# Patient Record
Sex: FEMALE | Race: BLACK OR AFRICAN AMERICAN | NOT HISPANIC OR LATINO | Employment: OTHER | URBAN - METROPOLITAN AREA
[De-identification: names, ages, dates, MRNs, and addresses within clinical notes are randomized per-mention and may not be internally consistent; named-entity substitution may affect disease eponyms.]

---

## 2010-11-03 LAB
CHOLESTEROL, TOTAL: 231 MG/DL (ref 0–200)
HDLC SERPL-MCNC: 56 MG/DL (ref 40–59)
LDLC SERPL CALC-MCNC: 160 MG/DL (ref 0–99)
TRIGL SERPL-MCNC: 77 MG/DL (ref 0–149)
VLDLC SERPL-MCNC: 15 MG/DL (ref 5–40)

## 2020-02-03 ENCOUNTER — TELEPHONE (OUTPATIENT)
Dept: INTERNAL MEDICINE | Facility: CLINIC | Age: 65
End: 2020-02-03

## 2020-02-03 ENCOUNTER — HOSPITAL ENCOUNTER (OUTPATIENT)
Dept: RADIOLOGY | Facility: HOSPITAL | Age: 65
Discharge: HOME | DRG: 744 | End: 2020-02-03
Attending: FAMILY MEDICINE
Payer: COMMERCIAL

## 2020-02-03 ENCOUNTER — OFFICE VISIT (OUTPATIENT)
Dept: INTERNAL MEDICINE | Facility: CLINIC | Age: 65
End: 2020-02-03
Payer: COMMERCIAL

## 2020-02-03 VITALS
SYSTOLIC BLOOD PRESSURE: 148 MMHG | HEIGHT: 63 IN | OXYGEN SATURATION: 99 % | TEMPERATURE: 99 F | HEART RATE: 93 BPM | BODY MASS INDEX: 31.92 KG/M2 | DIASTOLIC BLOOD PRESSURE: 80 MMHG | WEIGHT: 180.13 LBS

## 2020-02-03 DIAGNOSIS — R07.89 CHEST PRESSURE: ICD-10-CM

## 2020-02-03 DIAGNOSIS — R53.83 FATIGUE, UNSPECIFIED TYPE: ICD-10-CM

## 2020-02-03 DIAGNOSIS — I10 HYPERTENSION, UNSPECIFIED TYPE: ICD-10-CM

## 2020-02-03 DIAGNOSIS — R06.02 SOB (SHORTNESS OF BREATH): ICD-10-CM

## 2020-02-03 DIAGNOSIS — E04.1 THYROID NODULE: ICD-10-CM

## 2020-02-03 DIAGNOSIS — N93.9 VAGINAL BLEEDING: Primary | ICD-10-CM

## 2020-02-03 PROCEDURE — 99204 OFFICE O/P NEW MOD 45 MIN: CPT | Mod: S$GLB,,, | Performed by: FAMILY MEDICINE

## 2020-02-03 PROCEDURE — 93010 ELECTROCARDIOGRAM REPORT: CPT | Mod: S$GLB,,, | Performed by: INTERNAL MEDICINE

## 2020-02-03 PROCEDURE — 93005 ELECTROCARDIOGRAM TRACING: CPT | Mod: S$GLB,,, | Performed by: FAMILY MEDICINE

## 2020-02-03 PROCEDURE — 3077F PR MOST RECENT SYSTOLIC BLOOD PRESSURE >= 140 MM HG: ICD-10-PCS | Mod: CPTII,S$GLB,, | Performed by: FAMILY MEDICINE

## 2020-02-03 PROCEDURE — 99204 PR OFFICE/OUTPT VISIT, NEW, LEVL IV, 45-59 MIN: ICD-10-PCS | Mod: S$GLB,,, | Performed by: FAMILY MEDICINE

## 2020-02-03 PROCEDURE — 3008F PR BODY MASS INDEX (BMI) DOCUMENTED: ICD-10-PCS | Mod: CPTII,S$GLB,, | Performed by: FAMILY MEDICINE

## 2020-02-03 PROCEDURE — 1101F PT FALLS ASSESS-DOCD LE1/YR: CPT | Mod: CPTII,S$GLB,, | Performed by: FAMILY MEDICINE

## 2020-02-03 PROCEDURE — 71046 X-RAY EXAM CHEST 2 VIEWS: CPT | Mod: 26,,, | Performed by: RADIOLOGY

## 2020-02-03 PROCEDURE — 3008F BODY MASS INDEX DOCD: CPT | Mod: CPTII,S$GLB,, | Performed by: FAMILY MEDICINE

## 2020-02-03 PROCEDURE — 99999 PR PBB SHADOW E&M-EST. PATIENT-LVL IV: CPT | Mod: PBBFAC,,, | Performed by: FAMILY MEDICINE

## 2020-02-03 PROCEDURE — 99999 PR PBB SHADOW E&M-EST. PATIENT-LVL IV: ICD-10-PCS | Mod: PBBFAC,,, | Performed by: FAMILY MEDICINE

## 2020-02-03 PROCEDURE — 93010 EKG 12-LEAD: ICD-10-PCS | Mod: S$GLB,,, | Performed by: INTERNAL MEDICINE

## 2020-02-03 PROCEDURE — 3077F SYST BP >= 140 MM HG: CPT | Mod: CPTII,S$GLB,, | Performed by: FAMILY MEDICINE

## 2020-02-03 PROCEDURE — 3079F PR MOST RECENT DIASTOLIC BLOOD PRESSURE 80-89 MM HG: ICD-10-PCS | Mod: CPTII,S$GLB,, | Performed by: FAMILY MEDICINE

## 2020-02-03 PROCEDURE — 71046 XR CHEST PA AND LATERAL: ICD-10-PCS | Mod: 26,,, | Performed by: RADIOLOGY

## 2020-02-03 PROCEDURE — 93005 EKG 12-LEAD: ICD-10-PCS | Mod: S$GLB,,, | Performed by: FAMILY MEDICINE

## 2020-02-03 PROCEDURE — 3079F DIAST BP 80-89 MM HG: CPT | Mod: CPTII,S$GLB,, | Performed by: FAMILY MEDICINE

## 2020-02-03 PROCEDURE — 71046 X-RAY EXAM CHEST 2 VIEWS: CPT | Mod: TC,FY,PO

## 2020-02-03 PROCEDURE — 1101F PR PT FALLS ASSESS DOC 0-1 FALLS W/OUT INJ PAST YR: ICD-10-PCS | Mod: CPTII,S$GLB,, | Performed by: FAMILY MEDICINE

## 2020-02-03 RX ORDER — FUROSEMIDE 40 MG/1
1 TABLET ORAL 2 TIMES DAILY
Status: ON HOLD | COMMUNITY
Start: 2020-01-13 | End: 2020-02-06 | Stop reason: SDUPTHER

## 2020-02-03 RX ORDER — LOSARTAN POTASSIUM 50 MG/1
50 TABLET ORAL DAILY
Qty: 30 TABLET | Refills: 3 | Status: SHIPPED | OUTPATIENT
Start: 2020-02-03 | End: 2020-09-04 | Stop reason: SDUPTHER

## 2020-02-04 ENCOUNTER — TELEPHONE (OUTPATIENT)
Dept: OBSTETRICS AND GYNECOLOGY | Facility: CLINIC | Age: 65
End: 2020-02-04

## 2020-02-04 ENCOUNTER — HOSPITAL ENCOUNTER (OUTPATIENT)
Facility: HOSPITAL | Age: 65
Discharge: HOME | DRG: 744 | End: 2020-02-06
Attending: EMERGENCY MEDICINE | Admitting: EMERGENCY MEDICINE
Payer: COMMERCIAL

## 2020-02-04 ENCOUNTER — PATIENT MESSAGE (OUTPATIENT)
Dept: INTERNAL MEDICINE | Facility: CLINIC | Age: 65
End: 2020-02-04

## 2020-02-04 DIAGNOSIS — N95.0 POSTMENOPAUSAL BLEEDING: ICD-10-CM

## 2020-02-04 DIAGNOSIS — N93.8 DUB (DYSFUNCTIONAL UTERINE BLEEDING): ICD-10-CM

## 2020-02-04 DIAGNOSIS — D64.9 SYMPTOMATIC ANEMIA: Primary | ICD-10-CM

## 2020-02-04 DIAGNOSIS — D64.9 ANEMIA: ICD-10-CM

## 2020-02-04 PROBLEM — E61.1 LOW IRON: Status: ACTIVE | Noted: 2020-02-04

## 2020-02-04 PROBLEM — I51.7 CARDIOMEGALY: Status: ACTIVE | Noted: 2020-02-04

## 2020-02-04 PROBLEM — D75.839 THROMBOCYTOSIS: Status: ACTIVE | Noted: 2020-02-04

## 2020-02-04 PROBLEM — R74.01 ELEVATED AST (SGOT): Status: ACTIVE | Noted: 2020-02-04

## 2020-02-04 PROBLEM — I10 ESSENTIAL HYPERTENSION: Status: ACTIVE | Noted: 2020-02-04

## 2020-02-04 PROBLEM — D62 ACUTE BLOOD LOSS ANEMIA: Status: ACTIVE | Noted: 2020-02-04

## 2020-02-04 LAB
ABO + RH BLD: NORMAL
ALBUMIN SERPL BCP-MCNC: 3.9 G/DL (ref 3.5–5.2)
ALP SERPL-CCNC: 66 U/L (ref 55–135)
ALT SERPL W/O P-5'-P-CCNC: 29 U/L (ref 10–44)
ANION GAP SERPL CALC-SCNC: 10 MMOL/L (ref 8–16)
ANISOCYTOSIS BLD QL SMEAR: ABNORMAL
APTT BLDCRRT: 21.3 SEC (ref 21–32)
AST SERPL-CCNC: 46 U/L (ref 10–40)
BASOPHILS # BLD AUTO: 0.02 K/UL (ref 0–0.2)
BASOPHILS NFR BLD: 0.5 % (ref 0–1.9)
BILIRUB SERPL-MCNC: 0.4 MG/DL (ref 0.1–1)
BILIRUB UR QL STRIP: ABNORMAL
BLD GP AB SCN CELLS X3 SERPL QL: NORMAL
BLD PROD TYP BPU: NORMAL
BLD PROD TYP BPU: NORMAL
BLOOD UNIT EXPIRATION DATE: NORMAL
BLOOD UNIT EXPIRATION DATE: NORMAL
BLOOD UNIT TYPE CODE: 6200
BLOOD UNIT TYPE CODE: 6200
BLOOD UNIT TYPE: NORMAL
BLOOD UNIT TYPE: NORMAL
BUN SERPL-MCNC: 12 MG/DL (ref 8–23)
BURR CELLS BLD QL SMEAR: ABNORMAL
CALCIUM SERPL-MCNC: 8.8 MG/DL (ref 8.7–10.5)
CHLORIDE SERPL-SCNC: 106 MMOL/L (ref 95–110)
CLARITY UR: ABNORMAL
CO2 SERPL-SCNC: 22 MMOL/L (ref 23–29)
CODING SYSTEM: NORMAL
CODING SYSTEM: NORMAL
COLOR UR: ABNORMAL
CREAT SERPL-MCNC: 0.8 MG/DL (ref 0.5–1.4)
DIFFERENTIAL METHOD: ABNORMAL
DISPENSE STATUS: NORMAL
DISPENSE STATUS: NORMAL
EOSINOPHIL # BLD AUTO: 0.1 K/UL (ref 0–0.5)
EOSINOPHIL NFR BLD: 1.2 % (ref 0–8)
ERYTHROCYTE [DISTWIDTH] IN BLOOD BY AUTOMATED COUNT: 22.5 % (ref 11.5–14.5)
EST. GFR  (AFRICAN AMERICAN): >60 ML/MIN/1.73 M^2
EST. GFR  (NON AFRICAN AMERICAN): >60 ML/MIN/1.73 M^2
GLUCOSE SERPL-MCNC: 105 MG/DL (ref 70–110)
GLUCOSE UR QL STRIP: ABNORMAL
HCT VFR BLD AUTO: 16.5 % (ref 37–48.5)
HCV AB SERPL QL IA: NEGATIVE
HGB BLD-MCNC: 4.2 G/DL (ref 12–16)
HGB UR QL STRIP: ABNORMAL
HYPOCHROMIA BLD QL SMEAR: ABNORMAL
IMM GRANULOCYTES # BLD AUTO: 0.01 K/UL (ref 0–0.04)
IMM GRANULOCYTES NFR BLD AUTO: 0.2 % (ref 0–0.5)
INR PPP: 1 (ref 0.8–1.2)
KETONES UR QL STRIP: ABNORMAL
LEUKOCYTE ESTERASE UR QL STRIP: ABNORMAL
LYMPHOCYTES # BLD AUTO: 1.7 K/UL (ref 1–4.8)
LYMPHOCYTES NFR BLD: 41 % (ref 18–48)
MCH RBC QN AUTO: 14.6 PG (ref 27–31)
MCHC RBC AUTO-ENTMCNC: 25.5 G/DL (ref 32–36)
MCV RBC AUTO: 58 FL (ref 82–98)
MICROSCOPIC COMMENT: ABNORMAL
MONOCYTES # BLD AUTO: 0.4 K/UL (ref 0.3–1)
MONOCYTES NFR BLD: 9.8 % (ref 4–15)
NEUTROPHILS # BLD AUTO: 1.9 K/UL (ref 1.8–7.7)
NEUTROPHILS NFR BLD: 47.3 % (ref 38–73)
NITRITE UR QL STRIP: ABNORMAL
NRBC BLD-RTO: 1 /100 WBC
NUM UNITS TRANS PACKED RBC: NORMAL
NUM UNITS TRANS PACKED RBC: NORMAL
OVALOCYTES BLD QL SMEAR: ABNORMAL
PH UR STRIP: ABNORMAL [PH] (ref 5–8)
PLATELET # BLD AUTO: 427 K/UL (ref 150–350)
PLATELET BLD QL SMEAR: ABNORMAL
PMV BLD AUTO: 10.2 FL (ref 9.2–12.9)
POIKILOCYTOSIS BLD QL SMEAR: ABNORMAL
POTASSIUM SERPL-SCNC: 4 MMOL/L (ref 3.5–5.1)
PROT SERPL-MCNC: 7.3 G/DL (ref 6–8.4)
PROT UR QL STRIP: ABNORMAL
PROTHROMBIN TIME: 10.5 SEC (ref 9–12.5)
RBC # BLD AUTO: 2.87 M/UL (ref 4–5.4)
RBC #/AREA URNS HPF: >100 /HPF (ref 0–4)
SODIUM SERPL-SCNC: 138 MMOL/L (ref 136–145)
SP GR UR STRIP: ABNORMAL (ref 1–1.03)
TARGETS BLD QL SMEAR: ABNORMAL
URN SPEC COLLECT METH UR: ABNORMAL
UROBILINOGEN UR STRIP-ACNC: ABNORMAL EU/DL
WBC # BLD AUTO: 4.07 K/UL (ref 3.9–12.7)

## 2020-02-04 PROCEDURE — 25000003 PHARM REV CODE 250: Performed by: EMERGENCY MEDICINE

## 2020-02-04 PROCEDURE — 58100 BIOPSY OF UTERUS LINING: CPT

## 2020-02-04 PROCEDURE — 63600175 PHARM REV CODE 636 W HCPCS: Performed by: NURSE PRACTITIONER

## 2020-02-04 PROCEDURE — 86803 HEPATITIS C AB TEST: CPT

## 2020-02-04 PROCEDURE — 88305 TISSUE EXAM BY PATHOLOGIST: CPT | Performed by: PATHOLOGY

## 2020-02-04 PROCEDURE — G0378 HOSPITAL OBSERVATION PER HR: HCPCS

## 2020-02-04 PROCEDURE — 85730 THROMBOPLASTIN TIME PARTIAL: CPT

## 2020-02-04 PROCEDURE — 36430 TRANSFUSION BLD/BLD COMPNT: CPT | Mod: 59

## 2020-02-04 PROCEDURE — 25000003 PHARM REV CODE 250: Performed by: NURSE PRACTITIONER

## 2020-02-04 PROCEDURE — 86920 COMPATIBILITY TEST SPIN: CPT

## 2020-02-04 PROCEDURE — 85025 COMPLETE CBC W/AUTO DIFF WBC: CPT

## 2020-02-04 PROCEDURE — 21400001 HC TELEMETRY ROOM

## 2020-02-04 PROCEDURE — 96374 THER/PROPH/DIAG INJ IV PUSH: CPT | Mod: 59

## 2020-02-04 PROCEDURE — 25000003 PHARM REV CODE 250: Performed by: OBSTETRICS & GYNECOLOGY

## 2020-02-04 PROCEDURE — 88305 TISSUE EXAM BY PATHOLOGIST: CPT | Mod: 26,,, | Performed by: PATHOLOGY

## 2020-02-04 PROCEDURE — 93010 ELECTROCARDIOGRAM REPORT: CPT | Mod: ,,, | Performed by: INTERNAL MEDICINE

## 2020-02-04 PROCEDURE — 88305 TISSUE EXAM BY PATHOLOGIST: ICD-10-PCS | Mod: 26,,, | Performed by: PATHOLOGY

## 2020-02-04 PROCEDURE — 96376 TX/PRO/DX INJ SAME DRUG ADON: CPT | Mod: 59

## 2020-02-04 PROCEDURE — 99291 CRITICAL CARE FIRST HOUR: CPT | Mod: 25

## 2020-02-04 PROCEDURE — 93005 ELECTROCARDIOGRAM TRACING: CPT

## 2020-02-04 PROCEDURE — 86901 BLOOD TYPING SEROLOGIC RH(D): CPT

## 2020-02-04 PROCEDURE — 85610 PROTHROMBIN TIME: CPT

## 2020-02-04 PROCEDURE — 81000 URINALYSIS NONAUTO W/SCOPE: CPT

## 2020-02-04 PROCEDURE — 80053 COMPREHEN METABOLIC PANEL: CPT

## 2020-02-04 PROCEDURE — 93010 EKG 12-LEAD: ICD-10-PCS | Mod: ,,, | Performed by: INTERNAL MEDICINE

## 2020-02-04 PROCEDURE — P9016 RBC LEUKOCYTES REDUCED: HCPCS

## 2020-02-04 PROCEDURE — 36415 COLL VENOUS BLD VENIPUNCTURE: CPT

## 2020-02-04 RX ORDER — MEDROXYPROGESTERONE ACETATE 5 MG/1
10 TABLET ORAL DAILY
Status: DISCONTINUED | OUTPATIENT
Start: 2020-02-04 | End: 2020-02-06 | Stop reason: HOSPADM

## 2020-02-04 RX ORDER — ONDANSETRON 2 MG/ML
4 INJECTION INTRAMUSCULAR; INTRAVENOUS EVERY 4 HOURS PRN
Status: DISCONTINUED | OUTPATIENT
Start: 2020-02-04 | End: 2020-02-06 | Stop reason: HOSPADM

## 2020-02-04 RX ORDER — TALC
6 POWDER (GRAM) TOPICAL NIGHTLY PRN
Status: DISCONTINUED | OUTPATIENT
Start: 2020-02-04 | End: 2020-02-06 | Stop reason: HOSPADM

## 2020-02-04 RX ORDER — FERROUS SULFATE 325(65) MG
325 TABLET, DELAYED RELEASE (ENTERIC COATED) ORAL 2 TIMES DAILY WITH MEALS
Status: DISCONTINUED | OUTPATIENT
Start: 2020-02-06 | End: 2020-02-06 | Stop reason: HOSPADM

## 2020-02-04 RX ORDER — ACETAMINOPHEN 325 MG/1
650 TABLET ORAL EVERY 4 HOURS PRN
Status: DISCONTINUED | OUTPATIENT
Start: 2020-02-04 | End: 2020-02-06 | Stop reason: HOSPADM

## 2020-02-04 RX ORDER — ASCORBIC ACID 500 MG
500 TABLET ORAL NIGHTLY
Status: DISCONTINUED | OUTPATIENT
Start: 2020-02-04 | End: 2020-02-06 | Stop reason: HOSPADM

## 2020-02-04 RX ORDER — HYDROCODONE BITARTRATE AND ACETAMINOPHEN 500; 5 MG/1; MG/1
TABLET ORAL
Status: DISCONTINUED | OUTPATIENT
Start: 2020-02-04 | End: 2020-02-06 | Stop reason: HOSPADM

## 2020-02-04 RX ORDER — MEDROXYPROGESTERONE ACETATE 5 MG/1
5 TABLET ORAL DAILY
Status: DISCONTINUED | OUTPATIENT
Start: 2020-02-04 | End: 2020-02-04

## 2020-02-04 RX ORDER — LOSARTAN POTASSIUM 50 MG/1
50 TABLET ORAL DAILY
Status: DISCONTINUED | OUTPATIENT
Start: 2020-02-05 | End: 2020-02-04

## 2020-02-04 RX ORDER — POLYETHYLENE GLYCOL 3350 17 G/17G
17 POWDER, FOR SOLUTION ORAL 2 TIMES DAILY PRN
Status: DISCONTINUED | OUTPATIENT
Start: 2020-02-04 | End: 2020-02-06 | Stop reason: HOSPADM

## 2020-02-04 RX ORDER — FUROSEMIDE 10 MG/ML
20 INJECTION INTRAMUSCULAR; INTRAVENOUS 2 TIMES DAILY PRN
Status: COMPLETED | OUTPATIENT
Start: 2020-02-04 | End: 2020-02-04

## 2020-02-04 RX ORDER — SODIUM CHLORIDE 0.9 % (FLUSH) 0.9 %
10 SYRINGE (ML) INJECTION
Status: DISCONTINUED | OUTPATIENT
Start: 2020-02-04 | End: 2020-02-06 | Stop reason: HOSPADM

## 2020-02-04 RX ORDER — FUROSEMIDE 10 MG/ML
20 INJECTION INTRAMUSCULAR; INTRAVENOUS ONCE
Status: DISCONTINUED | OUTPATIENT
Start: 2020-02-04 | End: 2020-02-04

## 2020-02-04 RX ORDER — FUROSEMIDE 40 MG/1
40 TABLET ORAL 2 TIMES DAILY
Status: DISCONTINUED | OUTPATIENT
Start: 2020-02-04 | End: 2020-02-04

## 2020-02-04 RX ORDER — LOSARTAN POTASSIUM 25 MG/1
25 TABLET ORAL DAILY
Status: DISCONTINUED | OUTPATIENT
Start: 2020-02-05 | End: 2020-02-06 | Stop reason: HOSPADM

## 2020-02-04 RX ORDER — FUROSEMIDE 40 MG/1
40 TABLET ORAL 2 TIMES DAILY
Status: DISCONTINUED | OUTPATIENT
Start: 2020-02-05 | End: 2020-02-06 | Stop reason: HOSPADM

## 2020-02-04 RX ADMIN — MEDROXYPROGESTERONE ACETATE 5 MG: 5 TABLET ORAL at 11:02

## 2020-02-04 RX ADMIN — MEDROXYPROGESTERONE ACETATE 10 MG: 5 TABLET ORAL at 03:02

## 2020-02-04 RX ADMIN — OXYCODONE HYDROCHLORIDE AND ACETAMINOPHEN 500 MG: 500 TABLET ORAL at 08:02

## 2020-02-04 RX ADMIN — FUROSEMIDE 20 MG: 10 INJECTION, SOLUTION INTRAMUSCULAR; INTRAVENOUS at 02:02

## 2020-02-04 RX ADMIN — FUROSEMIDE 20 MG: 10 INJECTION, SOLUTION INTRAMUSCULAR; INTRAVENOUS at 05:02

## 2020-02-04 NOTE — H&P
"[facility] Hospital Medicine  History & Physical    Patient Name: Liliane Ford  MRN: [id number]  Admission Date: 2/4/2020  Attending Physician: Ihsan Sullivan MD   Primary Care Provider: Primary Doctor No    Patient seen in the emergency room    Patient information was obtained from patient and ER records.     Subjective:     Principal Problem:Symptomatic anemia    Chief Complaint:   Chief Complaint   Patient presents with    Anemia     sent from Dr. Martin for low blood count Hgb-4 & Hct-17; pt has been having heavy vaginal bleeding off and on since august        HPI: This is a 64-year-old female who came into the emergency room with reports of extreme fatigue and ongoing vaginal bleeding with blood clots.  Patient reports she has been having intermittent heavy vaginal bleeding, at times with large blood clots, since August of 2019. Patient states she has not followed up with a primary care physician or a gyn or any physician at all for this problem.   Patient reports that her menses.  Initially when she was 56 years old and she had not had any problems until the intermittent bleeding began in August 2019 all of a sudden.  She denies any history of hysterectomy.  She reports her last Pap smear and mammogram was in 2004 and work "okay".  She denies any  previously diagnosed vaginal or gyn issues.  She denies any abdominal cramps or vaginal discharge.  Currently no nausea, vomiting, or abdominal pain.  She denies any diarrhea or constipation and states her last colonoscopy was greater than 10 years ago and was "okay".  Patient states recently she was started on some "blood pressure medication and a fluid pill" which she takes intermittently.   Patient was evaluated emergency room where she was noted to have  A hemoglobin of 4.2 and hematocrit of 17.9.  Patient has been placed in the hospital for further evaluation, recommendation, and blood transfusion.         Past Medical History:   Diagnosis " Date    Pulmonary edema        Past Surgical History:   Procedure Laterality Date     SECTION      x 2       Review of patient's allergies indicates:   Allergen Reactions    Penicillins Rash       No current facility-administered medications on file prior to encounter.      Current Outpatient Medications on File Prior to Encounter   Medication Sig    furosemide (LASIX) 40 MG tablet Take 1 tablet by mouth 2 (two) times daily.    losartan (COZAAR) 50 MG tablet Take 1 tablet (50 mg total) by mouth once daily.     Family History     Problem Relation (Age of Onset)    Hypertension Mother        Tobacco Use    Smoking status: Former Smoker    Smokeless tobacco: Never Used   Substance and Sexual Activity    Alcohol use: Never     Frequency: Never    Drug use: Never    Sexual activity: Not Currently     Partners: Male     Review of Systems   Constitutional: Positive for activity change, appetite change and fatigue. Negative for chills, diaphoresis and fever.   HENT: Negative for ear discharge, ear pain and facial swelling.    Eyes: Negative for pain and redness.   Respiratory: Positive for shortness of breath. Negative for cough.         Patient reports shortness of breath upon exertion   Cardiovascular: Positive for palpitations and leg swelling.        Patient reports palpitations upon exertion    Patient reports occasional bilateral lower extremity edema   Gastrointestinal: Negative for abdominal distention, abdominal pain, constipation, diarrhea, nausea and vomiting.   Endocrine: Negative for polydipsia and polyphagia.   Genitourinary: Positive for vaginal bleeding. Negative for difficulty urinating, dysuria, flank pain and hematuria.        Patient reports enter bleeding since 2019, occasionally with large blood clots.    Patient states she has not discussed this  problem with a physician prior to today.   Musculoskeletal: Negative for gait problem, neck pain and neck stiffness.   Skin:  Positive for pallor.   Allergic/Immunologic: Negative for food allergies.   Neurological: Positive for weakness. Negative for seizures, facial asymmetry and speech difficulty.   Hematological: Does not bruise/bleed easily.   Psychiatric/Behavioral: Negative for agitation, behavioral problems, confusion, hallucinations and suicidal ideas. The patient is not nervous/anxious.      Objective:     Vital Signs (Most Recent):  Temp: 97.8 °F (36.6 °C) (02/04/20 1431)  Pulse: 83 (02/04/20 1500)  Resp: 16 (02/04/20 1431)  BP: 133/67 (02/04/20 1431)  SpO2: 100 % (02/04/20 1431) Vital Signs (24h Range):  Temp:  [97.4 °F (36.3 °C)-98.7 °F (37.1 °C)] 97.8 °F (36.6 °C)  Pulse:  [82-99] 83  Resp:  [16-20] 16  SpO2:  [100 %] 100 %  BP: (133-149)/(67-85) 133/67     Weight: 81 kg (178 lb 7.4 oz)  Body mass index is 31.61 kg/m².    Physical Exam   Constitutional: She is oriented to person, place, and time. She appears well-developed and well-nourished.   Weak, fatigued   HENT:   Head: Normocephalic and atraumatic.   Eyes: Pupils are equal, round, and reactive to light. Conjunctivae and EOM are normal. Right eye exhibits no discharge. Left eye exhibits no discharge.   Neck: Normal range of motion. Neck supple. No JVD present.   Cardiovascular: Normal rate, regular rhythm and intact distal pulses.   Pulmonary/Chest: Breath sounds normal. No respiratory distress.   Short of breath upon exertion   Abdominal: Soft. Bowel sounds are normal. She exhibits no distension. There is no tenderness. There is no guarding.   Genitourinary:   Genitourinary Comments: Not examined   Musculoskeletal: Normal range of motion. She exhibits no edema.   Neurological: She is alert and oriented to person, place, and time. No cranial nerve deficit.   Skin: Skin is warm and dry. Capillary refill takes less than 2 seconds. She is not diaphoretic. There is pallor.   Psychiatric: She has a normal mood and affect. Her behavior is normal. Judgment and thought content  normal.         CRANIAL NERVES     CN III, IV, VI   Pupils are equal, round, and reactive to light.  Extraocular motions are normal.        Significant Labs: Reviewed       Folate and vitamin B12 level ordered      Significant Imaging: Reviewed    Assessment/Plan:     * Symptomatic anemia   Secondary to acute blood loss secondary to postmenopausal vaginal bleeding-   Patient to receive 2 units of packed red blood cells today   Repeat CBC in a.m.   Gyn consulted for further and evaluation recommendations      Low iron   Patient with low iron, low ferritin, elevated TIBC, and saturated ferritin unable to be measured   Patient to receive 2 units of packed red blood cells today -will give 1 dose of 200 mg of IV Venofer  in a.m.   Add multivitamin, ferrous sulfate, and p.m. Vitamin-C  for home use   Will go ahead and check folate and vitamin B12 level also       Cardiomegaly   As noted on chest x-ray, patient reports a history of bilateral lower extremity edema   will give a dose of IV Lasix after 1st and 2nd unit of packed red blood cells and monitor closely for any signs of volume overload      Elevated AST (SGOT)   Monitor   No reported history of alcohol   Repeat CMP in the morning       Thrombocytosis   Monitor   Patient currently vaginal bleeding   Repeat CBC in a.m.       Essential hypertension   Monitor   Resume home Lasix tomorrow, resume home losartan at half dose tomorrow   Titrate antihypertensives as needed       Postmenopausal bleeding   Gyn consulted   Provera started   Monitor anemia         VTE Risk Mitigation (From admission, onward)         Ordered     Place BEVERLY hose  Until discontinued      02/04/20 1610     IP VTE HIGH RISK PATIENT  Once      02/04/20 1610                Time spent seeing patient( greater than 1/2 spent in direct contact) : 74 minutes       ESTHELA Johnson  Department of Hospital Medicine   [facility]

## 2020-02-04 NOTE — PROGRESS NOTES
"Subjective:      Patient ID: Liliane Ford is a 64 y.o. female.    Chief Complaint: Fatigue (said it is her heart ) and Foot Swelling (vaginal bleeding )    HPI  63 yo female here to establish care.  She reports that she started having vaginal bleeding back in Aug 2019.  It would come and go. Reports heavy/clotting at times.  She says she bled at one point for 30 days.  She has been in menopause she reports since age 56.  She was not evaluated.  Lately, she is feeling more tired and can't exercise/do cross fit like she was.  She feels almost pressure in her chest.  No chest pain.  She has leg swelling, was seen in UC and given lasix.  Was also given losartan at some point for BP but she doesn't like meds so she didn't take it.  No weight loss, more gain due to not exercising like she had been.  She is more pale and is eating a lot of ice.    History reviewed. No pertinent past medical history.  Family History   Problem Relation Age of Onset    Hypertension Mother      Past Surgical History:   Procedure Laterality Date     SECTION      x 2     Social History     Tobacco Use    Smoking status: Former Smoker    Smokeless tobacco: Never Used   Substance Use Topics    Alcohol use: Never     Frequency: Never    Drug use: Never       BP (!) 148/80   Pulse 93   Temp 98.9 °F (37.2 °C) (Oral)   Ht 5' 3" (1.6 m)   Wt 81.7 kg (180 lb 1.9 oz)   SpO2 99%   BMI 31.91 kg/m²     Review of Systems   Constitutional: Positive for activity change and fatigue. Negative for appetite change, chills, diaphoresis, fever and unexpected weight change.   HENT: Negative for ear pain, hearing loss, postnasal drip, rhinorrhea and tinnitus.    Eyes: Negative for visual disturbance.   Respiratory: Positive for shortness of breath. Negative for cough and wheezing.    Cardiovascular: Positive for leg swelling. Negative for chest pain and palpitations.   Gastrointestinal: Negative for abdominal distention.   Genitourinary: " Positive for vaginal bleeding. Negative for dysuria, frequency, hematuria and urgency.   Musculoskeletal: Negative for back pain and joint swelling.   Neurological: Positive for weakness. Negative for headaches.   Hematological: Negative for adenopathy.   Psychiatric/Behavioral: Negative for confusion and decreased concentration.       Objective:     Physical Exam   Constitutional: She is oriented to person, place, and time. She appears well-developed and well-nourished. No distress.   HENT:   Right Ear: External ear normal.   Left Ear: External ear normal.   Nose: Nose normal.   Mouth/Throat: Oropharynx is clear and moist.   Eyes: Pupils are equal, round, and reactive to light. Conjunctivae are normal.   Neck: Normal range of motion. Neck supple. Carotid bruit is not present.   Cardiovascular: Normal rate and regular rhythm.   Murmur heard.  Pulmonary/Chest: Effort normal and breath sounds normal. No respiratory distress. She has no wheezes. She has no rales.   Abdominal: Soft. Bowel sounds are normal. She exhibits no distension. There is no tenderness. There is no guarding.   Musculoskeletal: She exhibits edema.   BL trace edema   Neurological: She is alert and oriented to person, place, and time. No cranial nerve deficit.   Skin: Skin is warm and dry. There is pallor.   Psychiatric: She has a normal mood and affect. Her behavior is normal. Judgment and thought content normal.   Nursing note and vitals reviewed.      Lab Results   Component Value Date    WBC 5.14 02/03/2020    HGB 4.2 (LL) 02/03/2020    HCT 17.9 (LL) 02/03/2020     (H) 02/03/2020    CHOL 185 11/08/2007    TRIG 50 11/08/2007    HDL 51 11/08/2007    ALT 31 02/03/2020    AST 32 02/03/2020     02/03/2020    K 3.5 02/03/2020     02/03/2020    CREATININE 0.8 02/03/2020    BUN 18 02/03/2020    CO2 25 02/03/2020    TSH 3.234 02/03/2020       Assessment:     1. Vaginal bleeding    2. Fatigue, unspecified type    3. Thyroid nodule    4.  Chest pressure    5. SOB (shortness of breath)    6. Hypertension, unspecified type         Plan:     Vaginal bleeding  -     Ambulatory referral to Gynecology  -     CBC auto differential; Future; Expected date: 02/03/2020  -     Ferritin; Future; Expected date: 02/03/2020  -     Iron and TIBC; Future; Expected date: 02/03/2020  -     US Pelvis Complete Non OB; Future; Expected date: 02/03/2020    Fatigue, unspecified type  -     CBC auto differential; Future; Expected date: 02/03/2020  -     Comprehensive metabolic panel; Future; Expected date: 02/03/2020  -     TSH; Future; Expected date: 02/03/2020  -     Ferritin; Future; Expected date: 02/03/2020  -     Iron and TIBC; Future; Expected date: 02/03/2020  -     2D Echo w/ Color Flow Doppler; Future    Thyroid nodule  -     TSH; Future; Expected date: 02/03/2020    Chest pressure  -     EKG 12-lead  -     2D Echo w/ Color Flow Doppler; Future  -     X-Ray Chest PA And Lateral; Future; Expected date: 02/03/2020    SOB (shortness of breath)  -     2D Echo w/ Color Flow Doppler; Future  -     X-Ray Chest PA And Lateral; Future; Expected date: 02/03/2020    Hypertension, unspecified type    Other orders  -     losartan (COZAAR) 50 MG tablet; Take 1 tablet (50 mg total) by mouth once daily.  Dispense: 30 tablet; Refill: 3    Labs ordered//critical H&H//pt called and advised to go to ER overnight  Needs to see Gyn, get vag US  Suspect she has anemia//likely cause for her symptoms  EKG done/CXR done  Advised to use lasix daily  Losartan sent in for BP  Will get Echo  Short term f/u

## 2020-02-04 NOTE — SUBJECTIVE & OBJECTIVE
Past Medical History:   Diagnosis Date    Pulmonary edema        Past Surgical History:   Procedure Laterality Date     SECTION      x 2       Review of patient's allergies indicates:   Allergen Reactions    Penicillins Rash       No current facility-administered medications on file prior to encounter.      Current Outpatient Medications on File Prior to Encounter   Medication Sig    furosemide (LASIX) 40 MG tablet Take 1 tablet by mouth 2 (two) times daily.    losartan (COZAAR) 50 MG tablet Take 1 tablet (50 mg total) by mouth once daily.     Family History     Problem Relation (Age of Onset)    Hypertension Mother        Tobacco Use    Smoking status: Former Smoker    Smokeless tobacco: Never Used   Substance and Sexual Activity    Alcohol use: Never     Frequency: Never    Drug use: Never    Sexual activity: Not Currently     Partners: Male     Review of Systems   Constitutional: Positive for activity change, appetite change and fatigue. Negative for chills, diaphoresis and fever.   HENT: Negative for ear discharge, ear pain and facial swelling.    Eyes: Negative for pain and redness.   Respiratory: Positive for shortness of breath. Negative for cough.         Patient reports shortness of breath upon exertion   Cardiovascular: Positive for palpitations and leg swelling.        Patient reports palpitations upon exertion    Patient reports occasional bilateral lower extremity edema   Gastrointestinal: Negative for abdominal distention, abdominal pain, constipation, diarrhea, nausea and vomiting.   Endocrine: Negative for polydipsia and polyphagia.   Genitourinary: Positive for vaginal bleeding. Negative for difficulty urinating, dysuria, flank pain and hematuria.        Patient reports enter bleeding since 2019, occasionally with large blood clots.    Patient states she has not discussed this  problem with a physician prior to today.   Musculoskeletal: Negative for gait problem, neck  pain and neck stiffness.   Skin: Positive for pallor.   Allergic/Immunologic: Negative for food allergies.   Neurological: Positive for weakness. Negative for seizures, facial asymmetry and speech difficulty.   Hematological: Does not bruise/bleed easily.   Psychiatric/Behavioral: Negative for agitation, behavioral problems, confusion, hallucinations and suicidal ideas. The patient is not nervous/anxious.      Objective:     Vital Signs (Most Recent):  Temp: 97.8 °F (36.6 °C) (02/04/20 1431)  Pulse: 83 (02/04/20 1500)  Resp: 16 (02/04/20 1431)  BP: 133/67 (02/04/20 1431)  SpO2: 100 % (02/04/20 1431) Vital Signs (24h Range):  Temp:  [97.4 °F (36.3 °C)-98.7 °F (37.1 °C)] 97.8 °F (36.6 °C)  Pulse:  [82-99] 83  Resp:  [16-20] 16  SpO2:  [100 %] 100 %  BP: (133-149)/(67-85) 133/67     Weight: 81 kg (178 lb 7.4 oz)  Body mass index is 31.61 kg/m².    Physical Exam   Constitutional: She is oriented to person, place, and time. She appears well-developed and well-nourished.   Weak, fatigued   HENT:   Head: Normocephalic and atraumatic.   Eyes: Pupils are equal, round, and reactive to light. Conjunctivae and EOM are normal. Right eye exhibits no discharge. Left eye exhibits no discharge.   Neck: Normal range of motion. Neck supple. No JVD present.   Cardiovascular: Normal rate, regular rhythm and intact distal pulses.   Pulmonary/Chest: Breath sounds normal. No respiratory distress.   Short of breath upon exertion   Abdominal: Soft. Bowel sounds are normal. She exhibits no distension. There is no tenderness. There is no guarding.   Genitourinary:   Genitourinary Comments: Not examined   Musculoskeletal: Normal range of motion. She exhibits no edema.   Neurological: She is alert and oriented to person, place, and time. No cranial nerve deficit.   Skin: Skin is warm and dry. Capillary refill takes less than 2 seconds. She is not diaphoretic. There is pallor.   Psychiatric: She has a normal mood and affect. Her behavior is  normal. Judgment and thought content normal.         CRANIAL NERVES     CN III, IV, VI   Pupils are equal, round, and reactive to light.  Extraocular motions are normal.        Significant Labs: Reviewed       Folate and vitamin B12 level ordered      Significant Imaging: Reviewed

## 2020-02-04 NOTE — ASSESSMENT & PLAN NOTE
Patient with low iron, low ferritin, elevated TIBC, and saturated ferritin unable to be measured   Patient to receive 2 units of packed red blood cells today -will give 1 dose of 200 mg of IV Venofer  in a.m.   Add multivitamin, ferrous sulfate, and p.m. Vitamin-C  for home use   Will go ahead and check folate and vitamin B12 level also

## 2020-02-04 NOTE — PLAN OF CARE
Patient AAOx4. VSS.  Patient remained afebrile throughout the shift.  Heart rate closely monitored   Patient NSR on monitor.   2 Units of PRBC's infusing as per orders  Monitor H/H  Patient remained free of falls this shift.  Plan of care reviewed.  Patient verbalized understanding.  Patient moving/turning independently  Frequent weight shifting encouraged.  Bed low, siderails up x2, wheels locked, call light in reach.  Patient instructed to call for assistance.  Hourly rounding completed.  Will continue to monitor.

## 2020-02-04 NOTE — TELEPHONE ENCOUNTER
Called pt and she said she did not go to ER.  She is on her way to her US and echo appointments this morning at [facility].  Please advise?

## 2020-02-04 NOTE — HPI
"This is a 64-year-old female who came into the emergency room with reports of extreme fatigue and ongoing vaginal bleeding with blood clots.  Patient reports she has been having intermittent heavy vaginal bleeding, at times with large blood clots, since August of 2019. Patient states she has not followed up with a primary care physician or a gyn or any physician at all for this problem.   Patient reports that her menses.  Initially when she was 56 years old and she had not had any problems until the intermittent bleeding began in August 2019 all of a sudden.  She denies any history of hysterectomy.  She reports her last Pap smear and mammogram was in 2004 and work "okay".  She denies any  previously diagnosed vaginal or gyn issues.  She denies any abdominal cramps or vaginal discharge.  Currently no nausea, vomiting, or abdominal pain.  She denies any diarrhea or constipation and states her last colonoscopy was greater than 10 years ago and was "okay".  Patient states recently she was started on some "blood pressure medication and a fluid pill" which she takes intermittently.   Patient was evaluated emergency room where she was noted to have  A hemoglobin of 4.2 and hematocrit of 17.9.  Patient has been placed in the hospital for further evaluation, recommendation, and blood transfusion.       "

## 2020-02-04 NOTE — ED PROVIDER NOTES
SCRIBE #1 NOTE: I, Alexis Santos, am scribing for, and in the presence of, Daniel Menezes MD. I have scribed the entire note.      History      Chief Complaint   Patient presents with    Anemia     sent from Dr. Martin for low blood count Hgb-4 & Hct-17; pt has been having heavy vaginal bleeding off and on since august     Review of patient's allergies indicates:   Allergen Reactions    Penicillins Rash        HPI   HPI    2020, 9:17 AM   History obtained from the patient      History of Present Illness: Liliane Ford is a 64 y.o. female patient who presents to the Emergency Department for anemia. Pt was referred to the ED by Dr. Martin ([facility]) for further evaluation of low H&H of 4.2/17.9. Symptoms are constant and moderate in severity. No mitigating or exacerbating factors reported. Associated sxs include vaginal bleeding, fatigue, and dyspnea on exertion. Patient denies any fever, chills, n/v/d, CP, weakness, numbness, dizziness, headache, and all other sxs at this time. No further complaints or concerns at this time.     Arrival mode: Personal vehicle    PCP: Primary Doctor No       Past Medical History:  Past Medical History:   Diagnosis Date    Pulmonary edema        Past Surgical History:  Past Surgical History:   Procedure Laterality Date     SECTION      x 2         Family History:  Family History   Problem Relation Age of Onset    Hypertension Mother        Social History:  Social History     Tobacco Use    Smoking status: Former Smoker    Smokeless tobacco: Never Used   Substance and Sexual Activity    Alcohol use: Never     Frequency: Never    Drug use: Never    Sexual activity: Not Currently     Partners: Male       ROS   Review of Systems   Constitutional: Positive for fatigue. Negative for chills, diaphoresis and fever.   HENT: Negative for sore throat.    Respiratory: Positive for shortness of breath (on exertion).    Cardiovascular: Negative for chest pain.    Gastrointestinal: Negative for diarrhea, nausea and vomiting.   Genitourinary: Positive for vaginal bleeding. Negative for dysuria.   Musculoskeletal: Negative for back pain.   Skin: Negative for rash.   Neurological: Negative for dizziness, weakness, light-headedness and headaches.   Hematological: Does not bruise/bleed easily.   All other systems reviewed and are negative.    Physical Exam      Initial Vitals [02/04/20 0906]   BP Pulse Resp Temp SpO2   (!) 143/85 96 18 98.7 °F (37.1 °C) 100 %      MAP       --          Physical Exam  Nursing Notes and Vital Signs Reviewed.  Constitutional: Patient is in no acute distress. Well-developed and well-nourished.  Head: Atraumatic. Normocephalic.  Eyes: PERRL. EOM intact. Conjunctivae are pale. No scleral icterus.  ENT: Mucous membranes are moist. Oropharynx is clear and symmetric.    Neck: Supple. Full ROM. No lymphadenopathy.  Cardiovascular: Regular rate. Regular rhythm. No murmurs, rubs, or gallops. Distal pulses are 2+ and symmetric.  Pulmonary/Chest: No respiratory distress. Clear to auscultation bilaterally. No wheezing or rales.  Abdominal: Soft and non-distended.  There is no tenderness.  No rebound, guarding, or rigidity.   Pelvic: A female chaperone was present for this examination. Nl external inspection. No lesions or abnormalities were visible on the labia majora or minora. Cervical os is closed. There is no CMT. There is blood in the vaginal vault. No discharge. No adnexal tenderness. No adnexal masses.  Musculoskeletal: Moves all extremities. No obvious deformities. No edema.  Skin: Warm and dry. Pale.  Neurological:  Alert, awake, and appropriate.  Normal speech.  No acute focal neurological deficits are appreciated.  Psychiatric: Normal affect. Good eye contact. Appropriate in content.    ED Course    Critical Care  Date/Time: 2/4/2020 11:38 AM  Performed by: Daniel Menezes MD  Authorized by: Daniel Menezes MD   Direct patient  "critical care time: 20 minutes  Additional history critical care time: 5 minutes  Ordering / reviewing critical care time: 5 minutes  Documentation critical care time: 5 minutes  Consulting other physicians critical care time: 5 minutes  Total critical care time (exclusive of procedural time) : 40 minutes  Critical care time was exclusive of separately billable procedures and treating other patients and teaching time.  Critical care was necessary to treat or prevent imminent or life-threatening deterioration of the following conditions: Symptomatic anemia.  Critical care was time spent personally by me on the following activities: blood draw for specimens, development of treatment plan with patient or surrogate, discussions with consultants, interpretation of cardiac output measurements, evaluation of patient's response to treatment, examination of patient, obtaining history from patient or surrogate, ordering and performing treatments and interventions, ordering and review of laboratory studies, ordering and review of radiographic studies, pulse oximetry and re-evaluation of patient's condition.        ED Vital Signs:  Vitals:    02/04/20 0906 02/04/20 1045 02/04/20 1107 02/04/20 1122   BP: (!) 143/85 (!) 140/83 136/75 136/76   Pulse: 96 87 86 84   Resp: 18 20 18 20   Temp: 98.7 °F (37.1 °C) 98.4 °F (36.9 °C) 98.4 °F (36.9 °C) 98.3 °F (36.8 °C)   TempSrc: Oral Oral Oral Oral   SpO2: 100% 100% 100% 100%   Weight: 81 kg (178 lb 7.4 oz)      Height: 5' 3" (1.6 m)          Abnormal Lab Results:  Labs Reviewed   CBC W/ AUTO DIFFERENTIAL - Abnormal; Notable for the following components:       Result Value    RBC 2.87 (*)     Hemoglobin 4.2 (*)     Hematocrit 16.5 (*)     Mean Corpuscular Volume 58 (*)     Mean Corpuscular Hemoglobin 14.6 (*)     Mean Corpuscular Hemoglobin Conc 25.5 (*)     RDW 22.5 (*)     Platelets 427 (*)     nRBC 1 (*)     Platelet Estimate Increased (*)     All other components within normal limits "    Narrative:      HGB, HCT  critical result(s) called and verbal readback obtained   from JEISON MCCORMACK RN by NICO 02/04/2020 10:01   COMPREHENSIVE METABOLIC PANEL - Abnormal; Notable for the following components:    CO2 22 (*)     AST 46 (*)     All other components within normal limits   HEPATITIS C ANTIBODY   APTT   PROTIME-INR   TYPE & SCREEN   PREPARE RBC SOFT        All Lab Results:  Results for orders placed or performed during the hospital encounter of 02/04/20   Hepatitis C antibody   Result Value Ref Range    Hepatitis C Ab Negative Negative   APTT   Result Value Ref Range    aPTT 21.3 21.0 - 32.0 sec   Protime-INR   Result Value Ref Range    Prothrombin Time 10.5 9.0 - 12.5 sec    INR 1.0 0.8 - 1.2   CBC auto differential   Result Value Ref Range    WBC 4.07 3.90 - 12.70 K/uL    RBC 2.87 (L) 4.00 - 5.40 M/uL    Hemoglobin 4.2 (LL) 12.0 - 16.0 g/dL    Hematocrit 16.5 (LL) 37.0 - 48.5 %    Mean Corpuscular Volume 58 (L) 82 - 98 fL    Mean Corpuscular Hemoglobin 14.6 (L) 27.0 - 31.0 pg    Mean Corpuscular Hemoglobin Conc 25.5 (L) 32.0 - 36.0 g/dL    RDW 22.5 (H) 11.5 - 14.5 %    Platelets 427 (H) 150 - 350 K/uL    MPV 10.2 9.2 - 12.9 fL    Immature Granulocytes 0.2 0.0 - 0.5 %    Gran # (ANC) 1.9 1.8 - 7.7 K/uL    Immature Grans (Abs) 0.01 0.00 - 0.04 K/uL    Lymph # 1.7 1.0 - 4.8 K/uL    Mono # 0.4 0.3 - 1.0 K/uL    Eos # 0.1 0.0 - 0.5 K/uL    Baso # 0.02 0.00 - 0.20 K/uL    nRBC 1 (A) 0 /100 WBC    Gran% 47.3 38.0 - 73.0 %    Lymph% 41.0 18.0 - 48.0 %    Mono% 9.8 4.0 - 15.0 %    Eosinophil% 1.2 0.0 - 8.0 %    Basophil% 0.5 0.0 - 1.9 %    Platelet Estimate Increased (A)     Aniso Moderate     Poik Moderate     Hypo Moderate     Ovalocytes Moderate     Target Cells Occasional     German Cells Occasional     Differential Method Automated    Comprehensive metabolic panel   Result Value Ref Range    Sodium 138 136 - 145 mmol/L    Potassium 4.0 3.5 - 5.1 mmol/L    Chloride 106 95 - 110 mmol/L    CO2 22 (L) 23 -  29 mmol/L    Glucose 105 70 - 110 mg/dL    BUN, Bld 12 8 - 23 mg/dL    Creatinine 0.8 0.5 - 1.4 mg/dL    Calcium 8.8 8.7 - 10.5 mg/dL    Total Protein 7.3 6.0 - 8.4 g/dL    Albumin 3.9 3.5 - 5.2 g/dL    Total Bilirubin 0.4 0.1 - 1.0 mg/dL    Alkaline Phosphatase 66 55 - 135 U/L    AST 46 (H) 10 - 40 U/L    ALT 29 10 - 44 U/L    Anion Gap 10 8 - 16 mmol/L    eGFR if African American >60 >60 mL/min/1.73 m^2    eGFR if non African American >60 >60 mL/min/1.73 m^2   Type & Screen   Result Value Ref Range    Group & Rh A POS     Indirect Soraya NEG    Prepare RBC 2 Units; symptomatic anemia   Result Value Ref Range    UNIT NUMBER [id number]     Product Code M0903P31     DISPENSE STATUS CROSSMATCHED     CODING SYSTEM EDSV628     Unit Blood Type Code 6200     Unit Blood Type A POS     Unit Expiration [id number]     UNIT NUMBER [id number]     Product Code I4351T76     DISPENSE STATUS ISSUED     CODING SYSTEM QPYO580     Unit Blood Type Code 6200     Unit Blood Type A POS     Unit Expiration [id number]      Imaging Results:  Imaging Results          X-Ray Chest 1 View (Final result)  Result time 02/04/20 10:12:50    Final result by Nikolai Suarez MD (02/04/20 10:12:50)                 Impression:      No acute findings.      Electronically signed by: Nikolai Suarez MD  Date:    02/04/2020  Time:    10:12             Narrative:    EXAMINATION:  XR CHEST 1 VIEW    CLINICAL HISTORY:  Anemia, unspecified    TECHNIQUE:  AP view of the chest was performed.    COMPARISON:  02/03/2020    FINDINGS:  Stable cardiomegaly and pulmonary vascular prominence.  Stable left mid lung bandlike scarring.  No acute infiltrates.  No pneumothorax.  No acute osseous findings demonstrated.                               The EKG was ordered, reviewed, and independently interpreted by the ED provider.  Interpretation time: 09:46  Rate: 90 BPM  Rhythm: Unusual P axis, possible ectopic atrial rhythm.  Interpretation: Left axis deviation. Possible  anterolateral infarct, age undetermined. No STEMI.           The Emergency Provider reviewed the vital signs and test results, which are outlined above.    ED Discussion     10:32 AM: Discussed pt's case with Dr. Aquino (OB/GYN) who recommends starting the pt on Provera, and having the pt follow up with outpatient OB/GYN for postmenopausal bleeding.    11:32 AM: Discussed case with DIEGO Canela ([facility]). Dr. Sullivan agrees with current care and management of pt and accepts admission.   Admitting Service: [facility]  Admitting Physician: Dr. Sullivan  Admit to: Inpatient Med Tele    11:33 AM: Re-evaluated pt. I have discussed test results, shared treatment plan, and the need for admission with patient and family at bedside. Pt and family express understanding at this time and agree with all information. All questions answered. Pt and family have no further questions or concerns at this time. Pt is ready for admit.         ED Medication(s):  Medications   0.9%  NaCl infusion (for blood administration) (has no administration in time range)   medroxyPROGESTERone tablet 5 mg (has no administration in time range)          New Prescriptions    No medications on file         Medical Decision Making    Medical Decision Making:   Clinical Tests:   Lab Tests: Ordered and Reviewed  Radiological Study: Ordered and Reviewed  Medical Tests: Ordered and Reviewed           Scribe Attestation:   Scribe #1: I performed the above scribed service and the documentation accurately describes the services I performed. I attest to the accuracy of the note.    Attending:   Physician Attestation Statement for Scribe #1: I, Daniel Menezes MD, personally performed the services described in this documentation, as scribed by Alexis Santos, in my presence, and it is both accurate and complete.          Clinical Impression       ICD-10-CM ICD-9-CM   1. Symptomatic anemia D64.9 285.9   2. Anemia D64.9 285.9   3. DUB  (dysfunctional uterine bleeding) N93.8 626.8       Disposition:   Disposition: Admitted  Condition: Jeni Menezes MD  02/04/20 1150

## 2020-02-04 NOTE — TELEPHONE ENCOUNTER
Called by lab regarding critical H/H.    Left message on both pt's and 's cell phone that needs to go to ER, likely for transfusion due to very low blood count.    SM

## 2020-02-04 NOTE — ED NOTES
Pt reports being sent from her MD's office for blood work. Pt reports SOB on exertion and heavy vaginal bleeding.    Patient moved to ED room 16, patient assisted onto stretcher and changed into a gown. Patient placed on cardiac monitor, continuous pulse oximetry and automatic blood pressure cuff. Bed placed in low locked position, side rails up x 2, call light is within reach of patient or family, orientation to room and explanation of wait provided to family and patient, alarms set and turned on for monitor and pulse ox, awaiting MD evaluation and orders, will continue to monitor.    Patient identifies self as Liliane Ford      LOC: The patient is awake, alert and aware of environment with an appropriate affect, the patient is oriented x 3 and speaking appropriately.  APPEARANCE: Patient resting comfortably and in no acute distress, patient is clean and well groomed, patient's clothing is properly fastened.  SKIN: The skin is warm and dry, color consistent with ethnicity, patient has normal skin turgor and moist mucus membranes, skin intact, no breakdown or bruising noted.  MUSCULOSKELETAL: Patient moving all extremities well, no obvious swelling or deformities noted.  RESPIRATORY: Airway is open and patent, respirations are spontaneous, patient has a normal effort and rate, no accessory muscle use noted.  CARDIAC: Patient has a normal rate, no peripheral edema noted, capillary refill < 3 seconds.  ABDOMEN: Soft and non tender to palpation, no distention noted.  NEUROLOGIC: PERRL, eyes open spontaneously, behavior appropriate to situation, follows commands, facial expression symmetrical, bilateral hand grasp equal and even, purposeful motor response noted, normal sensation in all extremities when touched with a finger.

## 2020-02-04 NOTE — ASSESSMENT & PLAN NOTE
Secondary to acute blood loss secondary to postmenopausal vaginal bleeding-   Patient to receive 2 units of packed red blood cells today   Repeat CBC in a.m.   Gyn consulted for further and evaluation recommendations

## 2020-02-04 NOTE — TELEPHONE ENCOUNTER
Left messages for the pt. to call back. slee,lpn    Pt scheduled for f/u appt Tuesday, [place], 2pm, with Dr. Aquino.       ----- Message from Ema Aquino MD sent at 2/4/2020  1:09 PM CST -----  Seen in er 2/4/2020  (post menopausal bleeding)    Needs f/u [place] with me next tuesday

## 2020-02-04 NOTE — ASSESSMENT & PLAN NOTE
Monitor   Resume home Lasix tomorrow, resume home losartan at half dose tomorrow   Titrate antihypertensives as needed

## 2020-02-04 NOTE — ASSESSMENT & PLAN NOTE
Provera 10 mg daily  emb today (see procedure note)  Pelvic sono results pending  Agrees to follow up in gyn office

## 2020-02-04 NOTE — TELEPHONE ENCOUNTER
Please call and find out if pt went to ER.  Critical low blood counts//if she has not gone she needs to go.  [facility] would be a good choice so we can track her results.

## 2020-02-04 NOTE — HPI
65 y/o  reports history of postmenopausal bleeding for past 5 months; at times has had daily bleeding; using super pad, changing q 3-4 hrs; denies dysmenorrhea; Denies gyn exam in last 10 years

## 2020-02-04 NOTE — PROCEDURES
"Liliane Ford is a 64 y.o. female patient.    Temp: 98.3 °F (36.8 °C) (02/04/20 1122)  Pulse: 82 (02/04/20 1207)  Resp: 20 (02/04/20 1207)  BP: 137/70 (02/04/20 1207)  SpO2: 100 % (02/04/20 1207)  Weight: 81 kg (178 lb 7.4 oz) (02/04/20 0906)  Height: 5' 3" (160 cm) (02/04/20 0906)       Procedures    CC: ENDOMETRIAL BIOPSPY    Liliane Ford is a 64 y.o. female No obstetric history on file. presents for an endometrial biopsy secondary to postmenopausal bleeding.      PRE-ENDOMETRIAL BIOPSY COUNSELING:    The patient was informed of the risk of bleeding, infection, uterine perforation and pain and that the test will rule-out endometrial cancer with accuracy greater than 95%.  She was counseled on the alternatives to endometrial biopsy and agrees to proceed.      TIME OUT PERFORMED.    The cervix was visualized with a speculum.  A single tooth tenaculum was placed on the anterior lip prior to the biopsy.      A sterile endometrial pipelle was passed without difficulty to a depth of 12 cm.    Endometrial tissue was obtained.      The specimen was placed in formalyn and sent to Pathology of histology evaluation.    The patient tolerated the procedure well.            POST ENDOMETRIAL BIOPSY COUNSELING:  Manage post biopsy cramping with NSAIDs or Tylenol.  Expect spotting or light bleeding for a few days.  Report bleeding heavier than a period, fever > 101.0 F, worsening pain or a foul smelling vaginal discharge.      Counseling lasted approximately 15 minutes and all her questions were answered.      FOLLOW-UP:  Pending biopsy  Ema Aquino  2/4/2020    "

## 2020-02-04 NOTE — TELEPHONE ENCOUNTER
Called pt and let her know that Dr. Martin said GO TO ER//[facility] for severe anemia. She is dangerously low and this can stress her heart!!  She said she will go to [facility].

## 2020-02-04 NOTE — ASSESSMENT & PLAN NOTE
As noted on chest x-ray, patient reports a history of bilateral lower extremity edema   will give a dose of IV Lasix after 1st and 2nd unit of packed red blood cells and monitor closely for any signs of volume overload

## 2020-02-04 NOTE — CONSULTS
[facility]  Obstetrics & Gynecology  Consult Note    Patient Name: Liliane Ford  MRN: [id number]  Admission Date: 2020  Hospital Length of Stay: 0 days  Code Status: No Order  Primary Care Provider: Primary Doctor No  Principal Problem: Postmenopausal bleeding    Consults  Subjective:     Chief Complaint: intermittent vaginal bleeding since Aug 2019    History of Present Illness:  63 y/o  reports history of postmenopausal bleeding for past 5 months; at times has had daily bleeding; using super pad, changing q 3-4 hrs; denies dysmenorrhea; Denies gyn exam in last 10 years  Presented to ER for blood transfusion --hgb 4  No new subjective & objective note has been filed under this hospital service since the last note was generated.    Pelvic exam    Vagina--large amount of watery blood in vagina, small clots removed  Cervix normal, closed  Ut sounds to 12 using pipelle    Assessment/Plan:     * Postmenopausal bleeding  Provera 10 mg daily  emb today (see procedure note)  Pelvic sono results pending  Agrees to follow up in gyn office    Anemia  Agree with plan for blood transfusion        Thank you for your consult. I will follow-up with patient. Please contact us if you have any additional questions.    Ema Aquino MD  Obstetrics & Gynecology  [facility]

## 2020-02-05 LAB
ALBUMIN SERPL BCP-MCNC: 3.7 G/DL (ref 3.5–5.2)
ALP SERPL-CCNC: 68 U/L (ref 55–135)
ALT SERPL W/O P-5'-P-CCNC: 21 U/L (ref 10–44)
ANION GAP SERPL CALC-SCNC: 7 MMOL/L (ref 8–16)
AST SERPL-CCNC: 21 U/L (ref 10–40)
BASOPHILS # BLD AUTO: 0.04 K/UL (ref 0–0.2)
BASOPHILS NFR BLD: 0.7 % (ref 0–1.9)
BILIRUB SERPL-MCNC: 0.6 MG/DL (ref 0.1–1)
BLD PROD TYP BPU: NORMAL
BLD PROD TYP BPU: NORMAL
BLOOD UNIT EXPIRATION DATE: NORMAL
BLOOD UNIT EXPIRATION DATE: NORMAL
BLOOD UNIT TYPE CODE: 6200
BLOOD UNIT TYPE CODE: 6200
BLOOD UNIT TYPE: NORMAL
BLOOD UNIT TYPE: NORMAL
BUN SERPL-MCNC: 13 MG/DL (ref 8–23)
CALCIUM SERPL-MCNC: 8.9 MG/DL (ref 8.7–10.5)
CHLORIDE SERPL-SCNC: 109 MMOL/L (ref 95–110)
CO2 SERPL-SCNC: 24 MMOL/L (ref 23–29)
CODING SYSTEM: NORMAL
CODING SYSTEM: NORMAL
CREAT SERPL-MCNC: 0.8 MG/DL (ref 0.5–1.4)
DIFFERENTIAL METHOD: ABNORMAL
DISPENSE STATUS: NORMAL
DISPENSE STATUS: NORMAL
EOSINOPHIL # BLD AUTO: 0.1 K/UL (ref 0–0.5)
EOSINOPHIL NFR BLD: 2 % (ref 0–8)
ERYTHROCYTE [DISTWIDTH] IN BLOOD BY AUTOMATED COUNT: 28.2 % (ref 11.5–14.5)
EST. GFR  (AFRICAN AMERICAN): >60 ML/MIN/1.73 M^2
EST. GFR  (NON AFRICAN AMERICAN): >60 ML/MIN/1.73 M^2
GLUCOSE SERPL-MCNC: 97 MG/DL (ref 70–110)
HCT VFR BLD AUTO: 22.6 % (ref 37–48.5)
HGB BLD-MCNC: 6.2 G/DL (ref 12–16)
IMM GRANULOCYTES # BLD AUTO: 0.02 K/UL (ref 0–0.04)
IMM GRANULOCYTES NFR BLD AUTO: 0.4 % (ref 0–0.5)
LYMPHOCYTES # BLD AUTO: 2.2 K/UL (ref 1–4.8)
LYMPHOCYTES NFR BLD: 39 % (ref 18–48)
MAGNESIUM SERPL-MCNC: 1.9 MG/DL (ref 1.6–2.6)
MCH RBC QN AUTO: 17.3 PG (ref 27–31)
MCHC RBC AUTO-ENTMCNC: 27.4 G/DL (ref 32–36)
MCV RBC AUTO: 63 FL (ref 82–98)
MONOCYTES # BLD AUTO: 0.4 K/UL (ref 0.3–1)
MONOCYTES NFR BLD: 7.7 % (ref 4–15)
NEUTROPHILS # BLD AUTO: 2.8 K/UL (ref 1.8–7.7)
NEUTROPHILS NFR BLD: 50.2 % (ref 38–73)
NRBC BLD-RTO: 1 /100 WBC
NUM UNITS TRANS PACKED RBC: NORMAL
NUM UNITS TRANS PACKED RBC: NORMAL
PLATELET # BLD AUTO: 383 K/UL (ref 150–350)
PMV BLD AUTO: 9.9 FL (ref 9.2–12.9)
POTASSIUM SERPL-SCNC: 3.5 MMOL/L (ref 3.5–5.1)
PROT SERPL-MCNC: 6.9 G/DL (ref 6–8.4)
RBC # BLD AUTO: 3.58 M/UL (ref 4–5.4)
SODIUM SERPL-SCNC: 140 MMOL/L (ref 136–145)
WBC # BLD AUTO: 5.61 K/UL (ref 3.9–12.7)

## 2020-02-05 PROCEDURE — 36430 TRANSFUSION BLD/BLD COMPNT: CPT

## 2020-02-05 PROCEDURE — 36415 COLL VENOUS BLD VENIPUNCTURE: CPT

## 2020-02-05 PROCEDURE — 96375 TX/PRO/DX INJ NEW DRUG ADDON: CPT

## 2020-02-05 PROCEDURE — 99232 PR SUBSEQUENT HOSPITAL CARE,LEVL II: ICD-10-PCS | Mod: ,,, | Performed by: OBSTETRICS & GYNECOLOGY

## 2020-02-05 PROCEDURE — 85025 COMPLETE CBC W/AUTO DIFF WBC: CPT

## 2020-02-05 PROCEDURE — 80053 COMPREHEN METABOLIC PANEL: CPT

## 2020-02-05 PROCEDURE — 25000003 PHARM REV CODE 250: Performed by: OBSTETRICS & GYNECOLOGY

## 2020-02-05 PROCEDURE — 83735 ASSAY OF MAGNESIUM: CPT

## 2020-02-05 PROCEDURE — 25000003 PHARM REV CODE 250: Performed by: NURSE PRACTITIONER

## 2020-02-05 PROCEDURE — 63600175 PHARM REV CODE 636 W HCPCS: Performed by: NURSE PRACTITIONER

## 2020-02-05 PROCEDURE — 21400001 HC TELEMETRY ROOM

## 2020-02-05 PROCEDURE — 99232 SBSQ HOSP IP/OBS MODERATE 35: CPT | Mod: ,,, | Performed by: OBSTETRICS & GYNECOLOGY

## 2020-02-05 PROCEDURE — G0378 HOSPITAL OBSERVATION PER HR: HCPCS

## 2020-02-05 PROCEDURE — P9016 RBC LEUKOCYTES REDUCED: HCPCS

## 2020-02-05 RX ORDER — HYDROCODONE BITARTRATE AND ACETAMINOPHEN 500; 5 MG/1; MG/1
TABLET ORAL
Status: DISCONTINUED | OUTPATIENT
Start: 2020-02-05 | End: 2020-02-06 | Stop reason: HOSPADM

## 2020-02-05 RX ADMIN — LOSARTAN POTASSIUM 25 MG: 25 TABLET ORAL at 08:02

## 2020-02-05 RX ADMIN — MEDROXYPROGESTERONE ACETATE 10 MG: 5 TABLET ORAL at 08:02

## 2020-02-05 RX ADMIN — OXYCODONE HYDROCHLORIDE AND ACETAMINOPHEN 500 MG: 500 TABLET ORAL at 09:02

## 2020-02-05 RX ADMIN — FUROSEMIDE 40 MG: 40 TABLET ORAL at 08:02

## 2020-02-05 RX ADMIN — FUROSEMIDE 40 MG: 40 TABLET ORAL at 09:02

## 2020-02-05 RX ADMIN — THERA TABS 1 TABLET: TAB at 08:02

## 2020-02-05 RX ADMIN — IRON SUCROSE 200 MG: 20 INJECTION, SOLUTION INTRAVENOUS at 10:02

## 2020-02-05 NOTE — ASSESSMENT & PLAN NOTE
Grossly enlarged uterus with fibroids on ultrasound.  Pt to follow up with Dr. Aquino in clinic next week and continue daily Provera until then.  Bleeding has significantly improved and pt is stable from Gyn standpoint.  Signing off.

## 2020-02-05 NOTE — SUBJECTIVE & OBJECTIVE
Interval History:   Pt reports feeling better. Bleeding has significantly improved and has nearly resolved completely.  Pt reports no issues this morning.    Scheduled Meds:   ascorbic acid (vitamin C)  500 mg Oral QHS    [START ON 2/6/2020] ferrous sulfate  325 mg Oral BID WM    furosemide  40 mg Oral BID    iron sucrose  200 mg Intravenous Once    losartan  25 mg Oral Daily    medroxyPROGESTERone  10 mg Oral Daily    multivitamin  1 tablet Oral Daily     Continuous Infusions:  PRN Meds:sodium chloride, acetaminophen, melatonin, ondansetron, polyethylene glycol, sodium chloride 0.9%    Review of patient's allergies indicates:   Allergen Reactions    Penicillins Rash       Objective:     Vital Signs (Most Recent):  Temp: 98.6 °F (37 °C) (02/05/20 0747)  Pulse: 84 (02/05/20 0747)  Resp: 20 (02/05/20 0747)  BP: (!) 154/75 (02/05/20 0747)  SpO2: 99 % (02/05/20 0747) Vital Signs (24h Range):  Temp:  [97.4 °F (36.3 °C)-99.2 °F (37.3 °C)] 98.6 °F (37 °C)  Pulse:  [] 84  Resp:  [16-20] 20  SpO2:  [98 %-100 %] 99 %  BP: (128-164)/(61-85) 154/75     Weight: 81 kg (178 lb 9.2 oz)  Body mass index is 31.63 kg/m².  No LMP recorded. Patient is postmenopausal.    I&O (Last 24H):    Intake/Output Summary (Last 24 hours) at 2/5/2020 0836  Last data filed at 2/4/2020 1719  Gross per 24 hour   Intake 1092.75 ml   Output --   Net 1092.75 ml       Physical Exam:   Constitutional: She is oriented to person, place, and time. She appears well-developed and well-nourished. No distress.       Cardiovascular: Normal rate, regular rhythm and normal heart sounds.     Pulmonary/Chest: Effort normal and breath sounds normal.        Abdominal: Soft. Bowel sounds are normal. She exhibits no distension. There is no tenderness.             Musculoskeletal: Normal range of motion and moves all extremeties. She exhibits no edema or tenderness.       Neurological: She is alert and oriented to person, place, and time.    Skin: Skin is warm  and dry.    Psychiatric: She has a normal mood and affect. Her behavior is normal. Thought content normal.       Laboratory:  CBC:   Recent Labs   Lab 02/05/20  0506   WBC 5.61   RBC 3.58*   HGB 6.2*   HCT 22.6*   *   MCV 63*   MCH 17.3*   MCHC 27.4*     I have personallly reviewed all pertinent lab results from the last 24 hours.    Diagnostic Results:  Labs: Reviewed

## 2020-02-05 NOTE — PROGRESS NOTES
[facility] -   Obstetrics & Gynecology  Progress Note    Patient Name: Liliane Ford  MRN: [id number]  Admission Date: 2020  Primary Care Provider: Primary Doctor No  Principal Problem: Symptomatic anemia    Subjective:     HPI:  63 y/o  reports history of postmenopausal bleeding for past 5 months; at times has had daily bleeding; using super pad, changing q 3-4 hrs; denies dysmenorrhea; Denies gyn exam in last 10 years    Interval History:   Pt reports feeling better. Bleeding has significantly improved and has nearly resolved completely.  Pt reports no issues this morning.    Scheduled Meds:   ascorbic acid (vitamin C)  500 mg Oral QHS    [START ON 2020] ferrous sulfate  325 mg Oral BID WM    furosemide  40 mg Oral BID    iron sucrose  200 mg Intravenous Once    losartan  25 mg Oral Daily    medroxyPROGESTERone  10 mg Oral Daily    multivitamin  1 tablet Oral Daily     Continuous Infusions:  PRN Meds:sodium chloride, acetaminophen, melatonin, ondansetron, polyethylene glycol, sodium chloride 0.9%    Review of patient's allergies indicates:   Allergen Reactions    Penicillins Rash       Objective:     Vital Signs (Most Recent):  Temp: 98.6 °F (37 °C) (20)  Pulse: 84 (20)  Resp: 20 (20)  BP: (!) 154/75 (20)  SpO2: 99 % (20) Vital Signs (24h Range):  Temp:  [97.4 °F (36.3 °C)-99.2 °F (37.3 °C)] 98.6 °F (37 °C)  Pulse:  [] 84  Resp:  [16-20] 20  SpO2:  [98 %-100 %] 99 %  BP: (128-164)/(61-85) 154/75     Weight: 81 kg (178 lb 9.2 oz)  Body mass index is 31.63 kg/m².  No LMP recorded. Patient is postmenopausal.    I&O (Last 24H):    Intake/Output Summary (Last 24 hours) at 2020 0836  Last data filed at 2020 1719  Gross per 24 hour   Intake 1092.75 ml   Output --   Net 1092.75 ml       Physical Exam:   Constitutional: She is oriented to person, place, and time. She appears well-developed and well-nourished. No distress.        Cardiovascular: Normal rate, regular rhythm and normal heart sounds.     Pulmonary/Chest: Effort normal and breath sounds normal.        Abdominal: Soft. Bowel sounds are normal. She exhibits no distension. There is no tenderness.             Musculoskeletal: Normal range of motion and moves all extremeties. She exhibits no edema or tenderness.       Neurological: She is alert and oriented to person, place, and time.    Skin: Skin is warm and dry.    Psychiatric: She has a normal mood and affect. Her behavior is normal. Thought content normal.       Laboratory:  CBC:   Recent Labs   Lab 02/05/20  0506   WBC 5.61   RBC 3.58*   HGB 6.2*   HCT 22.6*   *   MCV 63*   MCH 17.3*   MCHC 27.4*     I have personallly reviewed all pertinent lab results from the last 24 hours.    Diagnostic Results:  Labs: Reviewed    Assessment/Plan:     * Symptomatic anemia  Agree with plan for blood transfusion    Postmenopausal bleeding  Grossly enlarged uterus with fibroids on ultrasound.  Pt to follow up with Dr. Aquino in clinic next week and continue daily Provera until then.  Bleeding has significantly improved and pt is stable from Gyn standpoint.  Signing off.        Armando Haq MD  Obstetrics & Gynecology  [facility] -

## 2020-02-05 NOTE — PLAN OF CARE
Pt is awake and oriented. Vital Signs stable. No acute events noted. Care plan reviewed and pt verbalized understanding. Call light is in reach. Will continue to monitor. PIV CDI.  Pt with no complaints this shift. She remained free from injury. Plan of care reviewed. W

## 2020-02-05 NOTE — NURSING
Notified Dr. Sullivan that this pt's hemoglobin was 6.2 this morning. No new orders at this time. Will continue to monitor.

## 2020-02-05 NOTE — PLAN OF CARE
02/05/20 1425   Discharge Assessment   Assessment Type Discharge Planning Assessment   Confirmed/corrected address and phone number on facesheet? Yes   Assessment information obtained from? Patient   Prior to hospitilization cognitive status: Alert/Oriented   Prior to hospitalization functional status: Independent   Current cognitive status: Alert/Oriented   Current Functional Status: Independent   Lives With spouse   Able to Return to Prior Arrangements yes   Is patient able to care for self after discharge? Yes   Who are your caregiver(s) and their phone number(s)? Mariano Ford () [phone]   Patient's perception of discharge disposition home or selfcare   Readmission Within the Last 30 Days no previous admission in last 30 days   Patient currently being followed by outpatient case management? No   Patient currently receives any other outside agency services? No   Equipment Currently Used at Home none   Do you have any problems affording any of your prescribed medications? No   Is the patient taking medications as prescribed? yes   Does the patient have transportation home? Yes   Transportation Anticipated family or friend will provide   Does the patient receive services at the [facility]? No   Discharge Plan A Home with family   DME Needed Upon Discharge  none   Patient/Family in Agreement with Plan yes     Met with pt at bedside for DC assessment. Pt lives at home with her  and does not make use of any DME. There are no DC CM needs identified at this time. SWer provided a transitional care folder, information on advanced directives, information on pharmacy bedside delivery, and discharge planning begins on admission with contact information for any needs/questions. Pt opted out of bedside medication delivery. Her typical pharmacy is [facility] [address] .  Information below.    [address] [place] [address]  [phone]    Sorin Colorado LMSW 2/5/2020 2:29 PM

## 2020-02-06 ENCOUNTER — TELEPHONE (OUTPATIENT)
Dept: OBSTETRICS AND GYNECOLOGY | Facility: CLINIC | Age: 65
End: 2020-02-06

## 2020-02-06 VITALS
HEART RATE: 97 BPM | DIASTOLIC BLOOD PRESSURE: 81 MMHG | RESPIRATION RATE: 19 BRPM | HEIGHT: 63 IN | WEIGHT: 176.13 LBS | OXYGEN SATURATION: 100 % | SYSTOLIC BLOOD PRESSURE: 147 MMHG | TEMPERATURE: 98 F | BODY MASS INDEX: 31.21 KG/M2

## 2020-02-06 DIAGNOSIS — N85.01 COMPLEX ENDOMETRIAL HYPERPLASIA: Primary | ICD-10-CM

## 2020-02-06 PROBLEM — D64.9 SYMPTOMATIC ANEMIA: Status: RESOLVED | Noted: 2020-02-04 | Resolved: 2020-02-06

## 2020-02-06 PROBLEM — N95.0 POSTMENOPAUSAL BLEEDING: Status: RESOLVED | Noted: 2020-02-04 | Resolved: 2020-02-06

## 2020-02-06 LAB
ANION GAP SERPL CALC-SCNC: 10 MMOL/L (ref 8–16)
ANISOCYTOSIS BLD QL SMEAR: ABNORMAL
BASOPHILS # BLD AUTO: 0.03 K/UL (ref 0–0.2)
BASOPHILS NFR BLD: 0.5 % (ref 0–1.9)
BUN SERPL-MCNC: 12 MG/DL (ref 8–23)
BURR CELLS BLD QL SMEAR: ABNORMAL
CALCIUM SERPL-MCNC: 8.8 MG/DL (ref 8.7–10.5)
CHLORIDE SERPL-SCNC: 107 MMOL/L (ref 95–110)
CO2 SERPL-SCNC: 23 MMOL/L (ref 23–29)
CREAT SERPL-MCNC: 0.8 MG/DL (ref 0.5–1.4)
DACRYOCYTES BLD QL SMEAR: ABNORMAL
DIFFERENTIAL METHOD: ABNORMAL
EOSINOPHIL # BLD AUTO: 0.1 K/UL (ref 0–0.5)
EOSINOPHIL NFR BLD: 1.8 % (ref 0–8)
ERYTHROCYTE [DISTWIDTH] IN BLOOD BY AUTOMATED COUNT: 31.5 % (ref 11.5–14.5)
EST. GFR  (AFRICAN AMERICAN): >60 ML/MIN/1.73 M^2
EST. GFR  (NON AFRICAN AMERICAN): >60 ML/MIN/1.73 M^2
FINAL PATHOLOGIC DIAGNOSIS: NORMAL
GLUCOSE SERPL-MCNC: 85 MG/DL (ref 70–110)
GROSS: NORMAL
HCT VFR BLD AUTO: 30.4 % (ref 37–48.5)
HGB BLD-MCNC: 8.9 G/DL (ref 12–16)
HYPOCHROMIA BLD QL SMEAR: ABNORMAL
IMM GRANULOCYTES # BLD AUTO: 0.02 K/UL (ref 0–0.04)
IMM GRANULOCYTES NFR BLD AUTO: 0.4 % (ref 0–0.5)
LYMPHOCYTES # BLD AUTO: 1.8 K/UL (ref 1–4.8)
LYMPHOCYTES NFR BLD: 32.4 % (ref 18–48)
MAGNESIUM SERPL-MCNC: 1.9 MG/DL (ref 1.6–2.6)
MCH RBC QN AUTO: 19.8 PG (ref 27–31)
MCHC RBC AUTO-ENTMCNC: 29.3 G/DL (ref 32–36)
MCV RBC AUTO: 68 FL (ref 82–98)
MONOCYTES # BLD AUTO: 0.6 K/UL (ref 0.3–1)
MONOCYTES NFR BLD: 10 % (ref 4–15)
NEUTROPHILS # BLD AUTO: 3.1 K/UL (ref 1.8–7.7)
NEUTROPHILS NFR BLD: 54.9 % (ref 38–73)
NRBC BLD-RTO: 1 /100 WBC
OVALOCYTES BLD QL SMEAR: ABNORMAL
PLATELET # BLD AUTO: 381 K/UL (ref 150–350)
PLATELET BLD QL SMEAR: ABNORMAL
PMV BLD AUTO: 9.8 FL (ref 9.2–12.9)
POIKILOCYTOSIS BLD QL SMEAR: ABNORMAL
POTASSIUM SERPL-SCNC: 3.3 MMOL/L (ref 3.5–5.1)
RBC # BLD AUTO: 4.49 M/UL (ref 4–5.4)
SCHISTOCYTES BLD QL SMEAR: PRESENT
SICKLE CELLS BLD QL SMEAR: ABNORMAL
SODIUM SERPL-SCNC: 140 MMOL/L (ref 136–145)
SPHEROCYTES BLD QL SMEAR: ABNORMAL
STOMATOCYTES BLD QL SMEAR: PRESENT
TARGETS BLD QL SMEAR: ABNORMAL
WBC # BLD AUTO: 5.62 K/UL (ref 3.9–12.7)

## 2020-02-06 PROCEDURE — 25000003 PHARM REV CODE 250: Performed by: NURSE PRACTITIONER

## 2020-02-06 PROCEDURE — 96372 THER/PROPH/DIAG INJ SC/IM: CPT

## 2020-02-06 PROCEDURE — 63600175 PHARM REV CODE 636 W HCPCS: Performed by: EMERGENCY MEDICINE

## 2020-02-06 PROCEDURE — 25000003 PHARM REV CODE 250: Performed by: OBSTETRICS & GYNECOLOGY

## 2020-02-06 PROCEDURE — 96376 TX/PRO/DX INJ SAME DRUG ADON: CPT

## 2020-02-06 PROCEDURE — 85025 COMPLETE CBC W/AUTO DIFF WBC: CPT

## 2020-02-06 PROCEDURE — G0378 HOSPITAL OBSERVATION PER HR: HCPCS

## 2020-02-06 PROCEDURE — 36415 COLL VENOUS BLD VENIPUNCTURE: CPT

## 2020-02-06 PROCEDURE — 80048 BASIC METABOLIC PNL TOTAL CA: CPT

## 2020-02-06 PROCEDURE — 83735 ASSAY OF MAGNESIUM: CPT

## 2020-02-06 RX ORDER — ASCORBIC ACID 500 MG
500 TABLET ORAL NIGHTLY
COMMUNITY
Start: 2020-02-06

## 2020-02-06 RX ORDER — FERROUS SULFATE 325(65) MG
325 TABLET, DELAYED RELEASE (ENTERIC COATED) ORAL 2 TIMES DAILY WITH MEALS
Qty: 60 TABLET | Refills: 1 | Status: ON HOLD | OUTPATIENT
Start: 2020-02-06 | End: 2022-12-16 | Stop reason: HOSPADM

## 2020-02-06 RX ORDER — POLYETHYLENE GLYCOL 3350 17 G/17G
17 POWDER, FOR SOLUTION ORAL DAILY
Qty: 30 EACH | Refills: 1 | Status: SHIPPED | OUTPATIENT
Start: 2020-02-06 | End: 2021-08-06

## 2020-02-06 RX ORDER — CYANOCOBALAMIN 1000 UG/ML
1000 INJECTION, SOLUTION INTRAMUSCULAR; SUBCUTANEOUS ONCE
Status: COMPLETED | OUTPATIENT
Start: 2020-02-06 | End: 2020-02-06

## 2020-02-06 RX ORDER — POTASSIUM CHLORIDE 20 MEQ/1
40 TABLET, EXTENDED RELEASE ORAL ONCE
Status: COMPLETED | OUTPATIENT
Start: 2020-02-06 | End: 2020-02-06

## 2020-02-06 RX ORDER — MEGESTROL ACETATE 40 MG/1
40 TABLET ORAL DAILY
Qty: 30 TABLET | Refills: 3 | Status: SHIPPED | OUTPATIENT
Start: 2020-02-06 | End: 2021-08-06 | Stop reason: ALTCHOICE

## 2020-02-06 RX ORDER — MEDROXYPROGESTERONE ACETATE 10 MG/1
10 TABLET ORAL DAILY
Qty: 10 TABLET | Refills: 0 | Status: SHIPPED | OUTPATIENT
Start: 2020-02-07 | End: 2020-02-06 | Stop reason: CLARIF

## 2020-02-06 RX ADMIN — POTASSIUM CHLORIDE 40 MEQ: 1500 TABLET, EXTENDED RELEASE ORAL at 07:02

## 2020-02-06 RX ADMIN — MEDROXYPROGESTERONE ACETATE 10 MG: 5 TABLET ORAL at 08:02

## 2020-02-06 RX ADMIN — FUROSEMIDE 40 MG: 40 TABLET ORAL at 08:02

## 2020-02-06 RX ADMIN — THERA TABS 1 TABLET: TAB at 08:02

## 2020-02-06 RX ADMIN — CYANOCOBALAMIN 1000 MCG: 1000 INJECTION, SOLUTION INTRAMUSCULAR; SUBCUTANEOUS at 03:02

## 2020-02-06 RX ADMIN — LOSARTAN POTASSIUM 25 MG: 25 TABLET ORAL at 08:02

## 2020-02-06 RX ADMIN — IRON SUCROSE 200 MG: 20 INJECTION, SOLUTION INTRAVENOUS at 04:02

## 2020-02-06 RX ADMIN — FERROUS SULFATE TAB EC 325 MG (65 MG FE EQUIVALENT) 325 MG: 325 (65 FE) TABLET DELAYED RESPONSE at 07:02

## 2020-02-06 NOTE — ASSESSMENT & PLAN NOTE
Patient with low iron, low ferritin, elevated TIBC, and saturated ferritin unable to be measured   Patient to receive 2 units of packed red blood cells today -will give 1 dose of 200 mg of IV Venofer  in a.m.   Add multivitamin, ferrous sulfate, and p.m. Vitamin-C  for home use   Will go ahead and check folate and vitamin B12 level also       Continue oral iron

## 2020-02-06 NOTE — HOSPITAL COURSE
63 y/o  reports history of postmenopausal bleeding for past 5 months; at times has had daily bleeding; using super pad, changing q 3-4 hrs; denies dysmenorrhea; Denies gyn exam in last 10 years. Presented to ER for blood transfusion --hgb 4. Got 2 units of blood yesterday and H/h improved to 6.. Also GYN started on Provera and performed Endometrial Biopsy. Now feels better, Vaginal bleeding improving, no pain or dizziness.   - appreciate Dr. Haq, pt looks and feels great, Vaginal bleeding nearly resolved, she got another 2 units of blood yesterday which she completed last night and feels lot better. She is eating well, walking around well and all her Sx have resolved or greatly improved. Her Iron studies were low and hence got IV and oral Iron as well. She will be on Provera for 10 days and will f/u with Gyn. Her Uterine US was unremarkable. She was seen and examined and deemed stable for discharge home today.

## 2020-02-06 NOTE — ASSESSMENT & PLAN NOTE
Gyn consulted   Provera started   Monitor anemia     Improving but not yet stopped  Continue Provera

## 2020-02-06 NOTE — SUBJECTIVE & OBJECTIVE
Interval History: Presented to ER for blood transfusion --hgb 4. Got 2 units of blood yesterday and H/h improved to 6.2/19. Also GYN started on Provera and performed Endometrial Biopsy. Now feels better, Vaginal bleeding improving, no pain or dizziness.     Review of Systems   Constitutional: Positive for activity change, appetite change and fatigue. Negative for chills, diaphoresis and fever.   HENT: Negative for ear discharge, ear pain and facial swelling.    Eyes: Negative for pain and redness.   Respiratory: Positive for shortness of breath. Negative for cough.         Patient reports shortness of breath upon exertion   Cardiovascular: Negative for palpitations and leg swelling.        Patient reports palpitations upon exertion    Patient reports occasional bilateral lower extremity edema   Gastrointestinal: Negative for abdominal distention, abdominal pain, constipation, diarrhea, nausea and vomiting.   Endocrine: Negative for polydipsia and polyphagia.   Genitourinary: Positive for vaginal bleeding. Negative for difficulty urinating, dysuria, flank pain and hematuria.        Patient reports enter bleeding since August of 2019, occasionally with large blood clots.    Patient states she has not discussed this  problem with a physician prior to today.   Musculoskeletal: Negative for gait problem, neck pain and neck stiffness.   Skin: Positive for pallor.   Allergic/Immunologic: Negative for food allergies.   Neurological: Positive for weakness. Negative for seizures, facial asymmetry and speech difficulty.   Hematological: Does not bruise/bleed easily.   Psychiatric/Behavioral: Negative for agitation, behavioral problems, confusion, hallucinations and suicidal ideas. The patient is not nervous/anxious.      Objective:       Weight: 79.9 kg (176 lb 2.4 oz)  Body mass index is 31.2 kg/m².     Physical Exam   Constitutional: She is oriented to person, place, and time. She appears well-developed and well-nourished.    Weak, fatigued   HENT:   Head: Normocephalic and atraumatic.   Eyes: Pupils are equal, round, and reactive to light. Conjunctivae and EOM are normal. Right eye exhibits no discharge. Left eye exhibits no discharge.   Neck: Normal range of motion. Neck supple. No JVD present.   Cardiovascular: Normal rate, regular rhythm and intact distal pulses.   Pulmonary/Chest: Breath sounds normal. No respiratory distress.   Short of breath upon exertion   Abdominal: Soft. Bowel sounds are normal. She exhibits no distension. There is no tenderness. There is no guarding.   Genitourinary:   Genitourinary Comments: Not examined   Musculoskeletal: Normal range of motion. She exhibits no edema.   Neurological: She is alert and oriented to person, place, and time. No cranial nerve deficit.   Skin: Skin is warm and dry. Capillary refill takes less than 2 seconds. She is not diaphoretic. There is pallor.   Psychiatric: She has a normal mood and affect. Her behavior is normal. Judgment and thought content normal.   Nursing note and vitals reviewed.      Significant Labs: All pertinent labs within the past 24 hours have been reviewed.    Significant Imaging: I have reviewed all pertinent imaging results/findings within the past 24 hours.

## 2020-02-06 NOTE — PLAN OF CARE
CM met with patient at the bedside to discuss discharge needs.  Patient does not have any discharge needs at this time.     02/06/20 1025   Discharge Reassessment   Assessment Type Discharge Planning Reassessment   Provided patient/caregiver education on the expected discharge date and the discharge plan Yes   Do you have any problems affording any of your prescribed medications? No   Discharge Plan A Home   DME Needed Upon Discharge  none   Patient choice form signed by patient/caregiver N/A   Anticipated Discharge Disposition Home   Can the patient/caregiver answer the patient profile reliably? Yes, cognitively intact

## 2020-02-06 NOTE — PLAN OF CARE
Problem: Adult Inpatient Plan of Care  Goal: Plan of Care Review  Outcome: Ongoing, Progressing  Pt AAOx4. VSS. Pt remained free of falls this shift. No complaints of pain or discomfort. Medications administered as ordered. Pt is NSR on monitor. PIV intact, saline locked. Hourly rounding completed. Pt instructed to call for assistance. POC reviewed. Pt verbalized understanding. Will continue to monitor.

## 2020-02-06 NOTE — DISCHARGE SUMMARY
"[facility] Hospital Medicine  Discharge Summary      Patient Name: Liliane Ford  MRN: [id number]  Admission Date: 2020  Hospital Length of Stay: 2 days  Discharge Date and Time:  2020 3:32 PM  Attending Physician: Ihsan Sullivan MD   Discharging Provider: Ihsan Sullivan MD  Primary Care Provider: Primary Doctor No      HPI:   This is a 64-year-old female who came into the emergency room with reports of extreme fatigue and ongoing vaginal bleeding with blood clots.  Patient reports she has been having intermittent heavy vaginal bleeding, at times with large blood clots, since 2019. Patient states she has not followed up with a primary care physician or a gyn or any physician at all for this problem.   Patient reports that her menses.  Initially when she was 56 years old and she had not had any problems until the intermittent bleeding began in 2019 all of a sudden.  She denies any history of hysterectomy.  She reports her last Pap smear and mammogram was in  and work "okay".  She denies any  previously diagnosed vaginal or gyn issues.  She denies any abdominal cramps or vaginal discharge.  Currently no nausea, vomiting, or abdominal pain.  She denies any diarrhea or constipation and states her last colonoscopy was greater than 10 years ago and was "okay".  Patient states recently she was started on some "blood pressure medication and a fluid pill" which she takes intermittently.   Patient was evaluated emergency room where she was noted to have  A hemoglobin of 4.2 and hematocrit of 17.9.  Patient has been placed in the hospital for further evaluation, recommendation, and blood transfusion.         * No surgery found *      Hospital Course:   65 y/o  reports history of postmenopausal bleeding for past 5 months; at times has had daily bleeding; using super pad, changing q 3-4 hrs; denies dysmenorrhea; Denies gyn exam in last 10 years. Presented to ER for blood " transfusion --hgb 4. Got 2 units of blood yesterday and H/h improved to 6.2/19. Also GYN started on Provera and performed Endometrial Biopsy. Now feels better, Vaginal bleeding improving, no pain or dizziness.   2/6- appreciate Dr. Haq, pt looks and feels great, Vaginal bleeding nearly resolved, she got another 2 units of blood yesterday which she completed last night and feels lot better. She is eating well, walking around well and all her Sx have resolved or greatly improved. Her Iron studies were low and hence got IV and oral Iron as well. She will be on Provera for 10 days and will f/u with Gyn. Her Uterine US was unremarkable. She was seen and examined and deemed stable for discharge home today.      Consults:   Consults (From admission, onward)        Status Ordering Provider     Inpatient consult to Obstetrics / Gynecology  Once     Provider:  Ema Aquino MD    Acknowledged NILAY GOLDSTEIN          No new Assessment & Plan notes have been filed under this hospital service since the last note was generated.  Service: Hospital Medicine    Final Active Diagnoses:    Diagnosis Date Noted POA    Essential hypertension [I10] 02/04/2020 Yes    Thrombocytosis [D47.3] 02/04/2020 Yes    Elevated AST (SGOT) [R74.0] 02/04/2020 Yes    Cardiomegaly [I51.7] 02/04/2020 Yes    Low iron [E61.1] 02/04/2020 Yes      Problems Resolved During this Admission:    Diagnosis Date Noted Date Resolved POA    PRINCIPAL PROBLEM:  Symptomatic anemia [D64.9] 02/04/2020 02/06/2020 Yes    Postmenopausal bleeding [N95.0] 02/04/2020 02/06/2020 Yes       Discharged Condition: stable    Disposition: Home or Self Care    Follow Up:  Follow-up Information     Ema Aquino MD In 1 week.    Specialty:  Obstetrics and Gynecology  Contact information:  [address] [place] [id number] [phone]                 Patient Instructions:      Ambulatory referral/consult to Internal Medicine   Standing Status: Future   Referral Priority:  Routine Referral Type: Consultation   Referral Reason: Specialty Services Required   Requested Specialty: Internal Medicine   Number of Visits Requested: 1     Diet Cardiac     Activity as tolerated       Significant Diagnostic Studies: Labs:  Results for orders placed or performed during the hospital encounter of 02/04/20   Hepatitis C antibody   Result Value Ref Range    Hepatitis C Ab Negative Negative   APTT   Result Value Ref Range    aPTT 21.3 21.0 - 32.0 sec   Protime-INR   Result Value Ref Range    Prothrombin Time 10.5 9.0 - 12.5 sec    INR 1.0 0.8 - 1.2   CBC auto differential   Result Value Ref Range    WBC 4.07 3.90 - 12.70 K/uL    RBC 2.87 (L) 4.00 - 5.40 M/uL    Hemoglobin 4.2 (LL) 12.0 - 16.0 g/dL    Hematocrit 16.5 (LL) 37.0 - 48.5 %    Mean Corpuscular Volume 58 (L) 82 - 98 fL    Mean Corpuscular Hemoglobin 14.6 (L) 27.0 - 31.0 pg    Mean Corpuscular Hemoglobin Conc 25.5 (L) 32.0 - 36.0 g/dL    RDW 22.5 (H) 11.5 - 14.5 %    Platelets 427 (H) 150 - 350 K/uL    MPV 10.2 9.2 - 12.9 fL    Immature Granulocytes 0.2 0.0 - 0.5 %    Gran # (ANC) 1.9 1.8 - 7.7 K/uL    Immature Grans (Abs) 0.01 0.00 - 0.04 K/uL    Lymph # 1.7 1.0 - 4.8 K/uL    Mono # 0.4 0.3 - 1.0 K/uL    Eos # 0.1 0.0 - 0.5 K/uL    Baso # 0.02 0.00 - 0.20 K/uL    nRBC 1 (A) 0 /100 WBC    Gran% 47.3 38.0 - 73.0 %    Lymph% 41.0 18.0 - 48.0 %    Mono% 9.8 4.0 - 15.0 %    Eosinophil% 1.2 0.0 - 8.0 %    Basophil% 0.5 0.0 - 1.9 %    Platelet Estimate Increased (A)     Aniso Moderate     Poik Moderate     Hypo Moderate     Ovalocytes Moderate     Target Cells Occasional     German Cells Occasional     Differential Method Automated    Comprehensive metabolic panel   Result Value Ref Range    Sodium 138 136 - 145 mmol/L    Potassium 4.0 3.5 - 5.1 mmol/L    Chloride 106 95 - 110 mmol/L    CO2 22 (L) 23 - 29 mmol/L    Glucose 105 70 - 110 mg/dL    BUN, Bld 12 8 - 23 mg/dL    Creatinine 0.8 0.5 - 1.4 mg/dL    Calcium 8.8 8.7 - 10.5 mg/dL    Total Protein  7.3 6.0 - 8.4 g/dL    Albumin 3.9 3.5 - 5.2 g/dL    Total Bilirubin 0.4 0.1 - 1.0 mg/dL    Alkaline Phosphatase 66 55 - 135 U/L    AST 46 (H) 10 - 40 U/L    ALT 29 10 - 44 U/L    Anion Gap 10 8 - 16 mmol/L    eGFR if African American >60 >60 mL/min/1.73 m^2    eGFR if non African American >60 >60 mL/min/1.73 m^2   Urinalysis, Reflex to Urine Culture Urine, Clean Catch   Result Value Ref Range    Specimen UA Urine, Clean Catch     Color, UA Red (A) Yellow, Straw, Cindy    Appearance, UA Hazy (A) Clear    pH, UA SEE COMMENT 5.0 - 8.0    Specific Gravity, UA SEE COMMENT 1.005 - 1.030    Protein, UA SEE COMMENT Negative    Glucose, UA SEE COMMENT Negative    Ketones, UA SEE COMMENT Negative    Bilirubin (UA) SEE COMMENT Negative    Occult Blood UA SEE COMMENT Negative    Nitrite, UA SEE COMMENT Negative    Urobilinogen, UA SEE COMMENT <2.0 EU/dL    Leukocytes, UA SEE COMMENT Negative   Urinalysis Microscopic   Result Value Ref Range    RBC, UA >100 (H) 0 - 4 /hpf    Microscopic Comment SEE COMMENT    CBC auto differential   Result Value Ref Range    WBC 5.61 3.90 - 12.70 K/uL    RBC 3.58 (L) 4.00 - 5.40 M/uL    Hemoglobin 6.2 (L) 12.0 - 16.0 g/dL    Hematocrit 22.6 (L) 37.0 - 48.5 %    Mean Corpuscular Volume 63 (L) 82 - 98 fL    Mean Corpuscular Hemoglobin 17.3 (L) 27.0 - 31.0 pg    Mean Corpuscular Hemoglobin Conc 27.4 (L) 32.0 - 36.0 g/dL    RDW 28.2 (H) 11.5 - 14.5 %    Platelets 383 (H) 150 - 350 K/uL    MPV 9.9 9.2 - 12.9 fL    Immature Granulocytes 0.4 0.0 - 0.5 %    Gran # (ANC) 2.8 1.8 - 7.7 K/uL    Immature Grans (Abs) 0.02 0.00 - 0.04 K/uL    Lymph # 2.2 1.0 - 4.8 K/uL    Mono # 0.4 0.3 - 1.0 K/uL    Eos # 0.1 0.0 - 0.5 K/uL    Baso # 0.04 0.00 - 0.20 K/uL    nRBC 1 (A) 0 /100 WBC    Gran% 50.2 38.0 - 73.0 %    Lymph% 39.0 18.0 - 48.0 %    Mono% 7.7 4.0 - 15.0 %    Eosinophil% 2.0 0.0 - 8.0 %    Basophil% 0.7 0.0 - 1.9 %    Differential Method Automated    Comprehensive metabolic panel   Result Value Ref  Range    Sodium 140 136 - 145 mmol/L    Potassium 3.5 3.5 - 5.1 mmol/L    Chloride 109 95 - 110 mmol/L    CO2 24 23 - 29 mmol/L    Glucose 97 70 - 110 mg/dL    BUN, Bld 13 8 - 23 mg/dL    Creatinine 0.8 0.5 - 1.4 mg/dL    Calcium 8.9 8.7 - 10.5 mg/dL    Total Protein 6.9 6.0 - 8.4 g/dL    Albumin 3.7 3.5 - 5.2 g/dL    Total Bilirubin 0.6 0.1 - 1.0 mg/dL    Alkaline Phosphatase 68 55 - 135 U/L    AST 21 10 - 40 U/L    ALT 21 10 - 44 U/L    Anion Gap 7 (L) 8 - 16 mmol/L    eGFR if African American >60 >60 mL/min/1.73 m^2    eGFR if non African American >60 >60 mL/min/1.73 m^2   Magnesium   Result Value Ref Range    Magnesium 1.9 1.6 - 2.6 mg/dL   CBC auto differential   Result Value Ref Range    WBC 5.62 3.90 - 12.70 K/uL    RBC 4.49 4.00 - 5.40 M/uL    Hemoglobin 8.9 (L) 12.0 - 16.0 g/dL    Hematocrit 30.4 (L) 37.0 - 48.5 %    Mean Corpuscular Volume 68 (L) 82 - 98 fL    Mean Corpuscular Hemoglobin 19.8 (L) 27.0 - 31.0 pg    Mean Corpuscular Hemoglobin Conc 29.3 (L) 32.0 - 36.0 g/dL    RDW 31.5 (H) 11.5 - 14.5 %    Platelets 381 (H) 150 - 350 K/uL    MPV 9.8 9.2 - 12.9 fL    Immature Granulocytes 0.4 0.0 - 0.5 %    Gran # (ANC) 3.1 1.8 - 7.7 K/uL    Immature Grans (Abs) 0.02 0.00 - 0.04 K/uL    Lymph # 1.8 1.0 - 4.8 K/uL    Mono # 0.6 0.3 - 1.0 K/uL    Eos # 0.1 0.0 - 0.5 K/uL    Baso # 0.03 0.00 - 0.20 K/uL    nRBC 1 (A) 0 /100 WBC    Gran% 54.9 38.0 - 73.0 %    Lymph% 32.4 18.0 - 48.0 %    Mono% 10.0 4.0 - 15.0 %    Eosinophil% 1.8 0.0 - 8.0 %    Basophil% 0.5 0.0 - 1.9 %    Platelet Estimate Appears normal     Aniso Moderate     Poik Moderate     Hypo Moderate     Ovalocytes Occasional     Target Cells Occasional     Tear Drop Cells Occasional     German Cells Occasional     Stomatocytes Present     Spherocytes Moderate     Schistocytes Present     Sickle Cells Occasional (A)     Differential Method Automated    Basic metabolic panel   Result Value Ref Range    Sodium 140 136 - 145 mmol/L    Potassium 3.3 (L) 3.5 -  "5.1 mmol/L    Chloride 107 95 - 110 mmol/L    CO2 23 23 - 29 mmol/L    Glucose 85 70 - 110 mg/dL    BUN, Bld 12 8 - 23 mg/dL    Creatinine 0.8 0.5 - 1.4 mg/dL    Calcium 8.8 8.7 - 10.5 mg/dL    Anion Gap 10 8 - 16 mmol/L    eGFR if African American >60 >60 mL/min/1.73 m^2    eGFR if non African American >60 >60 mL/min/1.73 m^2   Magnesium   Result Value Ref Range    Magnesium 1.9 1.6 - 2.6 mg/dL   Type & Screen   Result Value Ref Range    Group & Rh A POS     Indirect Soraya NEG    Specimen to Pathology, Surgery Gynecology and Obstetrics   Result Value Ref Range    Final Pathologic Diagnosis       FRAGMENTS OF BENIGN ENDOMETRIUM SHOWING CHANGES CONSISTENT WITH COMPLEX  HYPERPLASIA WITHOUT ATYPIA ADMIXED WITH BLOOD CLOT      Gross       Surgery ID [id number];  Pathology ID:  [id number]  1.  Received in formalin labeled "endometrial biopsy" are multiple pale tan  tissue fragments admixed with clotted blood, 3 x 2.5 x 0.8 cm in aggregate.  The specimen is entirely embedded in cassette 657, 1A-1B.    Grossed by: Pablo Chavira     Prepare RBC 2 Units; symptomatic anemia   Result Value Ref Range    UNIT NUMBER [id number]     Product Code K3600C06     DISPENSE STATUS TRANSFUSED     CODING SYSTEM JNUU996     Unit Blood Type Code 6200     Unit Blood Type A POS     Unit Expiration [id number]     UNIT NUMBER [id number]     Product Code W4741D08     DISPENSE STATUS TRANSFUSED     CODING SYSTEM BLCD242     Unit Blood Type Code 6200     Unit Blood Type A POS     Unit Expiration [id number]    Prepare RBC 2 Units; severe symptomatic anemia   Result Value Ref Range    UNIT NUMBER [id number]     Product Code F5641E84     DISPENSE STATUS TRANSFUSED     CODING SYSTEM UBFW137     Unit Blood Type Code 6200     Unit Blood Type A POS     Unit Expiration [id number]     UNIT NUMBER [id number]     Product Code A6776G95     DISPENSE STATUS TRANSFUSED     CODING SYSTEM LRPA139     Unit Blood Type Code 6200     Unit Blood Type A POS     " Unit Expiration [id number]       All labs within the past 24 hours have been reviewed  Radiology:    Imaging Results          US Pelvis Complete Non OB (Final result)  Result time 02/04/20 13:17:20   Procedure changed from US Pelvis Comp with Transvag NON-OB (xpd)     Final result by Nikolai Suarez MD (02/04/20 13:17:20)                 Impression:      Enlarged, leiomyomatous uterus.    Enlarged right ovary containing multiple small follicles.      Electronically signed by: Nikolai Suarez MD  Date:    02/04/2020  Time:    13:17             Narrative:    EXAMINATION:  US PELVIS COMPLETE NON OB    CLINICAL HISTORY:  vaginal bleeding;    FINDINGS:  The uterus is enlarged measuring 15.7 x 6.4 x 8.6 cm with normal echotexture and a normal 2 mm endometrial stripe.  Multiple small intramural fibroids measuring up to 2.2 cm at the posterior fundus and 2.8 cm at the right fundus.  The right ovary measures 5.7 x 5.3 x 5.5 cm and the left ovary measures 2.5 x 2.5 x 2.5 cm.  Multiple small right ovarian follicles.  No free fluid identified. No solid adnexal lesions identified. Internal flow is demonstrated within both ovaries on color doppler.                               X-Ray Chest 1 View (Final result)  Result time 02/04/20 10:12:50    Final result by Nikolai Suarez MD (02/04/20 10:12:50)                 Impression:      No acute findings.      Electronically signed by: Nikolai Suarez MD  Date:    02/04/2020  Time:    10:12             Narrative:    EXAMINATION:  XR CHEST 1 VIEW    CLINICAL HISTORY:  Anemia, unspecified    TECHNIQUE:  AP view of the chest was performed.    COMPARISON:  02/03/2020    FINDINGS:  Stable cardiomegaly and pulmonary vascular prominence.  Stable left mid lung bandlike scarring.  No acute infiltrates.  No pneumothorax.  No acute osseous findings demonstrated.                                Pending Diagnostic Studies:     None         Medications:  Reconciled Home Medications:      Medication List       START taking these medications    ascorbic acid (vitamin C) 500 MG tablet  Commonly known as:  VITAMIN C  Take 1 tablet (500 mg total) by mouth every evening.     ferrous sulfate 325 (65 FE) MG EC tablet  Take 1 tablet (325 mg total) by mouth 2 (two) times daily with meals.     medroxyPROGESTERone 10 MG tablet  Commonly known as:  PROVERA  Take 1 tablet (10 mg total) by mouth once daily.  Start taking on:  February 7, 2020     polyethylene glycol 17 gram Pwpk  Commonly known as:  GLYCOLAX  Take 17 g by mouth once daily.        CONTINUE taking these medications    furosemide 40 MG tablet  Commonly known as:  LASIX  Take 1 tablet by mouth 2 (two) times daily.     losartan 50 MG tablet  Commonly known as:  COZAAR  Take 1 tablet (50 mg total) by mouth once daily.            Indwelling Lines/Drains at time of discharge:   Lines/Drains/Airways     None                 Time spent on the discharge of patient: 43 minutes  Patient was seen and examined on the date of discharge and determined to be suitable for discharge.         Ihsan Sullivan MD  Department of Hospital Medicine  [facility]

## 2020-02-06 NOTE — NURSING
Discharge instructions reviewed with patient and AVS given to patient; she verbalized understanding.   PIV intact on removal.  Tele monitor removed from pt and returned to monitor room.   Pt encouraged to  medications from pharmacy listed.   Stressed the importance of attending f/u appointments.   Pt has no complaints at this time.   Instructed her to call for a wheelchair once her daughter arrived to pick her up; she verbalized understanding.   Will continue to monitor.

## 2020-02-06 NOTE — NURSING
Notified by monitor tech of 11 beat run of Vtach. Pt asymptomatic, stated she had just gotten up before I walked into the room, but did not feel anything out of the ordinary. Breanne Rhoades NP notified; instructed to obtain potassium and magnesium levels with morning labs. Will continue to monitor.

## 2020-02-06 NOTE — PROGRESS NOTES
"[facility] Hospital Medicine  Progress Note    Patient Name: Liliane Ford  MRN: [id number]  Patient Class: IP- Inpatient   Admission Date: 2020  Length of Stay: 2 days  Attending Physician: Ihsan Sullivan MD  Primary Care Provider: Primary Doctor No        Subjective:     Principal Problem:Symptomatic anemia        HPI:  This is a 64-year-old female who came into the emergency room with reports of extreme fatigue and ongoing vaginal bleeding with blood clots.  Patient reports she has been having intermittent heavy vaginal bleeding, at times with large blood clots, since 2019. Patient states she has not followed up with a primary care physician or a gyn or any physician at all for this problem.   Patient reports that her menses.  Initially when she was 56 years old and she had not had any problems until the intermittent bleeding began in 2019 all of a sudden.  She denies any history of hysterectomy.  She reports her last Pap smear and mammogram was in  and work "okay".  She denies any  previously diagnosed vaginal or gyn issues.  She denies any abdominal cramps or vaginal discharge.  Currently no nausea, vomiting, or abdominal pain.  She denies any diarrhea or constipation and states her last colonoscopy was greater than 10 years ago and was "okay".  Patient states recently she was started on some "blood pressure medication and a fluid pill" which she takes intermittently.   Patient was evaluated emergency room where she was noted to have  A hemoglobin of 4.2 and hematocrit of 17.9.  Patient has been placed in the hospital for further evaluation, recommendation, and blood transfusion.         Overview/Hospital Course:  63 y/o  reports history of postmenopausal bleeding for past 5 months; at times has had daily bleeding; using super pad, changing q 3-4 hrs; denies dysmenorrhea; Denies gyn exam in last 10 years. Presented to ER for blood transfusion --hgb 4. Got 2 units of " blood yesterday and H/h improved to 6.2/19. Also GYN started on Provera and performed Endometrial Biopsy. Now feels better, Vaginal bleeding improving, no pain or dizziness.     Interval History: Presented to ER for blood transfusion --hgb 4. Got 2 units of blood yesterday and H/h improved to 6.2/19. Also GYN started on Provera and performed Endometrial Biopsy. Now feels better, Vaginal bleeding improving, no pain or dizziness.     Review of Systems   Constitutional: Positive for activity change, appetite change and fatigue. Negative for chills, diaphoresis and fever.   HENT: Negative for ear discharge, ear pain and facial swelling.    Eyes: Negative for pain and redness.   Respiratory: Positive for shortness of breath. Negative for cough.         Patient reports shortness of breath upon exertion   Cardiovascular: Negative for palpitations and leg swelling.        Patient reports palpitations upon exertion    Patient reports occasional bilateral lower extremity edema   Gastrointestinal: Negative for abdominal distention, abdominal pain, constipation, diarrhea, nausea and vomiting.   Endocrine: Negative for polydipsia and polyphagia.   Genitourinary: Positive for vaginal bleeding. Negative for difficulty urinating, dysuria, flank pain and hematuria.        Patient reports enter bleeding since August of 2019, occasionally with large blood clots.    Patient states she has not discussed this  problem with a physician prior to today.   Musculoskeletal: Negative for gait problem, neck pain and neck stiffness.   Skin: Positive for pallor.   Allergic/Immunologic: Negative for food allergies.   Neurological: Positive for weakness. Negative for seizures, facial asymmetry and speech difficulty.   Hematological: Does not bruise/bleed easily.   Psychiatric/Behavioral: Negative for agitation, behavioral problems, confusion, hallucinations and suicidal ideas. The patient is not nervous/anxious.      Objective:       Weight: 79.9  kg (176 lb 2.4 oz)  Body mass index is 31.2 kg/m².     Physical Exam   Constitutional: She is oriented to person, place, and time. She appears well-developed and well-nourished.   Weak, fatigued   HENT:   Head: Normocephalic and atraumatic.   Eyes: Pupils are equal, round, and reactive to light. Conjunctivae and EOM are normal. Right eye exhibits no discharge. Left eye exhibits no discharge.   Neck: Normal range of motion. Neck supple. No JVD present.   Cardiovascular: Normal rate, regular rhythm and intact distal pulses.   Pulmonary/Chest: Breath sounds normal. No respiratory distress.   Short of breath upon exertion   Abdominal: Soft. Bowel sounds are normal. She exhibits no distension. There is no tenderness. There is no guarding.   Genitourinary:   Genitourinary Comments: Not examined   Musculoskeletal: Normal range of motion. She exhibits no edema.   Neurological: She is alert and oriented to person, place, and time. No cranial nerve deficit.   Skin: Skin is warm and dry. Capillary refill takes less than 2 seconds. She is not diaphoretic. There is pallor.   Psychiatric: She has a normal mood and affect. Her behavior is normal. Judgment and thought content normal.   Nursing note and vitals reviewed.      Significant Labs: All pertinent labs within the past 24 hours have been reviewed.    Significant Imaging: I have reviewed all pertinent imaging results/findings within the past 24 hours.      Assessment/Plan:      * Symptomatic anemia   Secondary to acute blood loss secondary to postmenopausal vaginal bleeding-   Patient to receive 2 units of packed red blood cells today   Repeat CBC in a.m.   Gyn consulted for further and evaluation recommendations    2/5- feels better after 2 units, H/H still low at 6.2/22,6  Will transfuse 2 more units      Low iron   Patient with low iron, low ferritin, elevated TIBC, and saturated ferritin unable to be measured   Patient to receive 2 units of packed red blood cells today  -will give 1 dose of 200 mg of IV Venofer  in a.m.   Add multivitamin, ferrous sulfate, and p.m. Vitamin-C  for home use   Will go ahead and check folate and vitamin B12 level also       Continue oral iron    Cardiomegaly   As noted on chest x-ray, patient reports a history of bilateral lower extremity edema   will give a dose of IV Lasix after 1st and 2nd unit of packed red blood cells and monitor closely for any signs of volume overload      Elevated AST (SGOT)   Monitor   No reported history of alcohol   Repeat CMP in the morning       Thrombocytosis   Monitor   Patient currently vaginal bleeding   Repeat CBC in a.m.     Improving 381 K      Essential hypertension   Monitor   Resume home Lasix tomorrow, resume home losartan at half dose tomorrow   Titrate antihypertensives as needed     Under control    Postmenopausal bleeding   Gyn consulted   Provera started   Monitor anemia     Improving but not yet stopped  Continue Provera        VTE Risk Mitigation (From admission, onward)         Ordered     Place BEVERLY hose  Until discontinued      02/04/20 1610     IP VTE HIGH RISK PATIENT  Once      02/04/20 1610                      Ihsan Sullivan MD  Department of Hospital Medicine   [facility]

## 2020-02-06 NOTE — ASSESSMENT & PLAN NOTE
Monitor   Resume home Lasix tomorrow, resume home losartan at half dose tomorrow   Titrate antihypertensives as needed     Under control

## 2020-02-06 NOTE — ASSESSMENT & PLAN NOTE
Secondary to acute blood loss secondary to postmenopausal vaginal bleeding-   Patient to receive 2 units of packed red blood cells today   Repeat CBC in a.m.   Gyn consulted for further and evaluation recommendations    2/5- feels better after 2 units, H/H still low at 6.2/22,6  Will transfuse 2 more units

## 2020-02-07 NOTE — PLAN OF CARE
Feb11 Established Gynecological with Ema Aquino MD   Tuesday Feb 11, 2020 2:00 PM   Arrive at check-in approximately 15 minutes before your scheduled appointment time. Bring all outside medical records and imaging, along with a list of your current medications and insurance card.  Prepay due: Estimate unavailable   (off [facility]) 3rd floor  [facility]   [address] [id number] [phone]        02/07/20 0721   Final Note   Assessment Type Final Discharge Note   Anticipated Discharge Disposition Home   Hospital Follow Up  Appt(s) scheduled? Yes   [facility] Referral Info   Post Acute Recommendation No Care

## 2020-02-11 ENCOUNTER — OFFICE VISIT (OUTPATIENT)
Dept: OBSTETRICS AND GYNECOLOGY | Facility: CLINIC | Age: 65
End: 2020-02-11
Payer: COMMERCIAL

## 2020-02-11 VITALS
DIASTOLIC BLOOD PRESSURE: 78 MMHG | BODY MASS INDEX: 32.18 KG/M2 | WEIGHT: 181.69 LBS | SYSTOLIC BLOOD PRESSURE: 122 MMHG

## 2020-02-11 DIAGNOSIS — N85.01 COMPLEX ENDOMETRIAL HYPERPLASIA: ICD-10-CM

## 2020-02-11 DIAGNOSIS — Z12.11 SPECIAL SCREENING FOR MALIGNANT NEOPLASMS, COLON: ICD-10-CM

## 2020-02-11 DIAGNOSIS — Z12.31 SCREENING MAMMOGRAM, ENCOUNTER FOR: ICD-10-CM

## 2020-02-11 DIAGNOSIS — Z01.419 ENCOUNTER FOR GYNECOLOGICAL EXAMINATION (GENERAL) (ROUTINE) WITHOUT ABNORMAL FINDINGS: Primary | ICD-10-CM

## 2020-02-11 DIAGNOSIS — Z12.4 SCREENING FOR CERVICAL CANCER: ICD-10-CM

## 2020-02-11 PROCEDURE — 3074F PR MOST RECENT SYSTOLIC BLOOD PRESSURE < 130 MM HG: ICD-10-PCS | Mod: CPTII,S$GLB,, | Performed by: OBSTETRICS & GYNECOLOGY

## 2020-02-11 PROCEDURE — 99999 PR PBB SHADOW E&M-EST. PATIENT-LVL III: ICD-10-PCS | Mod: PBBFAC,,, | Performed by: OBSTETRICS & GYNECOLOGY

## 2020-02-11 PROCEDURE — 99397 PR PREVENTIVE VISIT,EST,65 & OVER: ICD-10-PCS | Mod: S$GLB,,, | Performed by: OBSTETRICS & GYNECOLOGY

## 2020-02-11 PROCEDURE — 3078F PR MOST RECENT DIASTOLIC BLOOD PRESSURE < 80 MM HG: ICD-10-PCS | Mod: CPTII,S$GLB,, | Performed by: OBSTETRICS & GYNECOLOGY

## 2020-02-11 PROCEDURE — 99999 PR PBB SHADOW E&M-EST. PATIENT-LVL III: CPT | Mod: PBBFAC,,, | Performed by: OBSTETRICS & GYNECOLOGY

## 2020-02-11 PROCEDURE — 3078F DIAST BP <80 MM HG: CPT | Mod: CPTII,S$GLB,, | Performed by: OBSTETRICS & GYNECOLOGY

## 2020-02-11 PROCEDURE — 99397 PER PM REEVAL EST PAT 65+ YR: CPT | Mod: S$GLB,,, | Performed by: OBSTETRICS & GYNECOLOGY

## 2020-02-11 PROCEDURE — 3074F SYST BP LT 130 MM HG: CPT | Mod: CPTII,S$GLB,, | Performed by: OBSTETRICS & GYNECOLOGY

## 2020-02-11 PROCEDURE — 87624 HPV HI-RISK TYP POOLED RSLT: CPT

## 2020-02-11 PROCEDURE — 88175 CYTOPATH C/V AUTO FLUID REDO: CPT

## 2020-02-11 NOTE — PROGRESS NOTES
Subjective:       Patient ID: Liliane Ford is a 64 y.o. female.    Chief Complaint:  No chief complaint on file.      History of Present Illness  HPI  Annual Exam-Postmenopausal  Here for ER follow up  Patient presents for annual exam. The patient postmenopausal bleeding has resolved while on megace. --no longer feeling short of breath (since blood transfusion); The patient is sexually active--denies pelvic pain; . GYN screening history: last pap: was normal and patient does not recall when last pap was and last mammogram: was normal and patient does not recall when last mammogram was done. The patient has never been taking hormone replacement therapy. Patient reports post-menopausal vaginal bleeding. The patient wears seatbelts: yes. The patient participates in regular exercise: yes. Has the patient ever been transfused or tattooed?: yes --during hospitalization in 2020; The patient reports that there is not domestic violence in her life.               GYN & OB History  No LMP recorded. Patient is postmenopausal.   Date of Last Pap: No result found    OB History    Para Term  AB Living   3 3       3   SAB TAB Ectopic Multiple Live Births         1        # Outcome Date GA Lbr Marvin/2nd Weight Sex Delivery Anes PTL Lv   3 Para 10/23/86     CS-LTranv      2 Para 82     CS-LTranv      1 Para 82     Vag-Spont          Review of Systems  Review of Systems   Genitourinary: Positive for postmenopausal bleeding.   All other systems reviewed and are negative.          Objective:      Physical Exam:   Constitutional: She appears well-developed.     Eyes: Pupils are equal, round, and reactive to light. Conjunctivae and EOM are normal.    Neck: Normal range of motion. Neck supple.     Pulmonary/Chest: Effort normal. Right breast exhibits no mass, no nipple discharge, no skin change and no tenderness. Left breast exhibits no mass, no nipple discharge, no skin change and no tenderness. Breasts are  symmetrical.        Abdominal: Soft.     Genitourinary: Rectum normal, vagina normal and uterus normal. Pelvic exam was performed with patient supine. Cervix is normal. Right adnexum displays no mass and no tenderness. Left adnexum displays no mass and no tenderness. No erythema, bleeding, rectocele, cystocele or unspecified prolapse of vaginal walls in the vagina. No vaginal discharge found. Labial bartholins normal.Cervix exhibits no motion tenderness and no friability. Additional cervical findings: pap smear done  Genitourinary Comments: atrophic        Uterus Size: 6 cm   Musculoskeletal: Normal range of motion.       Neurological: She is alert.    Skin: Skin is warm.    Psychiatric: She has a normal mood and affect.           Assessment:     Encounter Diagnoses   Name Primary?    Encounter for gynecological examination (general) (routine) without abnormal findings Yes    Screening for cervical cancer     Screening mammogram, encounter for     Complex endometrial hyperplasia     Special screening for malignant neoplasms, colon                Plan:      Continue annual well woman exam.  Pap today; Reviewed updated recommendations for pap smears (every 3 years) in low risk patients.   Recommend annual pelvic exams.  Reviewed recommendations for annual CBE.  mammo ordered, continue yearly until age 75  Osteoporosis prevention; 1200mg calcium/d with source of vitamin d   accepts referral for colonoscopy  Continue diet, exercise, weight loss  Continue daily iron  Continue megace for pmb-complex hyperplasia; aware repeat emb in 90d;

## 2020-02-13 NOTE — PHYSICIAN QUERY
PT Name: Liliane Ford  MR #: [id number]    Physician Query Form - Pathology Findings Clarification     CDS/: YANDEL Chaudhari,RNC-[email or web address]       Contact information:[email or web address]  This form is a permanent document in the medical record.     Query Date: February 13, 2020      By submitting this query, we are merely seeking further clarification of documentation.  Please utilize your independent clinical judgment when addressing the question(s) below.      The medical record contains the following:     Findings Supporting Clinical Information Location in Medical Record   FRAGMENTS OF BENIGN ENDOMETRIUM SHOWING CHANGES CONSISTENT WITH COMPLEX  HYPERPLASIA WITHOUT ATYPIA ADMIXED WITH BLOOD CLOT                 presents for an endometrial biopsy secondary to postmenopausal bleeding.    A sterile endometrial pipelle was passed without difficulty to a depth of 12 cm.     Endometrial tissue was obtained.   Pathology report 2/4                OB Procedure note 2/4     Please document the clinical significance of the Pathologists findings of   FRAGMENTS OF BENIGN ENDOMETRIUM SHOWING CHANGES CONSISTENT WITH COMPLEX  HYPERPLASIA WITHOUT ATYPIA ADMIXED WITH BLOOD CLOT    [ x  ] I agree with the Pathology Findings   [   ] I do not agree with the Pathology Findings   [   ] Other/Clarification of Findings:   [   ] Clinically Insignificant   [  ] Clinically Undetermined       Please document in your progress notes daily for the duration of treatment until resolved and include in your discharge summary.

## 2020-02-14 ENCOUNTER — PATIENT MESSAGE (OUTPATIENT)
Dept: INTERNAL MEDICINE | Facility: CLINIC | Age: 65
End: 2020-02-14

## 2020-02-14 DIAGNOSIS — D50.0 IRON DEFICIENCY ANEMIA DUE TO CHRONIC BLOOD LOSS: Primary | ICD-10-CM

## 2020-02-15 LAB
HPV HR 12 DNA SPEC QL NAA+PROBE: NEGATIVE
HPV16 AG SPEC QL: NEGATIVE
HPV18 DNA SPEC QL NAA+PROBE: NEGATIVE

## 2020-02-17 NOTE — TELEPHONE ENCOUNTER
Called pt and she said she is feeling perfect.  She is taking the Iron/vit c bid and she feels good, can even run.  She said cervical cx was negative, she has fibroid.    Did you still need her to do the echo?

## 2020-02-17 NOTE — TELEPHONE ENCOUNTER
Called pt and she said that she has a 1 month f/u scheduled for 3-3-2020, did you want to cancel that and do the 8 weeks?

## 2020-02-24 ENCOUNTER — PATIENT OUTREACH (OUTPATIENT)
Dept: ADMINISTRATIVE | Facility: HOSPITAL | Age: 65
End: 2020-02-24

## 2020-02-24 ENCOUNTER — PATIENT MESSAGE (OUTPATIENT)
Dept: ADMINISTRATIVE | Facility: HOSPITAL | Age: 65
End: 2020-02-24

## 2020-02-24 NOTE — PROGRESS NOTES
Pap smear done 2/11/2020 by Ema Aquino MD; added to care teams  Mammogram already scheduled for 3/5/2020  Updated pt info in LINKS  Enter/edited lipid panel from 2010 found in [facility].  Imported and hyper linked 2007 colonoscopy found in [facility] reviewed and updated. Immunizations abstracted.  Care Everywhere abstracted.  Health Maintenance Due   Topic    Mammogram: 3/5/2020    DEXA SCAN     Lipid Panel     TETANUS VACCINE     Colonoscopy     Pneumococcal Vaccine (65+ Low/Medium Risk) (1 of 2 - PCV13)     Previsit chart audit completed.  *KDL*

## 2020-03-05 ENCOUNTER — HOSPITAL ENCOUNTER (OUTPATIENT)
Dept: RADIOLOGY | Facility: HOSPITAL | Age: 65
Discharge: HOME | End: 2020-03-05
Attending: OBSTETRICS & GYNECOLOGY
Payer: COMMERCIAL

## 2020-03-05 VITALS — BODY MASS INDEX: 32.19 KG/M2 | HEIGHT: 63 IN | WEIGHT: 181.69 LBS

## 2020-03-05 DIAGNOSIS — Z12.31 SCREENING MAMMOGRAM, ENCOUNTER FOR: ICD-10-CM

## 2020-03-05 PROCEDURE — 77063 MAMMO DIGITAL SCREENING BILAT WITH TOMOSYNTHESIS_CAD: ICD-10-PCS | Mod: 26,,, | Performed by: RADIOLOGY

## 2020-03-05 PROCEDURE — 77067 MAMMO DIGITAL SCREENING BILAT WITH TOMOSYNTHESIS_CAD: ICD-10-PCS | Mod: 26,,, | Performed by: RADIOLOGY

## 2020-03-05 PROCEDURE — 77067 SCR MAMMO BI INCL CAD: CPT | Mod: TC

## 2020-03-05 PROCEDURE — 77067 SCR MAMMO BI INCL CAD: CPT | Mod: 26,,, | Performed by: RADIOLOGY

## 2020-03-05 PROCEDURE — 77063 BREAST TOMOSYNTHESIS BI: CPT | Mod: 26,,, | Performed by: RADIOLOGY

## 2020-03-10 LAB
FINAL PATHOLOGIC DIAGNOSIS: NORMAL
Lab: NORMAL

## 2020-03-17 ENCOUNTER — TELEPHONE (OUTPATIENT)
Dept: ENDOSCOPY | Facility: HOSPITAL | Age: 65
End: 2020-03-17

## 2020-03-17 NOTE — TELEPHONE ENCOUNTER
----- Message from Ceci Juares LPN sent at 3/17/2020 11:00 AM CDT -----  Message to send In basket message to the Endo Schedulers.

## 2020-04-27 ENCOUNTER — TELEPHONE (OUTPATIENT)
Dept: GASTROENTEROLOGY | Facility: CLINIC | Age: 65
End: 2020-04-27

## 2020-04-27 ENCOUNTER — PATIENT MESSAGE (OUTPATIENT)
Dept: GASTROENTEROLOGY | Facility: CLINIC | Age: 65
End: 2020-04-27

## 2020-04-28 ENCOUNTER — TELEPHONE (OUTPATIENT)
Dept: GASTROENTEROLOGY | Facility: CLINIC | Age: 65
End: 2020-04-28

## 2020-05-01 ENCOUNTER — PATIENT MESSAGE (OUTPATIENT)
Dept: OBSTETRICS AND GYNECOLOGY | Facility: CLINIC | Age: 65
End: 2020-05-01

## 2020-05-19 ENCOUNTER — TELEPHONE (OUTPATIENT)
Dept: OBSTETRICS AND GYNECOLOGY | Facility: CLINIC | Age: 65
End: 2020-05-19

## 2020-05-19 NOTE — TELEPHONE ENCOUNTER
----- Message from Jenifer Stewart sent at 5/19/2020  7:45 AM CDT -----  Contact: self  Pt requesting a call back to have appt rescheduled. Please call pt back at [phone]

## 2020-05-19 NOTE — TELEPHONE ENCOUNTER
Returned call to patient.  She requested to reschedule EMB, due to a family emergency.  Requested that appointment be 3 or more weeks out.  Appt rescheduled for 06/23/20 at 9:30 am, she confirmed appt, provider and location.  Verbalized understanding of visitor policy.

## 2020-09-04 RX ORDER — LOSARTAN POTASSIUM 50 MG/1
50 TABLET ORAL DAILY
Qty: 90 TABLET | Refills: 3 | Status: SHIPPED | OUTPATIENT
Start: 2020-09-04 | End: 2021-09-01

## 2020-11-10 ENCOUNTER — TELEPHONE (OUTPATIENT)
Dept: INTERNAL MEDICINE | Facility: CLINIC | Age: 65
End: 2020-11-10

## 2020-11-10 NOTE — TELEPHONE ENCOUNTER
----- Message from Brissa nileshlamar sent at 11/10/2020  1:19 PM CST -----  Type: Patient Call Back    Who called: pt     What is the request in detail: asking pre op clearance appt.     Can the clinic reply by [facility]?  Yes    Would the patient rather a call back or a response via [facility]? Call back     Best call back number: [phone] (home)     Additional Information:

## 2020-11-10 NOTE — TELEPHONE ENCOUNTER
Called pt and she is having cataract surgery both eyes by Dr. Ole Crowley.  She needs pre op.  Booked with Glenis Landa tomorrow at 10:40 am.  She will bring form with her.

## 2020-11-11 ENCOUNTER — OFFICE VISIT (OUTPATIENT)
Dept: INTERNAL MEDICINE | Facility: CLINIC | Age: 65
End: 2020-11-11
Payer: COMMERCIAL

## 2020-11-11 ENCOUNTER — TELEPHONE (OUTPATIENT)
Dept: INTERNAL MEDICINE | Facility: CLINIC | Age: 65
End: 2020-11-11

## 2020-11-11 VITALS
WEIGHT: 189.81 LBS | TEMPERATURE: 98 F | HEIGHT: 63 IN | BODY MASS INDEX: 33.63 KG/M2 | DIASTOLIC BLOOD PRESSURE: 108 MMHG | HEART RATE: 86 BPM | SYSTOLIC BLOOD PRESSURE: 162 MMHG

## 2020-11-11 DIAGNOSIS — Z01.818 PRE-OP EXAM: Primary | ICD-10-CM

## 2020-11-11 DIAGNOSIS — I10 HYPERTENSION, UNSPECIFIED TYPE: ICD-10-CM

## 2020-11-11 PROCEDURE — 3080F DIAST BP >= 90 MM HG: CPT | Mod: CPTII,S$GLB,, | Performed by: NURSE PRACTITIONER

## 2020-11-11 PROCEDURE — 3077F SYST BP >= 140 MM HG: CPT | Mod: CPTII,S$GLB,, | Performed by: NURSE PRACTITIONER

## 2020-11-11 PROCEDURE — 99999 PR PBB SHADOW E&M-EST. PATIENT-LVL III: ICD-10-PCS | Mod: PBBFAC,,, | Performed by: NURSE PRACTITIONER

## 2020-11-11 PROCEDURE — 3008F BODY MASS INDEX DOCD: CPT | Mod: CPTII,S$GLB,, | Performed by: NURSE PRACTITIONER

## 2020-11-11 PROCEDURE — 3080F PR MOST RECENT DIASTOLIC BLOOD PRESSURE >= 90 MM HG: ICD-10-PCS | Mod: CPTII,S$GLB,, | Performed by: NURSE PRACTITIONER

## 2020-11-11 PROCEDURE — 99214 OFFICE O/P EST MOD 30 MIN: CPT | Mod: S$GLB,,, | Performed by: NURSE PRACTITIONER

## 2020-11-11 PROCEDURE — 99214 PR OFFICE/OUTPT VISIT, EST, LEVL IV, 30-39 MIN: ICD-10-PCS | Mod: S$GLB,,, | Performed by: NURSE PRACTITIONER

## 2020-11-11 PROCEDURE — 1159F PR MEDICATION LIST DOCUMENTED IN MEDICAL RECORD: ICD-10-PCS | Mod: S$GLB,,, | Performed by: NURSE PRACTITIONER

## 2020-11-11 PROCEDURE — 1126F PR PAIN SEVERITY QUANTIFIED, NO PAIN PRESENT: ICD-10-PCS | Mod: S$GLB,,, | Performed by: NURSE PRACTITIONER

## 2020-11-11 PROCEDURE — 1101F PR PT FALLS ASSESS DOC 0-1 FALLS W/OUT INJ PAST YR: ICD-10-PCS | Mod: CPTII,S$GLB,, | Performed by: NURSE PRACTITIONER

## 2020-11-11 PROCEDURE — 1126F AMNT PAIN NOTED NONE PRSNT: CPT | Mod: S$GLB,,, | Performed by: NURSE PRACTITIONER

## 2020-11-11 PROCEDURE — 99999 PR PBB SHADOW E&M-EST. PATIENT-LVL III: CPT | Mod: PBBFAC,,, | Performed by: NURSE PRACTITIONER

## 2020-11-11 PROCEDURE — 1101F PT FALLS ASSESS-DOCD LE1/YR: CPT | Mod: CPTII,S$GLB,, | Performed by: NURSE PRACTITIONER

## 2020-11-11 PROCEDURE — 3077F PR MOST RECENT SYSTOLIC BLOOD PRESSURE >= 140 MM HG: ICD-10-PCS | Mod: CPTII,S$GLB,, | Performed by: NURSE PRACTITIONER

## 2020-11-11 PROCEDURE — 3008F PR BODY MASS INDEX (BMI) DOCUMENTED: ICD-10-PCS | Mod: CPTII,S$GLB,, | Performed by: NURSE PRACTITIONER

## 2020-11-11 PROCEDURE — 1159F MED LIST DOCD IN RCRD: CPT | Mod: S$GLB,,, | Performed by: NURSE PRACTITIONER

## 2020-11-11 NOTE — TELEPHONE ENCOUNTER
----- Message from Glenis Echols, ESTHELA-C sent at 11/11/2020 12:44 PM CST -----  Can go ahead and fax my note from today along with the form patient brought in for her pre op

## 2020-11-11 NOTE — PROGRESS NOTES
Subjective:     Liliane Ford is a 65 y.o. female who presents to the office today for a preoperative consultation at the request of surgeon Dr. Crowley who plans on performing left cataract surgery on . This consultation is requested for the specific conditions prompting preoperative evaluation (i.e. because of potential affect on operative risk). Planned anesthesia: patient unsure. The patient has the following known anesthesia issues: none. Patients bleeding risk: no remote history of abnormal bleeding. Patient does not have objections to receiving blood products if needed.    The following portions of the patient's history were reviewed and updated as appropriate:  Past Medical History:   Diagnosis Date    Pulmonary edema      Past Medical History:   Diagnosis Date    Pulmonary edema      Family History   Problem Relation Age of Onset    Hypertension Mother      Past Surgical History:   Procedure Laterality Date     SECTION      x 2     Social History     Socioeconomic History    Marital status:      Spouse name: Not on file    Number of children: 3    Years of education: Not on file    Highest education level: Not on file   Occupational History    Not on file   Social Needs    Financial resource strain: Not on file    Food insecurity     Worry: Not on file     Inability: Not on file    Transportation needs     Medical: Not on file     Non-medical: Not on file   Tobacco Use    Smoking status: Former Smoker    Smokeless tobacco: Never Used   Substance and Sexual Activity    Alcohol use: Never     Frequency: Never    Drug use: Never    Sexual activity: Not Currently     Partners: Male   Lifestyle    Physical activity     Days per week: Not on file     Minutes per session: Not on file    Stress: Not on file   Relationships    Social connections     Talks on phone: Not on file     Gets together: Not on file     Attends Mandaeism service: Not on file     Active member  of club or organization: Not on file     Attends meetings of clubs or organizations: Not on file     Relationship status: Not on file   Other Topics Concern    Not on file   Social History Narrative    Not on file     Review of patient's allergies indicates:   Allergen Reactions    Penicillins Rash     Medication List with Changes/Refills   Current Medications    ASCORBIC ACID, VITAMIN C, (VITAMIN C) 500 MG TABLET    Take 1 tablet (500 mg total) by mouth every evening.    FERROUS SULFATE 325 (65 FE) MG EC TABLET    Take 1 tablet (325 mg total) by mouth 2 (two) times daily with meals.    LOSARTAN (COZAAR) 50 MG TABLET    Take 1 tablet (50 mg total) by mouth once daily.    MEGESTROL (MEGACE) 40 MG TAB    Take 1 tablet (40 mg total) by mouth once daily.    POLYETHYLENE GLYCOL (GLYCOLAX) 17 GRAM PWPK    Take 17 g by mouth once daily.   Discontinued Medications    FUROSEMIDE (LASIX) 20 MG TAB    Take 1 tablet (20 mg total) by mouth daily as needed (leg swelling or SOB).     Patient Active Problem List   Diagnosis    Thyroid nodule    Essential hypertension    Thrombocytosis    Elevated AST (SGOT)    Cardiomegaly    Low iron       Review of Systems  Review of Systems   Constitutional: Negative for activity change, appetite change, chills, diaphoresis, fatigue, fever and unexpected weight change.   HENT: Negative.    Eyes: Positive for visual disturbance.   Respiratory: Negative for cough and shortness of breath.    Cardiovascular: Negative for chest pain, palpitations and leg swelling.   Gastrointestinal: Negative.    Genitourinary: Negative.    Musculoskeletal: Negative.    Skin: Negative for color change, pallor, rash and wound.   Allergic/Immunologic: Negative for immunocompromised state.   Neurological: Negative.  Negative for dizziness and facial asymmetry.   Hematological: Negative for adenopathy. Does not bruise/bleed easily.   Psychiatric/Behavioral: Negative for agitation, behavioral problems and  confusion.        Objective:     Vitals:    11/11/20 1105   BP: (!) 162/108   Pulse: 86   Temp: 97.7 °F (36.5 °C)       Physical Exam  Vitals signs and nursing note reviewed.   Constitutional:       General: She is not in acute distress.     Appearance: She is well-developed. She is not diaphoretic.   HENT:      Head: Normocephalic and atraumatic.   Eyes:      General:         Right eye: No discharge.         Left eye: No discharge.      Conjunctiva/sclera: Conjunctivae normal.   Cardiovascular:      Rate and Rhythm: Normal rate and regular rhythm.      Heart sounds: Normal heart sounds. No murmur.   Pulmonary:      Effort: Pulmonary effort is normal. No respiratory distress.      Breath sounds: Normal breath sounds. No wheezing or rales.   Chest:      Chest wall: No tenderness.   Abdominal:      General: There is no distension.      Palpations: Abdomen is soft.   Musculoskeletal: Normal range of motion.   Skin:     General: Skin is warm and dry.      Findings: No rash.   Neurological:      Mental Status: She is alert and oriented to person, place, and time.   Psychiatric:         Behavior: Behavior normal. Behavior is cooperative.         Thought Content: Thought content normal.         Judgment: Judgment normal.          Assessment:       Encounter Diagnoses   Name Primary?    Pre-op exam Yes    Hypertension, unspecified type         Plan:     Pre-op exam    Hypertension, unspecified type      I reviewed the patient's past medical, surgical, social and family history and with  physical exam findings and the proposed surgery and I make the following recommendations:     From a cardiac standpoint the patient is low risk for surgery with a low risk surgery. Patient has no evidence of cardiac symptomatology or cardiac diagnoses. The patient may proceed with surgery without further cardiac workup.     From a pulmonary standpoint the patient presents as a good candidate as well. The patient has no history of lung  disease or pulmonary symptoms. Good pulmonary toilet, incentive spirometry, early ambulation are all recommended to improve the pulmonary outcome. No further pulmonary workup as noted prior to surgery.     DVT prophylaxis should be per standard. Venous compression devices are recommended. Early ambulation. Patient has been educated on signs and symptoms of both DVT and pulmonary embolus and instructed on what to do if there are symptoms postop.     The patient has been instructed to take blood pressure medication the morning of surgery with a sip of water. Avoid any aspirin or anti-inflammatories between now and surgery.     If there is any further I can do to assist in the care of this patient please not hesitate to contact me. I will forward the lab results upon my receipt.    Patient's blood pressure is high today at 162/108. She admits she has not taken her blood pressure medication since may 2020. Patient has the medication at home and agrees to restart the medication daily. She will follow up with me on Monday to recheck bp    NELSY Luna

## 2020-11-16 ENCOUNTER — TELEPHONE (OUTPATIENT)
Dept: INTERNAL MEDICINE | Facility: CLINIC | Age: 65
End: 2020-11-16

## 2020-11-16 ENCOUNTER — OFFICE VISIT (OUTPATIENT)
Dept: INTERNAL MEDICINE | Facility: CLINIC | Age: 65
End: 2020-11-16
Payer: COMMERCIAL

## 2020-11-16 VITALS
BODY MASS INDEX: 33.25 KG/M2 | SYSTOLIC BLOOD PRESSURE: 186 MMHG | WEIGHT: 187.63 LBS | HEART RATE: 80 BPM | DIASTOLIC BLOOD PRESSURE: 100 MMHG | TEMPERATURE: 97 F | HEIGHT: 63 IN

## 2020-11-16 DIAGNOSIS — I10 HYPERTENSION, UNSPECIFIED TYPE: Primary | ICD-10-CM

## 2020-11-16 PROCEDURE — 1126F PR PAIN SEVERITY QUANTIFIED, NO PAIN PRESENT: ICD-10-PCS | Mod: S$GLB,,, | Performed by: NURSE PRACTITIONER

## 2020-11-16 PROCEDURE — 3080F DIAST BP >= 90 MM HG: CPT | Mod: CPTII,S$GLB,, | Performed by: NURSE PRACTITIONER

## 2020-11-16 PROCEDURE — 99213 OFFICE O/P EST LOW 20 MIN: CPT | Mod: S$GLB,,, | Performed by: NURSE PRACTITIONER

## 2020-11-16 PROCEDURE — 3080F PR MOST RECENT DIASTOLIC BLOOD PRESSURE >= 90 MM HG: ICD-10-PCS | Mod: CPTII,S$GLB,, | Performed by: NURSE PRACTITIONER

## 2020-11-16 PROCEDURE — 99999 PR PBB SHADOW E&M-EST. PATIENT-LVL III: ICD-10-PCS | Mod: PBBFAC,,, | Performed by: NURSE PRACTITIONER

## 2020-11-16 PROCEDURE — 1159F MED LIST DOCD IN RCRD: CPT | Mod: S$GLB,,, | Performed by: NURSE PRACTITIONER

## 2020-11-16 PROCEDURE — 1101F PR PT FALLS ASSESS DOC 0-1 FALLS W/OUT INJ PAST YR: ICD-10-PCS | Mod: CPTII,S$GLB,, | Performed by: NURSE PRACTITIONER

## 2020-11-16 PROCEDURE — 1159F PR MEDICATION LIST DOCUMENTED IN MEDICAL RECORD: ICD-10-PCS | Mod: S$GLB,,, | Performed by: NURSE PRACTITIONER

## 2020-11-16 PROCEDURE — 3288F PR FALLS RISK ASSESSMENT DOCUMENTED: ICD-10-PCS | Mod: CPTII,S$GLB,, | Performed by: NURSE PRACTITIONER

## 2020-11-16 PROCEDURE — 3008F BODY MASS INDEX DOCD: CPT | Mod: CPTII,S$GLB,, | Performed by: NURSE PRACTITIONER

## 2020-11-16 PROCEDURE — 3077F PR MOST RECENT SYSTOLIC BLOOD PRESSURE >= 140 MM HG: ICD-10-PCS | Mod: CPTII,S$GLB,, | Performed by: NURSE PRACTITIONER

## 2020-11-16 PROCEDURE — 99213 PR OFFICE/OUTPT VISIT, EST, LEVL III, 20-29 MIN: ICD-10-PCS | Mod: S$GLB,,, | Performed by: NURSE PRACTITIONER

## 2020-11-16 PROCEDURE — 1101F PT FALLS ASSESS-DOCD LE1/YR: CPT | Mod: CPTII,S$GLB,, | Performed by: NURSE PRACTITIONER

## 2020-11-16 PROCEDURE — 3008F PR BODY MASS INDEX (BMI) DOCUMENTED: ICD-10-PCS | Mod: CPTII,S$GLB,, | Performed by: NURSE PRACTITIONER

## 2020-11-16 PROCEDURE — 1126F AMNT PAIN NOTED NONE PRSNT: CPT | Mod: S$GLB,,, | Performed by: NURSE PRACTITIONER

## 2020-11-16 PROCEDURE — 99999 PR PBB SHADOW E&M-EST. PATIENT-LVL III: CPT | Mod: PBBFAC,,, | Performed by: NURSE PRACTITIONER

## 2020-11-16 PROCEDURE — 3288F FALL RISK ASSESSMENT DOCD: CPT | Mod: CPTII,S$GLB,, | Performed by: NURSE PRACTITIONER

## 2020-11-16 PROCEDURE — 3077F SYST BP >= 140 MM HG: CPT | Mod: CPTII,S$GLB,, | Performed by: NURSE PRACTITIONER

## 2020-11-16 NOTE — Clinical Note
Please fax pre op note from last week and this note to Dr. Crowley. I believe there is a form for me to sign in holding folder.

## 2020-11-16 NOTE — PROGRESS NOTES
"Subjective:       Patient ID: Liliane Ford is a 65 y.o. female.    Chief Complaint: Hypertension    Patient here today for follow up bp  Patient was seen last week for pre op visit  bp was high patient admitted she was not taking her bp med  Losartan 50 was restarted  bp high today 186/100 and 164/90 recheck by me  She denies headache, blurred vision, chest pain, shortness of breath  Patient has eye surgery Wednesday of this week  Patient does report increased stress      BP (!) 186/100   Pulse 80   Temp 97.3 °F (36.3 °C) (Temporal)   Ht 5' 3" (1.6 m)   Wt 85.1 kg (187 lb 9.8 oz)   BMI 33.23 kg/m²     Review of Systems   Constitutional: Negative for activity change, appetite change, chills, diaphoresis, fatigue, fever and unexpected weight change.   HENT: Negative.    Respiratory: Negative for cough and shortness of breath.    Cardiovascular: Negative for chest pain, palpitations and leg swelling.   Gastrointestinal: Negative.    Genitourinary: Negative.    Musculoskeletal: Negative.    Skin: Negative for color change, pallor, rash and wound.   Allergic/Immunologic: Negative for immunocompromised state.   Neurological: Negative.  Negative for dizziness and facial asymmetry.   Hematological: Negative for adenopathy. Does not bruise/bleed easily.   Psychiatric/Behavioral: Negative for agitation, behavioral problems and confusion.       Objective:      Physical Exam  Vitals signs and nursing note reviewed.   Constitutional:       General: She is not in acute distress.     Appearance: She is well-developed. She is not diaphoretic.   HENT:      Head: Normocephalic and atraumatic.   Cardiovascular:      Rate and Rhythm: Normal rate.   Pulmonary:      Effort: Pulmonary effort is normal. No respiratory distress.   Musculoskeletal: Normal range of motion.   Skin:     General: Skin is warm and dry.      Findings: No rash.   Neurological:      Mental Status: She is alert.   Psychiatric:         Behavior: Behavior " normal. Behavior is cooperative.         Thought Content: Thought content normal.         Judgment: Judgment normal.         Assessment:       1. Hypertension, unspecified type        Plan:       Liliane was seen today for hypertension.    Diagnoses and all orders for this visit:    Hypertension, unspecified type      Patient Instructions   Increase losartan to 2 pills every morning for a total of 100mg.   Recheck bp here in 2 weeks  ok to proceed with surgery on higher dose of losartan  Patient to keep bp diary at home

## 2020-11-17 NOTE — TELEPHONE ENCOUNTER
----- Message from Glenis Echols, ESTHELA-C sent at 11/16/2020  4:11 PM CST -----  Please fax pre op note from last week and this note to Dr. Crowley. I believe there is a form for me to sign in holding folder.

## 2021-03-22 DIAGNOSIS — M79.641 PAIN IN BOTH HANDS: Primary | ICD-10-CM

## 2021-03-22 DIAGNOSIS — M79.642 PAIN IN BOTH HANDS: Primary | ICD-10-CM

## 2021-03-23 ENCOUNTER — OFFICE VISIT (OUTPATIENT)
Dept: ORTHOPEDICS | Facility: CLINIC | Age: 66
End: 2021-03-23
Payer: COMMERCIAL

## 2021-03-23 ENCOUNTER — HOSPITAL ENCOUNTER (OUTPATIENT)
Dept: RADIOLOGY | Facility: HOSPITAL | Age: 66
Discharge: HOME | End: 2021-03-23
Attending: PHYSICIAN ASSISTANT
Payer: COMMERCIAL

## 2021-03-23 VITALS
HEIGHT: 63 IN | WEIGHT: 187 LBS | DIASTOLIC BLOOD PRESSURE: 100 MMHG | HEART RATE: 75 BPM | SYSTOLIC BLOOD PRESSURE: 198 MMHG | BODY MASS INDEX: 33.13 KG/M2

## 2021-03-23 DIAGNOSIS — M79.641 PAIN IN BOTH HANDS: Primary | ICD-10-CM

## 2021-03-23 DIAGNOSIS — S63.650A SPRAIN OF METACARPOPHALANGEAL (MCP) JOINT OF RIGHT INDEX FINGER, INITIAL ENCOUNTER: ICD-10-CM

## 2021-03-23 DIAGNOSIS — M79.641 PAIN IN BOTH HANDS: ICD-10-CM

## 2021-03-23 DIAGNOSIS — M79.642 PAIN IN BOTH HANDS: Primary | ICD-10-CM

## 2021-03-23 DIAGNOSIS — M79.642 PAIN IN BOTH HANDS: ICD-10-CM

## 2021-03-23 PROCEDURE — 99202 PR OFFICE/OUTPT VISIT, NEW, LEVL II, 15-29 MIN: ICD-10-PCS | Mod: S$GLB,,, | Performed by: PHYSICIAN ASSISTANT

## 2021-03-23 PROCEDURE — 3077F SYST BP >= 140 MM HG: CPT | Mod: CPTII,S$GLB,, | Performed by: PHYSICIAN ASSISTANT

## 2021-03-23 PROCEDURE — 1126F AMNT PAIN NOTED NONE PRSNT: CPT | Mod: S$GLB,,, | Performed by: PHYSICIAN ASSISTANT

## 2021-03-23 PROCEDURE — 73130 X-RAY EXAM OF HAND: CPT | Mod: 26,RT,, | Performed by: RADIOLOGY

## 2021-03-23 PROCEDURE — 99202 OFFICE O/P NEW SF 15 MIN: CPT | Mod: S$GLB,,, | Performed by: PHYSICIAN ASSISTANT

## 2021-03-23 PROCEDURE — 3008F BODY MASS INDEX DOCD: CPT | Mod: CPTII,S$GLB,, | Performed by: PHYSICIAN ASSISTANT

## 2021-03-23 PROCEDURE — 3080F DIAST BP >= 90 MM HG: CPT | Mod: CPTII,S$GLB,, | Performed by: PHYSICIAN ASSISTANT

## 2021-03-23 PROCEDURE — 99999 PR PBB SHADOW E&M-EST. PATIENT-LVL III: ICD-10-PCS | Mod: PBBFAC,,, | Performed by: PHYSICIAN ASSISTANT

## 2021-03-23 PROCEDURE — 99999 PR PBB SHADOW E&M-EST. PATIENT-LVL III: CPT | Mod: PBBFAC,,, | Performed by: PHYSICIAN ASSISTANT

## 2021-03-23 PROCEDURE — 3008F PR BODY MASS INDEX (BMI) DOCUMENTED: ICD-10-PCS | Mod: CPTII,S$GLB,, | Performed by: PHYSICIAN ASSISTANT

## 2021-03-23 PROCEDURE — 1126F PR PAIN SEVERITY QUANTIFIED, NO PAIN PRESENT: ICD-10-PCS | Mod: S$GLB,,, | Performed by: PHYSICIAN ASSISTANT

## 2021-03-23 PROCEDURE — 73130 XR HAND COMPLETE 3 VIEWS BILATERAL: ICD-10-PCS | Mod: 26,RT,, | Performed by: RADIOLOGY

## 2021-03-23 PROCEDURE — 3077F PR MOST RECENT SYSTOLIC BLOOD PRESSURE >= 140 MM HG: ICD-10-PCS | Mod: CPTII,S$GLB,, | Performed by: PHYSICIAN ASSISTANT

## 2021-03-23 PROCEDURE — 1159F MED LIST DOCD IN RCRD: CPT | Mod: S$GLB,,, | Performed by: PHYSICIAN ASSISTANT

## 2021-03-23 PROCEDURE — 73130 X-RAY EXAM OF HAND: CPT | Mod: TC,50

## 2021-03-23 PROCEDURE — 1159F PR MEDICATION LIST DOCUMENTED IN MEDICAL RECORD: ICD-10-PCS | Mod: S$GLB,,, | Performed by: PHYSICIAN ASSISTANT

## 2021-03-23 PROCEDURE — 73130 X-RAY EXAM OF HAND: CPT | Mod: 26,LT,, | Performed by: RADIOLOGY

## 2021-03-23 PROCEDURE — 3080F PR MOST RECENT DIASTOLIC BLOOD PRESSURE >= 90 MM HG: ICD-10-PCS | Mod: CPTII,S$GLB,, | Performed by: PHYSICIAN ASSISTANT

## 2021-06-04 ENCOUNTER — OFFICE VISIT (OUTPATIENT)
Dept: PODIATRY | Facility: CLINIC | Age: 66
End: 2021-06-04
Payer: COMMERCIAL

## 2021-06-04 ENCOUNTER — HOSPITAL ENCOUNTER (OUTPATIENT)
Dept: RADIOLOGY | Facility: HOSPITAL | Age: 66
Discharge: HOME | End: 2021-06-04
Attending: PODIATRIST
Payer: COMMERCIAL

## 2021-06-04 VITALS — HEIGHT: 63 IN | BODY MASS INDEX: 34.63 KG/M2 | WEIGHT: 195.44 LBS

## 2021-06-04 DIAGNOSIS — M76.822 POSTERIOR TIBIAL TENDINITIS, LEFT: Primary | ICD-10-CM

## 2021-06-04 DIAGNOSIS — M25.572 PAIN IN LEFT ANKLE AND JOINTS OF LEFT FOOT: ICD-10-CM

## 2021-06-04 DIAGNOSIS — M76.822 POSTERIOR TIBIAL TENDINITIS, LEFT: ICD-10-CM

## 2021-06-04 DIAGNOSIS — M19.072 OSTEOARTHRITIS OF LEFT ANKLE OR FOOT: ICD-10-CM

## 2021-06-04 PROCEDURE — 1101F PR PT FALLS ASSESS DOC 0-1 FALLS W/OUT INJ PAST YR: ICD-10-PCS | Mod: CPTII,S$GLB,, | Performed by: PODIATRIST

## 2021-06-04 PROCEDURE — 99999 PR PBB SHADOW E&M-EST. PATIENT-LVL III: CPT | Mod: PBBFAC,,, | Performed by: PODIATRIST

## 2021-06-04 PROCEDURE — 1125F PR PAIN SEVERITY QUANTIFIED, PAIN PRESENT: ICD-10-PCS | Mod: S$GLB,,, | Performed by: PODIATRIST

## 2021-06-04 PROCEDURE — 3008F BODY MASS INDEX DOCD: CPT | Mod: CPTII,S$GLB,, | Performed by: PODIATRIST

## 2021-06-04 PROCEDURE — 99204 OFFICE O/P NEW MOD 45 MIN: CPT | Mod: S$GLB,,, | Performed by: PODIATRIST

## 2021-06-04 PROCEDURE — 73630 X-RAY EXAM OF FOOT: CPT | Mod: TC,LT

## 2021-06-04 PROCEDURE — 1159F MED LIST DOCD IN RCRD: CPT | Mod: S$GLB,,, | Performed by: PODIATRIST

## 2021-06-04 PROCEDURE — 3288F PR FALLS RISK ASSESSMENT DOCUMENTED: ICD-10-PCS | Mod: CPTII,S$GLB,, | Performed by: PODIATRIST

## 2021-06-04 PROCEDURE — 1159F PR MEDICATION LIST DOCUMENTED IN MEDICAL RECORD: ICD-10-PCS | Mod: S$GLB,,, | Performed by: PODIATRIST

## 2021-06-04 PROCEDURE — 3288F FALL RISK ASSESSMENT DOCD: CPT | Mod: CPTII,S$GLB,, | Performed by: PODIATRIST

## 2021-06-04 PROCEDURE — 99999 PR PBB SHADOW E&M-EST. PATIENT-LVL III: ICD-10-PCS | Mod: PBBFAC,,, | Performed by: PODIATRIST

## 2021-06-04 PROCEDURE — 3008F PR BODY MASS INDEX (BMI) DOCUMENTED: ICD-10-PCS | Mod: CPTII,S$GLB,, | Performed by: PODIATRIST

## 2021-06-04 PROCEDURE — 73630 X-RAY EXAM OF FOOT: CPT | Mod: 26,LT,, | Performed by: RADIOLOGY

## 2021-06-04 PROCEDURE — 1101F PT FALLS ASSESS-DOCD LE1/YR: CPT | Mod: CPTII,S$GLB,, | Performed by: PODIATRIST

## 2021-06-04 PROCEDURE — 99204 PR OFFICE/OUTPT VISIT, NEW, LEVL IV, 45-59 MIN: ICD-10-PCS | Mod: S$GLB,,, | Performed by: PODIATRIST

## 2021-06-04 PROCEDURE — 1125F AMNT PAIN NOTED PAIN PRSNT: CPT | Mod: S$GLB,,, | Performed by: PODIATRIST

## 2021-06-04 PROCEDURE — 73630 XR FOOT COMPLETE 3 VIEW LEFT: ICD-10-PCS | Mod: 26,LT,, | Performed by: RADIOLOGY

## 2021-06-04 RX ORDER — IBUPROFEN 800 MG/1
800 TABLET ORAL 3 TIMES DAILY
Qty: 90 TABLET | Refills: 1 | Status: SHIPPED | OUTPATIENT
Start: 2021-06-04 | End: 2021-07-04

## 2021-06-28 ENCOUNTER — OFFICE VISIT (OUTPATIENT)
Dept: INTERNAL MEDICINE | Facility: CLINIC | Age: 66
End: 2021-06-28
Payer: COMMERCIAL

## 2021-06-28 VITALS
WEIGHT: 198.88 LBS | BODY MASS INDEX: 33.95 KG/M2 | HEIGHT: 64 IN | SYSTOLIC BLOOD PRESSURE: 140 MMHG | DIASTOLIC BLOOD PRESSURE: 88 MMHG | HEART RATE: 86 BPM | TEMPERATURE: 98 F

## 2021-06-28 DIAGNOSIS — I10 ESSENTIAL HYPERTENSION: Primary | ICD-10-CM

## 2021-06-28 DIAGNOSIS — J02.9 PHARYNGITIS, UNSPECIFIED ETIOLOGY: ICD-10-CM

## 2021-06-28 DIAGNOSIS — J02.9 SORE THROAT: ICD-10-CM

## 2021-06-28 LAB
CTP QC/QA: YES
S PYO RRNA THROAT QL PROBE: NEGATIVE

## 2021-06-28 PROCEDURE — 99213 OFFICE O/P EST LOW 20 MIN: CPT | Mod: PBBFAC,PO | Performed by: PHYSICIAN ASSISTANT

## 2021-06-28 PROCEDURE — 99214 OFFICE O/P EST MOD 30 MIN: CPT | Mod: S$GLB,,, | Performed by: PHYSICIAN ASSISTANT

## 2021-06-28 PROCEDURE — 99999 PR PBB SHADOW E&M-EST. PATIENT-LVL III: CPT | Mod: PBBFAC,,, | Performed by: PHYSICIAN ASSISTANT

## 2021-06-28 PROCEDURE — 99999 PR PBB SHADOW E&M-EST. PATIENT-LVL III: ICD-10-PCS | Mod: PBBFAC,,, | Performed by: PHYSICIAN ASSISTANT

## 2021-06-28 PROCEDURE — 87880 STREP A ASSAY W/OPTIC: CPT | Mod: PBBFAC,PO | Performed by: PHYSICIAN ASSISTANT

## 2021-06-28 PROCEDURE — 99214 PR OFFICE/OUTPT VISIT, EST, LEVL IV, 30-39 MIN: ICD-10-PCS | Mod: S$GLB,,, | Performed by: PHYSICIAN ASSISTANT

## 2021-06-28 RX ORDER — CEPHALEXIN 500 MG/1
500 CAPSULE ORAL EVERY 12 HOURS
Qty: 20 CAPSULE | Refills: 0 | Status: SHIPPED | OUTPATIENT
Start: 2021-06-28 | End: 2021-06-30 | Stop reason: ALTCHOICE

## 2021-06-30 ENCOUNTER — TELEPHONE (OUTPATIENT)
Dept: INTERNAL MEDICINE | Facility: CLINIC | Age: 66
End: 2021-06-30

## 2021-06-30 RX ORDER — AZITHROMYCIN 250 MG/1
TABLET, FILM COATED ORAL
Qty: 6 TABLET | Refills: 0 | Status: SHIPPED | OUTPATIENT
Start: 2021-06-30 | End: 2021-08-06

## 2021-08-06 ENCOUNTER — OFFICE VISIT (OUTPATIENT)
Dept: OBSTETRICS AND GYNECOLOGY | Facility: CLINIC | Age: 66
End: 2021-08-06
Payer: COMMERCIAL

## 2021-08-06 ENCOUNTER — LAB VISIT (OUTPATIENT)
Dept: LAB | Facility: HOSPITAL | Age: 66
End: 2021-08-06
Attending: OBSTETRICS & GYNECOLOGY
Payer: COMMERCIAL

## 2021-08-06 ENCOUNTER — PATIENT MESSAGE (OUTPATIENT)
Dept: OBSTETRICS AND GYNECOLOGY | Facility: CLINIC | Age: 66
End: 2021-08-06

## 2021-08-06 VITALS
SYSTOLIC BLOOD PRESSURE: 164 MMHG | HEIGHT: 64 IN | WEIGHT: 194.69 LBS | DIASTOLIC BLOOD PRESSURE: 92 MMHG | BODY MASS INDEX: 33.24 KG/M2

## 2021-08-06 DIAGNOSIS — N85.01 COMPLEX ENDOMETRIAL HYPERPLASIA: ICD-10-CM

## 2021-08-06 DIAGNOSIS — N85.01 COMPLEX ENDOMETRIAL HYPERPLASIA: Primary | ICD-10-CM

## 2021-08-06 DIAGNOSIS — N95.0 POSTMENOPAUSAL BLEEDING: ICD-10-CM

## 2021-08-06 LAB
ERYTHROCYTE [DISTWIDTH] IN BLOOD BY AUTOMATED COUNT: 16.2 % (ref 11.5–14.5)
HCT VFR BLD AUTO: 28.9 % (ref 37–48.5)
HGB BLD-MCNC: 8.8 G/DL (ref 12–16)
MCH RBC QN AUTO: 27.8 PG (ref 27–31)
MCHC RBC AUTO-ENTMCNC: 30.4 G/DL (ref 32–36)
MCV RBC AUTO: 91 FL (ref 82–98)
PLATELET # BLD AUTO: 319 K/UL (ref 150–450)
PMV BLD AUTO: 11.9 FL (ref 9.2–12.9)
RBC # BLD AUTO: 3.17 M/UL (ref 4–5.4)
WBC # BLD AUTO: 6.44 K/UL (ref 3.9–12.7)

## 2021-08-06 PROCEDURE — 58100 BIOPSY OF UTERUS LINING: CPT | Mod: S$GLB,,, | Performed by: OBSTETRICS & GYNECOLOGY

## 2021-08-06 PROCEDURE — 99999 PR PBB SHADOW E&M-EST. PATIENT-LVL II: CPT | Mod: PBBFAC,,, | Performed by: OBSTETRICS & GYNECOLOGY

## 2021-08-06 PROCEDURE — 36415 COLL VENOUS BLD VENIPUNCTURE: CPT | Performed by: OBSTETRICS & GYNECOLOGY

## 2021-08-06 PROCEDURE — 85027 COMPLETE CBC AUTOMATED: CPT | Performed by: OBSTETRICS & GYNECOLOGY

## 2021-08-06 PROCEDURE — 99999 PR PBB SHADOW E&M-EST. PATIENT-LVL II: ICD-10-PCS | Mod: PBBFAC,,, | Performed by: OBSTETRICS & GYNECOLOGY

## 2021-08-06 PROCEDURE — 99212 OFFICE O/P EST SF 10 MIN: CPT | Mod: 25,S$GLB,, | Performed by: OBSTETRICS & GYNECOLOGY

## 2021-08-06 PROCEDURE — 88305 TISSUE EXAM BY PATHOLOGIST: ICD-10-PCS | Mod: 26,,, | Performed by: PATHOLOGY

## 2021-08-06 PROCEDURE — 58100 BIOPSY OF UTERUS LINING: CPT | Mod: PBBFAC | Performed by: OBSTETRICS & GYNECOLOGY

## 2021-08-06 PROCEDURE — 88305 TISSUE EXAM BY PATHOLOGIST: CPT | Performed by: PATHOLOGY

## 2021-08-06 PROCEDURE — 88305 TISSUE EXAM BY PATHOLOGIST: CPT | Mod: 26,,, | Performed by: PATHOLOGY

## 2021-08-06 PROCEDURE — 99212 PR OFFICE/OUTPT VISIT, EST, LEVL II, 10-19 MIN: ICD-10-PCS | Mod: 25,S$GLB,, | Performed by: OBSTETRICS & GYNECOLOGY

## 2021-08-06 PROCEDURE — 58100 PR BIOPSY OF UTERUS LINING: ICD-10-PCS | Mod: S$GLB,,, | Performed by: OBSTETRICS & GYNECOLOGY

## 2021-08-06 PROCEDURE — 99212 OFFICE O/P EST SF 10 MIN: CPT | Mod: PBBFAC,25 | Performed by: OBSTETRICS & GYNECOLOGY

## 2021-08-09 RX ORDER — MEGESTROL ACETATE 40 MG/1
40 TABLET ORAL DAILY
Qty: 90 TABLET | Refills: 0 | Status: SHIPPED | OUTPATIENT
Start: 2021-08-09 | End: 2021-12-10

## 2021-08-10 LAB
FINAL PATHOLOGIC DIAGNOSIS: NORMAL
GROSS: NORMAL
Lab: NORMAL

## 2021-12-10 ENCOUNTER — LAB VISIT (OUTPATIENT)
Dept: LAB | Facility: HOSPITAL | Age: 66
End: 2021-12-10
Attending: NURSE PRACTITIONER
Payer: MEDICARE

## 2021-12-10 ENCOUNTER — PATIENT MESSAGE (OUTPATIENT)
Dept: OBSTETRICS AND GYNECOLOGY | Facility: CLINIC | Age: 66
End: 2021-12-10

## 2021-12-10 ENCOUNTER — OFFICE VISIT (OUTPATIENT)
Dept: OBSTETRICS AND GYNECOLOGY | Facility: CLINIC | Age: 66
End: 2021-12-10
Payer: COMMERCIAL

## 2021-12-10 VITALS
WEIGHT: 189.63 LBS | DIASTOLIC BLOOD PRESSURE: 112 MMHG | BODY MASS INDEX: 32.37 KG/M2 | HEIGHT: 64 IN | SYSTOLIC BLOOD PRESSURE: 210 MMHG

## 2021-12-10 DIAGNOSIS — I10 HYPERTENSION, UNSPECIFIED TYPE: ICD-10-CM

## 2021-12-10 DIAGNOSIS — N95.0 POSTMENOPAUSAL BLEEDING: Primary | ICD-10-CM

## 2021-12-10 DIAGNOSIS — N95.0 POSTMENOPAUSAL BLEEDING: ICD-10-CM

## 2021-12-10 LAB
BASOPHILS # BLD AUTO: 0.04 K/UL (ref 0–0.2)
BASOPHILS NFR BLD: 0.8 % (ref 0–1.9)
DIFFERENTIAL METHOD: ABNORMAL
EOSINOPHIL # BLD AUTO: 0.1 K/UL (ref 0–0.5)
EOSINOPHIL NFR BLD: 1.1 % (ref 0–8)
ERYTHROCYTE [DISTWIDTH] IN BLOOD BY AUTOMATED COUNT: 17.8 % (ref 11.5–14.5)
HCT VFR BLD AUTO: 37.6 % (ref 37–48.5)
HGB BLD-MCNC: 11.6 G/DL (ref 12–16)
IMM GRANULOCYTES # BLD AUTO: 0.01 K/UL (ref 0–0.04)
IMM GRANULOCYTES NFR BLD AUTO: 0.2 % (ref 0–0.5)
LYMPHOCYTES # BLD AUTO: 1.6 K/UL (ref 1–4.8)
LYMPHOCYTES NFR BLD: 29.5 % (ref 18–48)
MCH RBC QN AUTO: 25.3 PG (ref 27–31)
MCHC RBC AUTO-ENTMCNC: 30.9 G/DL (ref 32–36)
MCV RBC AUTO: 82 FL (ref 82–98)
MONOCYTES # BLD AUTO: 0.5 K/UL (ref 0.3–1)
MONOCYTES NFR BLD: 8.6 % (ref 4–15)
NEUTROPHILS # BLD AUTO: 3.2 K/UL (ref 1.8–7.7)
NEUTROPHILS NFR BLD: 59.8 % (ref 38–73)
NRBC BLD-RTO: 0 /100 WBC
PLATELET # BLD AUTO: 285 K/UL (ref 150–450)
PMV BLD AUTO: 12.1 FL (ref 9.2–12.9)
RBC # BLD AUTO: 4.59 M/UL (ref 4–5.4)
WBC # BLD AUTO: 5.33 K/UL (ref 3.9–12.7)

## 2021-12-10 PROCEDURE — 58100 BIOPSY OF UTERUS LINING: CPT | Mod: S$GLB,,, | Performed by: NURSE PRACTITIONER

## 2021-12-10 PROCEDURE — 88305 TISSUE EXAM BY PATHOLOGIST: CPT | Mod: 26,,, | Performed by: PATHOLOGY

## 2021-12-10 PROCEDURE — 88305 TISSUE EXAM BY PATHOLOGIST: CPT | Performed by: PATHOLOGY

## 2021-12-10 PROCEDURE — 99999 PR PBB SHADOW E&M-EST. PATIENT-LVL III: ICD-10-PCS | Mod: PBBFAC,,, | Performed by: NURSE PRACTITIONER

## 2021-12-10 PROCEDURE — 99213 PR OFFICE/OUTPT VISIT, EST, LEVL III, 20-29 MIN: ICD-10-PCS | Mod: 25,S$GLB,, | Performed by: NURSE PRACTITIONER

## 2021-12-10 PROCEDURE — 99999 PR PBB SHADOW E&M-EST. PATIENT-LVL III: CPT | Mod: PBBFAC,,, | Performed by: NURSE PRACTITIONER

## 2021-12-10 PROCEDURE — 36415 COLL VENOUS BLD VENIPUNCTURE: CPT | Performed by: NURSE PRACTITIONER

## 2021-12-10 PROCEDURE — 4010F ACE/ARB THERAPY RXD/TAKEN: CPT | Mod: CPTII,S$GLB,, | Performed by: NURSE PRACTITIONER

## 2021-12-10 PROCEDURE — 85025 COMPLETE CBC W/AUTO DIFF WBC: CPT | Performed by: NURSE PRACTITIONER

## 2021-12-10 PROCEDURE — 58100 BIOPSY (GYNECOLOGICAL): ICD-10-PCS | Mod: S$GLB,,, | Performed by: NURSE PRACTITIONER

## 2021-12-10 PROCEDURE — 88305 TISSUE EXAM BY PATHOLOGIST: ICD-10-PCS | Mod: 26,,, | Performed by: PATHOLOGY

## 2021-12-10 PROCEDURE — 4010F PR ACE/ARB THEARPY RXD/TAKEN: ICD-10-PCS | Mod: CPTII,S$GLB,, | Performed by: NURSE PRACTITIONER

## 2021-12-10 PROCEDURE — 99213 OFFICE O/P EST LOW 20 MIN: CPT | Mod: 25,S$GLB,, | Performed by: NURSE PRACTITIONER

## 2021-12-16 LAB
FINAL PATHOLOGIC DIAGNOSIS: NORMAL
GROSS: NORMAL
Lab: NORMAL

## 2022-03-15 ENCOUNTER — PATIENT MESSAGE (OUTPATIENT)
Dept: OBSTETRICS AND GYNECOLOGY | Facility: CLINIC | Age: 67
End: 2022-03-15
Payer: MEDICARE

## 2022-05-04 ENCOUNTER — TELEPHONE (OUTPATIENT)
Dept: OBSTETRICS AND GYNECOLOGY | Facility: CLINIC | Age: 67
End: 2022-05-04
Payer: MEDICARE

## 2022-05-04 NOTE — TELEPHONE ENCOUNTER
Called pt to inform Dr. Aquino will not be in on 5/10 appointment will have to be reschedule for different day with Dr. Aquino or we can switch to a different provider. No answer, left message for pt to call me back.

## 2022-05-10 ENCOUNTER — TELEPHONE (OUTPATIENT)
Dept: OBSTETRICS AND GYNECOLOGY | Facility: CLINIC | Age: 67
End: 2022-05-10
Payer: MEDICARE

## 2022-05-10 NOTE — TELEPHONE ENCOUNTER
Spoke with patient. Verify appointment with Ms Rivers patient states she did not schedule appointment with Tita she already has appointment with Dr Aquino in July. Patient verbalized understanding.

## 2022-05-24 ENCOUNTER — TELEPHONE (OUTPATIENT)
Dept: OBSTETRICS AND GYNECOLOGY | Facility: CLINIC | Age: 67
End: 2022-05-24
Payer: MEDICARE

## 2022-05-24 ENCOUNTER — PATIENT MESSAGE (OUTPATIENT)
Dept: OBSTETRICS AND GYNECOLOGY | Facility: CLINIC | Age: 67
End: 2022-05-24
Payer: MEDICARE

## 2022-05-24 NOTE — TELEPHONE ENCOUNTER
Called pt to reschedule appt sched 7/19 with Dr. Aquino due to she will not be at the [place] location. No answer, left message and also sent portal message

## 2022-06-14 ENCOUNTER — PATIENT MESSAGE (OUTPATIENT)
Dept: OBSTETRICS AND GYNECOLOGY | Facility: CLINIC | Age: 67
End: 2022-06-14
Payer: COMMERCIAL

## 2022-08-26 ENCOUNTER — TELEPHONE (OUTPATIENT)
Dept: INTERNAL MEDICINE | Facility: CLINIC | Age: 67
End: 2022-08-26
Payer: COMMERCIAL

## 2022-08-26 NOTE — TELEPHONE ENCOUNTER
----- Message from Shannan Rodríguez sent at 8/26/2022 12:02 PM CDT -----  Contact: [phone]  Patient is returning a phone call.  Who left a message for the patient: Paco Hanley LPN  Does patient know what this is regarding:  missed call  Would you like a call back, or a response through your [facility] portal?:  please call  Comments:

## 2022-08-26 NOTE — TELEPHONE ENCOUNTER
----- Message from Aubree Jimenes sent at 8/26/2022 10:44 AM CDT -----  Contact: pt portal or [phone]  See [facility] pt portal message below:    Appointment Request From: Liliane Ford    With Provider: Manan Martin MD [[facility]]    Preferred Date Range: Any    Preferred Times: Any Time    Reason for visit: high blood pressure    Comments:  high blood pressure and heart palpatations    Pt was called by pt access rep on 08/26/22 and there was no answer. A voice message was left advising the pt to contact the on call nurse.

## 2022-08-26 NOTE — TELEPHONE ENCOUNTER
Pt states, she can feel her heart beating at night.he rbp was elevated today 176/90. Advised pt to go to urgent care or ER for eval. She said, she has always felt her heart beating when sleeping. She said, she feels fine.she did her walking today and did house chores and feel fine. She will need to be seen for her bp though. appt scheduled for Monday, 8/29. Advised pt to go to ER if symptoms worsen. She verbalized understanding.

## 2022-08-29 ENCOUNTER — OFFICE VISIT (OUTPATIENT)
Dept: INTERNAL MEDICINE | Facility: CLINIC | Age: 67
End: 2022-08-29
Payer: COMMERCIAL

## 2022-08-29 VITALS
HEIGHT: 63 IN | BODY MASS INDEX: 32.89 KG/M2 | WEIGHT: 185.63 LBS | DIASTOLIC BLOOD PRESSURE: 100 MMHG | HEART RATE: 86 BPM | TEMPERATURE: 98 F | SYSTOLIC BLOOD PRESSURE: 164 MMHG

## 2022-08-29 DIAGNOSIS — E61.1 LOW IRON: ICD-10-CM

## 2022-08-29 DIAGNOSIS — R00.2 PALPITATIONS: ICD-10-CM

## 2022-08-29 DIAGNOSIS — I10 ESSENTIAL HYPERTENSION: Primary | ICD-10-CM

## 2022-08-29 DIAGNOSIS — Z12.31 SCREENING MAMMOGRAM FOR BREAST CANCER: ICD-10-CM

## 2022-08-29 PROCEDURE — 3077F SYST BP >= 140 MM HG: CPT | Mod: CPTII,S$GLB,, | Performed by: NURSE PRACTITIONER

## 2022-08-29 PROCEDURE — 1160F RVW MEDS BY RX/DR IN RCRD: CPT | Mod: CPTII,S$GLB,, | Performed by: NURSE PRACTITIONER

## 2022-08-29 PROCEDURE — 3008F PR BODY MASS INDEX (BMI) DOCUMENTED: ICD-10-PCS | Mod: CPTII,S$GLB,, | Performed by: NURSE PRACTITIONER

## 2022-08-29 PROCEDURE — 1160F PR REVIEW ALL MEDS BY PRESCRIBER/CLIN PHARMACIST DOCUMENTED: ICD-10-PCS | Mod: CPTII,S$GLB,, | Performed by: NURSE PRACTITIONER

## 2022-08-29 PROCEDURE — 99214 PR OFFICE/OUTPT VISIT, EST, LEVL IV, 30-39 MIN: ICD-10-PCS | Mod: S$GLB,,, | Performed by: NURSE PRACTITIONER

## 2022-08-29 PROCEDURE — 3288F PR FALLS RISK ASSESSMENT DOCUMENTED: ICD-10-PCS | Mod: CPTII,S$GLB,, | Performed by: NURSE PRACTITIONER

## 2022-08-29 PROCEDURE — 1126F PR PAIN SEVERITY QUANTIFIED, NO PAIN PRESENT: ICD-10-PCS | Mod: CPTII,S$GLB,, | Performed by: NURSE PRACTITIONER

## 2022-08-29 PROCEDURE — 1126F AMNT PAIN NOTED NONE PRSNT: CPT | Mod: CPTII,S$GLB,, | Performed by: NURSE PRACTITIONER

## 2022-08-29 PROCEDURE — 3080F DIAST BP >= 90 MM HG: CPT | Mod: CPTII,S$GLB,, | Performed by: NURSE PRACTITIONER

## 2022-08-29 PROCEDURE — 4010F PR ACE/ARB THEARPY RXD/TAKEN: ICD-10-PCS | Mod: CPTII,S$GLB,, | Performed by: NURSE PRACTITIONER

## 2022-08-29 PROCEDURE — 1159F MED LIST DOCD IN RCRD: CPT | Mod: CPTII,S$GLB,, | Performed by: NURSE PRACTITIONER

## 2022-08-29 PROCEDURE — 3288F FALL RISK ASSESSMENT DOCD: CPT | Mod: CPTII,S$GLB,, | Performed by: NURSE PRACTITIONER

## 2022-08-29 PROCEDURE — 1101F PR PT FALLS ASSESS DOC 0-1 FALLS W/OUT INJ PAST YR: ICD-10-PCS | Mod: CPTII,S$GLB,, | Performed by: NURSE PRACTITIONER

## 2022-08-29 PROCEDURE — 99214 OFFICE O/P EST MOD 30 MIN: CPT | Mod: S$GLB,,, | Performed by: NURSE PRACTITIONER

## 2022-08-29 PROCEDURE — 1159F PR MEDICATION LIST DOCUMENTED IN MEDICAL RECORD: ICD-10-PCS | Mod: CPTII,S$GLB,, | Performed by: NURSE PRACTITIONER

## 2022-08-29 PROCEDURE — 3008F BODY MASS INDEX DOCD: CPT | Mod: CPTII,S$GLB,, | Performed by: NURSE PRACTITIONER

## 2022-08-29 PROCEDURE — 3080F PR MOST RECENT DIASTOLIC BLOOD PRESSURE >= 90 MM HG: ICD-10-PCS | Mod: CPTII,S$GLB,, | Performed by: NURSE PRACTITIONER

## 2022-08-29 PROCEDURE — 4010F ACE/ARB THERAPY RXD/TAKEN: CPT | Mod: CPTII,S$GLB,, | Performed by: NURSE PRACTITIONER

## 2022-08-29 PROCEDURE — 99999 PR PBB SHADOW E&M-EST. PATIENT-LVL III: CPT | Mod: PBBFAC,,, | Performed by: NURSE PRACTITIONER

## 2022-08-29 PROCEDURE — 3077F PR MOST RECENT SYSTOLIC BLOOD PRESSURE >= 140 MM HG: ICD-10-PCS | Mod: CPTII,S$GLB,, | Performed by: NURSE PRACTITIONER

## 2022-08-29 PROCEDURE — 99999 PR PBB SHADOW E&M-EST. PATIENT-LVL III: ICD-10-PCS | Mod: PBBFAC,,, | Performed by: NURSE PRACTITIONER

## 2022-08-29 PROCEDURE — 1101F PT FALLS ASSESS-DOCD LE1/YR: CPT | Mod: CPTII,S$GLB,, | Performed by: NURSE PRACTITIONER

## 2022-08-29 RX ORDER — LOSARTAN POTASSIUM 100 MG/1
100 TABLET ORAL DAILY
Qty: 30 TABLET | Refills: 0 | Status: SHIPPED | OUTPATIENT
Start: 2022-08-29 | End: 2022-09-12

## 2022-08-29 NOTE — PROGRESS NOTES
"Subjective:       Patient ID: Liliane Ford is a 67 y.o. female.    Chief Complaint: Palpitations and Anemia    Patient here for fibroids and vaginal bleeding  Dx 2 years ago by gyn Ema Aquino  Patient states she was given 3 different rounds of provera along with a procedure  Bleeding stopped Dec 2021-July 2022  Restarted bleeding early July, bleeding decreased 2 weeks ago  She has palpitations x 2 years since bleeding, she was told this is due to being anemic  Has had blood transfusions in the past  Bp is high    Palpitations   Pertinent negatives include no chest pain, coughing, diaphoresis, dizziness, fever or shortness of breath. Her past medical history is significant for anemia.   Anemia  Symptoms include palpitations. There has been no bruising/bleeding easily, confusion, fever or pallor.     BP (!) 164/100   Pulse 86   Temp 97.6 °F (36.4 °C)   Ht 5' 3" (1.6 m)   Wt 84.2 kg (185 lb 10 oz)   BMI 32.88 kg/m²     Review of Systems   Constitutional:  Negative for activity change, appetite change, chills, diaphoresis, fatigue, fever and unexpected weight change.   HENT: Negative.     Respiratory:  Negative for cough and shortness of breath.    Cardiovascular:  Positive for palpitations. Negative for chest pain and leg swelling.   Gastrointestinal: Negative.    Genitourinary: Negative.    Musculoskeletal: Negative.    Skin:  Negative for color change, pallor, rash and wound.   Allergic/Immunologic: Negative for immunocompromised state.   Neurological: Negative.  Negative for dizziness and facial asymmetry.   Hematological:  Negative for adenopathy. Does not bruise/bleed easily.   Psychiatric/Behavioral:  Negative for agitation, behavioral problems and confusion.      Objective:      Physical Exam  Vitals and nursing note reviewed.   Constitutional:       General: She is not in acute distress.     Appearance: She is well-developed. She is not diaphoretic.   HENT:      Head: Normocephalic and atraumatic. "   Eyes:      General:         Right eye: No discharge.         Left eye: No discharge.      Conjunctiva/sclera: Conjunctivae normal.   Cardiovascular:      Rate and Rhythm: Normal rate and regular rhythm.      Heart sounds: Normal heart sounds. No murmur heard.  Pulmonary:      Effort: Pulmonary effort is normal. No respiratory distress.      Breath sounds: Normal breath sounds. No wheezing or rales.   Chest:      Chest wall: No tenderness.   Abdominal:      General: There is no distension.      Palpations: Abdomen is soft.   Musculoskeletal:         General: Normal range of motion.   Skin:     General: Skin is warm and dry.      Findings: No rash.   Neurological:      Mental Status: She is alert and oriented to person, place, and time.   Psychiatric:         Behavior: Behavior normal. Behavior is cooperative.         Thought Content: Thought content normal.         Judgment: Judgment normal.       Assessment:       1. Essential hypertension    2. Screening mammogram for breast cancer    3. Low iron    4. Palpitations        Plan:       Liliane was seen today for palpitations and anemia.    Diagnoses and all orders for this visit:    Essential hypertension  -     losartan (COZAAR) 100 MG tablet; Take 1 tablet (100 mg total) by mouth once daily.  -     Comprehensive Metabolic Panel; Future  -     CBC Auto Differential; Future  -     TSH; Future  -     Hemoglobin A1C; Future  -     Lipid Panel; Future  -     Ferritin; Future  -     Iron and TIBC; Future    Screening mammogram for breast cancer  -     Mammo Digital Screening Bilat w/ Tato; Future    Low iron  -     Comprehensive Metabolic Panel; Future  -     CBC Auto Differential; Future  -     TSH; Future  -     Hemoglobin A1C; Future  -     Lipid Panel; Future  -     Ferritin; Future  -     Iron and TIBC; Future    Palpitations  -     EKG 12-lead; Future  Increase losartan  Dash diet  Increase water  Will get labs and ekg  Patient to follow up with Dr. Woodall in 2  weeks to est care and review labs

## 2022-08-30 ENCOUNTER — HOSPITAL ENCOUNTER (OUTPATIENT)
Dept: CARDIOLOGY | Facility: HOSPITAL | Age: 67
Discharge: HOME | End: 2022-08-30
Payer: COMMERCIAL

## 2022-08-30 ENCOUNTER — HOSPITAL ENCOUNTER (OUTPATIENT)
Dept: RADIOLOGY | Facility: HOSPITAL | Age: 67
Discharge: HOME | End: 2022-08-30
Attending: NURSE PRACTITIONER
Payer: COMMERCIAL

## 2022-08-30 DIAGNOSIS — Z12.31 SCREENING MAMMOGRAM FOR BREAST CANCER: ICD-10-CM

## 2022-08-30 DIAGNOSIS — R00.2 PALPITATIONS: ICD-10-CM

## 2022-08-30 PROCEDURE — 77067 MAMMO DIGITAL SCREENING BILAT WITH TOMO: ICD-10-PCS | Mod: 26,,, | Performed by: RADIOLOGY

## 2022-08-30 PROCEDURE — 77063 BREAST TOMOSYNTHESIS BI: CPT | Mod: TC

## 2022-08-30 PROCEDURE — 93005 ELECTROCARDIOGRAM TRACING: CPT | Mod: PO

## 2022-08-30 PROCEDURE — 93010 EKG 12-LEAD: ICD-10-PCS | Mod: ,,, | Performed by: INTERNAL MEDICINE

## 2022-08-30 PROCEDURE — 77063 BREAST TOMOSYNTHESIS BI: CPT | Mod: 26,,, | Performed by: RADIOLOGY

## 2022-08-30 PROCEDURE — 77063 MAMMO DIGITAL SCREENING BILAT WITH TOMO: ICD-10-PCS | Mod: 26,,, | Performed by: RADIOLOGY

## 2022-08-30 PROCEDURE — 77067 SCR MAMMO BI INCL CAD: CPT | Mod: TC

## 2022-08-30 PROCEDURE — 77067 SCR MAMMO BI INCL CAD: CPT | Mod: 26,,, | Performed by: RADIOLOGY

## 2022-08-30 PROCEDURE — 93010 ELECTROCARDIOGRAM REPORT: CPT | Mod: ,,, | Performed by: INTERNAL MEDICINE

## 2022-09-12 ENCOUNTER — OFFICE VISIT (OUTPATIENT)
Dept: INTERNAL MEDICINE | Facility: CLINIC | Age: 67
End: 2022-09-12
Payer: COMMERCIAL

## 2022-09-12 VITALS
BODY MASS INDEX: 32.96 KG/M2 | SYSTOLIC BLOOD PRESSURE: 156 MMHG | HEART RATE: 75 BPM | TEMPERATURE: 98 F | WEIGHT: 186.06 LBS | DIASTOLIC BLOOD PRESSURE: 100 MMHG | OXYGEN SATURATION: 100 %

## 2022-09-12 DIAGNOSIS — Z12.11 ENCOUNTER FOR SCREENING COLONOSCOPY: ICD-10-CM

## 2022-09-12 DIAGNOSIS — Z00.00 ROUTINE GENERAL MEDICAL EXAMINATION AT A HEALTH CARE FACILITY: Primary | ICD-10-CM

## 2022-09-12 DIAGNOSIS — Z28.9 DELAYED IMMUNIZATIONS: ICD-10-CM

## 2022-09-12 DIAGNOSIS — Z78.0 POSTMENOPAUSAL: ICD-10-CM

## 2022-09-12 DIAGNOSIS — M21.619 BUNION: ICD-10-CM

## 2022-09-12 PROCEDURE — 99397 PR PREVENTIVE VISIT,EST,65 & OVER: ICD-10-PCS | Mod: S$GLB,,, | Performed by: FAMILY MEDICINE

## 2022-09-12 PROCEDURE — 3044F PR MOST RECENT HEMOGLOBIN A1C LEVEL <7.0%: ICD-10-PCS | Mod: CPTII,S$GLB,, | Performed by: FAMILY MEDICINE

## 2022-09-12 PROCEDURE — 99397 PER PM REEVAL EST PAT 65+ YR: CPT | Mod: S$GLB,,, | Performed by: FAMILY MEDICINE

## 2022-09-12 PROCEDURE — 4010F ACE/ARB THERAPY RXD/TAKEN: CPT | Mod: CPTII,S$GLB,, | Performed by: FAMILY MEDICINE

## 2022-09-12 PROCEDURE — 3288F FALL RISK ASSESSMENT DOCD: CPT | Mod: CPTII,S$GLB,, | Performed by: FAMILY MEDICINE

## 2022-09-12 PROCEDURE — 1160F PR REVIEW ALL MEDS BY PRESCRIBER/CLIN PHARMACIST DOCUMENTED: ICD-10-PCS | Mod: CPTII,S$GLB,, | Performed by: FAMILY MEDICINE

## 2022-09-12 PROCEDURE — 3008F BODY MASS INDEX DOCD: CPT | Mod: CPTII,S$GLB,, | Performed by: FAMILY MEDICINE

## 2022-09-12 PROCEDURE — 3077F SYST BP >= 140 MM HG: CPT | Mod: CPTII,S$GLB,, | Performed by: FAMILY MEDICINE

## 2022-09-12 PROCEDURE — 3080F DIAST BP >= 90 MM HG: CPT | Mod: CPTII,S$GLB,, | Performed by: FAMILY MEDICINE

## 2022-09-12 PROCEDURE — 1160F RVW MEDS BY RX/DR IN RCRD: CPT | Mod: CPTII,S$GLB,, | Performed by: FAMILY MEDICINE

## 2022-09-12 PROCEDURE — 1101F PR PT FALLS ASSESS DOC 0-1 FALLS W/OUT INJ PAST YR: ICD-10-PCS | Mod: CPTII,S$GLB,, | Performed by: FAMILY MEDICINE

## 2022-09-12 PROCEDURE — 1126F PR PAIN SEVERITY QUANTIFIED, NO PAIN PRESENT: ICD-10-PCS | Mod: CPTII,S$GLB,, | Performed by: FAMILY MEDICINE

## 2022-09-12 PROCEDURE — 4010F PR ACE/ARB THEARPY RXD/TAKEN: ICD-10-PCS | Mod: CPTII,S$GLB,, | Performed by: FAMILY MEDICINE

## 2022-09-12 PROCEDURE — 99999 PR PBB SHADOW E&M-EST. PATIENT-LVL III: ICD-10-PCS | Mod: PBBFAC,,, | Performed by: FAMILY MEDICINE

## 2022-09-12 PROCEDURE — 99999 PR PBB SHADOW E&M-EST. PATIENT-LVL III: CPT | Mod: PBBFAC,,, | Performed by: FAMILY MEDICINE

## 2022-09-12 PROCEDURE — 3288F PR FALLS RISK ASSESSMENT DOCUMENTED: ICD-10-PCS | Mod: CPTII,S$GLB,, | Performed by: FAMILY MEDICINE

## 2022-09-12 PROCEDURE — 1126F AMNT PAIN NOTED NONE PRSNT: CPT | Mod: CPTII,S$GLB,, | Performed by: FAMILY MEDICINE

## 2022-09-12 PROCEDURE — 3044F HG A1C LEVEL LT 7.0%: CPT | Mod: CPTII,S$GLB,, | Performed by: FAMILY MEDICINE

## 2022-09-12 PROCEDURE — 1101F PT FALLS ASSESS-DOCD LE1/YR: CPT | Mod: CPTII,S$GLB,, | Performed by: FAMILY MEDICINE

## 2022-09-12 PROCEDURE — 3080F PR MOST RECENT DIASTOLIC BLOOD PRESSURE >= 90 MM HG: ICD-10-PCS | Mod: CPTII,S$GLB,, | Performed by: FAMILY MEDICINE

## 2022-09-12 PROCEDURE — 3008F PR BODY MASS INDEX (BMI) DOCUMENTED: ICD-10-PCS | Mod: CPTII,S$GLB,, | Performed by: FAMILY MEDICINE

## 2022-09-12 PROCEDURE — 1159F MED LIST DOCD IN RCRD: CPT | Mod: CPTII,S$GLB,, | Performed by: FAMILY MEDICINE

## 2022-09-12 PROCEDURE — 3077F PR MOST RECENT SYSTOLIC BLOOD PRESSURE >= 140 MM HG: ICD-10-PCS | Mod: CPTII,S$GLB,, | Performed by: FAMILY MEDICINE

## 2022-09-12 PROCEDURE — 1159F PR MEDICATION LIST DOCUMENTED IN MEDICAL RECORD: ICD-10-PCS | Mod: CPTII,S$GLB,, | Performed by: FAMILY MEDICINE

## 2022-09-12 RX ORDER — OLMESARTAN MEDOXOMIL 40 MG/1
40 TABLET ORAL DAILY
Qty: 30 TABLET | Refills: 0 | Status: SHIPPED | OUTPATIENT
Start: 2022-09-12 | End: 2022-10-18

## 2022-09-12 RX ORDER — LOSARTAN POTASSIUM 50 MG/1
50 TABLET ORAL DAILY
COMMUNITY
Start: 2022-09-03 | End: 2022-09-12 | Stop reason: ALTCHOICE

## 2022-09-12 RX ORDER — MULTIVIT WITH MINERALS/HERBS
1 TABLET ORAL DAILY
COMMUNITY

## 2022-09-12 NOTE — PROGRESS NOTES
Subjective:       Patient ID: Liliane Ford is a 67 y.o. female.    Chief Complaint: Establish Care, Anemia, and Hypertension    Liliane Ford is a 67 y.o. female and is here for a comprehensive physical exam.    Do you take any herbs or supplements that were not prescribed by a doctor? yes  Are you taking calcium supplements? no  Are you taking aspirin daily? no     History:  Any STD's in the past? none    The following portions of the patient's history were reviewed and updated as appropriate: allergies, current medications, past family history, past medical history, past social history, past surgical history and problem list.    Review of Systems  Do you have pain that bothers you in your daily life? no  Pertinent items are noted in HPI.      2. Patient Counseling:  --Nutrition: Stressed importance of moderation in sodium/caffeine intake, saturated fat and cholesterol, caloric balance.  --Exercise: Stressed the importance of regular exercise.   --Substance Abuse: Discussed cessation/primary prevention of tobacco, alcohol - nonuser.   --Sexuality: Discussed sexually transmitted disease.  --Injury prevention: Discussed safety belts, smoke detector.   --Dental health: Discussed dental health.  --Immunizations reviewed.  --Discussed benefits of screening colonoscopy.    3. Discussed the patient's BMI.  4. Follow up as needed for acute illness          Review of Systems   Constitutional:  Negative for fever.   HENT:  Negative for congestion.    Eyes:  Negative for discharge.   Respiratory:  Negative for shortness of breath.    Cardiovascular:  Negative for chest pain.   Gastrointestinal:  Negative for abdominal pain.   Genitourinary:  Negative for difficulty urinating.   Musculoskeletal:  Negative for joint swelling.   Neurological:  Negative for dizziness.   Psychiatric/Behavioral:  Negative for agitation.      Objective:      Physical Exam  Vitals and nursing note reviewed.   Constitutional:       General: She  is not in acute distress.     Appearance: Normal appearance. She is well-developed. She is not diaphoretic.   HENT:      Head: Normocephalic and atraumatic.   Eyes:      General: No scleral icterus.     Conjunctiva/sclera: Conjunctivae normal.   Cardiovascular:      Rate and Rhythm: Normal rate and regular rhythm.   Pulmonary:      Effort: Pulmonary effort is normal. No respiratory distress.      Breath sounds: Normal breath sounds. No wheezing.   Abdominal:      General: There is no distension.      Palpations: Abdomen is soft.      Tenderness: There is no abdominal tenderness. There is no guarding.   Skin:     General: Skin is warm.      Coloration: Skin is not pale.      Findings: No erythema or rash.      Comments: Good turgor   Neurological:      Mental Status: She is alert.   Psychiatric:         Mood and Affect: Mood normal.         Thought Content: Thought content normal.       Assessment:       1. Routine general medical examination at a health care facility    2. Encounter for screening colonoscopy    3. Delayed immunizations    4. Postmenopausal    5. Bunion          Plan:     Problem List Items Addressed This Visit          Renal/    Postmenopausal    Relevant Orders    DXA Bone Density Spine And Hip       ID    Delayed immunizations       GI    Encounter for screening colonoscopy    Relevant Orders    Cologuard Screening (Multitarget Stool DNA)       Orthopedic    Bunion       Other    Routine general medical examination at a health care facility - Primary

## 2022-09-18 LAB — NONINV COLON CA DNA+OCC BLD SCRN STL QL: NORMAL

## 2022-10-05 LAB — NONINV COLON CA DNA+OCC BLD SCRN STL QL: NEGATIVE

## 2022-10-25 ENCOUNTER — HOSPITAL ENCOUNTER (OUTPATIENT)
Dept: RADIOLOGY | Facility: HOSPITAL | Age: 67
Discharge: HOME | End: 2022-10-25
Attending: FAMILY MEDICINE
Payer: COMMERCIAL

## 2022-10-25 ENCOUNTER — TELEPHONE (OUTPATIENT)
Dept: PRIMARY CARE CLINIC | Facility: CLINIC | Age: 67
End: 2022-10-25

## 2022-10-25 ENCOUNTER — OFFICE VISIT (OUTPATIENT)
Dept: PRIMARY CARE CLINIC | Facility: CLINIC | Age: 67
End: 2022-10-25
Payer: COMMERCIAL

## 2022-10-25 ENCOUNTER — PATIENT MESSAGE (OUTPATIENT)
Dept: PRIMARY CARE CLINIC | Facility: CLINIC | Age: 67
End: 2022-10-25

## 2022-10-25 VITALS
TEMPERATURE: 98 F | RESPIRATION RATE: 16 BRPM | DIASTOLIC BLOOD PRESSURE: 110 MMHG | HEART RATE: 103 BPM | HEIGHT: 63 IN | WEIGHT: 182.75 LBS | OXYGEN SATURATION: 95 % | SYSTOLIC BLOOD PRESSURE: 184 MMHG | BODY MASS INDEX: 32.38 KG/M2

## 2022-10-25 DIAGNOSIS — R00.0 TACHYCARDIA: ICD-10-CM

## 2022-10-25 DIAGNOSIS — R00.2 PALPITATIONS: ICD-10-CM

## 2022-10-25 DIAGNOSIS — Z76.89 ENCOUNTER TO ESTABLISH CARE: Primary | ICD-10-CM

## 2022-10-25 DIAGNOSIS — I10 PRIMARY HYPERTENSION: ICD-10-CM

## 2022-10-25 PROCEDURE — 1160F RVW MEDS BY RX/DR IN RCRD: CPT | Mod: CPTII,S$GLB,, | Performed by: FAMILY MEDICINE

## 2022-10-25 PROCEDURE — 3044F PR MOST RECENT HEMOGLOBIN A1C LEVEL <7.0%: ICD-10-PCS | Mod: CPTII,S$GLB,, | Performed by: FAMILY MEDICINE

## 2022-10-25 PROCEDURE — 1101F PT FALLS ASSESS-DOCD LE1/YR: CPT | Mod: CPTII,S$GLB,, | Performed by: FAMILY MEDICINE

## 2022-10-25 PROCEDURE — 93010 EKG 12-LEAD: ICD-10-PCS | Mod: S$GLB,,, | Performed by: INTERNAL MEDICINE

## 2022-10-25 PROCEDURE — 1160F PR REVIEW ALL MEDS BY PRESCRIBER/CLIN PHARMACIST DOCUMENTED: ICD-10-PCS | Mod: CPTII,S$GLB,, | Performed by: FAMILY MEDICINE

## 2022-10-25 PROCEDURE — 99215 OFFICE O/P EST HI 40 MIN: CPT | Mod: S$GLB,,, | Performed by: FAMILY MEDICINE

## 2022-10-25 PROCEDURE — 3288F PR FALLS RISK ASSESSMENT DOCUMENTED: ICD-10-PCS | Mod: CPTII,S$GLB,, | Performed by: FAMILY MEDICINE

## 2022-10-25 PROCEDURE — 99999 PR PBB SHADOW E&M-EST. PATIENT-LVL V: ICD-10-PCS | Mod: PBBFAC,,, | Performed by: FAMILY MEDICINE

## 2022-10-25 PROCEDURE — 1101F PR PT FALLS ASSESS DOC 0-1 FALLS W/OUT INJ PAST YR: ICD-10-PCS | Mod: CPTII,S$GLB,, | Performed by: FAMILY MEDICINE

## 2022-10-25 PROCEDURE — 3080F PR MOST RECENT DIASTOLIC BLOOD PRESSURE >= 90 MM HG: ICD-10-PCS | Mod: CPTII,S$GLB,, | Performed by: FAMILY MEDICINE

## 2022-10-25 PROCEDURE — 99215 PR OFFICE/OUTPT VISIT, EST, LEVL V, 40-54 MIN: ICD-10-PCS | Mod: S$GLB,,, | Performed by: FAMILY MEDICINE

## 2022-10-25 PROCEDURE — 1159F MED LIST DOCD IN RCRD: CPT | Mod: CPTII,S$GLB,, | Performed by: FAMILY MEDICINE

## 2022-10-25 PROCEDURE — 3288F FALL RISK ASSESSMENT DOCD: CPT | Mod: CPTII,S$GLB,, | Performed by: FAMILY MEDICINE

## 2022-10-25 PROCEDURE — 3080F DIAST BP >= 90 MM HG: CPT | Mod: CPTII,S$GLB,, | Performed by: FAMILY MEDICINE

## 2022-10-25 PROCEDURE — 93010 ELECTROCARDIOGRAM REPORT: CPT | Mod: S$GLB,,, | Performed by: INTERNAL MEDICINE

## 2022-10-25 PROCEDURE — 4010F ACE/ARB THERAPY RXD/TAKEN: CPT | Mod: CPTII,S$GLB,, | Performed by: FAMILY MEDICINE

## 2022-10-25 PROCEDURE — 4010F PR ACE/ARB THEARPY RXD/TAKEN: ICD-10-PCS | Mod: CPTII,S$GLB,, | Performed by: FAMILY MEDICINE

## 2022-10-25 PROCEDURE — 3077F PR MOST RECENT SYSTOLIC BLOOD PRESSURE >= 140 MM HG: ICD-10-PCS | Mod: CPTII,S$GLB,, | Performed by: FAMILY MEDICINE

## 2022-10-25 PROCEDURE — 93005 EKG 12-LEAD: ICD-10-PCS | Mod: S$GLB,,, | Performed by: FAMILY MEDICINE

## 2022-10-25 PROCEDURE — 1159F PR MEDICATION LIST DOCUMENTED IN MEDICAL RECORD: ICD-10-PCS | Mod: CPTII,S$GLB,, | Performed by: FAMILY MEDICINE

## 2022-10-25 PROCEDURE — 3077F SYST BP >= 140 MM HG: CPT | Mod: CPTII,S$GLB,, | Performed by: FAMILY MEDICINE

## 2022-10-25 PROCEDURE — 99999 PR PBB SHADOW E&M-EST. PATIENT-LVL V: CPT | Mod: PBBFAC,,, | Performed by: FAMILY MEDICINE

## 2022-10-25 PROCEDURE — 71046 X-RAY EXAM CHEST 2 VIEWS: CPT | Mod: TC,PN

## 2022-10-25 PROCEDURE — 3044F HG A1C LEVEL LT 7.0%: CPT | Mod: CPTII,S$GLB,, | Performed by: FAMILY MEDICINE

## 2022-10-25 PROCEDURE — 93005 ELECTROCARDIOGRAM TRACING: CPT | Mod: S$GLB,,, | Performed by: FAMILY MEDICINE

## 2022-10-25 RX ORDER — METOPROLOL SUCCINATE 25 MG/1
25 TABLET, EXTENDED RELEASE ORAL DAILY
Qty: 90 TABLET | Refills: 3 | Status: SHIPPED | OUTPATIENT
Start: 2022-10-25 | End: 2022-11-01

## 2022-10-25 RX ORDER — LOSARTAN POTASSIUM 100 MG/1
100 TABLET ORAL DAILY
Qty: 90 TABLET | Refills: 3 | Status: SHIPPED | OUTPATIENT
Start: 2022-10-25 | End: 2023-10-10 | Stop reason: SDUPTHER

## 2022-10-25 NOTE — PROGRESS NOTES
"    [facility]       [address] [place] [address]      Phone: [phone]      Fax: [phone]    Kishan Kelley MD                Office Visit  10/25/2022        Subjective      HPI:  Liliane Ford is a 67 y.o. female presents today in clinic for "Establish Care and Hypertension  ."     67-year-old female presents today to establish care, discuss blood pressure issues.      patient states her blood pressure has been running high lately.  She typically checks it twice a day.  The lowest reading she has gotten at home is 147/70.  Last night, around 4:00 a.m. she felt funny.  No actual chest pains, but could hear her heartbeat in her years.  Also could feel it in her chest.  Felt like palpitations.  States that she frequently can hear her heart beating at night.  Going up and down steps also makes her feel funny, palpitations.  She states she is very active at home, typically walks 3-4 miles per day.  Has stairs in her home and is constantly going up and down them.    states that she has history of fibroids.  Couple of years ago, she had some severe bleeding from the fibroids.  Bleeding was so bad that she wound up getting a blood transfusion, spent four days in the hospital.  Has tried treating them with Provera, for about three months.  No significant improvement.  Will be following up with her gyn to discuss other options.  Has typically bleeds about six days, then stops.  We starts a few days later.    PMH: HTN, fibroids  PSH:   F MH: None significant reported  allergies:  Penicillin caused a rash as a child.    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  .    T: No current use.  Quit 40+ years ago.    a: Denies  D: Denies    exercise: Daily    colon:  2022. : Guard negative.  Repeat three years ().    mm2022     gyn:  Dr. Aquino ([facility])      The following were updated and reviewed by myself in the " chart: medications, past medical history, past surgical history, family history, social history, and allergies.     Medications:  Current Outpatient Medications on File Prior to Visit   Medication Sig Dispense Refill    ascorbic acid, vitamin C, (VITAMIN C) 500 MG tablet Take 1 tablet (500 mg total) by mouth every evening.      b complex vitamins tablet Take 1 tablet by mouth once daily. B COMPLETE      ferrous sulfate 325 (65 FE) MG EC tablet Take 1 tablet (325 mg total) by mouth 2 (two) times daily with meals. 60 tablet 1    MULTIVIT,[id number]-CQ10 ORAL Take by mouth.      [DISCONTINUED] multivitamin capsule Take 1 capsule by mouth once daily.      [DISCONTINUED] olmesartan (BENICAR) 40 MG tablet TAKE 1 TABLET(40 MG) BY MOUTH EVERY DAY (Patient not taking: Reported on 10/25/2022) 30 tablet 0     No current facility-administered medications on file prior to visit.        PMHx:  Past Medical History:   Diagnosis Date    Hypertension     Pulmonary edema       Patient Active Problem List    Diagnosis Date Noted    Routine general medical examination at a health care facility 2022    Encounter for screening colonoscopy 2022    Delayed immunizations 2022    Postmenopausal 2022    Bunion 2022    Essential hypertension 2020    Thrombocytosis 2020    Elevated AST (SGOT) 2020    Cardiomegaly 2020    Low iron 2020    Thyroid nodule 2020        PSHx:  Past Surgical History:   Procedure Laterality Date     SECTION      x 2        FHx:  Family History   Problem Relation Age of Onset    Hypertension Mother         Social:  Social History     Socioeconomic History    Marital status:     Number of children: 3   Tobacco Use    Smoking status: Former     Types: Cigarettes    Smokeless tobacco: Never   Substance and Sexual Activity    Alcohol use: Never    Drug use: Never    Sexual activity: Not Currently     Partners: Male     Birth  "control/protection: Post-menopausal        Allergies:  Review of patient's allergies indicates:   Allergen Reactions    Penicillins Rash        ROS:  Review of Systems   Constitutional:  Negative for activity change, appetite change, chills and fever.   HENT:  Negative for congestion, postnasal drip, rhinorrhea, sore throat and trouble swallowing.    Respiratory:  Negative for cough, shortness of breath and wheezing.    Cardiovascular:  Positive for palpitations. Negative for chest pain.   Gastrointestinal:  Negative for abdominal pain, constipation, diarrhea, nausea and vomiting.   Genitourinary:  Negative for difficulty urinating.   Musculoskeletal:  Negative for arthralgias and myalgias.   Skin:  Negative for color change and rash.   Neurological:  Negative for speech difficulty and headaches.   All other systems reviewed and are negative.       Objective      BP (!) 184/110   Pulse 103   Temp 97.6 °F (36.4 °C) (Temporal)   Resp 16   Ht 5' 3" (1.6 m)   Wt 82.9 kg (182 lb 12.2 oz)   SpO2 95%   BMI 32.37 kg/m²   Ht Readings from Last 3 Encounters:   10/25/22 5' 3" (1.6 m)   08/29/22 5' 3" (1.6 m)   12/10/21 5' 3.5" (1.613 m)     Wt Readings from Last 3 Encounters:   10/25/22 82.9 kg (182 lb 12.2 oz)   09/12/22 84.4 kg (186 lb 1.1 oz)   08/29/22 84.2 kg (185 lb 10 oz)       PHYSICAL EXAM:  Physical Exam  Vitals and nursing note reviewed.   Constitutional:       General: She is not in acute distress.     Appearance: Normal appearance.   HENT:      Head: Normocephalic and atraumatic.      Right Ear: Tympanic membrane, ear canal and external ear normal.      Left Ear: Tympanic membrane, ear canal and external ear normal.      Nose: Nose normal. No congestion or rhinorrhea.      Mouth/Throat:      Mouth: Mucous membranes are moist.      Pharynx: Oropharynx is clear. No oropharyngeal exudate or posterior oropharyngeal erythema.   Eyes:      Extraocular Movements: Extraocular movements intact.      " Conjunctiva/sclera: Conjunctivae normal.      Pupils: Pupils are equal, round, and reactive to light.   Cardiovascular:      Rate and Rhythm: Normal rate and regular rhythm.   Pulmonary:      Effort: Pulmonary effort is normal. No respiratory distress.      Breath sounds: No wheezing, rhonchi or rales.   Musculoskeletal:         General: Normal range of motion.      Cervical back: Normal range of motion.   Lymphadenopathy:      Cervical: No cervical adenopathy.   Skin:     General: Skin is warm and dry.      Findings: No rash.   Neurological:      Mental Status: She is alert.            LABS / IMAGING:  Recent Results (from the past 4368 hour(s))   Comprehensive Metabolic Panel    Collection Time: 08/30/22 10:48 AM   Result Value Ref Range    Sodium 141 136 - 145 mmol/L    Potassium 4.4 3.5 - 5.1 mmol/L    Chloride 108 95 - 110 mmol/L    CO2 20 (L) 23 - 29 mmol/L    Glucose 90 70 - 110 mg/dL    BUN 7 (L) 8 - 23 mg/dL    Creatinine 0.8 0.5 - 1.4 mg/dL    Calcium 9.4 8.7 - 10.5 mg/dL    Total Protein 6.9 6.0 - 8.4 g/dL    Albumin 3.9 3.5 - 5.2 g/dL    Total Bilirubin 0.8 0.1 - 1.0 mg/dL    Alkaline Phosphatase 62 55 - 135 U/L    AST 29 10 - 40 U/L    ALT 30 10 - 44 U/L    Anion Gap 13 8 - 16 mmol/L    eGFR >60.0 >60 mL/min/1.73 m^2   CBC Auto Differential    Collection Time: 08/30/22 10:48 AM   Result Value Ref Range    WBC 4.52 3.90 - 12.70 K/uL    RBC 3.86 (L) 4.00 - 5.40 M/uL    Hemoglobin 10.7 (L) 12.0 - 16.0 g/dL    Hematocrit 35.1 (L) 37.0 - 48.5 %    MCV 91 82 - 98 fL    MCH 27.7 27.0 - 31.0 pg    MCHC 30.5 (L) 32.0 - 36.0 g/dL    RDW 15.3 (H) 11.5 - 14.5 %    Platelets 314 150 - 450 K/uL    MPV 11.7 9.2 - 12.9 fL    Immature Granulocytes 0.2 0.0 - 0.5 %    Gran # (ANC) 2.7 1.8 - 7.7 K/uL    Immature Grans (Abs) 0.01 0.00 - 0.04 K/uL    Lymph # 1.3 1.0 - 4.8 K/uL    Mono # 0.4 0.3 - 1.0 K/uL    Eos # 0.1 0.0 - 0.5 K/uL    Baso # 0.04 0.00 - 0.20 K/uL    nRBC 0 0 /100 WBC    Gran % 60.4 38.0 - 73.0 %    Lymph %  29.0 18.0 - 48.0 %    Mono % 8.2 4.0 - 15.0 %    Eosinophil % 1.3 0.0 - 8.0 %    Basophil % 0.9 0.0 - 1.9 %    Differential Method Automated    TSH    Collection Time: 08/30/22 10:48 AM   Result Value Ref Range    TSH 2.484 0.400 - 4.000 uIU/mL   Hemoglobin A1C    Collection Time: 08/30/22 10:48 AM   Result Value Ref Range    Hemoglobin A1C 4.9 4.0 - 5.6 %    Estimated Avg Glucose 94 68 - 131 mg/dL   Lipid Panel    Collection Time: 08/30/22 10:48 AM   Result Value Ref Range    Cholesterol 225 (H) 120 - 199 mg/dL    Triglycerides 58 30 - 150 mg/dL    HDL 71 40 - 75 mg/dL    LDL Cholesterol 142.4 63.0 - 159.0 mg/dL    HDL/Cholesterol Ratio 31.6 20.0 - 50.0 %    Total Cholesterol/HDL Ratio 3.2 2.0 - 5.0    Non-HDL Cholesterol 154 mg/dL   Ferritin    Collection Time: 08/30/22 10:48 AM   Result Value Ref Range    Ferritin 33 20.0 - 300.0 ng/mL   Iron and TIBC    Collection Time: 08/30/22 10:48 AM   Result Value Ref Range    Iron 61 30 - 160 ug/dL    Transferrin 262 200 - 375 mg/dL    TIBC 388 250 - 450 ug/dL    Saturated Iron 16 (L) 20 - 50 %   Cologuard Screening (Multitarget Stool DNA)    Collection Time: 09/16/22  9:35 AM    Specimen: Rectum; Stool   Result Value Ref Range    Cologuard Result Sample Could Not Be Processed 3 Negative   Cologuard Screening (Multitarget Stool DNA)    Collection Time: 09/30/22  7:32 AM    Specimen: Rectum; Stool   Result Value Ref Range    Cologuard Result Negative Negative         Assessment    1. Encounter to establish care    2. Primary hypertension    3. Tachycardia    4. Palpitations          Plan    Liliane was seen today for establish care and hypertension.    Diagnoses and all orders for this visit:    Encounter to establish care  -     Ambulatory referral/consult to Gynecology; Future    Primary hypertension  -     IN OFFICE EKG 12-LEAD (to Muse)  -     X-Ray Chest PA And Lateral; Future  -     losartan (COZAAR) 100 MG tablet; Take 1 tablet (100 mg total) by mouth once daily.  -      metoprolol succinate (TOPROL-XL) 25 MG 24 hr tablet; Take 1 tablet (25 mg total) by mouth once daily.  -     Ambulatory referral/consult to Cardiology; Future    Tachycardia  -     IN OFFICE EKG 12-LEAD (to Muse)  -     X-Ray Chest PA And Lateral; Future  -     losartan (COZAAR) 100 MG tablet; Take 1 tablet (100 mg total) by mouth once daily.  -     metoprolol succinate (TOPROL-XL) 25 MG 24 hr tablet; Take 1 tablet (25 mg total) by mouth once daily.  -     Ambulatory referral/consult to Cardiology; Future    Palpitations  -     IN OFFICE EKG 12-LEAD (to Muse)  -     X-Ray Chest PA And Lateral; Future  -     losartan (COZAAR) 100 MG tablet; Take 1 tablet (100 mg total) by mouth once daily.  -     metoprolol succinate (TOPROL-XL) 25 MG 24 hr tablet; Take 1 tablet (25 mg total) by mouth once daily.  -     Ambulatory referral/consult to Cardiology; Future    Physically, looks okay today.  EKG done in clinic shows normal sinus rhythm. chest x-ray appears WNL    Blood pressure extremely elevated in clinic today.  We will start her on losartan 100 mg, along with Toprol-XL 25 mg daily.  Referral to Cardiology for additional workup.    RTC in 1-2 weeks for nurse visit to recheck BP.      FOLLOW-UP:  Follow up in about 3 months (around 1/25/2023) for check up, and in 2 weeks for a nurse visit to recheck BP.    I spent a total of 65 minutes face to face and non-face to face on the date of this visit.This includes time preparing to see the patient (eg, review of tests, notes), obtaining and/or reviewing additional history from an independent historian and/or outside medical records, documenting clinical information in the electronic health record, independently interpreting results and/or communicating results to the patient/family/caregiver, or care coordinator.    Signed by:  Kishan Kelley MD

## 2022-10-25 NOTE — PATIENT INSTRUCTIONS
Physically, everything looks pretty good.      EKG shows sinus rhythm.    Chest x-ray is clear.      With your elevated blood pressure, the funny feeling in your chest, and hearing your heartbeat, would like to get you a full cardiac eval.  Specifically, would like them to do an echocardiogram and some other testing to ensure that your heart is okay.  Referral placed today.      In the meantime, continue taking the losartan 100 mg tablets daily.  This will help with your blood pressure.      Would also like to start you on Toprol-XL (metoprolol) once daily.  This will help with blood pressure, but it should also keep your heart from racing, and minimize that funny feeling.

## 2022-10-27 ENCOUNTER — OFFICE VISIT (OUTPATIENT)
Dept: OBSTETRICS AND GYNECOLOGY | Facility: CLINIC | Age: 67
End: 2022-10-27
Payer: COMMERCIAL

## 2022-10-27 VITALS
SYSTOLIC BLOOD PRESSURE: 174 MMHG | DIASTOLIC BLOOD PRESSURE: 106 MMHG | WEIGHT: 183.19 LBS | HEIGHT: 62 IN | BODY MASS INDEX: 33.71 KG/M2

## 2022-10-27 DIAGNOSIS — N95.0 POSTMENOPAUSAL BLEEDING: ICD-10-CM

## 2022-10-27 DIAGNOSIS — Z76.89 ENCOUNTER TO ESTABLISH CARE: ICD-10-CM

## 2022-10-27 DIAGNOSIS — D21.9 FIBROIDS: ICD-10-CM

## 2022-10-27 DIAGNOSIS — N85.2 ENLARGED UTERUS: ICD-10-CM

## 2022-10-27 DIAGNOSIS — Z01.419 ENCOUNTER FOR GYNECOLOGICAL EXAMINATION WITHOUT ABNORMAL FINDING: Primary | ICD-10-CM

## 2022-10-27 PROCEDURE — 3044F PR MOST RECENT HEMOGLOBIN A1C LEVEL <7.0%: ICD-10-PCS | Mod: CPTII,S$GLB,, | Performed by: NURSE PRACTITIONER

## 2022-10-27 PROCEDURE — 99999 PR PBB SHADOW E&M-EST. PATIENT-LVL IV: CPT | Mod: PBBFAC,,, | Performed by: NURSE PRACTITIONER

## 2022-10-27 PROCEDURE — 99999 PR PBB SHADOW E&M-EST. PATIENT-LVL IV: ICD-10-PCS | Mod: PBBFAC,,, | Performed by: NURSE PRACTITIONER

## 2022-10-27 PROCEDURE — 3044F HG A1C LEVEL LT 7.0%: CPT | Mod: CPTII,S$GLB,, | Performed by: NURSE PRACTITIONER

## 2022-10-27 PROCEDURE — 4010F PR ACE/ARB THEARPY RXD/TAKEN: ICD-10-PCS | Mod: CPTII,S$GLB,, | Performed by: NURSE PRACTITIONER

## 2022-10-27 PROCEDURE — 3077F SYST BP >= 140 MM HG: CPT | Mod: CPTII,S$GLB,, | Performed by: NURSE PRACTITIONER

## 2022-10-27 PROCEDURE — 1160F RVW MEDS BY RX/DR IN RCRD: CPT | Mod: CPTII,S$GLB,, | Performed by: NURSE PRACTITIONER

## 2022-10-27 PROCEDURE — 88305 TISSUE EXAM BY PATHOLOGIST: ICD-10-PCS | Mod: 26,,, | Performed by: PATHOLOGY

## 2022-10-27 PROCEDURE — 88141 CYTOPATH C/V INTERPRET: CPT | Mod: ,,, | Performed by: STUDENT IN AN ORGANIZED HEALTH CARE EDUCATION/TRAINING PROGRAM

## 2022-10-27 PROCEDURE — 99397 PR PREVENTIVE VISIT,EST,65 & OVER: ICD-10-PCS | Mod: 25,S$GLB,, | Performed by: NURSE PRACTITIONER

## 2022-10-27 PROCEDURE — 88305 TISSUE EXAM BY PATHOLOGIST: CPT | Performed by: PATHOLOGY

## 2022-10-27 PROCEDURE — 1160F PR REVIEW ALL MEDS BY PRESCRIBER/CLIN PHARMACIST DOCUMENTED: ICD-10-PCS | Mod: CPTII,S$GLB,, | Performed by: NURSE PRACTITIONER

## 2022-10-27 PROCEDURE — 1126F AMNT PAIN NOTED NONE PRSNT: CPT | Mod: CPTII,S$GLB,, | Performed by: NURSE PRACTITIONER

## 2022-10-27 PROCEDURE — 88175 CYTOPATH C/V AUTO FLUID REDO: CPT | Performed by: STUDENT IN AN ORGANIZED HEALTH CARE EDUCATION/TRAINING PROGRAM

## 2022-10-27 PROCEDURE — 1101F PR PT FALLS ASSESS DOC 0-1 FALLS W/OUT INJ PAST YR: ICD-10-PCS | Mod: CPTII,S$GLB,, | Performed by: NURSE PRACTITIONER

## 2022-10-27 PROCEDURE — 1101F PT FALLS ASSESS-DOCD LE1/YR: CPT | Mod: CPTII,S$GLB,, | Performed by: NURSE PRACTITIONER

## 2022-10-27 PROCEDURE — 3080F DIAST BP >= 90 MM HG: CPT | Mod: CPTII,S$GLB,, | Performed by: NURSE PRACTITIONER

## 2022-10-27 PROCEDURE — 3288F PR FALLS RISK ASSESSMENT DOCUMENTED: ICD-10-PCS | Mod: CPTII,S$GLB,, | Performed by: NURSE PRACTITIONER

## 2022-10-27 PROCEDURE — 3288F FALL RISK ASSESSMENT DOCD: CPT | Mod: CPTII,S$GLB,, | Performed by: NURSE PRACTITIONER

## 2022-10-27 PROCEDURE — 1126F PR PAIN SEVERITY QUANTIFIED, NO PAIN PRESENT: ICD-10-PCS | Mod: CPTII,S$GLB,, | Performed by: NURSE PRACTITIONER

## 2022-10-27 PROCEDURE — 88141 PR  CYTOPATH CERV/VAG INTERPRET: ICD-10-PCS | Mod: ,,, | Performed by: STUDENT IN AN ORGANIZED HEALTH CARE EDUCATION/TRAINING PROGRAM

## 2022-10-27 PROCEDURE — 1159F MED LIST DOCD IN RCRD: CPT | Mod: CPTII,S$GLB,, | Performed by: NURSE PRACTITIONER

## 2022-10-27 PROCEDURE — 3077F PR MOST RECENT SYSTOLIC BLOOD PRESSURE >= 140 MM HG: ICD-10-PCS | Mod: CPTII,S$GLB,, | Performed by: NURSE PRACTITIONER

## 2022-10-27 PROCEDURE — 58100 PR BIOPSY OF UTERUS LINING: ICD-10-PCS | Mod: S$GLB,,, | Performed by: NURSE PRACTITIONER

## 2022-10-27 PROCEDURE — 3080F PR MOST RECENT DIASTOLIC BLOOD PRESSURE >= 90 MM HG: ICD-10-PCS | Mod: CPTII,S$GLB,, | Performed by: NURSE PRACTITIONER

## 2022-10-27 PROCEDURE — 99397 PER PM REEVAL EST PAT 65+ YR: CPT | Mod: 25,S$GLB,, | Performed by: NURSE PRACTITIONER

## 2022-10-27 PROCEDURE — 58100 BIOPSY OF UTERUS LINING: CPT | Mod: S$GLB,,, | Performed by: NURSE PRACTITIONER

## 2022-10-27 PROCEDURE — 1159F PR MEDICATION LIST DOCUMENTED IN MEDICAL RECORD: ICD-10-PCS | Mod: CPTII,S$GLB,, | Performed by: NURSE PRACTITIONER

## 2022-10-27 PROCEDURE — 4010F ACE/ARB THERAPY RXD/TAKEN: CPT | Mod: CPTII,S$GLB,, | Performed by: NURSE PRACTITIONER

## 2022-10-27 PROCEDURE — 87624 HPV HI-RISK TYP POOLED RSLT: CPT | Performed by: NURSE PRACTITIONER

## 2022-10-27 PROCEDURE — 88305 TISSUE EXAM BY PATHOLOGIST: CPT | Mod: 26,,, | Performed by: PATHOLOGY

## 2022-10-27 NOTE — PROGRESS NOTES
"CC: Well woman exam    Liliane Ford is a 67 y.o. female  presents for well woman exam.  LMP: No LMP recorded. Patient is postmenopausal..    Extensive hx of  bleeding - has taken megace in past which helps but when stops returns to bleeding  Last EMB done  - normal  Has been followed by Dr. Aquino in past  and has taken multiple rounds of megace - pt not really wanting to repeat due to side effects - mainly weight gain  Pt saw primary recently and aware of HTN issues will be see cards to see if can get BP regulated.     Past Medical History:   Diagnosis Date    Hypertension     Pulmonary edema      Past Surgical History:   Procedure Laterality Date     SECTION      x 2     Social History     Socioeconomic History    Marital status:     Number of children: 3   Tobacco Use    Smoking status: Former     Types: Cigarettes    Smokeless tobacco: Never   Substance and Sexual Activity    Alcohol use: Never    Drug use: Never    Sexual activity: Not Currently     Partners: Male     Birth control/protection: Post-menopausal     Family History   Problem Relation Age of Onset    Hypertension Mother      OB History          6    Para   6    Term   3            AB        Living   3         SAB        IAB        Ectopic        Multiple   1    Live Births                     BP (!) 174/106 (BP Location: Right arm, Patient Position: Sitting, BP Method: Medium (Manual))   Ht 5' 2" (1.575 m)   Wt 83.1 kg (183 lb 3.2 oz)   BMI 33.51 kg/m²       ROS:  GENERAL: Denies weight gain or weight loss. Feeling well overall.   SKIN: Denies rash or lesions.   HEAD: Denies head injury or headache.   NODES: Denies enlarged lymph nodes.   CHEST: Denies chest pain or shortness of breath.   CARDIOVASCULAR: Denies palpitations or left sided chest pain.   ABDOMEN: No abdominal pain, constipation, diarrhea, nausea, vomiting or rectal bleeding.   URINARY: No frequency, dysuria, hematuria, or burning on " urination.  REPRODUCTIVE: See HPI.   BREASTS: The patient performs breast self-examination and denies pain, lumps, or nipple discharge.   HEMATOLOGIC: No easy bruisability or excessive bleeding.   MUSCULOSKELETAL: Denies joint pain or swelling.   NEUROLOGIC: Denies syncope or weakness.   PSYCHIATRIC: Denies depression, anxiety or mood swings.    PHYSICAL EXAM:  APPEARANCE: Well nourished, well developed, in no acute distress.  AFFECT: WNL, alert and oriented x 3  SKIN: No acne or hirsutism  NECK: Neck symmetric without masses or thyromegaly  NODES: No inguinal, cervical, axillary, or femoral lymph node enlargement  CHEST: Good respiratory effect  ABDOMEN: Soft.  No tenderness or masses.  No hepatosplenomegaly.  No hernias.  BREASTS: Symmetrical, no skin changes or visible lesions.  No palpable masses, nipple discharge bilaterally.  PELVIC: Normal external genitalia without lesions.  Normal hair distribution.  Adequate perineal body, normal urethral meatus.  Vagina moist and well rugated without lesions or discharge. - heavy bleeding noted.   Cervix pink, without lesions, discharge or tenderness. - bleeding - attempted pap  No significant cystocele or rectocele.  Bimanual exam shows uterus to be 14-16 week size, irregular, mobile and nontender.  Adnexa without masses or tenderness.    EXTREMITIES: No edema.  Physical Exam      Procedure Details    A speculum was placed, A sharp tenaculum was applied to the cervix for stabilization. A pipelle was used to sample the endometrium. Uterus sounded to  12 . Sample was sent for pathologic examination.    A minimal tissue as appeared to be only blood  Patient tolerated procedure well.    Complications:  None            1. Encounter for gynecological examination without abnormal finding        2. Encounter to establish care  Ambulatory referral/consult to Gynecology      3. Postmenopausal bleeding  Specimen to Pathology, Ob/Gyn    Endometrial biopsy      4. Enlarged uterus   Specimen to Pathology, Ob/Gyn    Endometrial biopsy      5. Fibroids  Specimen to Pathology, Ob/Gyn    Endometrial biopsy       AND PLAN:    Patient was counseled today on A.C.S. Pap guidelines and recommendations for yearly pelvic exams, mammograms and monthly self breast exams; to see her PCP for other health maintenance.           Plan:    The patient was advised to call for any fever or for prolonged or severe pain or bleeding. She was advised to use OTC analgesics as needed for mild to moderate pain. Patient instructed to call office for results in 1 to 2 weeks.       Pt to f/u with Dr. Aquino   Pending EMB - will discuss with Miles if needs further megace or other progesterone

## 2022-11-01 ENCOUNTER — OFFICE VISIT (OUTPATIENT)
Dept: CARDIOLOGY | Facility: CLINIC | Age: 67
End: 2022-11-01
Payer: COMMERCIAL

## 2022-11-01 VITALS
RESPIRATION RATE: 16 BRPM | BODY MASS INDEX: 33.92 KG/M2 | HEART RATE: 80 BPM | SYSTOLIC BLOOD PRESSURE: 190 MMHG | WEIGHT: 184.31 LBS | OXYGEN SATURATION: 100 % | DIASTOLIC BLOOD PRESSURE: 100 MMHG | HEIGHT: 62 IN

## 2022-11-01 DIAGNOSIS — R00.2 PALPITATIONS: ICD-10-CM

## 2022-11-01 DIAGNOSIS — E78.00 HYPERCHOLESTEROLEMIA: ICD-10-CM

## 2022-11-01 DIAGNOSIS — I10 PRIMARY HYPERTENSION: ICD-10-CM

## 2022-11-01 DIAGNOSIS — I10 ESSENTIAL HYPERTENSION: ICD-10-CM

## 2022-11-01 DIAGNOSIS — R94.31 ABNORMAL ECG: Primary | ICD-10-CM

## 2022-11-01 DIAGNOSIS — R00.0 TACHYCARDIA: ICD-10-CM

## 2022-11-01 PROCEDURE — 3077F SYST BP >= 140 MM HG: CPT | Mod: CPTII,S$GLB,, | Performed by: INTERNAL MEDICINE

## 2022-11-01 PROCEDURE — 3044F HG A1C LEVEL LT 7.0%: CPT | Mod: CPTII,S$GLB,, | Performed by: INTERNAL MEDICINE

## 2022-11-01 PROCEDURE — 99999 PR PBB SHADOW E&M-EST. PATIENT-LVL IV: ICD-10-PCS | Mod: PBBFAC,,, | Performed by: INTERNAL MEDICINE

## 2022-11-01 PROCEDURE — 4010F PR ACE/ARB THEARPY RXD/TAKEN: ICD-10-PCS | Mod: CPTII,S$GLB,, | Performed by: INTERNAL MEDICINE

## 2022-11-01 PROCEDURE — 1159F PR MEDICATION LIST DOCUMENTED IN MEDICAL RECORD: ICD-10-PCS | Mod: CPTII,S$GLB,, | Performed by: INTERNAL MEDICINE

## 2022-11-01 PROCEDURE — 99204 PR OFFICE/OUTPT VISIT, NEW, LEVL IV, 45-59 MIN: ICD-10-PCS | Mod: S$GLB,,, | Performed by: INTERNAL MEDICINE

## 2022-11-01 PROCEDURE — 1160F RVW MEDS BY RX/DR IN RCRD: CPT | Mod: CPTII,S$GLB,, | Performed by: INTERNAL MEDICINE

## 2022-11-01 PROCEDURE — 1126F AMNT PAIN NOTED NONE PRSNT: CPT | Mod: CPTII,S$GLB,, | Performed by: INTERNAL MEDICINE

## 2022-11-01 PROCEDURE — 1159F MED LIST DOCD IN RCRD: CPT | Mod: CPTII,S$GLB,, | Performed by: INTERNAL MEDICINE

## 2022-11-01 PROCEDURE — 99999 PR PBB SHADOW E&M-EST. PATIENT-LVL IV: CPT | Mod: PBBFAC,,, | Performed by: INTERNAL MEDICINE

## 2022-11-01 PROCEDURE — 99204 OFFICE O/P NEW MOD 45 MIN: CPT | Mod: S$GLB,,, | Performed by: INTERNAL MEDICINE

## 2022-11-01 PROCEDURE — 4010F ACE/ARB THERAPY RXD/TAKEN: CPT | Mod: CPTII,S$GLB,, | Performed by: INTERNAL MEDICINE

## 2022-11-01 PROCEDURE — 3077F PR MOST RECENT SYSTOLIC BLOOD PRESSURE >= 140 MM HG: ICD-10-PCS | Mod: CPTII,S$GLB,, | Performed by: INTERNAL MEDICINE

## 2022-11-01 PROCEDURE — 3080F DIAST BP >= 90 MM HG: CPT | Mod: CPTII,S$GLB,, | Performed by: INTERNAL MEDICINE

## 2022-11-01 PROCEDURE — 3044F PR MOST RECENT HEMOGLOBIN A1C LEVEL <7.0%: ICD-10-PCS | Mod: CPTII,S$GLB,, | Performed by: INTERNAL MEDICINE

## 2022-11-01 PROCEDURE — 1126F PR PAIN SEVERITY QUANTIFIED, NO PAIN PRESENT: ICD-10-PCS | Mod: CPTII,S$GLB,, | Performed by: INTERNAL MEDICINE

## 2022-11-01 PROCEDURE — 1160F PR REVIEW ALL MEDS BY PRESCRIBER/CLIN PHARMACIST DOCUMENTED: ICD-10-PCS | Mod: CPTII,S$GLB,, | Performed by: INTERNAL MEDICINE

## 2022-11-01 PROCEDURE — 3080F PR MOST RECENT DIASTOLIC BLOOD PRESSURE >= 90 MM HG: ICD-10-PCS | Mod: CPTII,S$GLB,, | Performed by: INTERNAL MEDICINE

## 2022-11-01 RX ORDER — METOPROLOL SUCCINATE 25 MG/1
50 TABLET, EXTENDED RELEASE ORAL DAILY
Qty: 90 TABLET | Refills: 3
Start: 2022-11-01 | End: 2022-12-21 | Stop reason: DRUGHIGH

## 2022-11-01 NOTE — PROGRESS NOTES
"Subjective:    Patient ID:  Liliane Ford is a 67 y.o. female who presents for evaluation of Hypertension and Palpitations      Pt referred by Kerrie Pena NP      HPI  Pt presents for eval.  Her current med conditions include HTN, hypercholesterolemia.  Nonsmoker.  She thinks she may have developed a heart condition after having heavy fibroid bleeding issues 2 years ago, required 4 units PRBC.  PCP recently added Toprol for palpitations, HTN control.  Some vague palpitation sxs over last year.  No CP sxs.  No CHF sxs.  Walks daily for exercise.   Ecg 10/25/22 NSR, old septal infarct.  Old ecgs reviewed: Feb 2020 anterolateral infarct, and 8/30/22 NSR, old septal infarct.         Past Medical History:   Diagnosis Date    Hypertension     Pulmonary edema      Current Outpatient Medications   Medication Instructions    ascorbic acid (vitamin C) (VITAMIN C) 500 mg, Oral, Nightly    b complex vitamins tablet 1 tablet, Oral, Daily, B COMPLETE    ferrous sulfate 325 mg, Oral, 2 times daily with meals    losartan (COZAAR) 100 mg, Oral, Daily    metoprolol succinate (TOPROL-XL) 25 mg, Oral, Daily    MULTIVIT,MIN52-FOLIC-VITK-CQ10 ORAL Oral         Review of Systems   Constitutional: Negative.   HENT: Negative.     Eyes: Negative.    Cardiovascular:  Positive for palpitations.   Respiratory: Negative.     Endocrine: Negative.    Hematologic/Lymphatic: Negative.    Skin: Negative.    Musculoskeletal: Negative.    Gastrointestinal: Negative.    Genitourinary: Negative.    Neurological: Negative.    Psychiatric/Behavioral: Negative.     Allergic/Immunologic: Negative.       BP (!) 190/100 (BP Location: Left arm, Patient Position: Sitting, BP Method: Large (Manual))   Pulse 80   Ht 5' 2" (1.575 m)   Wt 83.6 kg (184 lb 4.9 oz)   SpO2 100%   BMI 33.71 kg/m²     Wt Readings from Last 3 Encounters:   11/01/22 83.6 kg (184 lb 4.9 oz)   10/27/22 83.1 kg (183 lb 3.2 oz)   10/25/22 82.9 kg (182 lb 12.2 oz)     Temp Readings from " Last 3 Encounters:   10/25/22 97.6 °F (36.4 °C) (Temporal)   09/12/22 98.1 °F (36.7 °C)   08/29/22 97.6 °F (36.4 °C)     BP Readings from Last 3 Encounters:   11/01/22 (!) 190/100   10/27/22 (!) 174/106   10/25/22 (!) 184/110     Pulse Readings from Last 3 Encounters:   11/01/22 80   10/25/22 103   09/12/22 75       Objective:    Physical Exam  Vitals and nursing note reviewed.   Constitutional:       General: She is not in acute distress.     Appearance: Normal appearance. She is well-developed. She is not ill-appearing or diaphoretic.   HENT:      Head: Normocephalic.   Neck:      Thyroid: No thyromegaly.      Vascular: Normal carotid pulses. No carotid bruit, hepatojugular reflux or JVD.   Cardiovascular:      Rate and Rhythm: Normal rate and regular rhythm.      Chest Wall: PMI is not displaced.      Pulses: Normal pulses.           Radial pulses are 2+ on the right side and 2+ on the left side.      Heart sounds: Normal heart sounds, S1 normal and S2 normal. No murmur heard.    No friction rub. No gallop.   Pulmonary:      Effort: Pulmonary effort is normal.      Breath sounds: Normal breath sounds. No wheezing or rales.   Abdominal:      General: Bowel sounds are normal. There is no abdominal bruit.      Palpations: Abdomen is soft. There is no hepatomegaly, splenomegaly or mass.      Tenderness: There is no abdominal tenderness.   Musculoskeletal:      Cervical back: Neck supple.   Lymphadenopathy:      Cervical: No cervical adenopathy.   Skin:     General: Skin is warm.   Neurological:      Mental Status: She is alert and oriented to person, place, and time.   Psychiatric:         Behavior: Behavior normal. Behavior is cooperative.       I have reviewed all pertinent labs and cardiac studies.      Chemistry        Component Value Date/Time     08/30/2022 1048    K 4.4 08/30/2022 1048     08/30/2022 1048    CO2 20 (L) 08/30/2022 1048    BUN 7 (L) 08/30/2022 1048    CREATININE 0.8 08/30/2022 1048     GLU 90 08/30/2022 1048        Component Value Date/Time    CALCIUM 9.4 08/30/2022 1048    ALKPHOS 62 08/30/2022 1048    AST 29 08/30/2022 1048    ALT 30 08/30/2022 1048    BILITOT 0.8 08/30/2022 1048    ESTGFRAFRICA >60 02/06/2020 0458    EGFRNONAA >60 02/06/2020 0458          Lab Results   Component Value Date    WBC 4.52 08/30/2022    HGB 10.7 (L) 08/30/2022    HCT 35.1 (L) 08/30/2022    MCV 91 08/30/2022     08/30/2022       Lab Results   Component Value Date    HGBA1C 4.9 08/30/2022     Lab Results   Component Value Date    CHOL 225 (H) 08/30/2022    CHOL 185 11/08/2007     Lab Results   Component Value Date    HDL 71 08/30/2022    HDL 56 11/03/2010    HDL 51 11/08/2007     Lab Results   Component Value Date    LDLCALC 142.4 08/30/2022    LDLCALC 160 (H) 11/03/2010    LDLCALC 124.0 11/08/2007     Lab Results   Component Value Date    TRIG 58 08/30/2022    TRIG 77 11/03/2010    TRIG 50 11/08/2007     Lab Results   Component Value Date    CHOLHDL 31.6 08/30/2022    CHOLHDL 27.6 11/08/2007           Assessment:       1. Abnormal ECG    2. Primary hypertension    3. Tachycardia    4. Palpitations    5. Essential hypertension    6. Hypercholesterolemia         Plan:             Abnormal ecg suggestive of prior anteroseptal infarct.  May be false + reading.  Ecgs since 2020 are all similar.  Pt counseled on ecg findings.  Stress test advised.  Echocardiogram.  Discussed possible LHC if stress test reveals coronary ischemia.  Indications, risks/benefits discussed to include PCI.  HTN control needed.  Goal < 130/80.  Increase Toprol xl to 50 mg qd.  Continue Losartan.  She has f/u appt next week with PCP for HTN check.  Titrate meds as needed.  PCP advised to consider adding Norvasc soon if needed if BP remains above goal.  2 week Vital Holter.  Cardiac diet discussed.  Daily exercise.  Lower lipids to goal w diet, exercise.  PCP advised to add statin if fails to improve.    PHONE REVIEW.

## 2022-11-02 LAB
FINAL PATHOLOGIC DIAGNOSIS: NORMAL
GROSS: NORMAL
HPV HR 12 DNA SPEC QL NAA+PROBE: NEGATIVE
HPV16 AG SPEC QL: NEGATIVE
HPV18 DNA SPEC QL NAA+PROBE: NEGATIVE
Lab: NORMAL

## 2022-11-03 ENCOUNTER — TELEPHONE (OUTPATIENT)
Dept: OBSTETRICS AND GYNECOLOGY | Facility: CLINIC | Age: 67
End: 2022-11-03
Payer: COMMERCIAL

## 2022-11-03 LAB
FINAL PATHOLOGIC DIAGNOSIS: ABNORMAL
Lab: ABNORMAL

## 2022-11-03 NOTE — TELEPHONE ENCOUNTER
----- Message from Cristine Pena NP sent at 11/3/2022  2:51 PM CDT -----  Pap was unsatisfactory due to her bleeding - maybe when she f/u with Dr. Aquino it can be repeated

## 2022-11-03 NOTE — PROGRESS NOTES
After verifying two patient identifiers, results given. Informed patient that pap smear was unsatisfactory and needed to be repeated. Patient verbalized understanding.

## 2022-11-08 ENCOUNTER — CLINICAL SUPPORT (OUTPATIENT)
Dept: PRIMARY CARE CLINIC | Facility: CLINIC | Age: 67
End: 2022-11-08
Payer: COMMERCIAL

## 2022-11-08 VITALS — HEART RATE: 68 BPM | SYSTOLIC BLOOD PRESSURE: 184 MMHG | DIASTOLIC BLOOD PRESSURE: 88 MMHG

## 2022-11-08 DIAGNOSIS — I10 PRIMARY HYPERTENSION: Primary | ICD-10-CM

## 2022-11-08 PROCEDURE — 99999 PR PBB SHADOW E&M-EST. PATIENT-LVL II: CPT | Mod: PBBFAC,,,

## 2022-11-08 PROCEDURE — 99999 PR PBB SHADOW E&M-EST. PATIENT-LVL II: ICD-10-PCS | Mod: PBBFAC,,,

## 2022-11-08 RX ORDER — AMLODIPINE BESYLATE 10 MG/1
10 TABLET ORAL DAILY
Qty: 90 TABLET | Refills: 3 | Status: SHIPPED | OUTPATIENT
Start: 2022-11-08 | End: 2023-03-15 | Stop reason: SDUPTHER

## 2022-11-08 NOTE — PROGRESS NOTES
Patient here for BP check.  Blood pressure still elevated.      Cardiology recommended adding Norvasc if not under control.  Will add that on today, 10 mg daily.      It appears she is also eligible for the digital medicine program.  Will place enrollment orders today.

## 2022-11-09 ENCOUNTER — PATIENT MESSAGE (OUTPATIENT)
Dept: PRIMARY CARE CLINIC | Facility: CLINIC | Age: 67
End: 2022-11-09
Payer: COMMERCIAL

## 2022-11-18 ENCOUNTER — PATIENT OUTREACH (OUTPATIENT)
Dept: ADMINISTRATIVE | Facility: HOSPITAL | Age: 67
End: 2022-11-18
Payer: COMMERCIAL

## 2022-11-18 ENCOUNTER — TELEPHONE (OUTPATIENT)
Dept: PRIMARY CARE CLINIC | Facility: CLINIC | Age: 67
End: 2022-11-18
Payer: COMMERCIAL

## 2022-11-18 VITALS — DIASTOLIC BLOOD PRESSURE: 71 MMHG | SYSTOLIC BLOOD PRESSURE: 147 MMHG

## 2022-11-18 NOTE — PROGRESS NOTES
HTN Report: Patient notified to obtain a home BP reading. Patient states her home BP reading yesterday was 147/71, remote BP will be entered.

## 2022-11-22 ENCOUNTER — HOSPITAL ENCOUNTER (OUTPATIENT)
Dept: RADIOLOGY | Facility: HOSPITAL | Age: 67
Discharge: HOME | End: 2022-11-22
Attending: INTERNAL MEDICINE
Payer: COMMERCIAL

## 2022-11-22 ENCOUNTER — TELEPHONE (OUTPATIENT)
Dept: CARDIOLOGY | Facility: HOSPITAL | Age: 67
End: 2022-11-22
Payer: COMMERCIAL

## 2022-11-22 ENCOUNTER — HOSPITAL ENCOUNTER (OUTPATIENT)
Dept: CARDIOLOGY | Facility: HOSPITAL | Age: 67
Discharge: HOME | End: 2022-11-22
Attending: INTERNAL MEDICINE
Payer: COMMERCIAL

## 2022-11-22 VITALS — BODY MASS INDEX: 33.86 KG/M2 | WEIGHT: 184 LBS | HEIGHT: 62 IN

## 2022-11-22 DIAGNOSIS — R94.39 ABNORMAL NUCLEAR STRESS TEST: ICD-10-CM

## 2022-11-22 DIAGNOSIS — I10 ESSENTIAL HYPERTENSION: ICD-10-CM

## 2022-11-22 DIAGNOSIS — R94.31 ABNORMAL ECG: ICD-10-CM

## 2022-11-22 DIAGNOSIS — R00.0 TACHYCARDIA: ICD-10-CM

## 2022-11-22 DIAGNOSIS — R00.2 PALPITATIONS: ICD-10-CM

## 2022-11-22 DIAGNOSIS — I10 PRIMARY HYPERTENSION: ICD-10-CM

## 2022-11-22 DIAGNOSIS — R00.0 TACHYCARDIA: Primary | ICD-10-CM

## 2022-11-22 LAB
CV STRESS BASE HR: 77 BPM
DIASTOLIC BLOOD PRESSURE: 95 MMHG
NUC REST EJECTION FRACTION: 72
NUC STRESS EJECTION FRACTION: 76 %
OHS CV CPX 1 MINUTE RECOVERY HEART RATE: 123 BPM
OHS CV CPX 85 PERCENT MAX PREDICTED HEART RATE MALE: 124
OHS CV CPX ESTIMATED METS: 7
OHS CV CPX MAX PREDICTED HEART RATE: 146
OHS CV CPX PATIENT IS FEMALE: 1
OHS CV CPX PATIENT IS MALE: 0
OHS CV CPX PEAK DIASTOLIC BLOOD PRESSURE: 91 MMHG
OHS CV CPX PEAK HEAR RATE: 137 BPM
OHS CV CPX PEAK RATE PRESSURE PRODUCT: NORMAL
OHS CV CPX PEAK SYSTOLIC BLOOD PRESSURE: 194 MMHG
OHS CV CPX PERCENT MAX PREDICTED HEART RATE ACHIEVED: 94
OHS CV CPX RATE PRESSURE PRODUCT PRESENTING: NORMAL
STRESS ECHO POST EXERCISE DUR MIN: 6 MINUTES
STRESS ECHO POST EXERCISE DUR SEC: 0 SECONDS
SYSTOLIC BLOOD PRESSURE: 169 MMHG

## 2022-11-22 PROCEDURE — 78452 HT MUSCLE IMAGE SPECT MULT: CPT | Mod: 26,,, | Performed by: INTERNAL MEDICINE

## 2022-11-22 PROCEDURE — 93306 TTE W/DOPPLER COMPLETE: CPT | Mod: 26,,, | Performed by: INTERNAL MEDICINE

## 2022-11-22 PROCEDURE — 93016 CV STRESS TEST SUPVJ ONLY: CPT | Mod: ,,, | Performed by: INTERNAL MEDICINE

## 2022-11-22 PROCEDURE — 93016 NUCLEAR STRESS - CARDIOLOGY INTERPRETED (CUPID ONLY): ICD-10-PCS | Mod: ,,, | Performed by: INTERNAL MEDICINE

## 2022-11-22 PROCEDURE — 93248 EXT ECG>7D<15D REV&INTERPJ: CPT | Mod: ,,, | Performed by: INTERNAL MEDICINE

## 2022-11-22 PROCEDURE — 93017 CV STRESS TEST TRACING ONLY: CPT

## 2022-11-22 PROCEDURE — 78452 NUCLEAR STRESS - CARDIOLOGY INTERPRETED (CUPID ONLY): ICD-10-PCS | Mod: 26,,, | Performed by: INTERNAL MEDICINE

## 2022-11-22 PROCEDURE — 93248 CV CARDIAC MONITOR - 3-15 DAY ADULT (CUPID ONLY): ICD-10-PCS | Mod: ,,, | Performed by: INTERNAL MEDICINE

## 2022-11-22 PROCEDURE — 93018 CV STRESS TEST I&R ONLY: CPT | Mod: ,,, | Performed by: INTERNAL MEDICINE

## 2022-11-22 PROCEDURE — A9502 TC99M TETROFOSMIN: HCPCS

## 2022-11-22 PROCEDURE — 93306 TTE W/DOPPLER COMPLETE: CPT

## 2022-11-22 PROCEDURE — 93018 NUCLEAR STRESS - CARDIOLOGY INTERPRETED (CUPID ONLY): ICD-10-PCS | Mod: ,,, | Performed by: INTERNAL MEDICINE

## 2022-11-22 PROCEDURE — 93306 ECHO (CUPID ONLY): ICD-10-PCS | Mod: 26,,, | Performed by: INTERNAL MEDICINE

## 2022-11-22 NOTE — TELEPHONE ENCOUNTER
Rema,    Please call pt.  The nuclear stress test per report is normal.  On my review, the ecg with exercise shows up to 1.5 mm ST depression with exercise.  There is apical defect on some images.    In light of her ecgs in past showing old MI, and above abnormalities, I advise proceeding with coronary CTA at [facility]  This will help evaluate the possibility of underlying blockages that the stress test may not be showing clearly.    Please schedule.  Take 2 extra Toprol pills am of her CTA test.    Thanks       Dr Plascencia

## 2022-11-22 NOTE — TELEPHONE ENCOUNTER
Telephoned patient with results and recommendations  Scheduled Cardiac CTA  on 11/28/22 for 1245pm, reviewed instructions, Npo status, answered questions and advised of Metoprolol dosage before study

## 2022-11-23 LAB
ASCENDING AORTA: 3.14 CM
AV INDEX (PROSTH): 0.66
AV MEAN GRADIENT: 6 MMHG
AV PEAK GRADIENT: 10 MMHG
AV VALVE AREA: 2.2 CM2
AV VELOCITY RATIO: 0.7
BSA FOR ECHO PROCEDURE: 1.91 M2
CV ECHO LV RWT: 0.57 CM
DOP CALC AO PEAK VEL: 1.6 M/S
DOP CALC AO VTI: 40.7 CM
DOP CALC LVOT AREA: 3.3 CM2
DOP CALC LVOT DIAMETER: 2.06 CM
DOP CALC LVOT PEAK VEL: 1.12 M/S
DOP CALC LVOT STROKE VOLUME: 89.61 CM3
DOP CALC RVOT PEAK VEL: 0.72 M/S
DOP CALC RVOT VTI: 18.4 CM
DOP CALCLVOT PEAK VEL VTI: 26.9 CM
E WAVE DECELERATION TIME: 278.16 MSEC
E/A RATIO: 1
E/E' RATIO: 14.62 M/S
ECHO LV POSTERIOR WALL: 1.38 CM (ref 0.6–1.1)
EJECTION FRACTION: 60 %
FRACTIONAL SHORTENING: 44 % (ref 28–44)
INTERVENTRICULAR SEPTUM: 1.28 CM (ref 0.6–1.1)
IVRT: 53.28 MSEC
LA MAJOR: 4.99 CM
LA MINOR: 5.41 CM
LA WIDTH: 4.8 CM
LEFT ATRIUM SIZE: 4.78 CM
LEFT ATRIUM VOLUME INDEX MOD: 37.7 ML/M2
LEFT ATRIUM VOLUME INDEX: 55 ML/M2
LEFT ATRIUM VOLUME MOD: 69.38 CM3
LEFT ATRIUM VOLUME: 101.25 CM3
LEFT INTERNAL DIMENSION IN SYSTOLE: 2.7 CM (ref 2.1–4)
LEFT VENTRICLE DIASTOLIC VOLUME INDEX: 59.08 ML/M2
LEFT VENTRICLE DIASTOLIC VOLUME: 108.7 ML
LEFT VENTRICLE MASS INDEX: 139 G/M2
LEFT VENTRICLE SYSTOLIC VOLUME INDEX: 14.7 ML/M2
LEFT VENTRICLE SYSTOLIC VOLUME: 27 ML
LEFT VENTRICULAR INTERNAL DIMENSION IN DIASTOLE: 4.82 CM (ref 3.5–6)
LEFT VENTRICULAR MASS: 255.63 G
LV LATERAL E/E' RATIO: 13.57 M/S
LV SEPTAL E/E' RATIO: 15.83 M/S
LVOT MG: 2.56 MMHG
LVOT MV: 0.75 CM/S
MV PEAK A VEL: 0.95 M/S
MV PEAK E VEL: 0.95 M/S
PISA TR MAX VEL: 2.69 M/S
PULM VEIN S/D RATIO: 1.28
PV MEAN GRADIENT: 1.25 MMHG
PV PEAK D VEL: 0.53 M/S
PV PEAK S VEL: 0.68 M/S
PV PEAK VELOCITY: 1.12 CM/S
RA MAJOR: 4.88 CM
RA WIDTH: 3.36 CM
RIGHT VENTRICULAR END-DIASTOLIC DIMENSION: 3.42 CM
SINUS: 3.19 CM
STJ: 2.92 CM
TDI LATERAL: 0.07 M/S
TDI SEPTAL: 0.06 M/S
TDI: 0.07 M/S
TR MAX PG: 29 MMHG
TRICUSPID ANNULAR PLANE SYSTOLIC EXCURSION: 3.22 CM

## 2022-11-28 ENCOUNTER — PATIENT MESSAGE (OUTPATIENT)
Dept: CARDIOLOGY | Facility: CLINIC | Age: 67
End: 2022-11-28
Payer: COMMERCIAL

## 2022-11-28 ENCOUNTER — PATIENT OUTREACH (OUTPATIENT)
Dept: ADMINISTRATIVE | Facility: HOSPITAL | Age: 67
End: 2022-11-28
Payer: COMMERCIAL

## 2022-11-28 ENCOUNTER — HOSPITAL ENCOUNTER (OUTPATIENT)
Dept: RADIOLOGY | Facility: HOSPITAL | Age: 67
Discharge: HOME | End: 2022-11-28
Attending: INTERNAL MEDICINE
Payer: COMMERCIAL

## 2022-11-28 VITALS
RESPIRATION RATE: 16 BRPM | SYSTOLIC BLOOD PRESSURE: 130 MMHG | OXYGEN SATURATION: 100 % | DIASTOLIC BLOOD PRESSURE: 62 MMHG | HEART RATE: 59 BPM

## 2022-11-28 DIAGNOSIS — R94.39 ABNORMAL NUCLEAR STRESS TEST: ICD-10-CM

## 2022-11-28 DIAGNOSIS — I10 PRIMARY HYPERTENSION: ICD-10-CM

## 2022-11-28 DIAGNOSIS — R00.2 PALPITATIONS: ICD-10-CM

## 2022-11-28 DIAGNOSIS — R94.31 ABNORMAL ECG: ICD-10-CM

## 2022-11-28 DIAGNOSIS — R00.0 TACHYCARDIA: ICD-10-CM

## 2022-11-28 PROCEDURE — 25000003 PHARM REV CODE 250: Performed by: INTERNAL MEDICINE

## 2022-11-28 PROCEDURE — 75574 CT ANGIO HRT W/3D IMAGE: CPT | Mod: 26,,, | Performed by: RADIOLOGY

## 2022-11-28 PROCEDURE — 75574 CT ANGIO HRT W/3D IMAGE: CPT | Mod: TC

## 2022-11-28 PROCEDURE — 75574 CTA CARDIAC: ICD-10-PCS | Mod: 26,,, | Performed by: RADIOLOGY

## 2022-11-28 PROCEDURE — 25500020 PHARM REV CODE 255: Performed by: INTERNAL MEDICINE

## 2022-11-28 RX ORDER — METOPROLOL TARTRATE 1 MG/ML
5 INJECTION, SOLUTION INTRAVENOUS EVERY 5 MIN PRN
Status: DISCONTINUED | OUTPATIENT
Start: 2022-11-28 | End: 2022-11-29 | Stop reason: HOSPADM

## 2022-11-28 RX ADMIN — METOROPROLOL TARTRATE 5 MG: 5 INJECTION, SOLUTION INTRAVENOUS at 01:11

## 2022-11-28 RX ADMIN — IOHEXOL 100 ML: 350 INJECTION, SOLUTION INTRAVENOUS at 02:11

## 2022-11-28 NOTE — PROGRESS NOTES
Pt arrived to CT 1st floor / review meds / H&P as well as allergies and plan of care / pt denies medication which could react with nitro / placed to table IV established and V/S to flow sheet / pt on monitor and ready d=for scan

## 2022-11-28 NOTE — PROGRESS NOTES
HTN Report: Patient was seen today, 11/28/22, for cardiac CT scan, BP was taken at that time, 130/62.

## 2022-11-28 NOTE — PROGRESS NOTES
Pt tolerated test very well and ready for D/C home / sitting up and advised to increase PO fluids and eat a meal / pt ambulatory and staff to escort to lobby

## 2022-11-29 ENCOUNTER — TELEPHONE (OUTPATIENT)
Dept: CARDIOLOGY | Facility: CLINIC | Age: 67
End: 2022-11-29
Payer: COMMERCIAL

## 2022-11-29 ENCOUNTER — PATIENT MESSAGE (OUTPATIENT)
Dept: CARDIOLOGY | Facility: CLINIC | Age: 67
End: 2022-11-29
Payer: COMMERCIAL

## 2022-11-29 DIAGNOSIS — I25.10 CORONARY ARTERY DISEASE INVOLVING NATIVE CORONARY ARTERY OF NATIVE HEART, UNSPECIFIED WHETHER ANGINA PRESENT: Primary | ICD-10-CM

## 2022-11-29 DIAGNOSIS — R94.39 ABNORMAL NUCLEAR STRESS TEST: ICD-10-CM

## 2022-11-29 DIAGNOSIS — R06.02 SOB (SHORTNESS OF BREATH): ICD-10-CM

## 2022-11-29 DIAGNOSIS — R94.31 ABNORMAL ECG: ICD-10-CM

## 2022-11-29 RX ORDER — ROSUVASTATIN CALCIUM 20 MG/1
20 TABLET, COATED ORAL NIGHTLY
Qty: 90 TABLET | Refills: 3 | Status: SHIPPED | OUTPATIENT
Start: 2022-11-29 | End: 2023-10-10 | Stop reason: SDUPTHER

## 2022-11-29 RX ORDER — ASPIRIN 81 MG/1
81 TABLET ORAL DAILY
Qty: 90 TABLET | Refills: 3 | Status: SHIPPED | OUTPATIENT
Start: 2022-11-29 | End: 2024-02-27

## 2022-11-30 ENCOUNTER — PATIENT MESSAGE (OUTPATIENT)
Dept: CARDIOLOGY | Facility: CLINIC | Age: 67
End: 2022-11-30
Payer: COMMERCIAL

## 2022-11-30 NOTE — TELEPHONE ENCOUNTER
Telephoned patient to give results and recommendations and was unavailable---left voice mail message for call back

## 2022-11-30 NOTE — TELEPHONE ENCOUNTER
Rema    Please call pt.  Coronary CTA 70% mid LAD stenosis.  Recommend LHC for further evaluation of severity and significance of the blockage.  It does correlate with the abnormal nuclear stress test that I reviewed.    Start asa and statin -- sent to her pharmacy.    Please schedule Wed, 12/21/22 0730 if pt agreeable.    Update labs, consent prior to cath.    Thanks    Dr Plascencia

## 2022-12-01 NOTE — TELEPHONE ENCOUNTER
Patient returned call and answered questions, discussed heart cath, recovery and will call back after talking to

## 2022-12-12 PROBLEM — Z00.00 ROUTINE GENERAL MEDICAL EXAMINATION AT A HEALTH CARE FACILITY: Status: RESOLVED | Noted: 2022-09-12 | Resolved: 2022-12-12

## 2022-12-14 ENCOUNTER — HOSPITAL ENCOUNTER (INPATIENT)
Facility: HOSPITAL | Age: 67
LOS: 1 days | Discharge: HOME | DRG: 760 | End: 2022-12-16
Attending: EMERGENCY MEDICINE | Admitting: INTERNAL MEDICINE
Payer: COMMERCIAL

## 2022-12-14 DIAGNOSIS — R07.9 CHEST PAIN: ICD-10-CM

## 2022-12-14 DIAGNOSIS — D64.9 SYMPTOMATIC ANEMIA: Primary | ICD-10-CM

## 2022-12-14 PROBLEM — Z87.42 HISTORY OF ENDOMETRIAL HYPERPLASIA: Status: ACTIVE | Noted: 2022-12-14

## 2022-12-14 PROBLEM — Z78.0 POSTMENOPAUSAL: Status: RESOLVED | Noted: 2022-09-12 | Resolved: 2022-12-14

## 2022-12-14 PROBLEM — R93.89 ABNORMAL COMPUTED TOMOGRAPHY ANGIOGRAPHY (CTA): Status: ACTIVE | Noted: 2022-12-14

## 2022-12-14 PROBLEM — D21.9 FIBROIDS: Status: ACTIVE | Noted: 2022-12-14

## 2022-12-14 LAB
ABO + RH BLD: NORMAL
ACANTHOCYTES BLD QL SMEAR: PRESENT
ALBUMIN SERPL BCP-MCNC: 3.5 G/DL (ref 3.5–5.2)
ALP SERPL-CCNC: 50 U/L (ref 55–135)
ALT SERPL W/O P-5'-P-CCNC: 21 U/L (ref 10–44)
ANION GAP SERPL CALC-SCNC: 8 MMOL/L (ref 8–16)
ANISOCYTOSIS BLD QL SMEAR: SLIGHT
AST SERPL-CCNC: 22 U/L (ref 10–40)
BASOPHILS # BLD AUTO: 0.02 K/UL (ref 0–0.2)
BASOPHILS # BLD AUTO: 0.02 K/UL (ref 0–0.2)
BASOPHILS NFR BLD: 0.4 % (ref 0–1.9)
BASOPHILS NFR BLD: 0.5 % (ref 0–1.9)
BILIRUB SERPL-MCNC: 0.4 MG/DL (ref 0.1–1)
BLD GP AB SCN CELLS X3 SERPL QL: NORMAL
BLD PROD TYP BPU: NORMAL
BLD PROD TYP BPU: NORMAL
BLOOD UNIT EXPIRATION DATE: NORMAL
BLOOD UNIT EXPIRATION DATE: NORMAL
BLOOD UNIT TYPE CODE: 6200
BLOOD UNIT TYPE CODE: 6200
BLOOD UNIT TYPE: NORMAL
BLOOD UNIT TYPE: NORMAL
BUN SERPL-MCNC: 12 MG/DL (ref 8–23)
CALCIUM SERPL-MCNC: 8.4 MG/DL (ref 8.7–10.5)
CHLORIDE SERPL-SCNC: 111 MMOL/L (ref 95–110)
CO2 SERPL-SCNC: 20 MMOL/L (ref 23–29)
CODING SYSTEM: NORMAL
CODING SYSTEM: NORMAL
CREAT SERPL-MCNC: 0.8 MG/DL (ref 0.5–1.4)
DIFFERENTIAL METHOD: ABNORMAL
DIFFERENTIAL METHOD: ABNORMAL
DISPENSE STATUS: NORMAL
DISPENSE STATUS: NORMAL
EOSINOPHIL # BLD AUTO: 0 K/UL (ref 0–0.5)
EOSINOPHIL # BLD AUTO: 0 K/UL (ref 0–0.5)
EOSINOPHIL NFR BLD: 0.2 % (ref 0–8)
EOSINOPHIL NFR BLD: 0.7 % (ref 0–8)
ERYTHROCYTE [DISTWIDTH] IN BLOOD BY AUTOMATED COUNT: 18.4 % (ref 11.5–14.5)
ERYTHROCYTE [DISTWIDTH] IN BLOOD BY AUTOMATED COUNT: 19.8 % (ref 11.5–14.5)
EST. GFR  (NO RACE VARIABLE): >60 ML/MIN/1.73 M^2
GIANT PLATELETS BLD QL SMEAR: PRESENT
GLUCOSE SERPL-MCNC: 116 MG/DL (ref 70–110)
HCT VFR BLD AUTO: 16.5 % (ref 37–48.5)
HCT VFR BLD AUTO: 23.1 % (ref 37–48.5)
HCV AB SERPL QL IA: NEGATIVE
HEP C VIRUS HOLD SPECIMEN: NORMAL
HGB BLD-MCNC: 4.9 G/DL (ref 12–16)
HGB BLD-MCNC: 7 G/DL (ref 12–16)
HIV 1+2 AB+HIV1 P24 AG SERPL QL IA: NEGATIVE
HYPOCHROMIA BLD QL SMEAR: ABNORMAL
IMM GRANULOCYTES # BLD AUTO: 0.01 K/UL (ref 0–0.04)
IMM GRANULOCYTES # BLD AUTO: 0.01 K/UL (ref 0–0.04)
IMM GRANULOCYTES NFR BLD AUTO: 0.2 % (ref 0–0.5)
IMM GRANULOCYTES NFR BLD AUTO: 0.2 % (ref 0–0.5)
LYMPHOCYTES # BLD AUTO: 1.1 K/UL (ref 1–4.8)
LYMPHOCYTES # BLD AUTO: 1.4 K/UL (ref 1–4.8)
LYMPHOCYTES NFR BLD: 23.1 % (ref 18–48)
LYMPHOCYTES NFR BLD: 32.5 % (ref 18–48)
MCH RBC QN AUTO: 23.8 PG (ref 27–31)
MCH RBC QN AUTO: 25.7 PG (ref 27–31)
MCHC RBC AUTO-ENTMCNC: 29.7 G/DL (ref 32–36)
MCHC RBC AUTO-ENTMCNC: 30.3 G/DL (ref 32–36)
MCV RBC AUTO: 80 FL (ref 82–98)
MCV RBC AUTO: 85 FL (ref 82–98)
MONOCYTES # BLD AUTO: 0.3 K/UL (ref 0.3–1)
MONOCYTES # BLD AUTO: 0.4 K/UL (ref 0.3–1)
MONOCYTES NFR BLD: 10.5 % (ref 4–15)
MONOCYTES NFR BLD: 5.9 % (ref 4–15)
NEUTROPHILS # BLD AUTO: 2.3 K/UL (ref 1.8–7.7)
NEUTROPHILS # BLD AUTO: 3.2 K/UL (ref 1.8–7.7)
NEUTROPHILS NFR BLD: 55.6 % (ref 38–73)
NEUTROPHILS NFR BLD: 70.2 % (ref 38–73)
NRBC BLD-RTO: 0 /100 WBC
NRBC BLD-RTO: 0 /100 WBC
NUM UNITS TRANS PACKED RBC: NORMAL
NUM UNITS TRANS PACKED RBC: NORMAL
OVALOCYTES BLD QL SMEAR: ABNORMAL
PLATELET # BLD AUTO: 364 K/UL (ref 150–450)
PLATELET # BLD AUTO: 384 K/UL (ref 150–450)
PLATELET BLD QL SMEAR: ABNORMAL
PMV BLD AUTO: 10 FL (ref 9.2–12.9)
PMV BLD AUTO: 10.1 FL (ref 9.2–12.9)
POIKILOCYTOSIS BLD QL SMEAR: SLIGHT
POLYCHROMASIA BLD QL SMEAR: ABNORMAL
POTASSIUM SERPL-SCNC: 3.6 MMOL/L (ref 3.5–5.1)
PROT SERPL-MCNC: 6.1 G/DL (ref 6–8.4)
RBC # BLD AUTO: 2.06 M/UL (ref 4–5.4)
RBC # BLD AUTO: 2.72 M/UL (ref 4–5.4)
SODIUM SERPL-SCNC: 139 MMOL/L (ref 136–145)
TARGETS BLD QL SMEAR: ABNORMAL
WBC # BLD AUTO: 4.19 K/UL (ref 3.9–12.7)
WBC # BLD AUTO: 4.54 K/UL (ref 3.9–12.7)

## 2022-12-14 PROCEDURE — P9016 RBC LEUKOCYTES REDUCED: HCPCS | Performed by: EMERGENCY MEDICINE

## 2022-12-14 PROCEDURE — 86920 COMPATIBILITY TEST SPIN: CPT | Performed by: NURSE PRACTITIONER

## 2022-12-14 PROCEDURE — 85025 COMPLETE CBC W/AUTO DIFF WBC: CPT | Mod: 91 | Performed by: INTERNAL MEDICINE

## 2022-12-14 PROCEDURE — 80053 COMPREHEN METABOLIC PANEL: CPT | Performed by: NURSE PRACTITIONER

## 2022-12-14 PROCEDURE — 86920 COMPATIBILITY TEST SPIN: CPT | Performed by: EMERGENCY MEDICINE

## 2022-12-14 PROCEDURE — 99291 CRITICAL CARE FIRST HOUR: CPT | Mod: 25

## 2022-12-14 PROCEDURE — 25000003 PHARM REV CODE 250: Performed by: NURSE PRACTITIONER

## 2022-12-14 PROCEDURE — 99223 PR INITIAL HOSPITAL CARE,LEVL III: ICD-10-PCS | Mod: ,,, | Performed by: OBSTETRICS & GYNECOLOGY

## 2022-12-14 PROCEDURE — 86901 BLOOD TYPING SEROLOGIC RH(D): CPT | Performed by: NURSE PRACTITIONER

## 2022-12-14 PROCEDURE — G0378 HOSPITAL OBSERVATION PER HR: HCPCS

## 2022-12-14 PROCEDURE — 86803 HEPATITIS C AB TEST: CPT | Performed by: NURSE PRACTITIONER

## 2022-12-14 PROCEDURE — 99292 CRITICAL CARE ADDL 30 MIN: CPT

## 2022-12-14 PROCEDURE — 85025 COMPLETE CBC W/AUTO DIFF WBC: CPT | Performed by: NURSE PRACTITIONER

## 2022-12-14 PROCEDURE — 87389 HIV-1 AG W/HIV-1&-2 AB AG IA: CPT | Performed by: NURSE PRACTITIONER

## 2022-12-14 PROCEDURE — 84466 ASSAY OF TRANSFERRIN: CPT | Performed by: NURSE PRACTITIONER

## 2022-12-14 PROCEDURE — 36415 COLL VENOUS BLD VENIPUNCTURE: CPT | Performed by: NURSE PRACTITIONER

## 2022-12-14 PROCEDURE — 99223 1ST HOSP IP/OBS HIGH 75: CPT | Mod: ,,, | Performed by: OBSTETRICS & GYNECOLOGY

## 2022-12-14 PROCEDURE — 36430 TRANSFUSION BLD/BLD COMPNT: CPT

## 2022-12-14 PROCEDURE — 36415 COLL VENOUS BLD VENIPUNCTURE: CPT | Performed by: INTERNAL MEDICINE

## 2022-12-14 RX ORDER — TALC
6 POWDER (GRAM) TOPICAL NIGHTLY PRN
Status: DISCONTINUED | OUTPATIENT
Start: 2022-12-14 | End: 2022-12-16 | Stop reason: HOSPADM

## 2022-12-14 RX ORDER — SODIUM,POTASSIUM PHOSPHATES 280-250MG
2 POWDER IN PACKET (EA) ORAL
Status: DISCONTINUED | OUTPATIENT
Start: 2022-12-14 | End: 2022-12-16 | Stop reason: HOSPADM

## 2022-12-14 RX ORDER — IBUPROFEN 200 MG
16 TABLET ORAL
Status: DISCONTINUED | OUTPATIENT
Start: 2022-12-14 | End: 2022-12-16 | Stop reason: HOSPADM

## 2022-12-14 RX ORDER — NALOXONE HCL 0.4 MG/ML
0.02 VIAL (ML) INJECTION
Status: DISCONTINUED | OUTPATIENT
Start: 2022-12-14 | End: 2022-12-16 | Stop reason: HOSPADM

## 2022-12-14 RX ORDER — IBUPROFEN 200 MG
24 TABLET ORAL
Status: DISCONTINUED | OUTPATIENT
Start: 2022-12-14 | End: 2022-12-16 | Stop reason: HOSPADM

## 2022-12-14 RX ORDER — PROCHLORPERAZINE EDISYLATE 5 MG/ML
5 INJECTION INTRAMUSCULAR; INTRAVENOUS EVERY 6 HOURS PRN
Status: DISCONTINUED | OUTPATIENT
Start: 2022-12-14 | End: 2022-12-16 | Stop reason: HOSPADM

## 2022-12-14 RX ORDER — METOPROLOL SUCCINATE 25 MG/1
25 TABLET, EXTENDED RELEASE ORAL DAILY
Status: DISCONTINUED | OUTPATIENT
Start: 2022-12-14 | End: 2022-12-16 | Stop reason: HOSPADM

## 2022-12-14 RX ORDER — AMOXICILLIN 250 MG
1 CAPSULE ORAL 2 TIMES DAILY PRN
Status: DISCONTINUED | OUTPATIENT
Start: 2022-12-14 | End: 2022-12-16 | Stop reason: HOSPADM

## 2022-12-14 RX ORDER — ONDANSETRON 2 MG/ML
4 INJECTION INTRAMUSCULAR; INTRAVENOUS EVERY 8 HOURS PRN
Status: DISCONTINUED | OUTPATIENT
Start: 2022-12-14 | End: 2022-12-16 | Stop reason: HOSPADM

## 2022-12-14 RX ORDER — IPRATROPIUM BROMIDE AND ALBUTEROL SULFATE 2.5; .5 MG/3ML; MG/3ML
3 SOLUTION RESPIRATORY (INHALATION) EVERY 4 HOURS PRN
Status: DISCONTINUED | OUTPATIENT
Start: 2022-12-14 | End: 2022-12-16 | Stop reason: HOSPADM

## 2022-12-14 RX ORDER — LANOLIN ALCOHOL/MO/W.PET/CERES
800 CREAM (GRAM) TOPICAL
Status: DISCONTINUED | OUTPATIENT
Start: 2022-12-14 | End: 2022-12-16 | Stop reason: HOSPADM

## 2022-12-14 RX ORDER — LOSARTAN POTASSIUM 50 MG/1
100 TABLET ORAL DAILY
Status: DISCONTINUED | OUTPATIENT
Start: 2022-12-14 | End: 2022-12-16 | Stop reason: HOSPADM

## 2022-12-14 RX ORDER — SIMETHICONE 80 MG
1 TABLET,CHEWABLE ORAL 4 TIMES DAILY PRN
Status: DISCONTINUED | OUTPATIENT
Start: 2022-12-14 | End: 2022-12-16 | Stop reason: HOSPADM

## 2022-12-14 RX ORDER — ASPIRIN 81 MG/1
81 TABLET ORAL DAILY
Status: DISCONTINUED | OUTPATIENT
Start: 2022-12-14 | End: 2022-12-16 | Stop reason: HOSPADM

## 2022-12-14 RX ORDER — ACETAMINOPHEN 325 MG/1
650 TABLET ORAL EVERY 4 HOURS PRN
Status: DISCONTINUED | OUTPATIENT
Start: 2022-12-14 | End: 2022-12-16 | Stop reason: HOSPADM

## 2022-12-14 RX ORDER — AMLODIPINE BESYLATE 10 MG/1
10 TABLET ORAL DAILY
Status: DISCONTINUED | OUTPATIENT
Start: 2022-12-14 | End: 2022-12-16 | Stop reason: HOSPADM

## 2022-12-14 RX ORDER — HYDROCODONE BITARTRATE AND ACETAMINOPHEN 500; 5 MG/1; MG/1
TABLET ORAL
Status: DISCONTINUED | OUTPATIENT
Start: 2022-12-14 | End: 2022-12-16 | Stop reason: HOSPADM

## 2022-12-14 RX ORDER — ATORVASTATIN CALCIUM 10 MG/1
10 TABLET, FILM COATED ORAL DAILY
Status: DISCONTINUED | OUTPATIENT
Start: 2022-12-14 | End: 2022-12-16 | Stop reason: HOSPADM

## 2022-12-14 RX ORDER — GLUCAGON 1 MG
1 KIT INJECTION
Status: DISCONTINUED | OUTPATIENT
Start: 2022-12-14 | End: 2022-12-16 | Stop reason: HOSPADM

## 2022-12-14 RX ORDER — MAG HYDROX/ALUMINUM HYD/SIMETH 200-200-20
30 SUSPENSION, ORAL (FINAL DOSE FORM) ORAL 4 TIMES DAILY PRN
Status: DISCONTINUED | OUTPATIENT
Start: 2022-12-14 | End: 2022-12-16 | Stop reason: HOSPADM

## 2022-12-14 RX ADMIN — AMLODIPINE BESYLATE 10 MG: 10 TABLET ORAL at 02:12

## 2022-12-14 RX ADMIN — ASPIRIN 81 MG: 81 TABLET, COATED ORAL at 02:12

## 2022-12-14 NOTE — ED PROVIDER NOTES
Encounter Date: 2022       History     Chief Complaint   Patient presents with    Fatigue     Pt. Reports she has fibroid and had and oblation in August. She says that she has been bleeding more since then and now she is having extreme weakness and fatigue.      The history is provided by the patient.   Fatigue  This is a recurrent problem. The current episode started more than 2 days ago. The problem occurs constantly. The problem has not changed since onset.Pertinent negatives include no chest pain, no abdominal pain, no headaches and no shortness of breath. Nothing aggravates the symptoms. Nothing relieves the symptoms.   Review of patient's allergies indicates:   Allergen Reactions    Penicillins Rash     Past Medical History:   Diagnosis Date    Hypertension     Pulmonary edema      Past Surgical History:   Procedure Laterality Date     SECTION      x 2     Family History   Problem Relation Age of Onset    Hypertension Mother      Social History     Tobacco Use    Smoking status: Former     Types: Cigarettes    Smokeless tobacco: Never   Substance Use Topics    Alcohol use: Never    Drug use: Never     Review of Systems   Constitutional:  Positive for fatigue. Negative for fever.   HENT:  Negative for sore throat.    Respiratory:  Negative for shortness of breath.    Cardiovascular:  Negative for chest pain.   Gastrointestinal:  Negative for abdominal pain and nausea.   Genitourinary:  Negative for dysuria.   Musculoskeletal:  Negative for back pain.   Skin:  Negative for rash.   Neurological:  Negative for weakness and headaches.   Hematological:  Does not bruise/bleed easily.     Physical Exam     Initial Vitals   BP Pulse Resp Temp SpO2   22 1121 22 1121 22 1121 22 1121 22 1140   (!) 150/73 88 16 98 °F (36.7 °C) 97 %      MAP       --                Physical Exam    Nursing note and vitals reviewed.  Constitutional: She appears well-developed and well-nourished. No  distress.   HENT:   Head: Normocephalic and atraumatic.   Mouth/Throat: Oropharynx is clear and moist.   Eyes: Conjunctivae and EOM are normal. Pupils are equal, round, and reactive to light.   Pale conjunctiva   Neck: Neck supple.   Normal range of motion.  Cardiovascular:  Normal rate, regular rhythm and normal heart sounds.     Exam reveals no gallop and no friction rub.       No murmur heard.  Pulmonary/Chest: Breath sounds normal. No respiratory distress. She has no wheezes. She has no rhonchi. She has no rales.   Abdominal: Abdomen is soft. Bowel sounds are normal. She exhibits no distension and no mass. There is no abdominal tenderness. There is no rebound and no guarding.   Musculoskeletal:         General: No tenderness or edema. Normal range of motion.      Cervical back: Normal range of motion and neck supple.     Neurological: She is alert and oriented to person, place, and time. She has normal strength.   Skin: Skin is warm and dry. No rash noted.   Psychiatric: She has a normal mood and affect. Thought content normal.       ED Course   Critical Care    Date/Time: 12/14/2022 12:38 PM  Performed by: Guanako Silverio MD  Authorized by: Javi Hernandez MD   Direct patient critical care time: 25 minutes  Additional history critical care time: 15 minutes  Ordering / reviewing critical care time: 12 minutes  Documentation critical care time: 15 minutes  Consulting other physicians critical care time: 10 minutes  Total critical care time (exclusive of procedural time) : 77 minutes  Critical care was necessary to treat or prevent imminent or life-threatening deterioration of the following conditions: circulatory failure.      Labs Reviewed   CBC W/ AUTO DIFFERENTIAL - Abnormal; Notable for the following components:       Result Value    RBC 2.06 (*)     Hemoglobin 4.9 (*)     Hematocrit 16.5 (*)     MCV 80 (*)     MCH 23.8 (*)     MCHC 29.7 (*)     RDW 19.8 (*)     All other components within normal limits     Narrative:     HGB AND HCT critical result(s) called and verbal readback obtained   from THEODORA MCCALLUM RN by [email or web address] 12/14/2022 12:01   COMPREHENSIVE METABOLIC PANEL - Abnormal; Notable for the following components:    Chloride 111 (*)     CO2 20 (*)     Glucose 116 (*)     Calcium 8.4 (*)     Alkaline Phosphatase 50 (*)     All other components within normal limits   HEP C VIRUS HOLD SPECIMEN    Narrative:     Release to patient->Immediate   HIV 1 / 2 ANTIBODY   HEPATITIS C ANTIBODY   TYPE & SCREEN   PREPARE RBC SOFT          Imaging Results    None          Medications   0.9%  NaCl infusion (for blood administration) (has no administration in time range)                       ED Vital Signs:  Vitals:    12/14/22 1121 12/14/22 1131 12/14/22 1140 12/14/22 1200   BP: (!) 150/73  (!) 158/74 (!) 154/72   Pulse: 88 81 81 86   Resp: 16  18 20   Temp: 98 °F (36.7 °C)      TempSrc: Oral      SpO2:   97% 96%   Weight: 81.3 kg (179 lb 2 oz)            Abnormal Lab Results:  Labs Reviewed   CBC W/ AUTO DIFFERENTIAL - Abnormal; Notable for the following components:       Result Value    RBC 2.06 (*)     Hemoglobin 4.9 (*)     Hematocrit 16.5 (*)     MCV 80 (*)     MCH 23.8 (*)     MCHC 29.7 (*)     RDW 19.8 (*)     All other components within normal limits    Narrative:     HGB AND HCT critical result(s) called and verbal readback obtained   from THEODORA MCCALLUM RN by [email or web address] 12/14/2022 12:01   COMPREHENSIVE METABOLIC PANEL - Abnormal; Notable for the following components:    Chloride 111 (*)     CO2 20 (*)     Glucose 116 (*)     Calcium 8.4 (*)     Alkaline Phosphatase 50 (*)     All other components within normal limits   HEP C VIRUS HOLD SPECIMEN    Narrative:     Release to patient->Immediate   HIV 1 / 2 ANTIBODY   HEPATITIS C ANTIBODY   TYPE & SCREEN   PREPARE RBC SOFT          All Lab Results:  Results for orders placed or performed during the hospital encounter of 12/14/22   CBC auto differential   Result Value Ref  Range    WBC 4.54 3.90 - 12.70 K/uL    RBC 2.06 (L) 4.00 - 5.40 M/uL    Hemoglobin 4.9 (LL) 12.0 - 16.0 g/dL    Hematocrit 16.5 (LL) 37.0 - 48.5 %    MCV 80 (L) 82 - 98 fL    MCH 23.8 (L) 27.0 - 31.0 pg    MCHC 29.7 (L) 32.0 - 36.0 g/dL    RDW 19.8 (H) 11.5 - 14.5 %    Platelets 384 150 - 450 K/uL    MPV 10.0 9.2 - 12.9 fL    Immature Granulocytes 0.2 0.0 - 0.5 %    Gran # (ANC) 3.2 1.8 - 7.7 K/uL    Immature Grans (Abs) 0.01 0.00 - 0.04 K/uL    Lymph # 1.1 1.0 - 4.8 K/uL    Mono # 0.3 0.3 - 1.0 K/uL    Eos # 0.0 0.0 - 0.5 K/uL    Baso # 0.02 0.00 - 0.20 K/uL    nRBC 0 0 /100 WBC    Gran % 70.2 38.0 - 73.0 %    Lymph % 23.1 18.0 - 48.0 %    Mono % 5.9 4.0 - 15.0 %    Eosinophil % 0.2 0.0 - 8.0 %    Basophil % 0.4 0.0 - 1.9 %    Platelet Estimate Appears normal     Aniso Slight     Poik Slight     Poly Occasional     Hypo Occasional     Ovalocytes Occasional     Target Cells Occasional     Acanthocytes Present     Large/Giant Platelets Present     Differential Method Automated    Comprehensive metabolic panel   Result Value Ref Range    Sodium 139 136 - 145 mmol/L    Potassium 3.6 3.5 - 5.1 mmol/L    Chloride 111 (H) 95 - 110 mmol/L    CO2 20 (L) 23 - 29 mmol/L    Glucose 116 (H) 70 - 110 mg/dL    BUN 12 8 - 23 mg/dL    Creatinine 0.8 0.5 - 1.4 mg/dL    Calcium 8.4 (L) 8.7 - 10.5 mg/dL    Total Protein 6.1 6.0 - 8.4 g/dL    Albumin 3.5 3.5 - 5.2 g/dL    Total Bilirubin 0.4 0.1 - 1.0 mg/dL    Alkaline Phosphatase 50 (L) 55 - 135 U/L    AST 22 10 - 40 U/L    ALT 21 10 - 44 U/L    Anion Gap 8 8 - 16 mmol/L    eGFR >60 >60 mL/min/1.73 m^2   HCV Virus Hold Specimen   Result Value Ref Range    HEP C Virus Hold Specimen Hold for HCV sendout            Imaging Results:  Imaging Results    None            The Emergency Provider reviewed the vital signs and test results, which are outlined above.    ED Discussions:  12:25 PM: Re-evaluated pt. I have discussed test results, shared treatment plan, and the need for admission with  patient and family at bedside. Pt and family express understanding at this time and agree with all information. All questions answered. Pt and family have no further questions or concerns at this time. Pt is ready for admit.    12:38 PM: Discussed case with Dr. Hernandez ([facility]). Dr. Hernandez agrees with current care and management of pt and accepts admission.   Admitting Service: Hospital medicine   Admitting Physician: Dr. Hernandez  Admit to: obs             Clinical Impression:   Final diagnoses:  [D64.9] Symptomatic anemia (Primary)        ED Disposition Condition    Observation                 Guanako Silverio MD  12/14/22 [id number]

## 2022-12-14 NOTE — PHARMACY MED REC
"Admission Medication History     The home medication history was taken by Blane Morales.    You may go to "Admission" then "Reconcile Home Medications" tabs to review and/or act upon these items.     The home medication list has been updated by the Pharmacy department.   Please read ALL comments highlighted in yellow.   Please address this information as you see fit.    Feel free to contact us if you have any questions or require assistance.      The medications listed below were removed from the home medication list. Please reorder if appropriate:  Patient reports no longer taking the following medication(s):  MULTIVITAMIN TABLET    Medications listed below were obtained from: Patient/family, [employer], and Patient's pharmacy  (Not in a hospital admission)      Potential issues to be addressed PRIOR TO DISCHARGE: NONE    Blane Morales, Chidi-Adv  Pharmacy Technician Specialist-Medication History  [facility] [phone]  Secure chat preferred     Current Outpatient Medications on File Prior to Encounter   Medication Sig Dispense Refill Last Dose    amLODIPine (NORVASC) 10 MG tablet Take 1 tablet (10 mg total) by mouth once daily. 90 tablet 3 12/13/2022    ascorbic acid, vitamin C, (VITAMIN C) 500 MG tablet Take 1 tablet (500 mg total) by mouth every evening.   Past Week    aspirin (ECOTRIN) 81 MG EC tablet Take 1 tablet (81 mg total) by mouth once daily. 90 tablet 3 12/13/2022    b complex vitamins tablet Take 1 tablet by mouth once daily. B COMPLETE   12/13/2022    ferrous sulfate 325 (65 FE) MG EC tablet Take 1 tablet (325 mg total) by mouth 2 (two) times daily with meals. 60 tablet 1 Past Week    losartan (COZAAR) 100 MG tablet Take 1 tablet (100 mg total) by mouth once daily. 90 tablet 3 12/13/2022    metoprolol succinate (TOPROL-XL) 25 MG 24 hr tablet Take 2 tablets (50 mg total) by mouth once daily. (Patient taking differently: Take 25 mg by mouth once daily.) 90 tablet 3 12/13/2022    " rosuvastatin (CRESTOR) 20 MG tablet Take 1 tablet (20 mg total) by mouth every evening. 90 tablet 3 12/13/2022    [DISCONTINUED] MULTIVIT,MIN52-FOLIC-VITK-CQ10 ORAL Take by mouth.                              .

## 2022-12-14 NOTE — H&P
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 Pt also with history of enlarged uterus with multiple fibroids.  Pt admitted to  secondary to symptomatic anemia and is currently receiving blood transfusion.  Has been noting daily vaginal bleeding since .  Pt with prior history of needing a transfusion in  as well.            OB History    Para Term  AB Living   6 6 3 0 0 3   SAB IAB Ectopic Multiple Live Births   0 0 0 1 0      # Outcome Date GA Lbr Marvin/2nd Weight Sex Delivery Anes PTL Lv   6 Para 10/23/86     CS-LTranv      5 Para 82     CS-LTranv      4 Para 82     Vag-Spont      3 Term            2 Term            1 Term              Past Medical History:   Diagnosis Date    Hypertension     Pulmonary edema      Past Surgical History:   Procedure Laterality Date     SECTION      x 2       (Not in a hospital admission)      Review of patient's allergies indicates:   Allergen Reactions    Penicillins Rash        Family History       Problem Relation (Age of Onset)    Hypertension Mother          Tobacco Use    Smoking status: Former     Types: Cigarettes    Smokeless tobacco: Never   Substance and Sexual Activity    Alcohol use: Never    Drug use: Never    Sexual activity: Not Currently     Partners: Male     Birth control/protection: Post-menopausal     Review of Systems   Constitutional:  Positive for fatigue. Negative for activity change, appetite change, chills, fever and unexpected weight change.   Respiratory:   Negative for shortness of breath.    Cardiovascular:  Negative for chest pain, palpitations and leg swelling.   Gastrointestinal:  Negative for abdominal pain, bloating, blood in stool, constipation, diarrhea, nausea and vomiting.   Genitourinary:  Positive for vaginal bleeding and postmenopausal bleeding. Negative for dysuria, flank pain, frequency, genital sores, hematuria, pelvic pain, urgency, vaginal discharge, vaginal pain, urinary incontinence, vaginal dryness and vaginal odor.   Musculoskeletal:  Negative for back pain.   Neurological:  Negative for syncope and headaches.    Objective:     Vital Signs (Most Recent):  Temp: 97.7 °F (36.5 °C) (12/14/22 1553)  Pulse: 81 (12/14/22 1600)  Resp: 20 (12/14/22 1600)  BP: (!) 160/80 (12/14/22 1600)  SpO2: 98 % (12/14/22 1600)   Vital Signs (24h Range):  Temp:  [97.7 °F (36.5 °C)-98 °F (36.7 °C)] 97.7 °F (36.5 °C)  Pulse:  [78-88] 81  Resp:  [16-20] 20  SpO2:  [95 %-100 %] 98 %  BP: (144-166)/(64-80) 160/80     Weight: 81.3 kg (179 lb 2 oz)  Body mass index is 32.76 kg/m².    No LMP recorded. Patient is postmenopausal.    Physical Exam:   Constitutional: She is oriented to person, place, and time. She appears well-developed and well-nourished. No distress.       Cardiovascular:  Normal rate, regular rhythm and normal heart sounds.             Pulmonary/Chest: Effort normal and breath sounds normal.        Abdominal: Soft. Bowel sounds are normal. She exhibits no distension. There is no abdominal tenderness.     Genitourinary:    Right adnexa and left adnexa normal.      Pelvic exam was performed with patient supine.   There is no rash, tenderness, lesion or injury on the right labia. There is no rash, tenderness, lesion or injury on the left labia. There is bleeding in the vagina. No erythema,  no vaginal discharge or tenderness in the vagina.    No foreign body in the vagina.      No signs of injury in the vagina.   Uterus is enlarged and hosting fibroids. Uterus  size: 12 cm.          Musculoskeletal: Normal range of motion and moves all extremeties. No tenderness or edema.       Neurological: She is alert and oriented to person, place, and time.    Skin: Skin is warm and dry.    Psychiatric: She has a normal mood and affect. Her behavior is normal. Thought content normal.     Laboratory:  CBC:   Recent Labs   Lab 12/14/22  1144   WBC 4.54   RBC 2.06*   HGB 4.9*   HCT 16.5*      MCV 80*   MCH 23.8*   MCHC 29.7*     I have personallly reviewed all pertinent lab results from the last 24 hours.    Diagnostic Results:  Labs: Reviewed  Pap and pathology results reviewed    Assessment/Plan:     * Symptomatic anemia  Currently receiving transfusion.  As per  recs.    Essential hypertension  Followed by primary team.    Postmenopausal bleeding  Pt with history of Complex Endometrial Hyperplasia without atypia in 02/20 and effectively treated with course of Megace as evidenced by several negative follow-up EMBs.  Most recent EMB 10/22 was also negative, thus pt does not need any further endometrial sampling at this stage.  Bleeding is most likely due to fibroids and pt would most benefit from hysterectomy.  Will proceed with transfusion and restart Megace while inpatient. Pt counseled on possibility of requiring emergency hysterectomy during this hospitalization if bleeding worsens.  Pt voiced understanding.          Armando Haq MD  Obstetrics & Gynecology  [facility]

## 2022-12-14 NOTE — FIRST PROVIDER EVALUATION
Medical screening examination initiated.  I have conducted a focused provider triage encounter, findings are as follows:    Brief history of present illness:  Vaginal bleeding since August. Hx of fibroids. Had endometrial ablation in August. GYN:     Vitals:    12/14/22 1121   BP: (!) 150/73   BP Location: Right arm   Patient Position: Sitting   Pulse: 88   Resp: 16   Temp: 98 °F (36.7 °C)   TempSrc: Oral   SpO2: Comment: unable to obtain a pulse ox in triage.   Weight: 81.3 kg (179 lb 2 oz)       Pertinent physical exam:  Pale conjunctiva. AAOx4. Respirations even and unlabored.     Brief workup plan:  Labs, Type & Screen. Plan for possible transfusion.    Preliminary workup initiated; this workup will be continued and followed by the physician or advanced practice provider that is assigned to the patient when roomed.

## 2022-12-14 NOTE — SUBJECTIVE & OBJECTIVE
Past Medical History:   Diagnosis Date    Hypertension     Pulmonary edema        Past Surgical History:   Procedure Laterality Date     SECTION      x 2       Review of patient's allergies indicates:   Allergen Reactions    Penicillins Rash       No current facility-administered medications on file prior to encounter.     Current Outpatient Medications on File Prior to Encounter   Medication Sig    amLODIPine (NORVASC) 10 MG tablet Take 1 tablet (10 mg total) by mouth once daily.    ascorbic acid, vitamin C, (VITAMIN C) 500 MG tablet Take 1 tablet (500 mg total) by mouth every evening.    aspirin (ECOTRIN) 81 MG EC tablet Take 1 tablet (81 mg total) by mouth once daily.    b complex vitamins tablet Take 1 tablet by mouth once daily. B COMPLETE    ferrous sulfate 325 (65 FE) MG EC tablet Take 1 tablet (325 mg total) by mouth 2 (two) times daily with meals.    losartan (COZAAR) 100 MG tablet Take 1 tablet (100 mg total) by mouth once daily.    metoprolol succinate (TOPROL-XL) 25 MG 24 hr tablet Take 2 tablets (50 mg total) by mouth once daily. (Patient taking differently: Take 25 mg by mouth once daily.)    rosuvastatin (CRESTOR) 20 MG tablet Take 1 tablet (20 mg total) by mouth every evening.    [DISCONTINUED] MULTIVIT,[id number] ORAL Take by mouth.     Family History       Problem Relation (Age of Onset)    Hypertension Mother          Tobacco Use    Smoking status: Former     Types: Cigarettes    Smokeless tobacco: Never   Substance and Sexual Activity    Alcohol use: Never    Drug use: Never    Sexual activity: Not Currently     Partners: Male     Birth control/protection: Post-menopausal     Review of Systems   Constitutional:  Positive for fatigue. Negative for activity change, diaphoresis and unexpected weight change.   HENT:  Negative for congestion, ear pain and sore throat.    Eyes: Negative.    Respiratory:  Negative for shortness of breath and wheezing.    Cardiovascular:  Negative for  chest pain and palpitations.   Gastrointestinal:  Negative for abdominal pain, constipation, diarrhea and vomiting.   Endocrine: Negative.    Genitourinary:  Positive for menstrual problem. Negative for flank pain, hematuria and urgency.   Musculoskeletal:  Negative for joint swelling and neck pain.   Skin:  Negative for pallor.   Neurological:  Negative for seizures, syncope and light-headedness.   Hematological: Negative.    Psychiatric/Behavioral: Negative.       Objective:     Vital Signs (Most Recent):  Temp: 97.8 °F (36.6 °C) (12/14/22 1354)  Pulse: 79 (12/14/22 1354)  Resp: 19 (12/14/22 1354)  BP: (!) 144/64 (12/14/22 1354)  SpO2: 99 % (12/14/22 1354)   Vital Signs (24h Range):  Temp:  [97.8 °F (36.6 °C)-98 °F (36.7 °C)] 97.8 °F (36.6 °C)  Pulse:  [79-88] 79  Resp:  [16-20] 19  SpO2:  [96 %-100 %] 99 %  BP: (144-158)/(64-74) 144/64     Weight: 81.3 kg (179 lb 2 oz)  Body mass index is 32.76 kg/m².    Physical Exam  Constitutional:       Appearance: She is well-developed.   HENT:      Head: Normocephalic and atraumatic.   Cardiovascular:      Rate and Rhythm: Normal rate and regular rhythm.      Heart sounds: Normal heart sounds. No murmur heard.  Pulmonary:      Effort: Pulmonary effort is normal. No respiratory distress.      Breath sounds: Normal breath sounds.   Abdominal:      General: Bowel sounds are normal. There is no distension.      Palpations: Abdomen is soft.      Tenderness: There is no abdominal tenderness.   Genitourinary:     Comments: Deferred    Musculoskeletal:         General: Normal range of motion.      Cervical back: Normal range of motion and neck supple.   Skin:     General: Skin is warm and dry.   Neurological:      Mental Status: She is alert and oriented to person, place, and time.           Significant Labs: All pertinent labs within the past 24 hours have been reviewed.  CBC:   Recent Labs   Lab 12/14/22  1144   WBC 4.54   HGB 4.9*   HCT 16.5*        CMP:   Recent Labs    Lab 12/14/22  1144      K 3.6   *   CO2 20*   *   BUN 12   CREATININE 0.8   CALCIUM 8.4*   PROT 6.1   ALBUMIN 3.5   BILITOT 0.4   ALKPHOS 50*   AST 22   ALT 21   ANIONGAP 8       Significant Imaging: I have reviewed all pertinent imaging results/findings within the past 24 hours.

## 2022-12-14 NOTE — ASSESSMENT & PLAN NOTE
67 year old female who presented to ED with exertional fatigue in setting of persistent postmenopausal bleeding since August 2022. She is noted to have severe microcytic anemia (H/H 4.9/16.5). Orders placed for 2 units PrBCs.iron studies are pending. A repeat CBC will be collected after this these transfusions and will order more if as needed. She is known to our GYN group. Her last PAP smear was not successful due to her bleeding. She is scheduled to see Dr. Aquino on 1/11 for possible EMB and to discuss other options for postmenopausal bleeding. GYN has been consulted for further recs.

## 2022-12-14 NOTE — HPI
Liliane Ford is a 67 year old female with history of postmenopausal bleeding, hypertension, and most recently an abnormal coronary CTA who presents to ED for further evaluation of progressive exertional fatigue and weakness. She has had persistent postmenopausal bleeding since August. She continues to have bleeding despite megace. She reports a similar event 2-3 years ago, and she was found to have a hemoglobin of <4g/dL and was transfused 4 units PRBCs. She denies associated symptoms of light headedness, dizziness, chest pain, and palpitations. She was last seen by Cristine Marquez NP who recommended follow up with Dr. Aquino to discuss EMB and other treatment options. She states that she has an appointment scheduled for 1/11/23.     Additionally, the patient had a recent coronary CTA that demonstrated 70% mid LAD stenosis. She is scheduled to have a C with possible PCI on 12//21/22.     In ED, H/H 4.9/16.5. Her other labs and vitals were stable. Her case has been discussed with Dr. Haq who plans to see patient today. She will be placed in observation under the care of [facility] medicine.

## 2022-12-14 NOTE — ASSESSMENT & PLAN NOTE
Pt with history of Complex Endometrial Hyperplasia without atypia in 02/20 and effectively treated with course of Megace as evidenced by several negative follow-up EMBs.  Most recent EMB 10/22 was also negative, thus pt does not need any further endometrial sampling at this stage.  Bleeding is most likely due to fibroids and pt would most benefit from hysterectomy.  Will proceed with transfusion and restart Megace while inpatient. Pt counseled on possibility of requiring emergency hysterectomy during this hospitalization if bleeding worsens.  Pt voiced understanding.

## 2022-12-14 NOTE — SUBJECTIVE & OBJECTIVE
OB History    Para Term  AB Living   6 6 3 0 0 3   SAB IAB Ectopic Multiple Live Births   0 0 0 1 0      # Outcome Date GA Lbr Marvin/2nd Weight Sex Delivery Anes PTL Lv   6 Para 10/23/86     CS-LTranv      5 Para 82     CS-LTranv      4 Para 82     Vag-Spont      3 Term            2 Term            1 Term              Past Medical History:   Diagnosis Date    Hypertension     Pulmonary edema      Past Surgical History:   Procedure Laterality Date     SECTION      x 2       (Not in a hospital admission)      Review of patient's allergies indicates:   Allergen Reactions    Penicillins Rash        Family History       Problem Relation (Age of Onset)    Hypertension Mother          Tobacco Use    Smoking status: Former     Types: Cigarettes    Smokeless tobacco: Never   Substance and Sexual Activity    Alcohol use: Never    Drug use: Never    Sexual activity: Not Currently     Partners: Male     Birth control/protection: Post-menopausal     Review of Systems   Constitutional:  Positive for fatigue. Negative for activity change, appetite change, chills, fever and unexpected weight change.   Respiratory:  Negative for shortness of breath.    Cardiovascular:  Negative for chest pain, palpitations and leg swelling.   Gastrointestinal:  Negative for abdominal pain, bloating, blood in stool, constipation, diarrhea, nausea and vomiting.   Genitourinary:  Positive for vaginal bleeding and postmenopausal bleeding. Negative for dysuria, flank pain, frequency, genital sores, hematuria, pelvic pain, urgency, vaginal discharge, vaginal pain, urinary incontinence, vaginal dryness and vaginal odor.   Musculoskeletal:  Negative for back pain.   Neurological:  Negative for syncope and headaches.    Objective:     Vital Signs (Most Recent):  Temp: 97.7 °F (36.5 °C) (22 1553)  Pulse: 81 (22 1600)  Resp: 20 (22 1600)  BP: (!) 160/80 (22 1600)  SpO2: 98 % (22 1600)   Vital  Signs (24h Range):  Temp:  [97.7 °F (36.5 °C)-98 °F (36.7 °C)] 97.7 °F (36.5 °C)  Pulse:  [78-88] 81  Resp:  [16-20] 20  SpO2:  [95 %-100 %] 98 %  BP: (144-166)/(64-80) 160/80     Weight: 81.3 kg (179 lb 2 oz)  Body mass index is 32.76 kg/m².    No LMP recorded. Patient is postmenopausal.    Physical Exam:   Constitutional: She is oriented to person, place, and time. She appears well-developed and well-nourished. No distress.       Cardiovascular:  Normal rate, regular rhythm and normal heart sounds.             Pulmonary/Chest: Effort normal and breath sounds normal.        Abdominal: Soft. Bowel sounds are normal. She exhibits no distension. There is no abdominal tenderness.     Genitourinary:    Right adnexa and left adnexa normal.      Pelvic exam was performed with patient supine.   There is no rash, tenderness, lesion or injury on the right labia. There is no rash, tenderness, lesion or injury on the left labia. There is bleeding in the vagina. No erythema,  no vaginal discharge or tenderness in the vagina.    No foreign body in the vagina.      No signs of injury in the vagina.   Uterus is enlarged and hosting fibroids. Uterus size: 12 cm.          Musculoskeletal: Normal range of motion and moves all extremeties. No tenderness or edema.       Neurological: She is alert and oriented to person, place, and time.    Skin: Skin is warm and dry.    Psychiatric: She has a normal mood and affect. Her behavior is normal. Thought content normal.     Laboratory:  CBC:   Recent Labs   Lab 12/14/22  1144   WBC 4.54   RBC 2.06*   HGB 4.9*   HCT 16.5*      MCV 80*   MCH 23.8*   MCHC 29.7*     I have personallly reviewed all pertinent lab results from the last 24 hours.    Diagnostic Results:  Labs: Reviewed  Pap and pathology results reviewed

## 2022-12-14 NOTE — HPI
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 Pt also with history of enlarged uterus with multiple fibroids.  Pt admitted to  secondary to symptomatic anemia and is currently receiving blood transfusion.  Has been noting daily vaginal bleeding since .  Pt with prior history of needing a transfusion in  as well.

## 2022-12-14 NOTE — ASSESSMENT & PLAN NOTE
11/2022 coronary CTA that demonstrated 70% mid LAD stenosis. She is scheduled to have a [facility] with possible PCI on 12//21/22.   Followed by Dr. Plascencia  --resume ASA, STATIN, and BB

## 2022-12-14 NOTE — H&P
[facility]  [facility]  History & Physical    Patient Name: Liliane Ford  MRN: [id number]  Patient Class: OP- Observation  Admission Date: 2022  Attending Physician: Javi Hernandez MD   Primary Care Provider: Kishan Kelley MD      Patient information was obtained from patient and ER records.     Subjective:     Principal Problem:Symptomatic anemia    Chief Complaint:   Chief Complaint   Patient presents with    Fatigue     Pt. Reports she has fibroid and had and oblation in August. She says that she has been bleeding more since then and now she is having extreme weakness and fatigue.         HPI: Liliane Ford is a 67 year old female with history of postmenopausal bleeding, hypertension, and most recently an abnormal coronary CTA who presents to ED for further evaluation of progressive exertional fatigue and weakness. She has had persistent postmenopausal bleeding since August. She continues to have bleeding despite megace. She reports a similar event 2-3 years ago, and she was found to have a hemoglobin of <4g/dL and was transfused 4 units PRBCs. She denies associated symptoms of light headedness, dizziness, chest pain, and palpitations. She was last seen by Cristine Marquez NP who recommended follow up with Dr. Aquino to discuss EMB and other treatment options. She states that she has an appointment scheduled for 23.     Additionally, the patient had a recent coronary CTA that demonstrated 70% mid LAD stenosis. She is scheduled to have a [facility] with possible PCI on 22.     In ED, H/H 4.9/16.5. Her other labs and vitals were stable. Her case has been discussed with Dr. Haq who plans to see patient today. She will be placed in observation under the care of [facility] medicine.             Past Medical History:   Diagnosis Date    Hypertension     Pulmonary edema        Past Surgical History:   Procedure Laterality Date     SECTION      x 2       Review of  patient's allergies indicates:   Allergen Reactions    Penicillins Rash       No current facility-administered medications on file prior to encounter.     Current Outpatient Medications on File Prior to Encounter   Medication Sig    amLODIPine (NORVASC) 10 MG tablet Take 1 tablet (10 mg total) by mouth once daily.    ascorbic acid, vitamin C, (VITAMIN C) 500 MG tablet Take 1 tablet (500 mg total) by mouth every evening.    aspirin (ECOTRIN) 81 MG EC tablet Take 1 tablet (81 mg total) by mouth once daily.    b complex vitamins tablet Take 1 tablet by mouth once daily. B COMPLETE    ferrous sulfate 325 (65 FE) MG EC tablet Take 1 tablet (325 mg total) by mouth 2 (two) times daily with meals.    losartan (COZAAR) 100 MG tablet Take 1 tablet (100 mg total) by mouth once daily.    metoprolol succinate (TOPROL-XL) 25 MG 24 hr tablet Take 2 tablets (50 mg total) by mouth once daily. (Patient taking differently: Take 25 mg by mouth once daily.)    rosuvastatin (CRESTOR) 20 MG tablet Take 1 tablet (20 mg total) by mouth every evening.    [DISCONTINUED] MULTIVIT,[id number] ORAL Take by mouth.     Family History       Problem Relation (Age of Onset)    Hypertension Mother          Tobacco Use    Smoking status: Former     Types: Cigarettes    Smokeless tobacco: Never   Substance and Sexual Activity    Alcohol use: Never    Drug use: Never    Sexual activity: Not Currently     Partners: Male     Birth control/protection: Post-menopausal     Review of Systems   Constitutional:  Positive for fatigue. Negative for activity change, diaphoresis and unexpected weight change.   HENT:  Negative for congestion, ear pain and sore throat.    Eyes: Negative.    Respiratory:  Negative for shortness of breath and wheezing.    Cardiovascular:  Negative for chest pain and palpitations.   Gastrointestinal:  Negative for abdominal pain, constipation, diarrhea and vomiting.   Endocrine: Negative.    Genitourinary:   Positive for menstrual problem. Negative for flank pain, hematuria and urgency.   Musculoskeletal:  Negative for joint swelling and neck pain.   Skin:  Negative for pallor.   Neurological:  Negative for seizures, syncope and light-headedness.   Hematological: Negative.    Psychiatric/Behavioral: Negative.       Objective:     Vital Signs (Most Recent):  Temp: 97.8 °F (36.6 °C) (12/14/22 1354)  Pulse: 79 (12/14/22 1354)  Resp: 19 (12/14/22 1354)  BP: (!) 144/64 (12/14/22 1354)  SpO2: 99 % (12/14/22 1354)   Vital Signs (24h Range):  Temp:  [97.8 °F (36.6 °C)-98 °F (36.7 °C)] 97.8 °F (36.6 °C)  Pulse:  [79-88] 79  Resp:  [16-20] 19  SpO2:  [96 %-100 %] 99 %  BP: (144-158)/(64-74) 144/64     Weight: 81.3 kg (179 lb 2 oz)  Body mass index is 32.76 kg/m².    Physical Exam  Constitutional:       Appearance: She is well-developed.   HENT:      Head: Normocephalic and atraumatic.   Cardiovascular:      Rate and Rhythm: Normal rate and regular rhythm.      Heart sounds: Normal heart sounds. No murmur heard.  Pulmonary:      Effort: Pulmonary effort is normal. No respiratory distress.      Breath sounds: Normal breath sounds.   Abdominal:      General: Bowel sounds are normal. There is no distension.      Palpations: Abdomen is soft.      Tenderness: There is no abdominal tenderness.   Genitourinary:     Comments: Deferred    Musculoskeletal:         General: Normal range of motion.      Cervical back: Normal range of motion and neck supple.   Skin:     General: Skin is warm and dry.   Neurological:      Mental Status: She is alert and oriented to person, place, and time.           Significant Labs: All pertinent labs within the past 24 hours have been reviewed.  CBC:   Recent Labs   Lab 12/14/22  1144   WBC 4.54   HGB 4.9*   HCT 16.5*        CMP:   Recent Labs   Lab 12/14/22  1144      K 3.6   *   CO2 20*   *   BUN 12   CREATININE 0.8   CALCIUM 8.4*   PROT 6.1   ALBUMIN 3.5   BILITOT 0.4   ALKPHOS  50*   AST 22   ALT 21   ANIONGAP 8       Significant Imaging: I have reviewed all pertinent imaging results/findings within the past 24 hours.    Assessment/Plan:     * Symptomatic anemia  67 year old female who presented to ED with exertional fatigue in setting of persistent postmenopausal bleeding since August 2022. She is noted to have severe microcytic anemia (H/H 4.9/16.5). Orders placed for 2 units PrBCs.iron studies are pending. A repeat CBC will be collected after this these transfusions and will order more if as needed. She is known to our GYN group. Her last PAP smear was not successful due to her bleeding. She is scheduled to see Dr. Aquino on 1/11 for possible EMB and to discuss other options for postmenopausal bleeding. GYN has been consulted for further recs.     Abnormal computed tomography angiography (CTA)  11/2022 coronary CTA that demonstrated 70% mid LAD stenosis. She is scheduled to have a [facility] with possible PCI on 12//21/22.   Followed by Dr. Plascencia  --resume ASA, STATIN, and BB    Essential hypertension  --resume metoprolol, amlodipine, and losartan        Postmenopausal bleeding  See plan for Symptomatic anemia        VTE Risk Mitigation (From admission, onward)    None          Robert Pichardo NP  Department of Hospital Medicine   [facility] - [facility] Dept.

## 2022-12-15 LAB
ALBUMIN SERPL BCP-MCNC: 3.4 G/DL (ref 3.5–5.2)
ALP SERPL-CCNC: 50 U/L (ref 55–135)
ALT SERPL W/O P-5'-P-CCNC: 20 U/L (ref 10–44)
ANION GAP SERPL CALC-SCNC: 9 MMOL/L (ref 8–16)
AST SERPL-CCNC: 22 U/L (ref 10–40)
BASOPHILS # BLD AUTO: 0.02 K/UL (ref 0–0.2)
BASOPHILS NFR BLD: 0.5 % (ref 0–1.9)
BILIRUB SERPL-MCNC: 1 MG/DL (ref 0.1–1)
BLD PROD TYP BPU: NORMAL
BLOOD UNIT EXPIRATION DATE: NORMAL
BLOOD UNIT TYPE CODE: 6200
BLOOD UNIT TYPE: NORMAL
BUN SERPL-MCNC: 12 MG/DL (ref 8–23)
CALCIUM SERPL-MCNC: 8.3 MG/DL (ref 8.7–10.5)
CHLORIDE SERPL-SCNC: 111 MMOL/L (ref 95–110)
CO2 SERPL-SCNC: 20 MMOL/L (ref 23–29)
CODING SYSTEM: NORMAL
CREAT SERPL-MCNC: 0.7 MG/DL (ref 0.5–1.4)
DIFFERENTIAL METHOD: ABNORMAL
DISPENSE STATUS: NORMAL
EOSINOPHIL # BLD AUTO: 0 K/UL (ref 0–0.5)
EOSINOPHIL NFR BLD: 0.9 % (ref 0–8)
ERYTHROCYTE [DISTWIDTH] IN BLOOD BY AUTOMATED COUNT: 18 % (ref 11.5–14.5)
EST. GFR  (NO RACE VARIABLE): >60 ML/MIN/1.73 M^2
GLUCOSE SERPL-MCNC: 98 MG/DL (ref 70–110)
HCT VFR BLD AUTO: 22.6 % (ref 37–48.5)
HGB BLD-MCNC: 7 G/DL (ref 12–16)
IMM GRANULOCYTES # BLD AUTO: 0.01 K/UL (ref 0–0.04)
IMM GRANULOCYTES NFR BLD AUTO: 0.2 % (ref 0–0.5)
IRON SERPL-MCNC: <10 UG/DL (ref 30–160)
LYMPHOCYTES # BLD AUTO: 0.9 K/UL (ref 1–4.8)
LYMPHOCYTES NFR BLD: 20.3 % (ref 18–48)
MCH RBC QN AUTO: 25.9 PG (ref 27–31)
MCHC RBC AUTO-ENTMCNC: 31 G/DL (ref 32–36)
MCV RBC AUTO: 84 FL (ref 82–98)
MONOCYTES # BLD AUTO: 0.3 K/UL (ref 0.3–1)
MONOCYTES NFR BLD: 7.8 % (ref 4–15)
NEUTROPHILS # BLD AUTO: 3.1 K/UL (ref 1.8–7.7)
NEUTROPHILS NFR BLD: 70.3 % (ref 38–73)
NRBC BLD-RTO: 0 /100 WBC
NUM UNITS TRANS PACKED RBC: NORMAL
PLATELET # BLD AUTO: 371 K/UL (ref 150–450)
PMV BLD AUTO: 10.4 FL (ref 9.2–12.9)
POTASSIUM SERPL-SCNC: 3.8 MMOL/L (ref 3.5–5.1)
PROT SERPL-MCNC: 5.8 G/DL (ref 6–8.4)
RBC # BLD AUTO: 2.7 M/UL (ref 4–5.4)
SATURATED IRON: ABNORMAL % (ref 20–50)
SODIUM SERPL-SCNC: 140 MMOL/L (ref 136–145)
TOTAL IRON BINDING CAPACITY: 417 UG/DL (ref 250–450)
TRANSFERRIN SERPL-MCNC: 282 MG/DL (ref 200–375)
WBC # BLD AUTO: 4.38 K/UL (ref 3.9–12.7)

## 2022-12-15 PROCEDURE — 99231 SBSQ HOSP IP/OBS SF/LOW 25: CPT | Mod: ,,, | Performed by: OBSTETRICS & GYNECOLOGY

## 2022-12-15 PROCEDURE — 21400001 HC TELEMETRY ROOM

## 2022-12-15 PROCEDURE — 94761 N-INVAS EAR/PLS OXIMETRY MLT: CPT

## 2022-12-15 PROCEDURE — 36415 COLL VENOUS BLD VENIPUNCTURE: CPT | Performed by: NURSE PRACTITIONER

## 2022-12-15 PROCEDURE — 25000003 PHARM REV CODE 250: Performed by: NURSE PRACTITIONER

## 2022-12-15 PROCEDURE — 99231 PR SUBSEQUENT HOSPITAL CARE,LEVL I: ICD-10-PCS | Mod: ,,, | Performed by: OBSTETRICS & GYNECOLOGY

## 2022-12-15 PROCEDURE — 11000001 HC ACUTE MED/SURG PRIVATE ROOM

## 2022-12-15 PROCEDURE — P9016 RBC LEUKOCYTES REDUCED: HCPCS | Performed by: NURSE PRACTITIONER

## 2022-12-15 PROCEDURE — 63600175 PHARM REV CODE 636 W HCPCS: Performed by: NURSE PRACTITIONER

## 2022-12-15 PROCEDURE — 80053 COMPREHEN METABOLIC PANEL: CPT | Performed by: NURSE PRACTITIONER

## 2022-12-15 PROCEDURE — 25000003 PHARM REV CODE 250: Performed by: OBSTETRICS & GYNECOLOGY

## 2022-12-15 PROCEDURE — 85025 COMPLETE CBC W/AUTO DIFF WBC: CPT | Performed by: NURSE PRACTITIONER

## 2022-12-15 PROCEDURE — 36430 TRANSFUSION BLD/BLD COMPNT: CPT

## 2022-12-15 PROCEDURE — 96374 THER/PROPH/DIAG INJ IV PUSH: CPT

## 2022-12-15 RX ORDER — MEGESTROL ACETATE 40 MG/1
40 TABLET ORAL DAILY
Qty: 30 TABLET | Refills: 2 | Status: SHIPPED | OUTPATIENT
Start: 2022-12-15 | End: 2023-11-16

## 2022-12-15 RX ORDER — MEGESTROL ACETATE 40 MG/1
40 TABLET ORAL DAILY
Status: DISCONTINUED | OUTPATIENT
Start: 2022-12-15 | End: 2022-12-16 | Stop reason: HOSPADM

## 2022-12-15 RX ORDER — HYDROCODONE BITARTRATE AND ACETAMINOPHEN 500; 5 MG/1; MG/1
TABLET ORAL
Status: DISCONTINUED | OUTPATIENT
Start: 2022-12-15 | End: 2022-12-16 | Stop reason: HOSPADM

## 2022-12-15 RX ADMIN — MEGESTROL ACETATE 40 MG: 40 TABLET ORAL at 10:12

## 2022-12-15 RX ADMIN — AMLODIPINE BESYLATE 10 MG: 10 TABLET ORAL at 10:12

## 2022-12-15 RX ADMIN — IRON SUCROSE 200 MG: 20 INJECTION, SOLUTION INTRAVENOUS at 10:12

## 2022-12-15 RX ADMIN — METOPROLOL SUCCINATE 25 MG: 25 TABLET, EXTENDED RELEASE ORAL at 10:12

## 2022-12-15 RX ADMIN — ASPIRIN 81 MG: 81 TABLET, COATED ORAL at 10:12

## 2022-12-15 NOTE — HOSPITAL COURSE
67 year old female, PMHx: EZEKIEL, HLD, HTN, Postmenopausal bleeding. Patient presented to the ED with c/o severe fatigue and weakness. Admission Hgb: 4.9, transfused with 2U PRBC on admission, Hgb improved to 7.0, Hgb this AM remained at 7.0, due to continued LUGO, fatigue and vaginal bleeding transfused additional unit PRBC today, for total of 3U PRBC since admission. GYN initiated megace for the bleeding, patient is scheduled to see GYN on 1/11 for evaluation for possible hysterectomy but if vaginal bleeding worsens or does not improve may be considered for emergent hysterectomy.  Iron level was found to be undetectable, subsequently was started on IV iron infusion. On the day of discharge, patient noted to have hemoglobin of 8.3, indicating appropriate hemoglobin rise with transfusion, additionally she had received 2 doses of IV iron.  Patient seen and examined personally on the day of discharge with  present at bedside.  Discussed recommendation for continuing IV iron with hematology referral initiated.  Prescriptions for Megace and oral iron sent to pharmacy.  All questions answered to their satisfaction.  Stable for discharge home at this time with outpatient follow-up with Gynecology and Hematology.

## 2022-12-15 NOTE — ASSESSMENT & PLAN NOTE
See plan for Symptomatic anemia    --Seen by GYN, initiated on Megace  --Scheduled to see Dr. Aquino on 1/11 for evaluation as OP

## 2022-12-15 NOTE — PLAN OF CARE
[facility] - Med Surg  Initial Discharge Assessment       Primary Care Provider: Kishan Kelley MD    Admission Diagnosis: Chest pain [R07.9]  Symptomatic anemia [D64.9]    Admission Date: 12/14/2022  Expected Discharge Date:     Discharge Barriers Identified: None    Payor: [employer] / Plan: [place] [employer] CHOICE / Product Type: Commercial /     Extended Emergency Contact Information  Primary Emergency Contact: LOGAN OMAYRA  Address: [address] [place] [address] [facility]  Home Phone: [phone]  Mobile Phone: [phone]  Relation: Spouse  Preferred language: English   needed? No    Discharge Plan A: Home  Discharge Plan B: [facility]      [id number] - [place] - [address] AT [facility] [address]  [address]  [place] [address]  Phone: [phone] Fax: [phone]      Initial Assessment (most recent)       Adult Discharge Assessment - 12/15/22 1430          Discharge Assessment    Assessment Type Discharge Planning Assessment     Confirmed/corrected address, phone number and insurance Yes     Confirmed Demographics Correct on Facesheet     Source of Information patient     When was your last doctors appointment? --   July 2022    Communicated PIERCE with patient/caregiver Date not available/Unable to determine     Reason For Admission anemia     People in Home spouse     Do you have help at home or someone to help you manage your care at home? Yes     Who are your caregiver(s) and their phone number(s)? Omayra Logan [phone]     Prior to hospitilization cognitive status: Alert/Oriented     Current cognitive status: Alert/Oriented     Walking or Climbing Stairs --   independent    Dressing/Bathing --   independent    Do you have any problems with: --   independent    Home Accessibility --   na    Equipment Currently Used at Home none     Readmission within 30 days? No     Patient currently being followed  by outpatient case management? No     Do you currently have service(s) that help you manage your care at home? No     Do you take prescription medications? Yes     Do you have prescription coverage? Yes     Do you have any problems affording any of your prescribed medications? No     Is the patient taking medications as prescribed? yes     Who is going to help you get home at discharge? spouse     How do you get to doctors appointments? car, drives self     Are you on dialysis? No     Do you take coumadin? No     Discharge Plan A Home     Discharge Plan B Home Health     DME Needed Upon Discharge  none     Discharge Plan discussed with: Spouse/sig other     Name(s) and Number(s) Mariano Ford [phone]     Discharge Barriers Identified None                      Initial discharge note:  Met with patient, independent with adls.  No home health or dme.  Uses MyChart, prefers to make own appointments.

## 2022-12-15 NOTE — PLAN OF CARE
Patient is stable. Pt received 2 units of blood today. Bleeding closely monitored. Continuous cardiac monitor in place running NSR. No signs or symptoms of acute distress. Call light in reach. Chart orders reviewed.

## 2022-12-15 NOTE — PLAN OF CARE
Patient is in stable condition, no acute distress, remained free from injuries, no pain reported this shift, on cardiac monitoring (NSR), VSS and all active orders reviewed. 24 hr chart check performed.

## 2022-12-15 NOTE — PROGRESS NOTES
[facility] Medicine  Progress Note    Patient Name: Liliane Ford  MRN: [id number]  Patient Class: IP- Inpatient   Admission Date: 12/14/2022  Length of Stay: 0 days  Attending Physician: Norma Freitas MD  Primary Care Provider: Kishan Kelley MD        Subjective:     Principal Problem:Symptomatic anemia        HPI:  Liliane Ford is a 67 year old female with history of postmenopausal bleeding, hypertension, and most recently an abnormal coronary CTA who presents to ED for further evaluation of progressive exertional fatigue and weakness. She has had persistent postmenopausal bleeding since August. She continues to have bleeding despite megace. She reports a similar event 2-3 years ago, and she was found to have a hemoglobin of <4g/dL and was transfused 4 units PRBCs. She denies associated symptoms of light headedness, dizziness, chest pain, and palpitations. She was last seen by Cristine Marquez NP who recommended follow up with Dr. Aquino to discuss EMB and other treatment options. She states that she has an appointment scheduled for 1/11/23.     Additionally, the patient had a recent coronary CTA that demonstrated 70% mid LAD stenosis. She is scheduled to have a C with possible PCI on 12//21/22.     In ED, H/H 4.9/16.5. Her other labs and vitals were stable. Her case has been discussed with Dr. Haq who plans to see patient today. She will be placed in observation under the care of [facility] medicine.             Overview/Hospital Course:  67 year old female, PMHx: EZEKIEL, HLD, HTN, Postmenopausal bleeding. Patient presented to the ED with c/o severe fatigue and weakness. Admission Hgb: 4.9, transfused with 2U PRBC on admission, Hgb improved to 7.0, Hgb this AM remained at 7.0, due to continued LUGO, fatigue and vaginal bleeding transfused additional unit PRBC today, for total of 3U PRBC since admission. GYN initiated megace for the bleeding, patient is scheduled to see GYN on 1/11 for evaluation  for possible hysterectomy but if vaginal bleeding worsens or does not improve may be considered for emergent hysterectomy. Possible discharge in AM, repeat labs in AM, monitor for bleeding and Hgb. Will repeat H/H after unit of blood completed this afternoon.       Interval History: Hgb: 7.0, give 1U PRBC today, GYN initiated megace. Repeat H/H following transfusion today and repeat labs in AM.     Review of Systems   Constitutional:  Positive for activity change and fatigue. Negative for appetite change, chills, diaphoresis, fever and unexpected weight change.   HENT:  Negative for congestion, hearing loss, mouth sores, postnasal drip, rhinorrhea, sore throat and trouble swallowing.    Eyes:  Negative for discharge and visual disturbance.   Respiratory:  Positive for shortness of breath (with exertion). Negative for cough and chest tightness.    Cardiovascular:  Negative for chest pain, palpitations and leg swelling.   Gastrointestinal:  Negative for blood in stool, constipation, diarrhea, nausea and vomiting.   Endocrine: Negative for cold intolerance and heat intolerance.   Genitourinary:  Positive for vaginal bleeding. Negative for difficulty urinating, dyspareunia, flank pain and hematuria.   Musculoskeletal:  Positive for arthralgias. Negative for back pain and myalgias.   Skin: Negative.    Neurological:  Positive for weakness. Negative for dizziness, light-headedness and headaches.   Hematological:  Negative for adenopathy. Does not bruise/bleed easily.   Psychiatric/Behavioral:  Negative for agitation, behavioral problems and confusion. The patient is nervous/anxious.    Objective:     Vital Signs (Most Recent):  Temp: 98 °F (36.7 °C) (12/15/22 1357)  Pulse: 80 (12/15/22 1357)  Resp: 18 (12/15/22 1357)  BP: (!) 118/59 (12/15/22 1357)  SpO2: 98 % (12/15/22 1357)   Vital Signs (24h Range):  Temp:  [97 °F (36.1 °C)-98.9 °F (37.2 °C)] 98 °F (36.7 °C)  Pulse:  [77-99] 80  Resp:  [17-20] 18  SpO2:  [96 %-100 %]  98 %  BP: (118-178)/(59-84) 118/59     Weight: 81.3 kg (179 lb 2 oz)  Body mass index is 32.76 kg/m².    Intake/Output Summary (Last 24 hours) at 12/15/2022 1628  Last data filed at 12/15/2022 0800  Gross per 24 hour   Intake 300.42 ml   Output --   Net 300.42 ml      Physical Exam  Vitals and nursing note reviewed.   Constitutional:       General: She is not in acute distress.     Appearance: She is well-developed. She is ill-appearing.   HENT:      Head: Normocephalic and atraumatic.      Right Ear: Hearing and external ear normal.      Left Ear: Hearing and external ear normal.      Nose: No rhinorrhea.      Right Sinus: No maxillary sinus tenderness or frontal sinus tenderness.      Left Sinus: No maxillary sinus tenderness or frontal sinus tenderness.      Mouth/Throat:      Mouth: No oral lesions.      Pharynx: Uvula midline.   Eyes:      General:         Right eye: No discharge.         Left eye: No discharge.      Conjunctiva/sclera: Conjunctivae normal.      Pupils: Pupils are equal, round, and reactive to light.   Neck:      Thyroid: No thyromegaly.      Vascular: No carotid bruit.      Trachea: No tracheal deviation.   Cardiovascular:      Rate and Rhythm: Normal rate and regular rhythm.      Pulses:           Dorsalis pedis pulses are 2+ on the right side and 2+ on the left side.      Heart sounds: Normal heart sounds, S1 normal and S2 normal. No murmur heard.  Pulmonary:      Effort: Pulmonary effort is normal. No respiratory distress.      Breath sounds: Normal breath sounds.   Abdominal:      General: Bowel sounds are normal. There is no distension.      Palpations: Abdomen is soft. There is no mass.      Tenderness: There is no abdominal tenderness.   Musculoskeletal:         General: Normal range of motion.      Cervical back: Normal range of motion.   Lymphadenopathy:      Cervical: No cervical adenopathy.      Upper Body:      Right upper body: No supraclavicular adenopathy.      Left upper body:  No supraclavicular adenopathy.   Skin:     General: Skin is warm and dry.      Capillary Refill: Capillary refill takes less than 2 seconds.      Coloration: Skin is pale.      Findings: No rash.   Neurological:      Mental Status: She is alert and oriented to person, place, and time.      Sensory: No sensory deficit.      Coordination: Coordination normal.      Gait: Gait normal.   Psychiatric:         Mood and Affect: Mood is not anxious or depressed.         Speech: Speech normal.         Behavior: Behavior normal.         Thought Content: Thought content normal.         Judgment: Judgment normal.       Significant Labs: All pertinent labs within the past 24 hours have been reviewed.  CBC:   Recent Labs   Lab 12/14/22  1144 12/14/22  1830 12/15/22  0606   WBC 4.54 4.19 4.38   HGB 4.9* 7.0* 7.0*   HCT 16.5* 23.1* 22.6*    364 371     CMP:   Recent Labs   Lab 12/14/22  1144 12/15/22  0605    140   K 3.6 3.8   * 111*   CO2 20* 20*   * 98   BUN 12 12   CREATININE 0.8 0.7   CALCIUM 8.4* 8.3*   PROT 6.1 5.8*   ALBUMIN 3.5 3.4*   BILITOT 0.4 1.0   ALKPHOS 50* 50*   AST 22 22   ALT 21 20   ANIONGAP 8 9       Significant Imaging: I have reviewed all pertinent imaging results/findings within the past 24 hours.      Assessment/Plan:      * Symptomatic anemia  67 year old female who presented to ED with exertional fatigue in setting of persistent postmenopausal bleeding since August 2022. She is noted to have severe microcytic anemia (H/H 4.9/16.5). Orders placed for 2 units PrBCs.iron studies are pending. A repeat CBC will be collected after this these transfusions and will order more if as needed. She is known to our GYN group. Her last PAP smear was not successful due to her bleeding. She is scheduled to see Dr. Aquino on 1/11 for possible EMB and to discuss other options for postmenopausal bleeding. GYN has been consulted for further recs.     --Severe EZEKIEL: IV Venofer 200mg Daily IV, follow-up  with Hematology after discharge to continue repletion.     Abnormal computed tomography angiography (CTA)  11/2022 coronary CTA that demonstrated 70% mid LAD stenosis. She is scheduled to have a [facility] with possible PCI on 12//21/22.   Followed by Dr. Plascencia  --resume ASA, STATIN, and BB    Essential hypertension  --resume metoprolol, amlodipine, and losartan        Postmenopausal bleeding  See plan for Symptomatic anemia    --Seen by GYN, initiated on Megace  --Scheduled to see Dr. Aquino on 1/11 for evaluation as OP        VTE Risk Mitigation (From admission, onward)         Ordered     IP VTE HIGH RISK PATIENT  Once         12/14/22 1425     Place sequential compression device  Until discontinued         12/14/22 1425                Discharge Planning   PIERCE:      Code Status: Full Code   Is the patient medically ready for discharge?:     Reason for patient still in hospital (select all that apply): Patient trending condition  Discharge Plan A: Home                  Jennifer Burciaga NP  Department of Hospital Medicine   [facility] - Med Surg

## 2022-12-15 NOTE — SUBJECTIVE & OBJECTIVE
Interval History: Patient reports she is doing well. She reports the vaginal bleeding is persistent but light. Denies pain.      Scheduled Meds:   amLODIPine  10 mg Oral Daily    aspirin  81 mg Oral Daily    atorvastatin  10 mg Oral Daily    iron sucrose  200 mg Intravenous Daily    losartan  100 mg Oral Daily    megestroL  40 mg Oral Daily    metoprolol succinate  25 mg Oral Daily     Continuous Infusions:  PRN Meds:sodium chloride, sodium chloride, acetaminophen, albuterol-ipratropium, aluminum-magnesium hydroxide-simethicone, dextrose 10%, dextrose 10%, glucagon (human recombinant), glucose, glucose, magnesium oxide, magnesium oxide, melatonin, naloxone, ondansetron, potassium bicarbonate, potassium bicarbonate, potassium bicarbonate, potassium, sodium phosphates, potassium, sodium phosphates, potassium, sodium phosphates, prochlorperazine, senna-docusate 8.6-50 mg, simethicone    Review of patient's allergies indicates:   Allergen Reactions    Penicillins Rash       Objective:     Vital Signs (Most Recent):  Temp: 97 °F (36.1 °C) (12/15/22 0827)  Pulse: 99 (12/15/22 0850)  Resp: 18 (12/15/22 0850)  BP: (!) 141/67 (12/15/22 0827)  SpO2: 100 % (12/15/22 0850)   Vital Signs (24h Range):  Temp:  [97 °F (36.1 °C)-98.6 °F (37 °C)] 97 °F (36.1 °C)  Pulse:  [78-99] 99  Resp:  [16-20] 18  SpO2:  [95 %-100 %] 100 %  BP: (129-178)/(64-84) 141/67     Weight: 81.3 kg (179 lb 2 oz)  Body mass index is 32.76 kg/m².  No LMP recorded. Patient is postmenopausal.    I&O (Last 24H):    Intake/Output Summary (Last 24 hours) at 12/15/2022 1010  Last data filed at 12/15/2022 0800  Gross per 24 hour   Intake 1795 ml   Output --   Net 1795 ml         Laboratory:  CBC:   Recent Labs   Lab 12/15/22  0606   WBC 4.38   RBC 2.70*   HGB 7.0*   HCT 22.6*      MCV 84   MCH 25.9*   MCHC 31.0*       Diagnostic Results:  Labs: Reviewed    Physical Exam:   Constitutional: She is oriented to person, place, and time. She appears well-developed  and well-nourished. No distress.       Cardiovascular:  Normal rate, regular rhythm and normal heart sounds.            No murmur heard.   Pulmonary/Chest: Effort normal and breath sounds normal. No respiratory distress. She has no wheezes. She has no rales.        Abdominal: Soft. Bowel sounds are normal. She exhibits no distension. There is no abdominal tenderness. There is no guarding.   Uterine fibroids palpable, non tender     Genitourinary:    Genitourinary Comments: Light amount of blood to josiah pad             Musculoskeletal: No edema.      Comments: No calf tenderness       Neurological: She is alert and oriented to person, place, and time.    Skin: Skin is warm and dry. No rash noted. She is not diaphoretic.

## 2022-12-15 NOTE — ASSESSMENT & PLAN NOTE
67 year old female who presented to ED with exertional fatigue in setting of persistent postmenopausal bleeding since August 2022. She is noted to have severe microcytic anemia (H/H 4.9/16.5). Orders placed for 2 units PrBCs.iron studies are pending. A repeat CBC will be collected after this these transfusions and will order more if as needed. She is known to our GYN group. Her last PAP smear was not successful due to her bleeding. She is scheduled to see Dr. Aquino on 1/11 for possible EMB and to discuss other options for postmenopausal bleeding. GYN has been consulted for further recs.     --Severe EZEKIEL: IV Venofer 200mg Daily IV, follow-up with Hematology after discharge to continue repletion.

## 2022-12-15 NOTE — PROGRESS NOTES
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 Pt also with history of enlarged uterus with multiple fibroids.  Pt admitted to  secondary to symptomatic anemia and is currently receiving blood transfusion.  Has been noting daily vaginal bleeding since .  Pt with prior history of needing a transfusion in  as well.        Interval History: Patient reports she is doing well. She reports the vaginal bleeding is persistent but light. Denies pain.      Scheduled Meds:   amLODIPine  10 mg Oral Daily    aspirin  81 mg Oral Daily    atorvastatin  10 mg Oral Daily    iron sucrose  200 mg Intravenous Daily    losartan  100 mg Oral Daily    megestroL  40 mg Oral Daily    metoprolol succinate  25 mg Oral Daily     Continuous Infusions:  PRN Meds:sodium chloride, sodium chloride, acetaminophen, albuterol-ipratropium, aluminum-magnesium hydroxide-simethicone, dextrose 10%, dextrose 10%, glucagon (human recombinant), glucose, glucose, magnesium oxide, magnesium oxide, melatonin, naloxone, ondansetron, potassium bicarbonate, potassium bicarbonate, potassium bicarbonate, potassium, sodium phosphates, potassium, sodium phosphates, potassium, sodium phosphates, prochlorperazine, senna-docusate 8.6-50 mg, simethicone    Review of patient's allergies indicates:   Allergen Reactions    Penicillins Rash       Objective:     Vital Signs (Most Recent):  Temp: 97 °F (36.1 °C) (12/15/22 0827)  Pulse: 99 (12/15/22 0850)  Resp: 18 (12/15/22 0850)  BP: (!) 141/67 (12/15/22 0827)  SpO2: 100 % (12/15/22 0850)   Vital Signs (24h  Range):  Temp:  [97 °F (36.1 °C)-98.6 °F (37 °C)] 97 °F (36.1 °C)  Pulse:  [78-99] 99  Resp:  [16-20] 18  SpO2:  [95 %-100 %] 100 %  BP: (129-178)/(64-84) 141/67     Weight: 81.3 kg (179 lb 2 oz)  Body mass index is 32.76 kg/m².  No LMP recorded. Patient is postmenopausal.    I&O (Last 24H):    Intake/Output Summary (Last 24 hours) at 12/15/2022 1010  Last data filed at 12/15/2022 0800  Gross per 24 hour   Intake 1795 ml   Output --   Net 1795 ml         Laboratory:  CBC:   Recent Labs   Lab 12/15/22  0606   WBC 4.38   RBC 2.70*   HGB 7.0*   HCT 22.6*      MCV 84   MCH 25.9*   MCHC 31.0*       Diagnostic Results:  Labs: Reviewed    Physical Exam:   Constitutional: She is oriented to person, place, and time. She appears well-developed and well-nourished. No distress.       Cardiovascular:  Normal rate, regular rhythm and normal heart sounds.            No murmur heard.   Pulmonary/Chest: Effort normal and breath sounds normal. No respiratory distress. She has no wheezes. She has no rales.        Abdominal: Soft. Bowel sounds are normal. She exhibits no distension. There is no abdominal tenderness. There is no guarding.   Uterine fibroids palpable, non tender     Genitourinary:    Genitourinary Comments: Light amount of blood to josiah pad             Musculoskeletal: No edema.      Comments: No calf tenderness       Neurological: She is alert and oriented to person, place, and time.    Skin: Skin is warm and dry. No rash noted. She is not diaphoretic.            Assessment/Plan:     * Symptomatic anemia  S/p 2 units PRBC with plan for additional unit this AM as per     Essential hypertension  Followed by primary team.    Postmenopausal bleeding  Pt with history of Complex Endometrial Hyperplasia without atypia in 02/20 and effectively treated with course of Megace as evidenced by several negative follow-up EMBs.  Most recent EMB 10/22 was also negative, thus pt does not need any further endometrial sampling at  this stage.  Bleeding is most likely due to fibroids and pt would most benefit from hysterectomy.  Will proceed with transfusion and restart Megace while inpatient. Pt counseled on possibility of requiring emergency hysterectomy during this hospitalization if bleeding worsens.  Pt voiced understanding.    12/15-start Megace to reduce bleeding. Patient intends to follow up 1/11 with Dr Aquino as outpatient to discuss definitive management of her PMB and fibroids. No surgical plans this admission especially if bleeding remains light and patient responds appropriately to transfusion.        Thania Portillo PA-C  Obstetrics & Gynecology  [facility]

## 2022-12-15 NOTE — ASSESSMENT & PLAN NOTE
11/2022 coronary CTA that demonstrated 70% mid LAD stenosis. She is scheduled to have a [facility] with possible PCI on 12//21/22.   Followed by Dr. Plascencia  --resume ASA, STATIN, and BB

## 2022-12-15 NOTE — ASSESSMENT & PLAN NOTE
Pt with history of Complex Endometrial Hyperplasia without atypia in 02/20 and effectively treated with course of Megace as evidenced by several negative follow-up EMBs.  Most recent EMB 10/22 was also negative, thus pt does not need any further endometrial sampling at this stage.  Bleeding is most likely due to fibroids and pt would most benefit from hysterectomy.  Will proceed with transfusion and restart Megace while inpatient. Pt counseled on possibility of requiring emergency hysterectomy during this hospitalization if bleeding worsens.  Pt voiced understanding.    12/15-start Megace to reduce bleeding. Patient intends to follow up 1/11 with Dr Aquino as outpatient to discuss definitive management of her PMB and fibroids. No surgical plans this admission especially if bleeding remains light and patient responds appropriately to transfusion.

## 2022-12-16 VITALS
WEIGHT: 174.63 LBS | TEMPERATURE: 98 F | DIASTOLIC BLOOD PRESSURE: 72 MMHG | OXYGEN SATURATION: 100 % | RESPIRATION RATE: 18 BRPM | BODY MASS INDEX: 32.14 KG/M2 | HEIGHT: 62 IN | HEART RATE: 79 BPM | SYSTOLIC BLOOD PRESSURE: 149 MMHG

## 2022-12-16 LAB
ANION GAP SERPL CALC-SCNC: 8 MMOL/L (ref 8–16)
BASOPHILS # BLD AUTO: 0.02 K/UL (ref 0–0.2)
BASOPHILS NFR BLD: 0.3 % (ref 0–1.9)
BUN SERPL-MCNC: 14 MG/DL (ref 8–23)
CALCIUM SERPL-MCNC: 8.7 MG/DL (ref 8.7–10.5)
CHLORIDE SERPL-SCNC: 111 MMOL/L (ref 95–110)
CO2 SERPL-SCNC: 22 MMOL/L (ref 23–29)
CREAT SERPL-MCNC: 0.7 MG/DL (ref 0.5–1.4)
DIFFERENTIAL METHOD: ABNORMAL
EOSINOPHIL # BLD AUTO: 0.1 K/UL (ref 0–0.5)
EOSINOPHIL NFR BLD: 0.8 % (ref 0–8)
ERYTHROCYTE [DISTWIDTH] IN BLOOD BY AUTOMATED COUNT: 17.9 % (ref 11.5–14.5)
EST. GFR  (NO RACE VARIABLE): >60 ML/MIN/1.73 M^2
GLUCOSE SERPL-MCNC: 122 MG/DL (ref 70–110)
HCT VFR BLD AUTO: 26.9 % (ref 37–48.5)
HGB BLD-MCNC: 8.3 G/DL (ref 12–16)
IMM GRANULOCYTES # BLD AUTO: 0.03 K/UL (ref 0–0.04)
IMM GRANULOCYTES NFR BLD AUTO: 0.5 % (ref 0–0.5)
LYMPHOCYTES # BLD AUTO: 1.4 K/UL (ref 1–4.8)
LYMPHOCYTES NFR BLD: 24 % (ref 18–48)
MCH RBC QN AUTO: 25.8 PG (ref 27–31)
MCHC RBC AUTO-ENTMCNC: 30.9 G/DL (ref 32–36)
MCV RBC AUTO: 84 FL (ref 82–98)
MONOCYTES # BLD AUTO: 0.5 K/UL (ref 0.3–1)
MONOCYTES NFR BLD: 7.9 % (ref 4–15)
NEUTROPHILS # BLD AUTO: 4 K/UL (ref 1.8–7.7)
NEUTROPHILS NFR BLD: 66.5 % (ref 38–73)
NRBC BLD-RTO: 1 /100 WBC
PLATELET # BLD AUTO: 375 K/UL (ref 150–450)
PMV BLD AUTO: 10.3 FL (ref 9.2–12.9)
POTASSIUM SERPL-SCNC: 3.6 MMOL/L (ref 3.5–5.1)
RBC # BLD AUTO: 3.22 M/UL (ref 4–5.4)
SODIUM SERPL-SCNC: 141 MMOL/L (ref 136–145)
WBC # BLD AUTO: 5.96 K/UL (ref 3.9–12.7)

## 2022-12-16 PROCEDURE — 99231 PR SUBSEQUENT HOSPITAL CARE,LEVL I: ICD-10-PCS | Mod: ,,, | Performed by: OBSTETRICS & GYNECOLOGY

## 2022-12-16 PROCEDURE — 85025 COMPLETE CBC W/AUTO DIFF WBC: CPT | Performed by: INTERNAL MEDICINE

## 2022-12-16 PROCEDURE — 80048 BASIC METABOLIC PNL TOTAL CA: CPT | Performed by: INTERNAL MEDICINE

## 2022-12-16 PROCEDURE — 36415 COLL VENOUS BLD VENIPUNCTURE: CPT | Performed by: INTERNAL MEDICINE

## 2022-12-16 PROCEDURE — 99231 SBSQ HOSP IP/OBS SF/LOW 25: CPT | Mod: ,,, | Performed by: OBSTETRICS & GYNECOLOGY

## 2022-12-16 PROCEDURE — 25000003 PHARM REV CODE 250: Performed by: OBSTETRICS & GYNECOLOGY

## 2022-12-16 PROCEDURE — 63600175 PHARM REV CODE 636 W HCPCS: Performed by: NURSE PRACTITIONER

## 2022-12-16 PROCEDURE — 25000003 PHARM REV CODE 250: Performed by: NURSE PRACTITIONER

## 2022-12-16 RX ORDER — FERROUS SULFATE 325(65) MG
325 TABLET, DELAYED RELEASE (ENTERIC COATED) ORAL 2 TIMES DAILY WITH MEALS
Qty: 60 TABLET | Refills: 0 | Status: SHIPPED | OUTPATIENT
Start: 2022-12-16 | End: 2023-01-15

## 2022-12-16 RX ADMIN — MEGESTROL ACETATE 40 MG: 40 TABLET ORAL at 09:12

## 2022-12-16 RX ADMIN — IRON SUCROSE 200 MG: 20 INJECTION, SOLUTION INTRAVENOUS at 09:12

## 2022-12-16 RX ADMIN — METOPROLOL SUCCINATE 25 MG: 25 TABLET, EXTENDED RELEASE ORAL at 09:12

## 2022-12-16 RX ADMIN — ASPIRIN 81 MG: 81 TABLET, COATED ORAL at 09:12

## 2022-12-16 RX ADMIN — AMLODIPINE BESYLATE 10 MG: 10 TABLET ORAL at 09:12

## 2022-12-16 NOTE — ASSESSMENT & PLAN NOTE
11/2022 coronary CTA that demonstrated 70% mid LAD stenosis. She is scheduled to have a [facility] with possible PCI on 12//21/22.   Followed by Dr. Plascencia  --resume ASA, STATIN, and BB

## 2022-12-16 NOTE — PLAN OF CARE
[facility]  Discharge Final Note    Primary Care Provider: Kishan Kelley MD    Expected Discharge Date: 12/16/2022    Final Discharge Note (most recent)       Final Note - 12/16/22 1429          Final Note    Assessment Type Final Discharge Note     Anticipated Discharge Disposition Home or Self Care     Hospital Resources/Appts/Education Provided Appointments scheduled by Navigator/Coordinator;Appointments scheduled and added to AVS        Post-Acute Status    Discharge Delays None known at this time                   Contact Info       Kishan Kelley MD   Specialty: Family Medicine   Relationship: PCP - General    [facility] [facility] [address]   Phone: [phone]       Next Steps: Follow up    Ema Aquino MD   Specialty: Obstetrics and Gynecology   Relationship: Consulting Physician    [address] [place] [id number]   Phone: [phone]       Next Steps: Follow up          Pt to DC home with no CM DC needs.    Sorin Colorado LMSW 12/16/2022 2:31 PM

## 2022-12-16 NOTE — PLAN OF CARE
Bed locked, in lowest position. Call bell in reach. Purposeful rounding done every two hours. Cardiac monitoring in use. 1 U of RBCs administered per MD order, tolerated well. Chart check complete. Will continue with plan of care.

## 2022-12-16 NOTE — PLAN OF CARE
Patient remains free from injury this shift. Safety precautions maintained. No s/s of acute distress noted. Cardiac monitoring in place. Head of the bed elevated, side rails up X2 and call light within reach. Patient education about care complete.       Problem: Adult Inpatient Plan of Care  Goal: Plan of Care Review  Outcome: Ongoing, Progressing  Goal: Patient-Specific Goal (Individualized)  Outcome: Ongoing, Progressing  Goal: Absence of Hospital-Acquired Illness or Injury  Outcome: Ongoing, Progressing  Goal: Optimal Comfort and Wellbeing  Outcome: Ongoing, Progressing  Goal: Readiness for Transition of Care  Outcome: Ongoing, Progressing     Problem: Infection  Goal: Absence of Infection Signs and Symptoms  Outcome: Ongoing, Progressing

## 2022-12-16 NOTE — PROGRESS NOTES
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 Pt also with history of enlarged uterus with multiple fibroids.  Pt admitted to  secondary to symptomatic anemia and is currently receiving blood transfusion.  Has been noting daily vaginal bleeding since .  Pt with prior history of needing a transfusion in  as well.        Interval History: Patient reports she is doing well. She is ambulating well. Voiding well. No fever overnight. Denies pain. Reports vaginal bleeding is light.      Scheduled Meds:   amLODIPine  10 mg Oral Daily    aspirin  81 mg Oral Daily    atorvastatin  10 mg Oral Daily    iron sucrose  200 mg Intravenous Daily    losartan  100 mg Oral Daily    megestroL  40 mg Oral Daily    metoprolol succinate  25 mg Oral Daily     Continuous Infusions:  PRN Meds:sodium chloride, sodium chloride, acetaminophen, albuterol-ipratropium, aluminum-magnesium hydroxide-simethicone, dextrose 10%, dextrose 10%, glucagon (human recombinant), glucose, glucose, magnesium oxide, magnesium oxide, melatonin, naloxone, ondansetron, potassium bicarbonate, potassium bicarbonate, potassium bicarbonate, potassium, sodium phosphates, potassium, sodium phosphates, potassium, sodium phosphates, prochlorperazine, senna-docusate 8.6-50 mg, simethicone    Review of patient's allergies indicates:   Allergen Reactions    Penicillins Rash       Objective:     Vital Signs (Most Recent):  Temp: 97.9 °F (36.6 °C) (22)  Pulse: 80 (22)  Resp: 18 (22)  BP: 131/68 (22)  SpO2: 100 %  (12/16/22 5035) Vital Signs (24h Range):  Temp:  [97.9 °F (36.6 °C)-98.9 °F (37.2 °C)] 97.9 °F (36.6 °C)  Pulse:  [77-90] 80  Resp:  [17-19] 18  SpO2:  [96 %-100 %] 100 %  BP: (118-147)/(59-76) 131/68     Weight: 79.2 kg (174 lb 9.7 oz)  Body mass index is 31.94 kg/m².  No LMP recorded. Patient is postmenopausal.    I&O (Last 24H):    Intake/Output Summary (Last 24 hours) at 12/16/2022 0930  Last data filed at 12/16/2022 0316  Gross per 24 hour   Intake 720 ml   Output --   Net 720 ml         Laboratory:  I have personallly reviewed all pertinent lab results from the last 24 hours.    Diagnostic Results:  Labs: Reviewed    Physical Exam:   Constitutional: She is oriented to person, place, and time. She appears well-developed and well-nourished. No distress.             Abdominal: Soft. She exhibits no distension. There is no abdominal tenderness.             Musculoskeletal: No edema.       Neurological: She is alert and oriented to person, place, and time.    Skin: Skin is warm and dry. She is not diaphoretic. No erythema.            Assessment/Plan:     * Symptomatic anemia  S/p 3 units PRBC with CBC pending this AM    Essential hypertension  Followed by primary team.    Postmenopausal bleeding  Pt with history of Complex Endometrial Hyperplasia without atypia in 02/20 and effectively treated with course of Megace as evidenced by several negative follow-up EMBs.  Most recent EMB 10/22 was also negative, thus pt does not need any further endometrial sampling at this stage.  Bleeding is most likely due to fibroids and pt would most benefit from hysterectomy.  Will proceed with transfusion and restart Megace while inpatient. Pt counseled on possibility of requiring emergency hysterectomy during this hospitalization if bleeding worsens.  Pt voiced understanding.    12/15-start Megace to reduce bleeding. Patient intends to follow up 1/11 with Dr Aquino as outpatient to discuss definitive management of her PMB and  fibroids. No surgical plans this admission especially if bleeding remains light and patient responds appropriately to transfusion.  12/16-Continue Megace. Bleeding continues to be scant, continue plan for outpatient GYN follow up and continue Megace.        Thania Portillo PA-C  Obstetrics & Gynecology  [facility]

## 2022-12-16 NOTE — SUBJECTIVE & OBJECTIVE
Interval History: Patient reports she is doing well. She is ambulating well. Voiding well. No fever overnight. Denies pain. Reports vaginal bleeding is light.      Scheduled Meds:   amLODIPine  10 mg Oral Daily    aspirin  81 mg Oral Daily    atorvastatin  10 mg Oral Daily    iron sucrose  200 mg Intravenous Daily    losartan  100 mg Oral Daily    megestroL  40 mg Oral Daily    metoprolol succinate  25 mg Oral Daily     Continuous Infusions:  PRN Meds:sodium chloride, sodium chloride, acetaminophen, albuterol-ipratropium, aluminum-magnesium hydroxide-simethicone, dextrose 10%, dextrose 10%, glucagon (human recombinant), glucose, glucose, magnesium oxide, magnesium oxide, melatonin, naloxone, ondansetron, potassium bicarbonate, potassium bicarbonate, potassium bicarbonate, potassium, sodium phosphates, potassium, sodium phosphates, potassium, sodium phosphates, prochlorperazine, senna-docusate 8.6-50 mg, simethicone    Review of patient's allergies indicates:   Allergen Reactions    Penicillins Rash       Objective:     Vital Signs (Most Recent):  Temp: 97.9 °F (36.6 °C) (12/16/22 0735)  Pulse: 80 (12/16/22 0735)  Resp: 18 (12/16/22 0735)  BP: 131/68 (12/16/22 0735)  SpO2: 100 % (12/16/22 0735) Vital Signs (24h Range):  Temp:  [97.9 °F (36.6 °C)-98.9 °F (37.2 °C)] 97.9 °F (36.6 °C)  Pulse:  [77-90] 80  Resp:  [17-19] 18  SpO2:  [96 %-100 %] 100 %  BP: (118-147)/(59-76) 131/68     Weight: 79.2 kg (174 lb 9.7 oz)  Body mass index is 31.94 kg/m².  No LMP recorded. Patient is postmenopausal.    I&O (Last 24H):    Intake/Output Summary (Last 24 hours) at 12/16/2022 0930  Last data filed at 12/16/2022 0316  Gross per 24 hour   Intake 720 ml   Output --   Net 720 ml         Laboratory:  I have personallly reviewed all pertinent lab results from the last 24 hours.    Diagnostic Results:  Labs: Reviewed    Physical Exam:   Constitutional: She is oriented to person, place, and time. She appears well-developed and  well-nourished. No distress.             Abdominal: Soft. She exhibits no distension. There is no abdominal tenderness.             Musculoskeletal: No edema.       Neurological: She is alert and oriented to person, place, and time.    Skin: Skin is warm and dry. She is not diaphoretic. No erythema.

## 2022-12-16 NOTE — ASSESSMENT & PLAN NOTE
Pt with history of Complex Endometrial Hyperplasia without atypia in 02/20 and effectively treated with course of Megace as evidenced by several negative follow-up EMBs.  Most recent EMB 10/22 was also negative, thus pt does not need any further endometrial sampling at this stage.  Bleeding is most likely due to fibroids and pt would most benefit from hysterectomy.  Will proceed with transfusion and restart Megace while inpatient. Pt counseled on possibility of requiring emergency hysterectomy during this hospitalization if bleeding worsens.  Pt voiced understanding.    12/15-start Megace to reduce bleeding. Patient intends to follow up 1/11 with Dr Aquino as outpatient to discuss definitive management of her PMB and fibroids. No surgical plans this admission especially if bleeding remains light and patient responds appropriately to transfusion.  12/16-Continue Megace. Bleeding continues to be scant, continue plan for outpatient GYN follow up and continue Megace.

## 2022-12-16 NOTE — DISCHARGE SUMMARY
[facility] Medicine  Discharge Summary      Patient Name: Liliane Ford  MRN: [id number]  SLOANE: [id number]  Patient Class: IP- Inpatient  Admission Date: 12/14/2022  Hospital Length of Stay: 1 days  Discharge Date and Time:  12/16/2022 12:04 PM  Attending Physician: Norma Freitas MD   Discharging Provider: Norma Freitas MD  Primary Care Provider: Kishan Kelley MD    Primary Care Team: Networked reference to record PCT     HPI:   Liliane Ford is a 67 year old female with history of postmenopausal bleeding, hypertension, and most recently an abnormal coronary CTA who presents to ED for further evaluation of progressive exertional fatigue and weakness. She has had persistent postmenopausal bleeding since August. She continues to have bleeding despite megace. She reports a similar event 2-3 years ago, and she was found to have a hemoglobin of <4g/dL and was transfused 4 units PRBCs. She denies associated symptoms of light headedness, dizziness, chest pain, and palpitations. She was last seen by Cristine Marquez NP who recommended follow up with Dr. Aquino to discuss EMB and other treatment options. She states that she has an appointment scheduled for 1/11/23.     Additionally, the patient had a recent coronary CTA that demonstrated 70% mid LAD stenosis. She is scheduled to have a [facility] with possible PCI on 12//21/22.     In ED, H/H 4.9/16.5. Her other labs and vitals were stable. Her case has been discussed with Dr. Haq who plans to see patient today. She will be placed in observation under the care of hospital medicine.             * No surgery found *      Hospital Course:   67 year old female, PMHx: EZEKIEL, HLD, HTN, Postmenopausal bleeding. Patient presented to the ED with c/o severe fatigue and weakness. Admission Hgb: 4.9, transfused with 2U PRBC on admission, Hgb improved to 7.0, Hgb this AM remained at 7.0, due to continued LUGO, fatigue and vaginal bleeding transfused additional unit PRBC today, for  total of 3U PRBC since admission. GYN initiated megace for the bleeding, patient is scheduled to see GYN on 1/11 for evaluation for possible hysterectomy but if vaginal bleeding worsens or does not improve may be considered for emergent hysterectomy.  Iron level was found to be undetectable, subsequently was started on IV iron infusion. On the day of discharge, patient noted to have hemoglobin of 8.3, indicating appropriate hemoglobin rise with transfusion, additionally she had received 2 doses of IV iron.  Patient seen and examined personally on the day of discharge with  present at bedside.  Discussed recommendation for continuing IV iron with hematology referral initiated.  Prescriptions for Megace and oral iron sent to pharmacy.  All questions answered to their satisfaction.  Stable for discharge home at this time with outpatient follow-up with Gynecology and Hematology.       Goals of Care Treatment Preferences:  Code Status: Full Code      Consults:   Consults (From admission, onward)        Status Ordering Provider     Inpatient consult to OB/GYN  Once        Provider:  Armando Haq MD    Acknowledged [facility]          * Symptomatic anemia  67 year old female who presented to ED with exertional fatigue in setting of persistent postmenopausal bleeding since August 2022. She is noted to have severe microcytic anemia (H/H 4.9/16.5). Orders placed for 2 units PrBCs.iron studies are pending. A repeat CBC will be collected after this these transfusions and will order more if as needed. She is known to our GYN group. Her last PAP smear was not successful due to her bleeding. She is scheduled to see Dr. Aquino on 1/11 for possible EMB and to discuss other options for postmenopausal bleeding. GYN has been consulted for further recs.     --Severe EZEKIEL: IV Venofer 200mg Daily IV, follow-up with Hematology after discharge to continue repletion.     Fibroids  Has follow up with Dr. Aquino on  1/11    History of endometrial hyperplasia        Abnormal computed tomography angiography (CTA)  11/2022 coronary CTA that demonstrated 70% mid LAD stenosis. She is scheduled to have a [facility] with possible PCI on 12//21/22.   Followed by Dr. Plascencia  --resume ASA, STATIN, and BB    Essential hypertension  --resume metoprolol, amlodipine, and losartan        Postmenopausal bleeding  See plan for Symptomatic anemia    --Seen by GYN, initiated on Megace  --Scheduled to see Dr. Aquino on 1/11 for evaluation as OP      Final Active Diagnoses:    Diagnosis Date Noted POA    PRINCIPAL PROBLEM:  Symptomatic anemia [D64.9] 02/04/2020 Yes    Abnormal computed tomography angiography (CTA) [R93.89] 12/14/2022 Yes    History of endometrial hyperplasia [Z87.42] 12/14/2022 Not Applicable    Fibroids [D21.9] 12/14/2022 Yes    Postmenopausal bleeding [N95.0] 02/04/2020 Yes    Essential hypertension [I10] 02/04/2020 Yes      Problems Resolved During this Admission:       Discharged Condition: stable    Disposition: Home or Self Care    Follow Up:   Follow-up Information     Kishan Kelley MD Follow up.    Specialty: Family Medicine  Contact information:  [address] [place] [id number] [phone]             Ema Aquino MD Follow up.    Specialty: Obstetrics and Gynecology  Contact information:  [address] [facility] [address] [phone]                       Patient Instructions:      Ambulatory referral/consult to Hematology / Oncology   Standing Status: Future   Referral Priority: Routine Referral Type: Consultation   Referral Reason: Specialty Services Required   Requested Specialty: Hematology and Oncology   Number of Visits Requested: 1     Notify your health care provider if you experience any of the following:  difficulty breathing or increased cough     Notify your health care provider if you experience any of the following:  persistent dizziness, light-headedness, or visual disturbances     Notify your  health care provider if you experience any of the following:  increased confusion or weakness     Activity as tolerated       Significant Diagnostic Studies: Labs:   BMP:   Recent Labs   Lab 12/15/22  0605 12/16/22  0929   GLU 98 122*    141   K 3.8 3.6   * 111*   CO2 20* 22*   BUN 12 14   CREATININE 0.7 0.7   CALCIUM 8.3* 8.7   , CBC   Recent Labs   Lab 12/14/22  1830 12/15/22  0606 12/16/22  0929   WBC 4.19 4.38 5.96   HGB 7.0* 7.0* 8.3*   HCT 23.1* 22.6* 26.9*    371 375    and All labs within the past 24 hours have been reviewed    Pending Diagnostic Studies:     None         Medications:  Reconciled Home Medications:      Medication List      START taking these medications    megestroL 40 MG Tab  Commonly known as: MEGACE  Take 1 tablet (40 mg total) by mouth once daily.        CONTINUE taking these medications    amLODIPine 10 MG tablet  Commonly known as: NORVASC  Take 1 tablet (10 mg total) by mouth once daily.     ascorbic acid (vitamin C) 500 MG tablet  Commonly known as: VITAMIN C  Take 1 tablet (500 mg total) by mouth every evening.     aspirin 81 MG EC tablet  Commonly known as: ECOTRIN  Take 1 tablet (81 mg total) by mouth once daily.     b complex vitamins tablet  Take 1 tablet by mouth once daily. B COMPLETE     ferrous sulfate 325 (65 FE) MG EC tablet  Take 1 tablet (325 mg total) by mouth 2 (two) times daily with meals.     losartan 100 MG tablet  Commonly known as: COZAAR  Take 1 tablet (100 mg total) by mouth once daily.     rosuvastatin 20 MG tablet  Commonly known as: CRESTOR  Take 1 tablet (20 mg total) by mouth every evening.        ASK your doctor about these medications    metoprolol succinate 25 MG 24 hr tablet  Commonly known as: TOPROL-XL  Take 2 tablets (50 mg total) by mouth once daily.            Indwelling Lines/Drains at time of discharge:   Lines/Drains/Airways     None                 Time spent on the discharge of patient: 35 minutes         Norma Freitas  MD  Department of Hospital Medicine  [facility]

## 2022-12-16 NOTE — PLAN OF CARE
Patient was administered 1 full unit of RBCs on yesterday, 12/15/2022, that was stopped at approximately 1652. Patient being discharged home in stable condition. Patient remains free from injury/falls during stay.

## 2022-12-18 LAB
OHS CV HOLTER SINUS AVERAGE HR: 77
OHS CV HOLTER SINUS MAX HR: 125
OHS CV HOLTER SINUS MIN HR: 54

## 2022-12-19 ENCOUNTER — TELEPHONE (OUTPATIENT)
Dept: CARDIOLOGY | Facility: CLINIC | Age: 67
End: 2022-12-19
Payer: COMMERCIAL

## 2022-12-19 ENCOUNTER — PATIENT MESSAGE (OUTPATIENT)
Dept: CARDIOLOGY | Facility: CLINIC | Age: 67
End: 2022-12-19
Payer: COMMERCIAL

## 2022-12-19 NOTE — PHYSICIAN QUERY
PT Name: Liliane Ford  MR #: [id number]    DOCUMENTATION CLARIFICATION      CDS/: YANDEL Tom, RN, CDS              Contact information:[email or web address]     This form is a permanent document in the medical record.      Query Date: December 19, 2022    By submitting this query, we are merely seeking further clarification of documentation. Please utilize your independent clinical judgment when addressing the question(s) below.    The Medical Record contains the following:   Indicators  Supporting Clinical Findings Location in Medical Record   X Anemia documented  Symptomatic anemia     She is noted to have severe microcytic anemia (H/H 4.9/16.5).      Pt admitted to  secondary to symptomatic anemia and is currently receiving blood transfusion     Severe EZEKIEL    H&P, GENESIS Pichardo NP/Dr. Hernandez, 12/14         GYN. PARVEZ Portillo PA-C/Dr. Monroe, 12/15       , JACK Burciaga NP/Dr. Freitas, 12/15   X H&H  She is noted to have severe microcytic anemia (H/H 4.9/16.5).     12/14/22  11:44 12/14/22 18:30 12/15/22 06:06 12/16/22 09:29   HEMOGLOBIN 4.9 (LL) 7.0 (L) 7.0 (L) 8.3 (L)   HEMATOCRIT 16.5 (LL) 23.1 (L) 22.6 (L) 26.9 (L)      12/14/22 11:44   Iron <10 (L)   TIBC 417   Saturated Iron Unable to calculate   Transferrin 282       H&P, GENESIS Pichardo NP/Dr. Hernandez, 12/14     Lab 12/14-12/16               Lab 12/14   X BP                    HR  Pulse:  [79-88] 79   BP: (144-158)/(64-74) 144/64    H&PGENESIS NP/Dr. Hernandez, 12/14    GI bleeding documented     X Acute bleeding (Non GI site)  History of postmenopausal bleeding     She has had persistent postmenopausal bleeding since August     She continues to have bleeding despite megace.     Pt. Reports she has fibroid and had and oblation in August   She says that she has been bleeding more since then and now she is having extreme weakness and fatigue.      Presented to ED with exertional fatigue in setting of persistent postmenopausal bleeding since August  2022.     Has been noting daily vaginal bleeding since 08/22.    She reports the vaginal bleeding is persistent but light    HGENESIS YOUNG NP/Dr. Hernandez, 12/14                           GYN. PARVEZ Portillo PA-C/Dr. Monroe, 12/15   X Transfusion(s)  Orders placed for 2 units PrBCs     Total of 3U PRBC since admission    GENESIS MCKEON NP/Dr. Hernandez, 12/14     , JACK Burciaga, LASHAY/Dr. Freitas, 12/15   X Acute/Chronic illness  Abnormal computed tomography angiography (CTA), postmenopausal bleeding, essential hypertension     GENESIS MCKEON NP/Dr. Hernandez, 12/14   X Treatments  She is known to our GYN group. Her last PAP smear was not successful due to her bleeding. She is scheduled to see Dr. Aquino on 1/11 for possible EMB and to discuss other options for postmenopausal bleeding     IV Venofer 200mg Daily IV, follow-up with Hematology after discharge to continue repletion.     GENESIS MCKEON NP/Dr. Hernandez, 12/14         , JACK Burciaga, LASHAY/Dr. Freitas, 12/15   X Other  Pt. Reports she has fibroid and had and oblation in August     Presents to ED for further evaluation of progressive exertional fatigue and weakness. She has had persistent postmenopausal bleeding since August. She continues to have bleeding despite megace. She reports a similar event 2-3 years ago, and she was found to have a hemoglobin of <4g/dL and was transfused 4 units PRBCs     History of Complex Hyperplasia without Atypia diagnosed 02/20 and subsequently treated with course of Megace (follow up EMBs were negative for hyperplasia or malignancy on 08/21, 12/21 and 10/22).    GENESIS MCKEON NP/Dr. Hernandez, 12/14                 KYMBERLY Portillo PA-C/Dr. Monroe, 12/15     Provider, please specify the anemia diagnosis or diagnoses associated with above clinical findings. Please check all that apply:     [  x ] Acute blood loss anemia    [   ] Iron deficiency anemia    [   ] Other Hematological Diagnosis (please specify): _________________   [   ]  Clinically Undetermined              Please document in your progress notes daily for the duration of treatment, until resolved, and include in your discharge summary.    Form No. [id number]

## 2022-12-19 NOTE — TELEPHONE ENCOUNTER
Please call pt.    Cancel LHC this week.  Pt just admitted for severe anemia with Hg 4.9 and needed PRBC.    LHC unsafe to proceed. Please schedule f/u appt with me next month for reevaluation of her CAD.  Will need to treat medically for now until anemia stabilizes then can consider LHC.    Thanks    Dr Plascencia

## 2022-12-19 NOTE — TELEPHONE ENCOUNTER
Telephoned patient to advise of postponing heart cath for now and confirmed follow up appointment on January 24th for 1:00PM

## 2022-12-20 ENCOUNTER — PATIENT OUTREACH (OUTPATIENT)
Dept: ADMINISTRATIVE | Facility: HOSPITAL | Age: 67
End: 2022-12-20
Payer: COMMERCIAL

## 2022-12-20 ENCOUNTER — TELEPHONE (OUTPATIENT)
Dept: HEMATOLOGY/ONCOLOGY | Facility: CLINIC | Age: 67
End: 2022-12-20
Payer: COMMERCIAL

## 2022-12-20 DIAGNOSIS — D64.9 SYMPTOMATIC ANEMIA: Primary | ICD-10-CM

## 2022-12-20 NOTE — TELEPHONE ENCOUNTER
Spoke to patient in reference to Hematology referral from Dr. Freitas.  Appointment scheduled per patient's request next available in [place].  Appointment notice via [facility].  Instructed to report back to the ER for SOB, chest pain, dizziness, blurred vision.  Pt verbalized understanding.

## 2022-12-20 NOTE — PROGRESS NOTES
HOSPITAL FOLLOW UP: Called and spoke with patient. Confirmed appt for tomorrow, 12/21/22 with Dr. Kelley at [facility].

## 2022-12-21 ENCOUNTER — OFFICE VISIT (OUTPATIENT)
Dept: PRIMARY CARE CLINIC | Facility: CLINIC | Age: 67
End: 2022-12-21
Payer: COMMERCIAL

## 2022-12-21 VITALS
BODY MASS INDEX: 33.95 KG/M2 | HEART RATE: 92 BPM | DIASTOLIC BLOOD PRESSURE: 80 MMHG | SYSTOLIC BLOOD PRESSURE: 132 MMHG | OXYGEN SATURATION: 94 % | TEMPERATURE: 98 F | WEIGHT: 184.5 LBS | RESPIRATION RATE: 15 BRPM | HEIGHT: 62 IN

## 2022-12-21 DIAGNOSIS — D64.9 SYMPTOMATIC ANEMIA: ICD-10-CM

## 2022-12-21 DIAGNOSIS — Z09 HOSPITAL DISCHARGE FOLLOW-UP: Primary | ICD-10-CM

## 2022-12-21 DIAGNOSIS — D21.9 FIBROID: ICD-10-CM

## 2022-12-21 DIAGNOSIS — I10 ESSENTIAL HYPERTENSION: ICD-10-CM

## 2022-12-21 PROCEDURE — 99215 PR OFFICE/OUTPT VISIT, EST, LEVL V, 40-54 MIN: ICD-10-PCS | Mod: S$GLB,,, | Performed by: FAMILY MEDICINE

## 2022-12-21 PROCEDURE — 99999 PR PBB SHADOW E&M-EST. PATIENT-LVL V: CPT | Mod: PBBFAC,,, | Performed by: FAMILY MEDICINE

## 2022-12-21 PROCEDURE — 1160F PR REVIEW ALL MEDS BY PRESCRIBER/CLIN PHARMACIST DOCUMENTED: ICD-10-PCS | Mod: CPTII,S$GLB,, | Performed by: FAMILY MEDICINE

## 2022-12-21 PROCEDURE — 99215 OFFICE O/P EST HI 40 MIN: CPT | Mod: S$GLB,,, | Performed by: FAMILY MEDICINE

## 2022-12-21 PROCEDURE — 3079F PR MOST RECENT DIASTOLIC BLOOD PRESSURE 80-89 MM HG: ICD-10-PCS | Mod: CPTII,S$GLB,, | Performed by: FAMILY MEDICINE

## 2022-12-21 PROCEDURE — 1160F RVW MEDS BY RX/DR IN RCRD: CPT | Mod: CPTII,S$GLB,, | Performed by: FAMILY MEDICINE

## 2022-12-21 PROCEDURE — 4010F ACE/ARB THERAPY RXD/TAKEN: CPT | Mod: CPTII,S$GLB,, | Performed by: FAMILY MEDICINE

## 2022-12-21 PROCEDURE — 99999 PR PBB SHADOW E&M-EST. PATIENT-LVL V: ICD-10-PCS | Mod: PBBFAC,,, | Performed by: FAMILY MEDICINE

## 2022-12-21 PROCEDURE — 3044F HG A1C LEVEL LT 7.0%: CPT | Mod: CPTII,S$GLB,, | Performed by: FAMILY MEDICINE

## 2022-12-21 PROCEDURE — 1111F DSCHRG MED/CURRENT MED MERGE: CPT | Mod: CPTII,S$GLB,, | Performed by: FAMILY MEDICINE

## 2022-12-21 PROCEDURE — 3044F PR MOST RECENT HEMOGLOBIN A1C LEVEL <7.0%: ICD-10-PCS | Mod: CPTII,S$GLB,, | Performed by: FAMILY MEDICINE

## 2022-12-21 PROCEDURE — 1159F PR MEDICATION LIST DOCUMENTED IN MEDICAL RECORD: ICD-10-PCS | Mod: CPTII,S$GLB,, | Performed by: FAMILY MEDICINE

## 2022-12-21 PROCEDURE — 4010F PR ACE/ARB THEARPY RXD/TAKEN: ICD-10-PCS | Mod: CPTII,S$GLB,, | Performed by: FAMILY MEDICINE

## 2022-12-21 PROCEDURE — 1111F PR DISCHARGE MEDS RECONCILED W/ CURRENT OUTPATIENT MED LIST: ICD-10-PCS | Mod: CPTII,S$GLB,, | Performed by: FAMILY MEDICINE

## 2022-12-21 PROCEDURE — 3075F SYST BP GE 130 - 139MM HG: CPT | Mod: CPTII,S$GLB,, | Performed by: FAMILY MEDICINE

## 2022-12-21 PROCEDURE — 1159F MED LIST DOCD IN RCRD: CPT | Mod: CPTII,S$GLB,, | Performed by: FAMILY MEDICINE

## 2022-12-21 PROCEDURE — 3008F PR BODY MASS INDEX (BMI) DOCUMENTED: ICD-10-PCS | Mod: CPTII,S$GLB,, | Performed by: FAMILY MEDICINE

## 2022-12-21 PROCEDURE — 3008F BODY MASS INDEX DOCD: CPT | Mod: CPTII,S$GLB,, | Performed by: FAMILY MEDICINE

## 2022-12-21 PROCEDURE — 3075F PR MOST RECENT SYSTOLIC BLOOD PRESS GE 130-139MM HG: ICD-10-PCS | Mod: CPTII,S$GLB,, | Performed by: FAMILY MEDICINE

## 2022-12-21 PROCEDURE — 3079F DIAST BP 80-89 MM HG: CPT | Mod: CPTII,S$GLB,, | Performed by: FAMILY MEDICINE

## 2022-12-21 RX ORDER — METOPROLOL SUCCINATE 50 MG/1
50 TABLET, EXTENDED RELEASE ORAL DAILY
Qty: 90 TABLET | Refills: 4 | Status: SHIPPED | OUTPATIENT
Start: 2022-12-21 | End: 2023-10-10 | Stop reason: SDUPTHER

## 2022-12-21 NOTE — PROGRESS NOTES
"    [facility]       [address] [place] [address]      Phone: [phone]      Fax: [phone]    Kishan Kelley MD                Office Visit  2022        Subjective      HPI:  Liliane Ford is a 67 y.o. female presents today in clinic for "Follow-up  ."     67-year-old female presents today for hospital follow-up.      Patient states that she was at [facility] from  - .  Has history of fibroids.  Has been bleeding from them for several months.  Was scheduled to have heart catheterization done, but before the procedure could be done, she was experiencing severe fatigue, weakness.  She went to the ER for evaluation.  Was found to have hemoglobin level of four.  Was admitted, received blood transfusion.  Was evaluated by gyn.  Was given Megace, which stopped the bleeding.  States that she has not bled for the last three days.  Feeling better.    Heart catheterization was postponed until her blood counts improved, stabilized.  Rescheduled for January.      She has upcoming appointment with her gynecologist to discuss the fibroids.  Was recommended that she have hysterectomy to prevent future episodes of bleeding.      Because of the bleeding, she was referred to Hematology.  Has appointment with them on .    Today, she states she feels fine.  Feeling much better.  No chest pain, shortness to breath.  No fever, chills, body aches.  No coughing, sneezing, URI type symptoms.  States appetite is normal.  No urinary issues.    States blood pressure has been doing much better at home ever since we adjusted her medications.  At home, blood pressure normally runs in the 120s SBP.    PMH: HTN, fibroids  PSH:   F MH: None significant reported  allergies:  Penicillin caused a rash as a child.    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  .    T: No current use.  Quit 40+ years ago.    a: Denies  D: " Denies    exercise: Daily    colon:  2022. : Guard negative.  Repeat three years ().    mm2022     gyn:  Dr. Aquino ([facility])      The following were updated and reviewed by myself in the chart: medications, past medical history, past surgical history, family history, social history, and allergies.     Medications:  Current Outpatient Medications on File Prior to Visit   Medication Sig Dispense Refill    amLODIPine (NORVASC) 10 MG tablet Take 1 tablet (10 mg total) by mouth once daily. 90 tablet 3    ascorbic acid, vitamin C, (VITAMIN C) 500 MG tablet Take 1 tablet (500 mg total) by mouth every evening.      aspirin (ECOTRIN) 81 MG EC tablet Take 1 tablet (81 mg total) by mouth once daily. 90 tablet 3    b complex vitamins tablet Take 1 tablet by mouth once daily. B COMPLETE      ferrous sulfate 325 (65 FE) MG EC tablet Take 1 tablet (325 mg total) by mouth 2 (two) times daily with meals. 60 tablet 0    losartan (COZAAR) 100 MG tablet Take 1 tablet (100 mg total) by mouth once daily. 90 tablet 3    megestroL (MEGACE) 40 MG Tab Take 1 tablet (40 mg total) by mouth once daily. 30 tablet 2    rosuvastatin (CRESTOR) 20 MG tablet Take 1 tablet (20 mg total) by mouth every evening. 90 tablet 3    [DISCONTINUED] metoprolol succinate (TOPROL-XL) 25 MG 24 hr tablet Take 2 tablets (50 mg total) by mouth once daily. (Patient taking differently: Take 25 mg by mouth once daily.) 90 tablet 3     No current facility-administered medications on file prior to visit.        PMHx:  Past Medical History:   Diagnosis Date    Hypertension     Pulmonary edema       Patient Active Problem List    Diagnosis Date Noted    Abnormal computed tomography angiography (CTA) 2022    History of endometrial hyperplasia 2022    Fibroids 2022    Palpitations 2022    Abnormal ECG 2022    Hypercholesterolemia 2022    Encounter for screening colonoscopy 2022    Delayed immunizations 2022     "Bunion 2022    Symptomatic anemia 2020    Postmenopausal bleeding 2020    Essential hypertension 2020    Thrombocytosis 2020    Elevated AST (SGOT) 2020    Cardiomegaly 2020    Low iron 2020    Thyroid nodule 2020        PSHx:  Past Surgical History:   Procedure Laterality Date     SECTION      x 2        FHx:  Family History   Problem Relation Age of Onset    Hypertension Mother         Social:  Social History     Socioeconomic History    Marital status:     Number of children: 3   Tobacco Use    Smoking status: Former     Types: Cigarettes    Smokeless tobacco: Never   Substance and Sexual Activity    Alcohol use: Never    Drug use: Never    Sexual activity: Not Currently     Partners: Male     Birth control/protection: Post-menopausal        Allergies:  Review of patient's allergies indicates:   Allergen Reactions    Penicillins Rash        ROS:  Review of Systems   Constitutional:  Negative for activity change, appetite change, chills and fever.   HENT:  Negative for congestion, postnasal drip, rhinorrhea, sore throat and trouble swallowing.    Respiratory:  Negative for cough, shortness of breath and wheezing.    Cardiovascular:  Negative for chest pain and palpitations.   Gastrointestinal:  Negative for abdominal pain, constipation, diarrhea, nausea and vomiting.   Genitourinary:  Negative for difficulty urinating.   Musculoskeletal:  Negative for arthralgias and myalgias.   Skin:  Negative for color change and rash.   Neurological:  Negative for speech difficulty and headaches.   All other systems reviewed and are negative.       Objective      /80   Pulse 92   Temp 97.7 °F (36.5 °C) (Temporal)   Resp 15   Ht 5' 2" (1.575 m)   Wt 83.7 kg (184 lb 8.4 oz)   SpO2 (!) 94%   BMI 33.75 kg/m²   Ht Readings from Last 3 Encounters:   22 5' 2" (1.575 m)   22 5' 2" (1.575 m)   22 5' 2" (1.575 m)     Wt Readings from Last " 3 Encounters:   12/21/22 83.7 kg (184 lb 8.4 oz)   12/16/22 79.2 kg (174 lb 9.7 oz)   11/22/22 83.5 kg (184 lb)       PHYSICAL EXAM:  Physical Exam  Vitals and nursing note reviewed.   Constitutional:       General: She is not in acute distress.     Appearance: Normal appearance.   HENT:      Head: Normocephalic and atraumatic.      Right Ear: Tympanic membrane, ear canal and external ear normal.      Left Ear: Tympanic membrane, ear canal and external ear normal.      Nose: Nose normal. No congestion or rhinorrhea.      Mouth/Throat:      Mouth: Mucous membranes are moist.      Pharynx: Oropharynx is clear. No oropharyngeal exudate or posterior oropharyngeal erythema.   Eyes:      Extraocular Movements: Extraocular movements intact.      Conjunctiva/sclera: Conjunctivae normal.      Pupils: Pupils are equal, round, and reactive to light.   Cardiovascular:      Rate and Rhythm: Normal rate and regular rhythm.   Pulmonary:      Effort: Pulmonary effort is normal. No respiratory distress.      Breath sounds: No wheezing, rhonchi or rales.   Musculoskeletal:         General: Normal range of motion.      Cervical back: Normal range of motion.   Lymphadenopathy:      Cervical: No cervical adenopathy.   Skin:     General: Skin is warm and dry.      Findings: No rash.   Neurological:      Mental Status: She is alert.            LABS / IMAGING:  Recent Results (from the past 4368 hour(s))   Comprehensive Metabolic Panel    Collection Time: 08/30/22 10:48 AM   Result Value Ref Range    Sodium 141 136 - 145 mmol/L    Potassium 4.4 3.5 - 5.1 mmol/L    Chloride 108 95 - 110 mmol/L    CO2 20 (L) 23 - 29 mmol/L    Glucose 90 70 - 110 mg/dL    BUN 7 (L) 8 - 23 mg/dL    Creatinine 0.8 0.5 - 1.4 mg/dL    Calcium 9.4 8.7 - 10.5 mg/dL    Total Protein 6.9 6.0 - 8.4 g/dL    Albumin 3.9 3.5 - 5.2 g/dL    Total Bilirubin 0.8 0.1 - 1.0 mg/dL    Alkaline Phosphatase 62 55 - 135 U/L    AST 29 10 - 40 U/L    ALT 30 10 - 44 U/L    Anion Gap  13 8 - 16 mmol/L    eGFR >60.0 >60 mL/min/1.73 m^2   CBC Auto Differential    Collection Time: 08/30/22 10:48 AM   Result Value Ref Range    WBC 4.52 3.90 - 12.70 K/uL    RBC 3.86 (L) 4.00 - 5.40 M/uL    Hemoglobin 10.7 (L) 12.0 - 16.0 g/dL    Hematocrit 35.1 (L) 37.0 - 48.5 %    MCV 91 82 - 98 fL    MCH 27.7 27.0 - 31.0 pg    MCHC 30.5 (L) 32.0 - 36.0 g/dL    RDW 15.3 (H) 11.5 - 14.5 %    Platelets 314 150 - 450 K/uL    MPV 11.7 9.2 - 12.9 fL    Immature Granulocytes 0.2 0.0 - 0.5 %    Gran # (ANC) 2.7 1.8 - 7.7 K/uL    Immature Grans (Abs) 0.01 0.00 - 0.04 K/uL    Lymph # 1.3 1.0 - 4.8 K/uL    Mono # 0.4 0.3 - 1.0 K/uL    Eos # 0.1 0.0 - 0.5 K/uL    Baso # 0.04 0.00 - 0.20 K/uL    nRBC 0 0 /100 WBC    Gran % 60.4 38.0 - 73.0 %    Lymph % 29.0 18.0 - 48.0 %    Mono % 8.2 4.0 - 15.0 %    Eosinophil % 1.3 0.0 - 8.0 %    Basophil % 0.9 0.0 - 1.9 %    Differential Method Automated    TSH    Collection Time: 08/30/22 10:48 AM   Result Value Ref Range    TSH 2.484 0.400 - 4.000 uIU/mL   Hemoglobin A1C    Collection Time: 08/30/22 10:48 AM   Result Value Ref Range    Hemoglobin A1C 4.9 4.0 - 5.6 %    Estimated Avg Glucose 94 68 - 131 mg/dL   Lipid Panel    Collection Time: 08/30/22 10:48 AM   Result Value Ref Range    Cholesterol 225 (H) 120 - 199 mg/dL    Triglycerides 58 30 - 150 mg/dL    HDL 71 40 - 75 mg/dL    LDL Cholesterol 142.4 63.0 - 159.0 mg/dL    HDL/Cholesterol Ratio 31.6 20.0 - 50.0 %    Total Cholesterol/HDL Ratio 3.2 2.0 - 5.0    Non-HDL Cholesterol 154 mg/dL   Ferritin    Collection Time: 08/30/22 10:48 AM   Result Value Ref Range    Ferritin 33 20.0 - 300.0 ng/mL   Iron and TIBC    Collection Time: 08/30/22 10:48 AM   Result Value Ref Range    Iron 61 30 - 160 ug/dL    Transferrin 262 200 - 375 mg/dL    TIBC 388 250 - 450 ug/dL    Saturated Iron 16 (L) 20 - 50 %   Cologuard Screening (Multitarget Stool DNA)    Collection Time: 09/16/22  9:35 AM    Specimen: Rectum; Stool   Result Value Ref Range     "Cologuard Result Sample Could Not Be Processed 3 Negative   Cologuard Screening (Multitarget Stool DNA)    Collection Time: 09/30/22  7:32 AM    Specimen: Rectum; Stool   Result Value Ref Range    Cologuard Result Negative Negative   Specimen to Pathology, Ob/Gyn    Collection Time: 10/27/22  9:59 AM   Result Value Ref Range    Final Pathologic Diagnosis       SCANT FRAGMENTS OF INACTIVE ENDOMETRIUM ADMIXED WITH ABUNDANT BLOOD CLOT    Gross       Hospital/clinic label MRN:  [id number]  Pathology label MRN:  [id number]  The specimen is received in formalin labeled "EMB".  The specimen consists of  multiple tan-brown fragments of soft tissue measuring 2.6 x 1.7 x 0.8 cm in  aggregate. The specimen is submitted entirely in cassette [id number].  Joni Russell,  Gross Technologist      Disclaimer       Unless the case is a 'gross only' or additional testing only, the final  diagnosis for each specimen is based on a microscopic examination of  appropriate tissue sections.     Liquid-Based Pap Smear, Screening    Collection Time: 10/27/22 10:02 AM   Result Value Ref Range    Final Pathologic Diagnosis (A)      Specimen Adequacy  Unsatisfactory for evaluation.  [place] Category  Unsatisfactory for evaluation. Specimen processed and examined, but  unsatisfactory for evaluation of epithelial abnormality because of scant  cellularity.  Blood present.      Disclaimer (A)      The Pap smear is a screening test that aids in the detection of cervical  cancer and cancer precursors. Both false positive and false negative results  can occur. The test should be used at regular intervals, and positive results  should be confirmed before definitive therapy.  This liquid based specimen is processed using the  or  Thin PrepPAP  System. This specimen has been analyzed by the Alpha[employer]p Imaging System  ([employer]), an automated imaging and review system which assists  the laboratory in evaluating cells on ThinPrep PAP " tests. Following automated  imaging, selected fields from every slide are reviewed by a cytotechnologist  and/or pathologist.  Screening was performed at [facility], [address] [place] [address].     HPV High Risk Genotypes, PCR    Collection Time: 10/27/22 10:02 AM   Result Value Ref Range    HPV other High Risk types, PCR Negative Negative    HPV High Risk type 16, PCR Negative Negative    HPV High Risk type 18, PCR Negative Negative   Echo    Collection Time: 11/22/22  9:41 AM   Result Value Ref Range    BSA 1.91 m2    TDI SEPTAL 0.06 m/s    LV LATERAL E/E' RATIO 13.57 m/s    LV SEPTAL E/E' RATIO 15.83 m/s    LA WIDTH 4.80 cm    Left Ventricular Outflow Tract Mean Velocity 0.75 cm/s    Left Ventricular Outflow Tract Mean Gradient 2.56 mmHg    TDI LATERAL 0.07 m/s    PV PEAK VELOCITY 1.12 cm/s    LVIDd 4.82 3.5 - 6.0 cm    IVS 1.28 (A) 0.6 - 1.1 cm    Posterior Wall 1.38 (A) 0.6 - 1.1 cm    LVIDs 2.70 2.1 - 4.0 cm    FS 44 28 - 44 %    LA volume 101.25 cm3    Sinus 3.19 cm    STJ 2.92 cm    Ascending aorta 3.14 cm    LV mass 255.63 g    LA size 4.78 cm    RVDD 3.42 cm    TAPSE 3.22 cm    Left Ventricle Relative Wall Thickness 0.57 cm    AV mean gradient 6 mmHg    AV valve area 2.20 cm2    AV Velocity Ratio 0.70     AV index (prosthetic) 0.66     E/A ratio 1.00     Mean e' 0.07 m/s    E wave deceleration time 278.16 msec    IVRT 53.28 msec    Pulm vein S/D ratio 1.28     LVOT diameter 2.06 cm    LVOT area 3.3 cm2    LVOT peak jovanni 1.12 m/s    LVOT peak VTI 26.90 cm    Ao peak jovanni 1.60 m/s    Ao VTI 40.7 cm    RVOT peak jovanni 0.72 m/s    RVOT peak VTI 18.4 cm    LVOT stroke volume 89.61 cm3    AV peak gradient 10 mmHg    PV mean gradient 1.25 mmHg    E/E' ratio 14.62 m/s    MV Peak E Jovanni 0.95 m/s    TR Max Jovanni 2.69 m/s    MV Peak A Jovanni 0.95 m/s    PV Peak S Jovanni 0.68 m/s    PV Peak D Jovanni 0.53 m/s    LV Systolic Volume 27.00 mL    LV Systolic Volume Index 14.7  mL/m2    LV Diastolic Volume 108.70 mL    LV Diastolic Volume Index 59.08 mL/m2    LA Volume Index 55.0 mL/m2    LV Mass Index 139 g/m2    RA Major Axis 4.88 cm    Left Atrium Minor Axis 5.41 cm    Left Atrium Major Axis 4.99 cm    Triscuspid Valve Regurgitation Peak Gradient 29 mmHg    LA Volume Index (Mod) 37.7 mL/m2    LA volume (mod) 69.38 cm3    RA Width 3.36 cm    EF 60 %   Cardiac Monitor - 3-15 Day Adult (Cupid Only)    Collection Time: 11/22/22 10:45 AM   Result Value Ref Range    Sinus min HR 54     Sinus max hr 125     Sinus avg hr 77    Nuclear Stress - Cardiology Interpreted    Collection Time: 11/22/22 11:02 AM   Result Value Ref Range    85% Max Predicted      Max Predicted      [facility] CV CPX PATIENT IS MALE 0.0     [facility] CV CPX PATIENT IS FEMALE 1.0     Systolic blood pressure 169 mmHg    Diastolic blood pressure 95 mmHg    HR at rest 77 bpm    Exercise duration (min) 6 minutes    Exercise duration (sec) 0 seconds    Peak Systolic  mmHg    Peak Diatolic BP 91 mmHg    Peak  bpm    Estimated METs 7     % Max HR Achieved 94     1 Minute Recovery  bpm    RPP 13,013     Peak RPP 26,578     Nuc Rest EF 72     Nuc Stress EF 76 %   CBC auto differential    Collection Time: 12/14/22 11:44 AM   Result Value Ref Range    WBC 4.54 3.90 - 12.70 K/uL    RBC 2.06 (L) 4.00 - 5.40 M/uL    Hemoglobin 4.9 (LL) 12.0 - 16.0 g/dL    Hematocrit 16.5 (LL) 37.0 - 48.5 %    MCV 80 (L) 82 - 98 fL    MCH 23.8 (L) 27.0 - 31.0 pg    MCHC 29.7 (L) 32.0 - 36.0 g/dL    RDW 19.8 (H) 11.5 - 14.5 %    Platelets 384 150 - 450 K/uL    MPV 10.0 9.2 - 12.9 fL    Immature Granulocytes 0.2 0.0 - 0.5 %    Gran # (ANC) 3.2 1.8 - 7.7 K/uL    Immature Grans (Abs) 0.01 0.00 - 0.04 K/uL    Lymph # 1.1 1.0 - 4.8 K/uL    Mono # 0.3 0.3 - 1.0 K/uL    Eos # 0.0 0.0 - 0.5 K/uL    Baso # 0.02 0.00 - 0.20 K/uL    nRBC 0 0 /100 WBC    Gran % 70.2 38.0 - 73.0 %    Lymph % 23.1 18.0 - 48.0 %    Mono % 5.9 4.0 - 15.0 %    Eosinophil  % 0.2 0.0 - 8.0 %    Basophil % 0.4 0.0 - 1.9 %    Platelet Estimate Appears normal     [place]iso Slight     Poik Slight     Poly Occasional     Hypo Occasional     Ovalocytes Occasional     Target Cells Occasional     Acanthocytes Present     Large/Giant Platelets Present     Differential Method Automated    Comprehensive metabolic panel    Collection Time: 12/14/22 11:44 AM   Result Value Ref Range    Sodium 139 136 - 145 mmol/L    Potassium 3.6 3.5 - 5.1 mmol/L    Chloride 111 (H) 95 - 110 mmol/L    CO2 20 (L) 23 - 29 mmol/L    Glucose 116 (H) 70 - 110 mg/dL    BUN 12 8 - 23 mg/dL    Creatinine 0.8 0.5 - 1.4 mg/dL    Calcium 8.4 (L) 8.7 - 10.5 mg/dL    Total Protein 6.1 6.0 - 8.4 g/dL    Albumin 3.5 3.5 - 5.2 g/dL    Total Bilirubin 0.4 0.1 - 1.0 mg/dL    Alkaline Phosphatase 50 (L) 55 - 135 U/L    AST 22 10 - 40 U/L    ALT 21 10 - 44 U/L    Anion Gap 8 8 - 16 mmol/L    eGFR >60 >60 mL/min/1.73 m^2   Type & Screen    Collection Time: 12/14/22 11:44 AM   Result Value Ref Range    Group & Rh A POS     Indirect Soraya NEG    HIV 1/2 Ag/Ab (4th Gen)    Collection Time: 12/14/22 11:44 AM   Result Value Ref Range    HIV 1/2 Ag/Ab Negative Negative   Hepatitis C Antibody    Collection Time: 12/14/22 11:44 AM   Result Value Ref Range    Hepatitis C Ab Negative Negative   HCV Virus Hold Specimen    Collection Time: 12/14/22 11:44 AM   Result Value Ref Range    HEP C Virus Hold Specimen Hold for HCV sendout    Prepare RBC 2 Units; severe anemia    Collection Time: 12/14/22 11:44 AM   Result Value Ref Range    UNIT NUMBER [id number]     Product Code M2218U72     DISPENSE STATUS TRANSFUSED     CODING SYSTEM JHXU398     Unit Blood Type Code 6200     Unit Blood Type A POS     Unit Expiration [id number]     UNIT NUMBER [id number]     Product Code K2341N02     DISPENSE STATUS TRANSFUSED     CODING SYSTEM [id number]     Unit Blood Type Code 6200     Unit Blood Type A POS     Unit Expiration [id number]    Iron and TIBC     Collection Time: 12/14/22 11:44 AM   Result Value Ref Range    Iron <10 (L) 30 - 160 ug/dL    Transferrin 282 200 - 375 mg/dL    TIBC 417 250 - 450 ug/dL    Saturated Iron Unable to calculate 20 - 50 %   Prepare RBC 1 Unit    Collection Time: 12/14/22 11:44 AM   Result Value Ref Range    UNIT NUMBER [id number]     Product Code P5309D80     DISPENSE STATUS TRANSFUSED     CODING SYSTEM JRMX236     Unit Blood Type Code 6200     Unit Blood Type A POS     Unit Expiration [id number]    CBC auto differential    Collection Time: 12/14/22  6:30 PM   Result Value Ref Range    WBC 4.19 3.90 - 12.70 K/uL    RBC 2.72 (L) 4.00 - 5.40 M/uL    Hemoglobin 7.0 (L) 12.0 - 16.0 g/dL    Hematocrit 23.1 (L) 37.0 - 48.5 %    MCV 85 82 - 98 fL    MCH 25.7 (L) 27.0 - 31.0 pg    MCHC 30.3 (L) 32.0 - 36.0 g/dL    RDW 18.4 (H) 11.5 - 14.5 %    Platelets 364 150 - 450 K/uL    MPV 10.1 9.2 - 12.9 fL    Immature Granulocytes 0.2 0.0 - 0.5 %    Gran # (ANC) 2.3 1.8 - 7.7 K/uL    Immature Grans (Abs) 0.01 0.00 - 0.04 K/uL    Lymph # 1.4 1.0 - 4.8 K/uL    Mono # 0.4 0.3 - 1.0 K/uL    Eos # 0.0 0.0 - 0.5 K/uL    Baso # 0.02 0.00 - 0.20 K/uL    nRBC 0 0 /100 WBC    Gran % 55.6 38.0 - 73.0 %    Lymph % 32.5 18.0 - 48.0 %    Mono % 10.5 4.0 - 15.0 %    Eosinophil % 0.7 0.0 - 8.0 %    Basophil % 0.5 0.0 - 1.9 %    Differential Method Automated    Comprehensive Metabolic Panel    Collection Time: 12/15/22  6:05 AM   Result Value Ref Range    Sodium 140 136 - 145 mmol/L    Potassium 3.8 3.5 - 5.1 mmol/L    Chloride 111 (H) 95 - 110 mmol/L    CO2 20 (L) 23 - 29 mmol/L    Glucose 98 70 - 110 mg/dL    BUN 12 8 - 23 mg/dL    Creatinine 0.7 0.5 - 1.4 mg/dL    Calcium 8.3 (L) 8.7 - 10.5 mg/dL    Total Protein 5.8 (L) 6.0 - 8.4 g/dL    Albumin 3.4 (L) 3.5 - 5.2 g/dL    Total Bilirubin 1.0 0.1 - 1.0 mg/dL    Alkaline Phosphatase 50 (L) 55 - 135 U/L    AST 22 10 - 40 U/L    ALT 20 10 - 44 U/L    Anion Gap 9 8 - 16 mmol/L    eGFR >60 >60 mL/min/1.73 m^2   CBC  Auto Differential    Collection Time: 12/15/22  6:06 AM   Result Value Ref Range    WBC 4.38 3.90 - 12.70 K/uL    RBC 2.70 (L) 4.00 - 5.40 M/uL    Hemoglobin 7.0 (L) 12.0 - 16.0 g/dL    Hematocrit 22.6 (L) 37.0 - 48.5 %    MCV 84 82 - 98 fL    MCH 25.9 (L) 27.0 - 31.0 pg    MCHC 31.0 (L) 32.0 - 36.0 g/dL    RDW 18.0 (H) 11.5 - 14.5 %    Platelets 371 150 - 450 K/uL    MPV 10.4 9.2 - 12.9 fL    Immature Granulocytes 0.2 0.0 - 0.5 %    Gran # (ANC) 3.1 1.8 - 7.7 K/uL    Immature Grans (Abs) 0.01 0.00 - 0.04 K/uL    Lymph # 0.9 (L) 1.0 - 4.8 K/uL    Mono # 0.3 0.3 - 1.0 K/uL    Eos # 0.0 0.0 - 0.5 K/uL    Baso # 0.02 0.00 - 0.20 K/uL    nRBC 0 0 /100 WBC    Gran % 70.3 38.0 - 73.0 %    Lymph % 20.3 18.0 - 48.0 %    Mono % 7.8 4.0 - 15.0 %    Eosinophil % 0.9 0.0 - 8.0 %    Basophil % 0.5 0.0 - 1.9 %    Differential Method Automated    CBC auto differential    Collection Time: 12/16/22  9:29 AM   Result Value Ref Range    WBC 5.96 3.90 - 12.70 K/uL    RBC 3.22 (L) 4.00 - 5.40 M/uL    Hemoglobin 8.3 (L) 12.0 - 16.0 g/dL    Hematocrit 26.9 (L) 37.0 - 48.5 %    MCV 84 82 - 98 fL    MCH 25.8 (L) 27.0 - 31.0 pg    MCHC 30.9 (L) 32.0 - 36.0 g/dL    RDW 17.9 (H) 11.5 - 14.5 %    Platelets 375 150 - 450 K/uL    MPV 10.3 9.2 - 12.9 fL    Immature Granulocytes 0.5 0.0 - 0.5 %    Gran # (ANC) 4.0 1.8 - 7.7 K/uL    Immature Grans (Abs) 0.03 0.00 - 0.04 K/uL    Lymph # 1.4 1.0 - 4.8 K/uL    Mono # 0.5 0.3 - 1.0 K/uL    Eos # 0.1 0.0 - 0.5 K/uL    Baso # 0.02 0.00 - 0.20 K/uL    nRBC 1 (A) 0 /100 WBC    Gran % 66.5 38.0 - 73.0 %    Lymph % 24.0 18.0 - 48.0 %    Mono % 7.9 4.0 - 15.0 %    Eosinophil % 0.8 0.0 - 8.0 %    Basophil % 0.3 0.0 - 1.9 %    Differential Method Automated    Basic metabolic panel    Collection Time: 12/16/22  9:29 AM   Result Value Ref Range    Sodium 141 136 - 145 mmol/L    Potassium 3.6 3.5 - 5.1 mmol/L    Chloride 111 (H) 95 - 110 mmol/L    CO2 22 (L) 23 - 29 mmol/L    Glucose 122 (H) 70 - 110 mg/dL    BUN  14 8 - 23 mg/dL    Creatinine 0.7 0.5 - 1.4 mg/dL    Calcium 8.7 8.7 - 10.5 mg/dL    Anion Gap 8 8 - 16 mmol/L    eGFR >60 >60 mL/min/1.73 m^2         Assessment    1. Hospital discharge follow-up    2. Symptomatic anemia    3. Fibroid    4. Essential hypertension          Plan    Liliane was seen today for follow-up.    Diagnoses and all orders for this visit:    Hospital discharge follow-up    Symptomatic anemia    Fibroid    Essential hypertension  -     metoprolol succinate (TOPROL-XL) 50 MG 24 hr tablet; Take 1 tablet (50 mg total) by mouth once daily.    Recommended she keep her follow-up appointments with gynecology.  Sounds like hysterectomy might be her best option to prevent future episodes of bleeding.      Also recommended she keep her upcoming appointments with Cardiology.  The CTA showed some blockages in the LCA.  Definitely needs heart catheterization, possible stent.      Suspect that the referral to Hematology was given for the excess bleeding.  Encouraged her to keep this appointment.  Suspect, though, that this is all related to the fibroids.    Blood pressure doing much better.  Will continue current meds.      FOLLOW-UP:  Follow up in about 3 months (around 3/21/2023) for check up.    I spent a total of 45 minutes face to face and non-face to face on the date of this visit.This includes time preparing to see the patient (eg, review of tests, notes), obtaining and/or reviewing additional history from an independent historian and/or outside medical records, documenting clinical information in the electronic health record, independently interpreting results and/or communicating results to the patient/family/caregiver, or care coordinator.    Signed by:  Kishan Kelley MD

## 2022-12-21 NOTE — PATIENT INSTRUCTIONS
Physically, everything looks good today.      You also sound like you are feeling much better.      Blood pressure is doing much better on current regimen! Keep up the good work!    Continue taking all of your medications, as prescribed.    Instead of taking two of the Toprol XL (metoprolol) tablets daily, I am sending a new prescription for a single tablet, but higher dose.  This might make it a bit more convenient for you.    Keep your follow-up appointments with Hematology, Cardiology, Gynecology, as scheduled.    Continue to eat a healthy diet.  Be careful with portion sizes.  Includes lots of fresh fruits, vegetables, whole grains, lean proteins.  See info below.    Keep hydrated.  Be sure to drink at least 8-10, 8 oz, glasses of water every day.

## 2023-01-06 ENCOUNTER — LAB VISIT (OUTPATIENT)
Dept: LAB | Facility: HOSPITAL | Age: 68
End: 2023-01-06
Attending: NURSE PRACTITIONER
Payer: COMMERCIAL

## 2023-01-06 DIAGNOSIS — D64.9 SYMPTOMATIC ANEMIA: ICD-10-CM

## 2023-01-06 PROCEDURE — 84443 ASSAY THYROID STIM HORMONE: CPT | Performed by: NURSE PRACTITIONER

## 2023-01-06 PROCEDURE — 36415 COLL VENOUS BLD VENIPUNCTURE: CPT | Mod: PN | Performed by: NURSE PRACTITIONER

## 2023-01-06 PROCEDURE — 82607 VITAMIN B-12: CPT | Performed by: NURSE PRACTITIONER

## 2023-01-06 PROCEDURE — 83090 ASSAY OF HOMOCYSTEINE: CPT | Performed by: NURSE PRACTITIONER

## 2023-01-06 PROCEDURE — 82746 ASSAY OF FOLIC ACID SERUM: CPT | Performed by: NURSE PRACTITIONER

## 2023-01-06 PROCEDURE — 84466 ASSAY OF TRANSFERRIN: CPT | Performed by: NURSE PRACTITIONER

## 2023-01-06 PROCEDURE — 83921 ORGANIC ACID SINGLE QUANT: CPT | Performed by: NURSE PRACTITIONER

## 2023-01-06 PROCEDURE — 85025 COMPLETE CBC W/AUTO DIFF WBC: CPT | Performed by: NURSE PRACTITIONER

## 2023-01-06 PROCEDURE — 82728 ASSAY OF FERRITIN: CPT | Performed by: NURSE PRACTITIONER

## 2023-01-07 LAB
BASOPHILS # BLD AUTO: 0.03 K/UL (ref 0–0.2)
BASOPHILS NFR BLD: 0.9 % (ref 0–1.9)
DIFFERENTIAL METHOD: ABNORMAL
EOSINOPHIL # BLD AUTO: 0 K/UL (ref 0–0.5)
EOSINOPHIL NFR BLD: 1.2 % (ref 0–8)
ERYTHROCYTE [DISTWIDTH] IN BLOOD BY AUTOMATED COUNT: 19.6 % (ref 11.5–14.5)
FERRITIN SERPL-MCNC: 86 NG/ML (ref 20–300)
FOLATE SERPL-MCNC: 17.6 NG/ML (ref 4–24)
HCT VFR BLD AUTO: 34.5 % (ref 37–48.5)
HCYS SERPL-SCNC: 4.2 UMOL/L (ref 4–15.5)
HGB BLD-MCNC: 10.2 G/DL (ref 12–16)
IMM GRANULOCYTES # BLD AUTO: 0.01 K/UL (ref 0–0.04)
IMM GRANULOCYTES NFR BLD AUTO: 0.3 % (ref 0–0.5)
IRON SERPL-MCNC: 35 UG/DL (ref 30–160)
LYMPHOCYTES # BLD AUTO: 1.3 K/UL (ref 1–4.8)
LYMPHOCYTES NFR BLD: 39.6 % (ref 18–48)
MCH RBC QN AUTO: 25.2 PG (ref 27–31)
MCHC RBC AUTO-ENTMCNC: 29.6 G/DL (ref 32–36)
MCV RBC AUTO: 85 FL (ref 82–98)
MONOCYTES # BLD AUTO: 0.4 K/UL (ref 0.3–1)
MONOCYTES NFR BLD: 12 % (ref 4–15)
NEUTROPHILS # BLD AUTO: 1.5 K/UL (ref 1.8–7.7)
NEUTROPHILS NFR BLD: 46 % (ref 38–73)
NRBC BLD-RTO: 0 /100 WBC
PLATELET # BLD AUTO: 434 K/UL (ref 150–450)
PMV BLD AUTO: 11.8 FL (ref 9.2–12.9)
RBC # BLD AUTO: 4.04 M/UL (ref 4–5.4)
SATURATED IRON: 9 % (ref 20–50)
TOTAL IRON BINDING CAPACITY: 408 UG/DL (ref 250–450)
TRANSFERRIN SERPL-MCNC: 276 MG/DL (ref 200–375)
TSH SERPL DL<=0.005 MIU/L-ACNC: 2.48 UIU/ML (ref 0.4–4)
VIT B12 SERPL-MCNC: 1171 PG/ML (ref 210–950)
WBC # BLD AUTO: 3.33 K/UL (ref 3.9–12.7)

## 2023-01-09 ENCOUNTER — OFFICE VISIT (OUTPATIENT)
Dept: HEMATOLOGY/ONCOLOGY | Facility: CLINIC | Age: 68
End: 2023-01-09
Payer: COMMERCIAL

## 2023-01-09 DIAGNOSIS — D21.9 FIBROIDS: ICD-10-CM

## 2023-01-09 DIAGNOSIS — D50.0 IRON DEFICIENCY ANEMIA DUE TO CHRONIC BLOOD LOSS: ICD-10-CM

## 2023-01-09 DIAGNOSIS — D64.9 SYMPTOMATIC ANEMIA: ICD-10-CM

## 2023-01-09 DIAGNOSIS — Z87.42 HISTORY OF ENDOMETRIAL HYPERPLASIA: ICD-10-CM

## 2023-01-09 DIAGNOSIS — E61.1 LOW IRON: Primary | ICD-10-CM

## 2023-01-09 PROCEDURE — 99215 OFFICE O/P EST HI 40 MIN: CPT | Mod: 95,,, | Performed by: NURSE PRACTITIONER

## 2023-01-09 PROCEDURE — 1160F PR REVIEW ALL MEDS BY PRESCRIBER/CLIN PHARMACIST DOCUMENTED: ICD-10-PCS | Mod: CPTII,95,, | Performed by: NURSE PRACTITIONER

## 2023-01-09 PROCEDURE — 1159F MED LIST DOCD IN RCRD: CPT | Mod: CPTII,95,, | Performed by: NURSE PRACTITIONER

## 2023-01-09 PROCEDURE — 1111F PR DISCHARGE MEDS RECONCILED W/ CURRENT OUTPATIENT MED LIST: ICD-10-PCS | Mod: CPTII,95,, | Performed by: NURSE PRACTITIONER

## 2023-01-09 PROCEDURE — 1160F RVW MEDS BY RX/DR IN RCRD: CPT | Mod: CPTII,95,, | Performed by: NURSE PRACTITIONER

## 2023-01-09 PROCEDURE — 1159F PR MEDICATION LIST DOCUMENTED IN MEDICAL RECORD: ICD-10-PCS | Mod: CPTII,95,, | Performed by: NURSE PRACTITIONER

## 2023-01-09 PROCEDURE — 1111F DSCHRG MED/CURRENT MED MERGE: CPT | Mod: CPTII,95,, | Performed by: NURSE PRACTITIONER

## 2023-01-09 PROCEDURE — 99215 PR OFFICE/OUTPT VISIT, EST, LEVL V, 40-54 MIN: ICD-10-PCS | Mod: 95,,, | Performed by: NURSE PRACTITIONER

## 2023-01-09 NOTE — PROGRESS NOTES
The patient location is: home   The chief complaint leading to consultation is: lab discussion    Visit type: audiovisual    Face to Face time with patient: 30  50 minutes of total time spent on the encounter, which includes face to face time and non-face to face time preparing to see the patient (eg, review of tests), Obtaining and/or reviewing separately obtained history, Documenting clinical information in the electronic or other health record, Independently interpreting results (not separately reported) and communicating results to the patient/family/caregiver, or Care coordination (not separately reported).       Each patient to whom he or she provides medical services by telemedicine is:  (1) informed of the relationship between the physician and patient and the respective role of any other health care provider with respect to management of the patient; and (2) notified that he or she may decline to receive medical services by telemedicine and may withdraw from such care at any time.      Patient ID: Liliane Ford is a 67 y.o. female.    Chief Complaint: lab discussion    HPI:  Patient is a 67 year old female who presents today for further evaluation and recommendations of anemia.  She has been referred to the hematology clinic by her pcp, Dr. Kishan Kelley.  She has been found to have heavy uterine bleeding with fibroids and has been found with uterine fibroids.  Was do to have heart cath but this has been postponed due to severe anemia requiring blood transfusions hgb 4.9 on 12/14/2022 and received 3 received units of blood.  She is being followed by gynecology - is seeing gynecology in February. States that she started having abnormal uterine bleeding in 2020 then was given Megace and bleeding stopped and then restarted in again in 2020 and states that she bleed for 6 months.  She is currently taking Megace daily again which has stopped the abnormal uterine bleeding.  She states that she is feeling well  without any complaints currently    States taking iron tablets twice daily.  States taking celery juice and prune juice and is having BM 2-3 with no constipation.       Other diagnosis include cardiomegaly, htn, palpitations, abnormal ECG, and hypercholesterolemia followed by cardiology.      2007 colonoscopy: normal with recommendations to repeat in 10 years.   10/27/2022 PAP Specimen Adequacy   Unsatisfactory for evaluation. [place] Category   Unsatisfactory for evaluation. Specimen processed and examined, but unsatisfactory for evaluation of epithelial abnormality because of scant cellularity. Blood present  10/27/2022 Endometrial biopsy SCANT FRAGMENTS OF INACTIVE ENDOMETRIUM ADMIXED WITH ABUNDANT BLOOD CLOT   2022 Mammogram   Impression:   No mammographic evidence of malignancy.  BI-RADS Category 1: Negative  Recommendation:  Routine screening mammogram in 1 year is recommended.  2022 Cologuard - negative       Social History     Socioeconomic History    Marital status:     Number of children: 3   Tobacco Use    Smoking status: Former     Types: Cigarettes    Smokeless tobacco: Never   Substance and Sexual Activity    Alcohol use: Never    Drug use: Never    Sexual activity: Not Currently     Partners: Male     Birth control/protection: Post-menopausal       Family History   Problem Relation Age of Onset    Hypertension Mother        Past Surgical History:   Procedure Laterality Date     SECTION      x 2       Past Medical History:   Diagnosis Date    Hypertension     Pulmonary edema        Review of Systems   Constitutional: Negative.    HENT: Negative.     Eyes: Negative.    Respiratory: Negative.     Cardiovascular: Negative.    Gastrointestinal: Negative.    Endocrine: Negative.    Genitourinary: Negative.    Musculoskeletal: Negative.    Skin: Negative.    Allergic/Immunologic: Negative.    Neurological: Negative.    Hematological: Negative.    Psychiatric/Behavioral: Negative.           Medication List with Changes/Refills   Current Medications    AMLODIPINE (NORVASC) 10 MG TABLET    Take 1 tablet (10 mg total) by mouth once daily.    ASCORBIC ACID, VITAMIN C, (VITAMIN C) 500 MG TABLET    Take 1 tablet (500 mg total) by mouth every evening.    ASPIRIN (ECOTRIN) 81 MG EC TABLET    Take 1 tablet (81 mg total) by mouth once daily.    B COMPLEX VITAMINS TABLET    Take 1 tablet by mouth once daily. B COMPLETE    FERROUS SULFATE 325 (65 FE) MG EC TABLET    Take 1 tablet (325 mg total) by mouth 2 (two) times daily with meals.    LOSARTAN (COZAAR) 100 MG TABLET    Take 1 tablet (100 mg total) by mouth once daily.    MEGESTROL (MEGACE) 40 MG TAB    Take 1 tablet (40 mg total) by mouth once daily.    METOPROLOL SUCCINATE (TOPROL-XL) 50 MG 24 HR TABLET    Take 1 tablet (50 mg total) by mouth once daily.    ROSUVASTATIN (CRESTOR) 20 MG TABLET    Take 1 tablet (20 mg total) by mouth every evening.        Objective:   There were no vitals filed for this visit.    Physical Exam  Pulmonary:      Effort: Pulmonary effort is normal.   Neurological:      General: No focal deficit present.      Mental Status: She is alert and oriented to person, place, and time.   Psychiatric:         Mood and Affect: Mood normal.         Behavior: Behavior normal.         Thought Content: Thought content normal.         Judgment: Judgment normal.       Assessment:     Problem List Items Addressed This Visit          Renal/    History of endometrial hyperplasia    Relevant Orders    CBC Auto Differential    Basic Metabolic Panel    Ferritin    Iron and TIBC       Oncology    Symptomatic anemia    Low iron - Primary    Relevant Orders    CBC Auto Differential    Ferritin    Iron and TIBC    CBC Auto Differential    Basic Metabolic Panel    Ferritin    Iron and TIBC    Fibroids    Relevant Orders    CBC Auto Differential    Basic Metabolic Panel    Ferritin    Iron and TIBC     Other Visit Diagnoses       Iron deficiency anemia  due to chronic blood loss        Relevant Orders    CBC Auto Differential    Ferritin    Iron and TIBC    CBC Auto Differential    Basic Metabolic Panel    Ferritin    Iron and TIBC            Lab Results   Component Value Date    WBC 3.33 (L) 01/06/2023    RBC 4.04 01/06/2023    HGB 10.2 (L) 01/06/2023    HCT 34.5 (L) 01/06/2023    MCV 85 01/06/2023    MCH 25.2 (L) 01/06/2023    MCHC 29.6 (L) 01/06/2023    RDW 19.6 (H) 01/06/2023     01/06/2023    MPV 11.8 01/06/2023    GRAN 1.5 (L) 01/06/2023    GRAN 46.0 01/06/2023    LYMPH 1.3 01/06/2023    LYMPH 39.6 01/06/2023    MONO 0.4 01/06/2023    MONO 12.0 01/06/2023    EOS 0.0 01/06/2023    BASO 0.03 01/06/2023    EOSINOPHIL 1.2 01/06/2023    BASOPHIL 0.9 01/06/2023      Lab Results   Component Value Date     12/16/2022    K 3.6 12/16/2022     (H) 12/16/2022    CO2 22 (L) 12/16/2022    BUN 14 12/16/2022    CREATININE 0.7 12/16/2022    CALCIUM 8.7 12/16/2022    ANIONGAP 8 12/16/2022    ESTGFRAFRICA >60 02/06/2020    EGFRNONAA >60 02/06/2020     Lab Results   Component Value Date    ALT 20 12/15/2022    AST 22 12/15/2022    ALKPHOS 50 (L) 12/15/2022    BILITOT 1.0 12/15/2022     Lab Results   Component Value Date    UIBC 167 11/19/2007    IRON 35 01/06/2023    TRANSFERRIN 276 01/06/2023    TIBC 408 01/06/2023    FESATURATED 9 (L) 01/06/2023      Lab Results   Component Value Date    UIBC 167 11/19/2007    IRON 35 01/06/2023    TRANSFERRIN 276 01/06/2023    TIBC 408 01/06/2023    FESATURATED 9 (L) 01/06/2023      Lab Results   Component Value Date    HJVFUFIF62 1171 (H) 01/06/2023     Lab Results   Component Value Date    FOLATE 17.6 01/06/2023     Lab Results   Component Value Date    TSH 2.478 01/06/2023         Med Onc Chart Routing      Follow up with physician    Follow up with [facility] 1 month. repeat cbc, iron studies in 1 month in [place] with a VV a couple days later to evaluate response   Infusion scheduling note n/a   Injection scheduling note n/a    Labs   Lab interval:  N/a   Imaging   N/a   Pharmacy appointment No pharmacy appointment needed      Other referrals No additional referrals needed          Plan:   Low iron  -     CBC Auto Differential; Future; Expected date: 01/09/2023  -     Ferritin; Future; Expected date: 01/09/2023  -     Iron and TIBC; Future; Expected date: 01/09/2023  -     CBC Auto Differential; Future; Expected date: 01/09/2023  -     Basic Metabolic Panel; Future; Expected date: 01/09/2023  -     Ferritin; Future; Expected date: 01/09/2023  -     Iron and TIBC; Future; Expected date: 01/09/2023    Symptomatic anemia  -     Ambulatory referral/consult to Hematology / Oncology    Iron deficiency anemia due to chronic blood loss  -     CBC Auto Differential; Future; Expected date: 01/09/2023  -     Ferritin; Future; Expected date: 01/09/2023  -     Iron and TIBC; Future; Expected date: 01/09/2023  -     CBC Auto Differential; Future; Expected date: 01/09/2023  -     Basic Metabolic Panel; Future; Expected date: 01/09/2023  -     Ferritin; Future; Expected date: 01/09/2023  -     Iron and TIBC; Future; Expected date: 01/09/2023    History of endometrial hyperplasia  -     CBC Auto Differential; Future; Expected date: 01/09/2023  -     Basic Metabolic Panel; Future; Expected date: 01/09/2023  -     Ferritin; Future; Expected date: 01/09/2023  -     Iron and TIBC; Future; Expected date: 01/09/2023    Fibroids  -     CBC Auto Differential; Future; Expected date: 01/09/2023  -     Basic Metabolic Panel; Future; Expected date: 01/09/2023  -     Ferritin; Future; Expected date: 01/09/2023  -     Iron and TIBC; Future; Expected date: 01/09/2023    She is responding really well to her iron supplementation without GI complications.  She will continue to f/u with GYN for further recommendations.  Currently not bleeding and will be taking Megace until f/u with GYN.  Will repeat labs in 1 month with a VV a couple days later to evaluate response.      Collaborating Provider:  Dr. Preston Brown    Thank You,  Angela Wagner, NADEGEP-C  Hematology Oncology

## 2023-01-10 LAB — METHYLMALONATE SERPL-SCNC: 0.12 UMOL/L

## 2023-02-24 ENCOUNTER — LAB VISIT (OUTPATIENT)
Dept: LAB | Facility: HOSPITAL | Age: 68
End: 2023-02-24
Attending: FAMILY MEDICINE
Payer: COMMERCIAL

## 2023-02-24 DIAGNOSIS — D21.9 FIBROIDS: ICD-10-CM

## 2023-02-24 DIAGNOSIS — E61.1 LOW IRON: ICD-10-CM

## 2023-02-24 DIAGNOSIS — Z87.42 HISTORY OF ENDOMETRIAL HYPERPLASIA: ICD-10-CM

## 2023-02-24 DIAGNOSIS — D50.0 IRON DEFICIENCY ANEMIA DUE TO CHRONIC BLOOD LOSS: ICD-10-CM

## 2023-02-24 LAB
ANION GAP SERPL CALC-SCNC: 10 MMOL/L (ref 8–16)
BASOPHILS # BLD AUTO: 0.04 K/UL (ref 0–0.2)
BASOPHILS NFR BLD: 0.9 % (ref 0–1.9)
BUN SERPL-MCNC: 11 MG/DL (ref 8–23)
CALCIUM SERPL-MCNC: 8.4 MG/DL (ref 8.7–10.5)
CHLORIDE SERPL-SCNC: 108 MMOL/L (ref 95–110)
CO2 SERPL-SCNC: 19 MMOL/L (ref 23–29)
CREAT SERPL-MCNC: 0.8 MG/DL (ref 0.5–1.4)
DIFFERENTIAL METHOD: ABNORMAL
EOSINOPHIL # BLD AUTO: 0.1 K/UL (ref 0–0.5)
EOSINOPHIL NFR BLD: 2.4 % (ref 0–8)
ERYTHROCYTE [DISTWIDTH] IN BLOOD BY AUTOMATED COUNT: 17.3 % (ref 11.5–14.5)
EST. GFR  (NO RACE VARIABLE): >60 ML/MIN/1.73 M^2
FERRITIN SERPL-MCNC: 20 NG/ML (ref 20–300)
GLUCOSE SERPL-MCNC: 142 MG/DL (ref 70–110)
HCT VFR BLD AUTO: 33.4 % (ref 37–48.5)
HGB BLD-MCNC: 10.4 G/DL (ref 12–16)
IMM GRANULOCYTES # BLD AUTO: 0.01 K/UL (ref 0–0.04)
IMM GRANULOCYTES NFR BLD AUTO: 0.2 % (ref 0–0.5)
IRON SERPL-MCNC: 114 UG/DL (ref 30–160)
LYMPHOCYTES # BLD AUTO: 1.6 K/UL (ref 1–4.8)
LYMPHOCYTES NFR BLD: 37.6 % (ref 18–48)
MCH RBC QN AUTO: 23.8 PG (ref 27–31)
MCHC RBC AUTO-ENTMCNC: 31.1 G/DL (ref 32–36)
MCV RBC AUTO: 76 FL (ref 82–98)
MONOCYTES # BLD AUTO: 0.5 K/UL (ref 0.3–1)
MONOCYTES NFR BLD: 11.1 % (ref 4–15)
NEUTROPHILS # BLD AUTO: 2 K/UL (ref 1.8–7.7)
NEUTROPHILS NFR BLD: 47.8 % (ref 38–73)
NRBC BLD-RTO: 0 /100 WBC
PLATELET # BLD AUTO: 347 K/UL (ref 150–450)
PMV BLD AUTO: 11 FL (ref 9.2–12.9)
POTASSIUM SERPL-SCNC: 3.7 MMOL/L (ref 3.5–5.1)
RBC # BLD AUTO: 4.37 M/UL (ref 4–5.4)
SATURATED IRON: 28 % (ref 20–50)
SODIUM SERPL-SCNC: 137 MMOL/L (ref 136–145)
TOTAL IRON BINDING CAPACITY: 401 UG/DL (ref 250–450)
TRANSFERRIN SERPL-MCNC: 271 MG/DL (ref 200–375)
WBC # BLD AUTO: 4.23 K/UL (ref 3.9–12.7)

## 2023-02-24 PROCEDURE — 36415 COLL VENOUS BLD VENIPUNCTURE: CPT | Mod: PN | Performed by: NURSE PRACTITIONER

## 2023-02-24 PROCEDURE — 82728 ASSAY OF FERRITIN: CPT | Performed by: NURSE PRACTITIONER

## 2023-02-24 PROCEDURE — 80048 BASIC METABOLIC PNL TOTAL CA: CPT | Performed by: NURSE PRACTITIONER

## 2023-02-24 PROCEDURE — 84466 ASSAY OF TRANSFERRIN: CPT | Performed by: NURSE PRACTITIONER

## 2023-02-24 PROCEDURE — 85025 COMPLETE CBC W/AUTO DIFF WBC: CPT | Performed by: NURSE PRACTITIONER

## 2023-02-27 ENCOUNTER — OFFICE VISIT (OUTPATIENT)
Dept: HEMATOLOGY/ONCOLOGY | Facility: CLINIC | Age: 68
End: 2023-02-27
Payer: COMMERCIAL

## 2023-02-27 DIAGNOSIS — D50.9 MICROCYTIC ANEMIA: ICD-10-CM

## 2023-02-27 DIAGNOSIS — D21.9 FIBROIDS: ICD-10-CM

## 2023-02-27 DIAGNOSIS — D64.9 ANEMIA, UNSPECIFIED TYPE: Primary | ICD-10-CM

## 2023-02-27 DIAGNOSIS — Z86.39 HISTORY OF IRON DEFICIENCY: ICD-10-CM

## 2023-02-27 PROCEDURE — 99214 PR OFFICE/OUTPT VISIT, EST, LEVL IV, 30-39 MIN: ICD-10-PCS | Mod: 95,,, | Performed by: NURSE PRACTITIONER

## 2023-02-27 PROCEDURE — 1159F PR MEDICATION LIST DOCUMENTED IN MEDICAL RECORD: ICD-10-PCS | Mod: CPTII,95,, | Performed by: NURSE PRACTITIONER

## 2023-02-27 PROCEDURE — 1160F RVW MEDS BY RX/DR IN RCRD: CPT | Mod: CPTII,95,, | Performed by: NURSE PRACTITIONER

## 2023-02-27 PROCEDURE — 4010F ACE/ARB THERAPY RXD/TAKEN: CPT | Mod: CPTII,95,, | Performed by: NURSE PRACTITIONER

## 2023-02-27 PROCEDURE — 1160F PR REVIEW ALL MEDS BY PRESCRIBER/CLIN PHARMACIST DOCUMENTED: ICD-10-PCS | Mod: CPTII,95,, | Performed by: NURSE PRACTITIONER

## 2023-02-27 PROCEDURE — 4010F PR ACE/ARB THEARPY RXD/TAKEN: ICD-10-PCS | Mod: CPTII,95,, | Performed by: NURSE PRACTITIONER

## 2023-02-27 PROCEDURE — 99214 OFFICE O/P EST MOD 30 MIN: CPT | Mod: 95,,, | Performed by: NURSE PRACTITIONER

## 2023-02-27 PROCEDURE — 1159F MED LIST DOCD IN RCRD: CPT | Mod: CPTII,95,, | Performed by: NURSE PRACTITIONER

## 2023-02-27 NOTE — PROGRESS NOTES
The patient location is: home  The chief complaint leading to consultation is: lab discussion    Visit type: audiovisual    Face to Face time with patient: 15   30 minutes of total time spent on the encounter, which includes face to face time and non-face to face time preparing to see the patient (eg, review of tests), Obtaining and/or reviewing separately obtained history, Documenting clinical information in the electronic or other health record, Independently interpreting results (not separately reported) and communicating results to the patient/family/caregiver, or Care coordination (not separately reported).     Each patient to whom he or she provides medical services by telemedicine is:  (1) informed of the relationship between the physician and patient and the respective role of any other health care provider with respect to management of the patient; and (2) notified that he or she may decline to receive medical services by telemedicine and may withdraw from such care at any time.    Patient ID: Liliane Ford is a 67 y.o. female.    Chief Complaint: lab discussion    HPI:  Patient is a 67 year old female who presents today for further evaluation and recommendations of anemia.  She has been referred to the hematology clinic by her pcp, Dr. Kishan Kelley.  She has been found to have heavy uterine bleeding with fibroids and has been found with uterine fibroids.  Was do to have heart cath but this has been postponed due to severe anemia requiring blood transfusions hgb 4.9 on 12/14/2022 and received 3 received units of blood.  She is being followed by gynecology - is seeing gynecology in February. States that she started having abnormal uterine bleeding in 2020 then was given Megace and bleeding stopped and then restarted in again in 2020 and states that she bleed for 6 months.  She is currently taking Megace daily again which has stopped the abnormal uterine bleeding.  She states that she is feeling well  without any complaints currently     States taking iron tablets twice daily.  States taking celery juice and prune juice and is having BM 2-3 with no constipation.        Other diagnosis include cardiomegaly, htn, palpitations, abnormal ECG, and hypercholesterolemia followed by cardiology.       2007 colonoscopy: normal with recommendations to repeat in 10 years.   10/27/2022 PAP Specimen Adequacy   Unsatisfactory for evaluation. [place] Category   Unsatisfactory for evaluation. Specimen processed and examined, but unsatisfactory for evaluation of epithelial abnormality because of scant cellularity. Blood present  10/27/2022 Endometrial biopsy SCANT FRAGMENTS OF INACTIVE ENDOMETRIUM ADMIXED WITH ABUNDANT BLOOD CLOT   2022 Mammogram   Impression:   No mammographic evidence of malignancy.  BI-RADS Category 1: Negative  Recommendation:  Routine screening mammogram in 1 year is recommended.  2022 Cologuard - negative    Interval History:  2023  At last visit was found to be responding well to oral iron with no GI issues.  She presents today to continue to monitor response to oral iron therapy.  Menstrual cycles have greatly reduced - just spotting with medication from GYN. She has been taking her oral iron once daily.  She has not been taking B12 since last visit. She took oral iron the morning of her labs.  States that she is feeling good with no complaints.     Social History     Socioeconomic History    Marital status:     Number of children: 3   Tobacco Use    Smoking status: Former     Types: Cigarettes    Smokeless tobacco: Never   Substance and Sexual Activity    Alcohol use: Never    Drug use: Never    Sexual activity: Not Currently     Partners: Male     Birth control/protection: Post-menopausal       Family History   Problem Relation Age of Onset    Hypertension Mother        Past Surgical History:   Procedure Laterality Date     SECTION      x 2       Past Medical History:    Diagnosis Date    Hypertension     Pulmonary edema        Review of Systems   Constitutional: Negative.    HENT: Negative.     Eyes: Negative.    Respiratory: Negative.     Cardiovascular: Negative.    Gastrointestinal: Negative.    Endocrine: Negative.    Genitourinary: Negative.    Musculoskeletal: Negative.    Skin: Negative.    Allergic/Immunologic: Negative.    Neurological: Negative.    Hematological: Negative.    Psychiatric/Behavioral: Negative.          Medication List with Changes/Refills   Current Medications    AMLODIPINE (NORVASC) 10 MG TABLET    Take 1 tablet (10 mg total) by mouth once daily.    ASCORBIC ACID, VITAMIN C, (VITAMIN C) 500 MG TABLET    Take 1 tablet (500 mg total) by mouth every evening.    ASPIRIN (ECOTRIN) 81 MG EC TABLET    Take 1 tablet (81 mg total) by mouth once daily.    B COMPLEX VITAMINS TABLET    Take 1 tablet by mouth once daily. B COMPLETE    LOSARTAN (COZAAR) 100 MG TABLET    Take 1 tablet (100 mg total) by mouth once daily.    MEGESTROL (MEGACE) 40 MG TAB    Take 1 tablet (40 mg total) by mouth once daily.    METOPROLOL SUCCINATE (TOPROL-XL) 50 MG 24 HR TABLET    Take 1 tablet (50 mg total) by mouth once daily.    ROSUVASTATIN (CRESTOR) 20 MG TABLET    Take 1 tablet (20 mg total) by mouth every evening.        Objective:   There were no vitals filed for this visit.    Physical Exam  Constitutional:       Appearance: Normal appearance.   Pulmonary:      Effort: Pulmonary effort is normal.   Neurological:      Mental Status: She is alert and oriented to person, place, and time.   Psychiatric:         Mood and Affect: Mood normal.         Behavior: Behavior normal.         Thought Content: Thought content normal.         Judgment: Judgment normal.       Assessment:     Problem List Items Addressed This Visit          Oncology    Fibroids     Other Visit Diagnoses       Anemia, unspecified type    -  Primary    Relevant Orders    Ferritin    Iron and TIBC    CBC Auto  Differential    Basic Metabolic Panel    Ferritin    Iron and TIBC    History of iron deficiency        Microcytic anemia                Lab Results   Component Value Date    WBC 4.23 02/24/2023    RBC 4.37 02/24/2023    HGB 10.4 (L) 02/24/2023    HCT 33.4 (L) 02/24/2023    MCV 76 (L) 02/24/2023    MCH 23.8 (L) 02/24/2023    MCHC 31.1 (L) 02/24/2023    RDW 17.3 (H) 02/24/2023     02/24/2023    MPV 11.0 02/24/2023    GRAN 2.0 02/24/2023    GRAN 47.8 02/24/2023    LYMPH 1.6 02/24/2023    LYMPH 37.6 02/24/2023    MONO 0.5 02/24/2023    MONO 11.1 02/24/2023    EOS 0.1 02/24/2023    BASO 0.04 02/24/2023    EOSINOPHIL 2.4 02/24/2023    BASOPHIL 0.9 02/24/2023      Lab Results   Component Value Date     02/24/2023    K 3.7 02/24/2023     02/24/2023    CO2 19 (L) 02/24/2023    BUN 11 02/24/2023    CREATININE 0.8 02/24/2023    CALCIUM 8.4 (L) 02/24/2023    ANIONGAP 10 02/24/2023    ESTGFRAFRICA >60 02/06/2020    EGFRNONAA >60 02/06/2020     Lab Results   Component Value Date    ALT 20 12/15/2022    AST 22 12/15/2022    ALKPHOS 50 (L) 12/15/2022    BILITOT 1.0 12/15/2022     Lab Results   Component Value Date    UIBC 167 11/19/2007    IRON 114 02/24/2023    TRANSFERRIN 271 02/24/2023    TIBC 401 02/24/2023    FESATURATED 28 02/24/2023      Lab Results   Component Value Date    FERRITIN 20 02/24/2023     Lab Results   Component Value Date    [id number] 1171 (H) 01/06/2023         Plan:   Anemia, unspecified type  -     Ferritin; Future; Expected date: 02/27/2023  -     Iron and TIBC; Future; Expected date: 02/27/2023  -     CBC Auto Differential; Future; Expected date: 02/27/2023  -     Basic Metabolic Panel; Future; Expected date: 02/27/2023  -     Ferritin; Future; Expected date: 02/27/2023  -     Iron and TIBC; Future; Expected date: 02/27/2023    Fibroids    History of iron deficiency    Microcytic anemia      Med Onc Chart Routing      Follow up with physician    Follow up with [facility] . F/u in 3 months  with labs prior in [place] and VV after to discuss results   Infusion scheduling note n/a   Injection scheduling note n/a   Labs   Lab interval:  Schedule for iron studies on 3/8/23 at [facility].   Imaging   N/a   Pharmacy appointment No pharmacy appointment needed      Other referrals No additional referrals needed      Repeat labs on (iron studies) on 3/8 - hold oral iron the morning of labs. If iron is ok and still anemic with microcytosis and normal RBC - consider alpha globin gene analysis to assess for alpha thal    Continue oral iron and B12 daily.    F/u in 3 months with labs prior - cbc, bmp, iron studies - possible alpha thal testing.    Collaborating Provider:  Dr. Preston Brown    Thank You,  Angela Wagner, FNP-C  Hematology Oncology

## 2023-03-08 ENCOUNTER — OFFICE VISIT (OUTPATIENT)
Dept: OBSTETRICS AND GYNECOLOGY | Facility: CLINIC | Age: 68
End: 2023-03-08
Payer: COMMERCIAL

## 2023-03-08 ENCOUNTER — LAB VISIT (OUTPATIENT)
Dept: LAB | Facility: HOSPITAL | Age: 68
End: 2023-03-08
Attending: NURSE PRACTITIONER
Payer: COMMERCIAL

## 2023-03-08 VITALS
BODY MASS INDEX: 33.55 KG/M2 | DIASTOLIC BLOOD PRESSURE: 84 MMHG | WEIGHT: 182.31 LBS | HEIGHT: 62 IN | SYSTOLIC BLOOD PRESSURE: 132 MMHG

## 2023-03-08 DIAGNOSIS — R87.615 UNSATISFACTORY CERVICAL PAPANICOLAOU SMEAR: ICD-10-CM

## 2023-03-08 DIAGNOSIS — N95.0 POSTMENOPAUSAL BLEEDING: Primary | ICD-10-CM

## 2023-03-08 DIAGNOSIS — D64.9 ANEMIA, UNSPECIFIED TYPE: ICD-10-CM

## 2023-03-08 DIAGNOSIS — Z12.4 SCREENING FOR CERVICAL CANCER: ICD-10-CM

## 2023-03-08 DIAGNOSIS — Z12.31 SCREENING MAMMOGRAM, ENCOUNTER FOR: ICD-10-CM

## 2023-03-08 PROCEDURE — 99213 PR OFFICE/OUTPT VISIT, EST, LEVL III, 20-29 MIN: ICD-10-PCS | Mod: S$GLB,,, | Performed by: OBSTETRICS & GYNECOLOGY

## 2023-03-08 PROCEDURE — 99999 PR PBB SHADOW E&M-EST. PATIENT-LVL III: ICD-10-PCS | Mod: PBBFAC,,, | Performed by: OBSTETRICS & GYNECOLOGY

## 2023-03-08 PROCEDURE — 3008F PR BODY MASS INDEX (BMI) DOCUMENTED: ICD-10-PCS | Mod: CPTII,S$GLB,, | Performed by: OBSTETRICS & GYNECOLOGY

## 2023-03-08 PROCEDURE — 1101F PT FALLS ASSESS-DOCD LE1/YR: CPT | Mod: CPTII,S$GLB,, | Performed by: OBSTETRICS & GYNECOLOGY

## 2023-03-08 PROCEDURE — 3288F PR FALLS RISK ASSESSMENT DOCUMENTED: ICD-10-PCS | Mod: CPTII,S$GLB,, | Performed by: OBSTETRICS & GYNECOLOGY

## 2023-03-08 PROCEDURE — 1159F MED LIST DOCD IN RCRD: CPT | Mod: CPTII,S$GLB,, | Performed by: OBSTETRICS & GYNECOLOGY

## 2023-03-08 PROCEDURE — 3079F DIAST BP 80-89 MM HG: CPT | Mod: CPTII,S$GLB,, | Performed by: OBSTETRICS & GYNECOLOGY

## 2023-03-08 PROCEDURE — 36415 COLL VENOUS BLD VENIPUNCTURE: CPT | Performed by: NURSE PRACTITIONER

## 2023-03-08 PROCEDURE — 1126F AMNT PAIN NOTED NONE PRSNT: CPT | Mod: CPTII,S$GLB,, | Performed by: OBSTETRICS & GYNECOLOGY

## 2023-03-08 PROCEDURE — 1126F PR PAIN SEVERITY QUANTIFIED, NO PAIN PRESENT: ICD-10-PCS | Mod: CPTII,S$GLB,, | Performed by: OBSTETRICS & GYNECOLOGY

## 2023-03-08 PROCEDURE — 88175 CYTOPATH C/V AUTO FLUID REDO: CPT | Performed by: OBSTETRICS & GYNECOLOGY

## 2023-03-08 PROCEDURE — 1101F PR PT FALLS ASSESS DOC 0-1 FALLS W/OUT INJ PAST YR: ICD-10-PCS | Mod: CPTII,S$GLB,, | Performed by: OBSTETRICS & GYNECOLOGY

## 2023-03-08 PROCEDURE — 99999 PR PBB SHADOW E&M-EST. PATIENT-LVL III: CPT | Mod: PBBFAC,,, | Performed by: OBSTETRICS & GYNECOLOGY

## 2023-03-08 PROCEDURE — 84466 ASSAY OF TRANSFERRIN: CPT | Performed by: NURSE PRACTITIONER

## 2023-03-08 PROCEDURE — 3075F SYST BP GE 130 - 139MM HG: CPT | Mod: CPTII,S$GLB,, | Performed by: OBSTETRICS & GYNECOLOGY

## 2023-03-08 PROCEDURE — 4010F PR ACE/ARB THEARPY RXD/TAKEN: ICD-10-PCS | Mod: CPTII,S$GLB,, | Performed by: OBSTETRICS & GYNECOLOGY

## 2023-03-08 PROCEDURE — 3075F PR MOST RECENT SYSTOLIC BLOOD PRESS GE 130-139MM HG: ICD-10-PCS | Mod: CPTII,S$GLB,, | Performed by: OBSTETRICS & GYNECOLOGY

## 2023-03-08 PROCEDURE — 3008F BODY MASS INDEX DOCD: CPT | Mod: CPTII,S$GLB,, | Performed by: OBSTETRICS & GYNECOLOGY

## 2023-03-08 PROCEDURE — 4010F ACE/ARB THERAPY RXD/TAKEN: CPT | Mod: CPTII,S$GLB,, | Performed by: OBSTETRICS & GYNECOLOGY

## 2023-03-08 PROCEDURE — 99213 OFFICE O/P EST LOW 20 MIN: CPT | Mod: S$GLB,,, | Performed by: OBSTETRICS & GYNECOLOGY

## 2023-03-08 PROCEDURE — 1159F PR MEDICATION LIST DOCUMENTED IN MEDICAL RECORD: ICD-10-PCS | Mod: CPTII,S$GLB,, | Performed by: OBSTETRICS & GYNECOLOGY

## 2023-03-08 PROCEDURE — 3288F FALL RISK ASSESSMENT DOCD: CPT | Mod: CPTII,S$GLB,, | Performed by: OBSTETRICS & GYNECOLOGY

## 2023-03-08 PROCEDURE — 3079F PR MOST RECENT DIASTOLIC BLOOD PRESSURE 80-89 MM HG: ICD-10-PCS | Mod: CPTII,S$GLB,, | Performed by: OBSTETRICS & GYNECOLOGY

## 2023-03-08 PROCEDURE — 82728 ASSAY OF FERRITIN: CPT | Performed by: NURSE PRACTITIONER

## 2023-03-08 RX ORDER — MEGESTROL ACETATE 40 MG/1
40 TABLET ORAL DAILY
Qty: 90 TABLET | Refills: 3 | Status: SHIPPED | OUTPATIENT
Start: 2023-03-08 | End: 2023-11-16

## 2023-03-08 NOTE — PROGRESS NOTES
Subjective:       Patient ID: Liliane Ford is a 67 y.o. female.    Chief Complaint:  No chief complaint on file.      History of Present Illness  HPI  Here for follow up  Previously followed for postmenopausal bleeding  Emb--complex hyperplasia without atypia  Bleeding subsided on provera but reports still has intermittent bleeding  Most recently hospitalized in 2022 for symptomatic anemia    Prefers not to have surgery    Also has fibroids--16 wk size    GYN & OB History  No LMP recorded. Patient is postmenopausal.   Date of Last Pap: 11/3/2022    OB History    Para Term  AB Living   6 6 3     3   SAB IAB Ectopic Multiple Live Births         1        # Outcome Date GA Lbr Marvin/2nd Weight Sex Delivery Anes PTL Lv   6 Para 10/23/86     CS-LTranv      5 Para 82     CS-LTranv      4 Para 82     Vag-Spont      3 Term            2 Term            1 Term                Review of Systems  Review of Systems   Genitourinary:  Positive for postmenopausal bleeding.   All other systems reviewed and are negative.        Objective:      Physical Exam:   Constitutional: She appears well-developed.     Eyes: Pupils are equal, round, and reactive to light. Conjunctivae and EOM are normal.      Pulmonary/Chest: Effort normal. Right breast exhibits no mass, no nipple discharge, no skin change, no tenderness and presence. Left breast exhibits no mass, no nipple discharge, no skin change, no tenderness and presence. Breasts are symmetrical.        Abdominal: Soft.     Genitourinary:    Vagina normal.      Pelvic exam was performed with patient supine.             Musculoskeletal: Normal range of motion.       Neurological: She is alert.    Skin: Skin is warm.    Psychiatric: She has a normal mood and affect.         Assessment:        1. Postmenopausal bleeding    2. Screening for cervical cancer    3. Screening mammogram, encounter for    4. Unsatisfactory cervical Papanicolaou smear               Plan:       Pap  today due to pmb and unsat pap in 10/2022  Reviewed options for pmb--observation, continue daily megace, mirena iud, surgery; could consider myfembre  Agrees to daily megace  Repeat sono at time of ww exam; if lining >4 would repeat emb  Mammo due 8/2024

## 2023-03-09 LAB
FERRITIN SERPL-MCNC: 16 NG/ML (ref 20–300)
IRON SERPL-MCNC: 99 UG/DL (ref 30–160)
SATURATED IRON: 22 % (ref 20–50)
TOTAL IRON BINDING CAPACITY: 450 UG/DL (ref 250–450)
TRANSFERRIN SERPL-MCNC: 304 MG/DL (ref 200–375)

## 2023-03-13 ENCOUNTER — PATIENT MESSAGE (OUTPATIENT)
Dept: PAIN MEDICINE | Facility: CLINIC | Age: 68
End: 2023-03-13
Payer: COMMERCIAL

## 2023-03-13 ENCOUNTER — TELEPHONE (OUTPATIENT)
Dept: HEMATOLOGY/ONCOLOGY | Facility: CLINIC | Age: 68
End: 2023-03-13
Payer: COMMERCIAL

## 2023-03-13 NOTE — PROGRESS NOTES
Please let patient know that she is still iron deficient.  We can either continue oral iron or proceed with IV iron therapy.  She can let me know what she chooses and we can schedule.    Melissa Lawson

## 2023-03-13 NOTE — TELEPHONE ENCOUNTER
Pt notified, stated she would like to continue oral iron. Nurse verbalized understanding . Provider notified.

## 2023-03-13 NOTE — TELEPHONE ENCOUNTER
----- Message from Melissa Wagner NP sent at 3/13/2023 10:39 AM CDT -----  Please let patient know that she is still iron deficient.  We can either continue oral iron or proceed with IV iron therapy.  She can let me know what she chooses and we can schedule.    Melissa Lawson

## 2023-03-14 NOTE — PROGRESS NOTES
Good News! Your pap smear came back and it was normal.   I still recommend doing a pelvic exam and annual visit every year, but you only need the pap test every 3 years. Please call me if you have any further questions.   Sincerely,   Dr. Aquino

## 2023-03-28 ENCOUNTER — OFFICE VISIT (OUTPATIENT)
Dept: PRIMARY CARE CLINIC | Facility: CLINIC | Age: 68
End: 2023-03-28
Payer: COMMERCIAL

## 2023-03-28 ENCOUNTER — TELEPHONE (OUTPATIENT)
Dept: CARDIOLOGY | Facility: CLINIC | Age: 68
End: 2023-03-28
Payer: COMMERCIAL

## 2023-03-28 VITALS
SYSTOLIC BLOOD PRESSURE: 142 MMHG | BODY MASS INDEX: 33.47 KG/M2 | HEIGHT: 62 IN | TEMPERATURE: 98 F | WEIGHT: 181.88 LBS | HEART RATE: 74 BPM | OXYGEN SATURATION: 98 % | DIASTOLIC BLOOD PRESSURE: 84 MMHG

## 2023-03-28 DIAGNOSIS — I25.10 CORONARY ARTERY DISEASE INVOLVING NATIVE HEART, UNSPECIFIED VESSEL OR LESION TYPE, UNSPECIFIED WHETHER ANGINA PRESENT: ICD-10-CM

## 2023-03-28 DIAGNOSIS — R07.89 CHEST DISCOMFORT: ICD-10-CM

## 2023-03-28 DIAGNOSIS — I10 PRIMARY HYPERTENSION: Primary | ICD-10-CM

## 2023-03-28 PROCEDURE — 1159F MED LIST DOCD IN RCRD: CPT | Mod: CPTII,S$GLB,, | Performed by: FAMILY MEDICINE

## 2023-03-28 PROCEDURE — 1101F PR PT FALLS ASSESS DOC 0-1 FALLS W/OUT INJ PAST YR: ICD-10-PCS | Mod: CPTII,S$GLB,, | Performed by: FAMILY MEDICINE

## 2023-03-28 PROCEDURE — 1126F PR PAIN SEVERITY QUANTIFIED, NO PAIN PRESENT: ICD-10-PCS | Mod: CPTII,S$GLB,, | Performed by: FAMILY MEDICINE

## 2023-03-28 PROCEDURE — 3008F PR BODY MASS INDEX (BMI) DOCUMENTED: ICD-10-PCS | Mod: CPTII,S$GLB,, | Performed by: FAMILY MEDICINE

## 2023-03-28 PROCEDURE — 99999 PR PBB SHADOW E&M-EST. PATIENT-LVL V: CPT | Mod: PBBFAC,,, | Performed by: FAMILY MEDICINE

## 2023-03-28 PROCEDURE — 3079F PR MOST RECENT DIASTOLIC BLOOD PRESSURE 80-89 MM HG: ICD-10-PCS | Mod: CPTII,S$GLB,, | Performed by: FAMILY MEDICINE

## 2023-03-28 PROCEDURE — 1126F AMNT PAIN NOTED NONE PRSNT: CPT | Mod: CPTII,S$GLB,, | Performed by: FAMILY MEDICINE

## 2023-03-28 PROCEDURE — 1159F PR MEDICATION LIST DOCUMENTED IN MEDICAL RECORD: ICD-10-PCS | Mod: CPTII,S$GLB,, | Performed by: FAMILY MEDICINE

## 2023-03-28 PROCEDURE — 99999 PR PBB SHADOW E&M-EST. PATIENT-LVL V: ICD-10-PCS | Mod: PBBFAC,,, | Performed by: FAMILY MEDICINE

## 2023-03-28 PROCEDURE — 99215 PR OFFICE/OUTPT VISIT, EST, LEVL V, 40-54 MIN: ICD-10-PCS | Mod: S$GLB,,, | Performed by: FAMILY MEDICINE

## 2023-03-28 PROCEDURE — 3288F PR FALLS RISK ASSESSMENT DOCUMENTED: ICD-10-PCS | Mod: CPTII,S$GLB,, | Performed by: FAMILY MEDICINE

## 2023-03-28 PROCEDURE — 3077F SYST BP >= 140 MM HG: CPT | Mod: CPTII,S$GLB,, | Performed by: FAMILY MEDICINE

## 2023-03-28 PROCEDURE — 4010F ACE/ARB THERAPY RXD/TAKEN: CPT | Mod: CPTII,S$GLB,, | Performed by: FAMILY MEDICINE

## 2023-03-28 PROCEDURE — 4010F PR ACE/ARB THEARPY RXD/TAKEN: ICD-10-PCS | Mod: CPTII,S$GLB,, | Performed by: FAMILY MEDICINE

## 2023-03-28 PROCEDURE — 99215 OFFICE O/P EST HI 40 MIN: CPT | Mod: S$GLB,,, | Performed by: FAMILY MEDICINE

## 2023-03-28 PROCEDURE — 3288F FALL RISK ASSESSMENT DOCD: CPT | Mod: CPTII,S$GLB,, | Performed by: FAMILY MEDICINE

## 2023-03-28 PROCEDURE — 3008F BODY MASS INDEX DOCD: CPT | Mod: CPTII,S$GLB,, | Performed by: FAMILY MEDICINE

## 2023-03-28 PROCEDURE — 1160F RVW MEDS BY RX/DR IN RCRD: CPT | Mod: CPTII,S$GLB,, | Performed by: FAMILY MEDICINE

## 2023-03-28 PROCEDURE — 1160F PR REVIEW ALL MEDS BY PRESCRIBER/CLIN PHARMACIST DOCUMENTED: ICD-10-PCS | Mod: CPTII,S$GLB,, | Performed by: FAMILY MEDICINE

## 2023-03-28 PROCEDURE — 1101F PT FALLS ASSESS-DOCD LE1/YR: CPT | Mod: CPTII,S$GLB,, | Performed by: FAMILY MEDICINE

## 2023-03-28 PROCEDURE — 3079F DIAST BP 80-89 MM HG: CPT | Mod: CPTII,S$GLB,, | Performed by: FAMILY MEDICINE

## 2023-03-28 PROCEDURE — 3077F PR MOST RECENT SYSTOLIC BLOOD PRESSURE >= 140 MM HG: ICD-10-PCS | Mod: CPTII,S$GLB,, | Performed by: FAMILY MEDICINE

## 2023-03-28 RX ORDER — FERROUS SULFATE 324(65)MG
TABLET, DELAYED RELEASE (ENTERIC COATED) ORAL 2 TIMES DAILY WITH MEALS
Status: ON HOLD | COMMUNITY
Start: 2023-02-19 | End: 2024-02-28 | Stop reason: HOSPADM

## 2023-03-28 NOTE — PATIENT INSTRUCTIONS
Physically, everything looks pretty good today.      Your blood pressure, however, is a bit higher than we would like.  We will recheck it before you go.  If still elevated, I will likely send a new medication to the pharmacy to help with your pressure.      In the meantime, let us also get you back to the cardiologist to do the heart catheterization procedure.  I am placing a new referral to Dr. Plascencia today.  His office should reach out to schedule either an in-person appointment or just set you up for the catheterization.      Until then, continue to eat a healthy diet.  Be careful with portion sizes.  Includes lots of fresh fruits, vegetables, whole grains, lean proteins.  See info below.    Keep hydrated.  Be sure to drink at least 8-10, 8 oz, glasses of water every day.    Stay active.  Try to do some sort of physical activity every day.  Nothing outrageous, just try walking for 10-15 minutes each day.     Keep your follow-up appointments with gynecology, Hematology, as scheduled.

## 2023-03-28 NOTE — PROGRESS NOTES
"    [facility]       [address] [place] [address]      Phone: [phone]      Fax: [phone]    Kishan Kelley MD                Office Visit  03/28/2023        Subjective      HPI:  Liliane Ford is a 67 y.o. female presents today in clinic for "Follow-up  ."     67-year-old female presents today to follow-up on multiple issues.     Patient states that she feels relatively okay today.  No shortness of breath.  No fever, chills, body aches.  No coughing, sneezing, URI type symptoms.  Appetite normal.  Bowel movements normal.  No urinary issues.      She is still having some discomfort in her right upper chest.  Just feels odd.  Was told in the past that she had blockages and had some abnormal tests on her heart.  Was estimated that she had a 70% blockage in one of the vessels.  Was scheduled for heart catheterization back in December, but then was also having bleeding issues from her fibroids.  She had to cancel the heart catheterization, and now would like to get it rescheduled.    She states she stays very active.  She walks her dogs approximately 2 miles every day.  Doing this does not increase the discomfort in her chest.  No other symptoms, just this uncomfortable feeling.    States that, for the fibroids, she followed up with her gyn.  They discuss several options to help control the bleeding.  For now, they are going to continue using Megace daily.  Seems to be working well.  She was able to have Pap done at last visit.  Will get mammogram this September.    She has also seen Hematology.  Had some workup done.  For now, they are just following.  She will follow-up with them again in May, with labs prior to the appointment.    Blood pressure is slightly elevated today in clinic.  Initial reading was 160/90.  After recheck, was 142/84.  Blood pressure tends to run better at home.  Still taking amlodipine 10 mg daily, along with losartan 100 mg " daily, Toprol-XL 50 mg daily.  No issues with these medications.    PMH: HTN, fibroids  PSH:   F MH: None significant reported  allergies:  Penicillin caused a rash as a child.    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  .    T: No current use.  Quit 40+ years ago.    a: Denies  D: Denies    exercise: Daily    colon:  2022.  Cologuard negative.  Repeat three years ().    mm2022     gyn:  Dr. Aquino ([facility])      The following were updated and reviewed by myself in the chart: medications, past medical history, past surgical history, family history, social history, and allergies.     Medications:  Current Outpatient Medications on File Prior to Visit   Medication Sig Dispense Refill    amLODIPine (NORVASC) 10 MG tablet Take 1 tablet (10 mg total) by mouth once daily. 90 tablet 3    ascorbic acid, vitamin C, (VITAMIN C) 500 MG tablet Take 1 tablet (500 mg total) by mouth every evening.      aspirin (ECOTRIN) 81 MG EC tablet Take 1 tablet (81 mg total) by mouth once daily. 90 tablet 3    b complex vitamins tablet Take 1 tablet by mouth once daily. B COMPLETE      ferrous sulfate 324 mg (65 mg iron) TbEC Take by mouth 2 (two) times daily with meals.      losartan (COZAAR) 100 MG tablet Take 1 tablet (100 mg total) by mouth once daily. 90 tablet 3    megestroL (MEGACE) 40 MG Tab Take 1 tablet (40 mg total) by mouth once daily. 30 tablet 2    metoprolol succinate (TOPROL-XL) 50 MG 24 hr tablet Take 1 tablet (50 mg total) by mouth once daily. 90 tablet 4    rosuvastatin (CRESTOR) 20 MG tablet Take 1 tablet (20 mg total) by mouth every evening. 90 tablet 3    megestroL (MEGACE) 40 MG Tab Take 1 tablet (40 mg total) by mouth once daily. (Patient not taking: Reported on 3/28/2023.) 90 tablet 3     No current facility-administered medications on file prior to visit.        PMHx:  Past Medical History:   Diagnosis Date    Hyperlipidemia     Hypertension     Pulmonary edema        Patient Active Problem List    Diagnosis Date Noted    Abnormal computed tomography angiography (CTA) 2022    History of endometrial hyperplasia 2022    Fibroids 2022    Palpitations 2022    Abnormal ECG 2022    Hypercholesterolemia 2022    Encounter for screening colonoscopy 2022    Delayed immunizations 2022    Bunion 2022    Symptomatic anemia 2020    Postmenopausal bleeding 2020    Essential hypertension 2020    Thrombocytosis 2020    Elevated AST (SGOT) 2020    Cardiomegaly 2020    Low iron 2020    Thyroid nodule 2020        PSHx:  Past Surgical History:   Procedure Laterality Date     SECTION      x 2        FHx:  Family History   Problem Relation Age of Onset    Hypertension Mother         Social:  Social History     Socioeconomic History    Marital status:     Number of children: 3   Tobacco Use    Smoking status: Former     Types: Cigarettes    Smokeless tobacco: Never   Substance and Sexual Activity    Alcohol use: Never    Drug use: Never    Sexual activity: Not Currently     Partners: Male     Birth control/protection: Post-menopausal        Allergies:  Review of patient's allergies indicates:   Allergen Reactions    Penicillins Rash        ROS:  Review of Systems   Constitutional:  Negative for activity change, appetite change, chills and fever.   HENT:  Negative for congestion, postnasal drip, rhinorrhea, sore throat and trouble swallowing.    Respiratory:  Negative for cough, shortness of breath and wheezing.    Cardiovascular:  Positive for chest pain. Negative for palpitations.   Gastrointestinal:  Negative for abdominal pain, constipation, diarrhea, nausea and vomiting.   Genitourinary:  Negative for difficulty urinating.   Musculoskeletal:  Negative for arthralgias and myalgias.   Skin:  Negative for color change and rash.   Neurological:  Negative for speech difficulty and  "headaches.   All other systems reviewed and are negative.       Objective      BP (!) 142/84   Pulse 74   Temp 97.7 °F (36.5 °C)   Ht 5' 2" (1.575 m)   Wt 82.5 kg (181 lb 14.1 oz)   SpO2 98%   BMI 33.27 kg/m²   Ht Readings from Last 3 Encounters:   03/28/23 5' 2" (1.575 m)   03/08/23 5' 2" (1.575 m)   12/21/22 5' 2" (1.575 m)     Wt Readings from Last 3 Encounters:   03/28/23 82.5 kg (181 lb 14.1 oz)   03/08/23 82.7 kg (182 lb 5.1 oz)   12/21/22 83.7 kg (184 lb 8.4 oz)       PHYSICAL EXAM:  Physical Exam  Vitals and nursing note reviewed.   Constitutional:       General: She is not in acute distress.     Appearance: Normal appearance.   HENT:      Head: Normocephalic and atraumatic.      Right Ear: Tympanic membrane, ear canal and external ear normal.      Left Ear: Tympanic membrane, ear canal and external ear normal.      Nose: Nose normal. No congestion or rhinorrhea.      Mouth/Throat:      Mouth: Mucous membranes are moist.      Pharynx: Oropharynx is clear. No oropharyngeal exudate or posterior oropharyngeal erythema.   Eyes:      Extraocular Movements: Extraocular movements intact.      Conjunctiva/sclera: Conjunctivae normal.      Pupils: Pupils are equal, round, and reactive to light.   Cardiovascular:      Rate and Rhythm: Normal rate and regular rhythm.   Pulmonary:      Effort: Pulmonary effort is normal. No respiratory distress.      Breath sounds: No wheezing, rhonchi or rales.   Musculoskeletal:         General: Normal range of motion.      Cervical back: Normal range of motion.   Lymphadenopathy:      Cervical: No cervical adenopathy.   Skin:     General: Skin is warm and dry.      Findings: No rash.   Neurological:      Mental Status: She is alert.            LABS / IMAGING:  Recent Results (from the past 4368 hour(s))   Cologuard Screening (Multitarget Stool DNA)    Collection Time: 09/30/22  7:32 AM    Specimen: Rectum; Stool   Result Value Ref Range    Cologuard Result Negative Negative " "  Specimen to Pathology, Ob/Gyn    Collection Time: 10/27/22  9:59 AM   Result Value Ref Range    Final Pathologic Diagnosis       SCANT FRAGMENTS OF INACTIVE ENDOMETRIUM ADMIXED WITH ABUNDANT BLOOD CLOT    Gross       Hospital/clinic label MRN:  [id number]  Pathology label MRN:  [id number]  The specimen is received in formalin labeled "EMB".  The specimen consists of  multiple tan-brown fragments of soft tissue measuring 2.6 x 1.7 x 0.8 cm in  aggregate. The specimen is submitted entirely in cassette [id number].  Joni Russell  Gross Technologist      Disclaimer       Unless the case is a 'gross only' or additional testing only, the final  diagnosis for each specimen is based on a microscopic examination of  appropriate tissue sections.     Liquid-Based Pap Smear, Screening    Collection Time: 10/27/22 10:02 AM   Result Value Ref Range    Final Pathologic Diagnosis (A)      Specimen Adequacy  Unsatisfactory for evaluation.  Ferriday Category  Unsatisfactory for evaluation. Specimen processed and examined, but  unsatisfactory for evaluation of epithelial abnormality because of scant  cellularity.  Blood present.      Disclaimer (A)      The Pap smear is a screening test that aids in the detection of cervical  cancer and cancer precursors. Both false positive and false negative results  can occur. The test should be used at regular intervals, and positive results  should be confirmed before definitive therapy.  This liquid based specimen is processed using the  or  Thin PrepPAP  System. This specimen has been analyzed by the ThinPrep Imaging System  ([employer]), an automated imaging and review system which assists  the laboratory in evaluating cells on ThinPrep PAP tests. Following automated  imaging, selected fields from every slide are reviewed by a cytotechnologist  and/or pathologist.  Screening was performed at [facility], [address], " [place] [address].     HPV High Risk Genotypes, PCR    Collection Time: 10/27/22 10:02 AM   Result Value Ref Range    HPV other High Risk types, PCR Negative Negative    HPV High Risk type 16, PCR Negative Negative    HPV High Risk type 18, PCR Negative Negative   Echo    Collection Time: 11/22/22  9:41 AM   Result Value Ref Range    BSA 1.91 m2    TDI SEPTAL 0.06 m/s    LV LATERAL E/E' RATIO 13.57 m/s    LV SEPTAL E/E' RATIO 15.83 m/s    LA WIDTH 4.80 cm    Left Ventricular Outflow Tract Mean Velocity 0.75 cm/s    Left Ventricular Outflow Tract Mean Gradient 2.56 mmHg    TDI LATERAL 0.07 m/s    PV PEAK VELOCITY 1.12 cm/s    LVIDd 4.82 3.5 - 6.0 cm    IVS 1.28 (A) 0.6 - 1.1 cm    Posterior Wall 1.38 (A) 0.6 - 1.1 cm    LVIDs 2.70 2.1 - 4.0 cm    FS 44 28 - 44 %    LA volume 101.25 cm3    Sinus 3.19 cm    STJ 2.92 cm    Ascending aorta 3.14 cm    LV mass 255.63 g    LA size 4.78 cm    RVDD 3.42 cm    TAPSE 3.22 cm    Left Ventricle Relative Wall Thickness 0.57 cm    AV mean gradient 6 mmHg    AV valve area 2.20 cm2    AV Velocity Ratio 0.70     AV index (prosthetic) 0.66     E/A ratio 1.00     Mean e' 0.07 m/s    E wave deceleration time 278.16 msec    IVRT 53.28 msec    Pulm vein S/D ratio 1.28     LVOT diameter 2.06 cm    LVOT area 3.3 cm2    LVOT peak jovanni 1.12 m/s    LVOT peak VTI 26.90 cm    Ao peak jovanni 1.60 m/s    Ao VTI 40.7 cm    RVOT peak jovanni 0.72 m/s    RVOT peak VTI 18.4 cm    LVOT stroke volume 89.61 cm3    AV peak gradient 10 mmHg    PV mean gradient 1.25 mmHg    E/E' ratio 14.62 m/s    MV Peak E Jovanni 0.95 m/s    TR Max Jovanni 2.69 m/s    MV Peak A Jovanni 0.95 m/s    PV Peak S Jovanni 0.68 m/s    PV Peak D Jovanni 0.53 m/s    LV Systolic Volume 27.00 mL    LV Systolic Volume Index 14.7 mL/m2    LV Diastolic Volume 108.70 mL    LV Diastolic Volume Index 59.08 mL/m2    LA Volume Index 55.0 mL/m2    LV Mass Index 139 g/m2    RA Major Axis 4.88 cm    Left Atrium Minor Axis 5.41 cm    Left Atrium Major Axis 4.99 cm     Triscuspid Valve Regurgitation Peak Gradient 29 mmHg    LA Volume Index (Mod) 37.7 mL/m2    LA volume (mod) 69.38 cm3    RA Width 3.36 cm    EF 60 %   Cardiac Monitor - 3-15 Day Adult (Cupid Only)    Collection Time: 11/22/22 10:45 AM   Result Value Ref Range    Sinus min HR 54     Sinus max hr 125     Sinus avg hr 77    Nuclear Stress - Cardiology Interpreted    Collection Time: 11/22/22 11:02 AM   Result Value Ref Range    85% Max Predicted      Max Predicted      [facility] CV CPX PATIENT IS MALE 0.0     [facility] CV CPX PATIENT IS FEMALE 1.0     Systolic blood pressure 169 mmHg    Diastolic blood pressure 95 mmHg    HR at rest 77 bpm    Exercise duration (min) 6 minutes    Exercise duration (sec) 0 seconds    Peak Systolic  mmHg    Peak Diatolic BP 91 mmHg    Peak  bpm    Estimated METs 7     % Max HR Achieved 94     1 Minute Recovery  bpm    RPP 13,013     Peak RPP 26,578     Nuc Rest EF 72     Nuc Stress EF 76 %   CBC auto differential    Collection Time: 12/14/22 11:44 AM   Result Value Ref Range    WBC 4.54 3.90 - 12.70 K/uL    RBC 2.06 (L) 4.00 - 5.40 M/uL    Hemoglobin 4.9 (LL) 12.0 - 16.0 g/dL    Hematocrit 16.5 (LL) 37.0 - 48.5 %    MCV 80 (L) 82 - 98 fL    MCH 23.8 (L) 27.0 - 31.0 pg    MCHC 29.7 (L) 32.0 - 36.0 g/dL    RDW 19.8 (H) 11.5 - 14.5 %    Platelets 384 150 - 450 K/uL    MPV 10.0 9.2 - 12.9 fL    Immature Granulocytes 0.2 0.0 - 0.5 %    Gran # (ANC) 3.2 1.8 - 7.7 K/uL    Immature Grans (Abs) 0.01 0.00 - 0.04 K/uL    Lymph # 1.1 1.0 - 4.8 K/uL    Mono # 0.3 0.3 - 1.0 K/uL    Eos # 0.0 0.0 - 0.5 K/uL    Baso # 0.02 0.00 - 0.20 K/uL    nRBC 0 0 /100 WBC    Gran % 70.2 38.0 - 73.0 %    Lymph % 23.1 18.0 - 48.0 %    Mono % 5.9 4.0 - 15.0 %    Eosinophil % 0.2 0.0 - 8.0 %    Basophil % 0.4 0.0 - 1.9 %    Platelet Estimate Appears normal     Aniso Slight     Poik Slight     Poly Occasional     Hypo Occasional     Ovalocytes Occasional     Target Cells Occasional     Acanthocytes  Present     Large/Giant Platelets Present     Differential Method Automated    Comprehensive metabolic panel    Collection Time: 12/14/22 11:44 AM   Result Value Ref Range    Sodium 139 136 - 145 mmol/L    Potassium 3.6 3.5 - 5.1 mmol/L    Chloride 111 (H) 95 - 110 mmol/L    CO2 20 (L) 23 - 29 mmol/L    Glucose 116 (H) 70 - 110 mg/dL    BUN 12 8 - 23 mg/dL    Creatinine 0.8 0.5 - 1.4 mg/dL    Calcium 8.4 (L) 8.7 - 10.5 mg/dL    Total Protein 6.1 6.0 - 8.4 g/dL    Albumin 3.5 3.5 - 5.2 g/dL    Total Bilirubin 0.4 0.1 - 1.0 mg/dL    Alkaline Phosphatase 50 (L) 55 - 135 U/L    AST 22 10 - 40 U/L    ALT 21 10 - 44 U/L    Anion Gap 8 8 - 16 mmol/L    eGFR >60 >60 mL/min/1.73 m^2   Type & Screen    Collection Time: 12/14/22 11:44 AM   Result Value Ref Range    Group & Rh A POS     Indirect Soraya NEG    HIV 1/2 Ag/Ab (4th Gen)    Collection Time: 12/14/22 11:44 AM   Result Value Ref Range    HIV 1/2 Ag/Ab Negative Negative   Hepatitis C Antibody    Collection Time: 12/14/22 11:44 AM   Result Value Ref Range    Hepatitis C Ab Negative Negative   HCV Virus Hold Specimen    Collection Time: 12/14/22 11:44 AM   Result Value Ref Range    HEP C Virus Hold Specimen Hold for HCV sendout    Prepare RBC 2 Units; severe anemia    Collection Time: 12/14/22 11:44 AM   Result Value Ref Range    UNIT NUMBER [id number]     Product Code O6122U64     DISPENSE STATUS TRANSFUSED     CODING SYSTEM [id number]     Unit Blood Type Code 6200     Unit Blood Type A POS     Unit Expiration [id number]     UNIT NUMBER [id number]     Product Code G7631K31     DISPENSE STATUS TRANSFUSED     CODING SYSTEM KQXS315     Unit Blood Type Code 6200     Unit Blood Type A POS     Unit Expiration [id number]    Iron and TIBC    Collection Time: 12/14/22 11:44 AM   Result Value Ref Range    Iron <10 (L) 30 - 160 ug/dL    Transferrin 282 200 - 375 mg/dL    TIBC 417 250 - 450 ug/dL    Saturated Iron Unable to calculate 20 - 50 %   Prepare RBC 1 Unit     Collection Time: 12/14/22 11:44 AM   Result Value Ref Range    UNIT NUMBER [id number]     Product Code M5835B90     DISPENSE STATUS TRANSFUSED     CODING SYSTEM [id number]     Unit Blood Type Code 6200     Unit Blood Type A POS     Unit Expiration [id number]    CBC auto differential    Collection Time: 12/14/22  6:30 PM   Result Value Ref Range    WBC 4.19 3.90 - 12.70 K/uL    RBC 2.72 (L) 4.00 - 5.40 M/uL    Hemoglobin 7.0 (L) 12.0 - 16.0 g/dL    Hematocrit 23.1 (L) 37.0 - 48.5 %    MCV 85 82 - 98 fL    MCH 25.7 (L) 27.0 - 31.0 pg    MCHC 30.3 (L) 32.0 - 36.0 g/dL    RDW 18.4 (H) 11.5 - 14.5 %    Platelets 364 150 - 450 K/uL    MPV 10.1 9.2 - 12.9 fL    Immature Granulocytes 0.2 0.0 - 0.5 %    Gran # (ANC) 2.3 1.8 - 7.7 K/uL    Immature Grans (Abs) 0.01 0.00 - 0.04 K/uL    Lymph # 1.4 1.0 - 4.8 K/uL    Mono # 0.4 0.3 - 1.0 K/uL    Eos # 0.0 0.0 - 0.5 K/uL    Baso # 0.02 0.00 - 0.20 K/uL    nRBC 0 0 /100 WBC    Gran % 55.6 38.0 - 73.0 %    Lymph % 32.5 18.0 - 48.0 %    Mono % 10.5 4.0 - 15.0 %    Eosinophil % 0.7 0.0 - 8.0 %    Basophil % 0.5 0.0 - 1.9 %    Differential Method Automated    Comprehensive Metabolic Panel    Collection Time: 12/15/22  6:05 AM   Result Value Ref Range    Sodium 140 136 - 145 mmol/L    Potassium 3.8 3.5 - 5.1 mmol/L    Chloride 111 (H) 95 - 110 mmol/L    CO2 20 (L) 23 - 29 mmol/L    Glucose 98 70 - 110 mg/dL    BUN 12 8 - 23 mg/dL    Creatinine 0.7 0.5 - 1.4 mg/dL    Calcium 8.3 (L) 8.7 - 10.5 mg/dL    Total Protein 5.8 (L) 6.0 - 8.4 g/dL    Albumin 3.4 (L) 3.5 - 5.2 g/dL    Total Bilirubin 1.0 0.1 - 1.0 mg/dL    Alkaline Phosphatase 50 (L) 55 - 135 U/L    AST 22 10 - 40 U/L    ALT 20 10 - 44 U/L    Anion Gap 9 8 - 16 mmol/L    eGFR >60 >60 mL/min/1.73 m^2   CBC Auto Differential    Collection Time: 12/15/22  6:06 AM   Result Value Ref Range    WBC 4.38 3.90 - 12.70 K/uL    RBC 2.70 (L) 4.00 - 5.40 M/uL    Hemoglobin 7.0 (L) 12.0 - 16.0 g/dL    Hematocrit 22.6 (L) 37.0 - 48.5 %    MCV  84 82 - 98 fL    MCH 25.9 (L) 27.0 - 31.0 pg    MCHC 31.0 (L) 32.0 - 36.0 g/dL    RDW 18.0 (H) 11.5 - 14.5 %    Platelets 371 150 - 450 K/uL    MPV 10.4 9.2 - 12.9 fL    Immature Granulocytes 0.2 0.0 - 0.5 %    Gran # (ANC) 3.1 1.8 - 7.7 K/uL    Immature Grans (Abs) 0.01 0.00 - 0.04 K/uL    Lymph # 0.9 (L) 1.0 - 4.8 K/uL    Mono # 0.3 0.3 - 1.0 K/uL    Eos # 0.0 0.0 - 0.5 K/uL    Baso # 0.02 0.00 - 0.20 K/uL    nRBC 0 0 /100 WBC    Gran % 70.3 38.0 - 73.0 %    Lymph % 20.3 18.0 - 48.0 %    Mono % 7.8 4.0 - 15.0 %    Eosinophil % 0.9 0.0 - 8.0 %    Basophil % 0.5 0.0 - 1.9 %    Differential Method Automated    CBC auto differential    Collection Time: 12/16/22  9:29 AM   Result Value Ref Range    WBC 5.96 3.90 - 12.70 K/uL    RBC 3.22 (L) 4.00 - 5.40 M/uL    Hemoglobin 8.3 (L) 12.0 - 16.0 g/dL    Hematocrit 26.9 (L) 37.0 - 48.5 %    MCV 84 82 - 98 fL    MCH 25.8 (L) 27.0 - 31.0 pg    MCHC 30.9 (L) 32.0 - 36.0 g/dL    RDW 17.9 (H) 11.5 - 14.5 %    Platelets 375 150 - 450 K/uL    MPV 10.3 9.2 - 12.9 fL    Immature Granulocytes 0.5 0.0 - 0.5 %    Gran # (ANC) 4.0 1.8 - 7.7 K/uL    Immature Grans (Abs) 0.03 0.00 - 0.04 K/uL    Lymph # 1.4 1.0 - 4.8 K/uL    Mono # 0.5 0.3 - 1.0 K/uL    Eos # 0.1 0.0 - 0.5 K/uL    Baso # 0.02 0.00 - 0.20 K/uL    nRBC 1 (A) 0 /100 WBC    Gran % 66.5 38.0 - 73.0 %    Lymph % 24.0 18.0 - 48.0 %    Mono % 7.9 4.0 - 15.0 %    Eosinophil % 0.8 0.0 - 8.0 %    Basophil % 0.3 0.0 - 1.9 %    Differential Method Automated    Basic metabolic panel    Collection Time: 12/16/22  9:29 AM   Result Value Ref Range    Sodium 141 136 - 145 mmol/L    Potassium 3.6 3.5 - 5.1 mmol/L    Chloride 111 (H) 95 - 110 mmol/L    CO2 22 (L) 23 - 29 mmol/L    Glucose 122 (H) 70 - 110 mg/dL    BUN 14 8 - 23 mg/dL    Creatinine 0.7 0.5 - 1.4 mg/dL    Calcium 8.7 8.7 - 10.5 mg/dL    Anion Gap 8 8 - 16 mmol/L    eGFR >60 >60 mL/min/1.73 m^2   CBC W/ AUTO DIFFERENTIAL    Collection Time: 01/06/23 10:34 AM   Result Value Ref  Range    WBC 3.33 (L) 3.90 - 12.70 K/uL    RBC 4.04 4.00 - 5.40 M/uL    Hemoglobin 10.2 (L) 12.0 - 16.0 g/dL    Hematocrit 34.5 (L) 37.0 - 48.5 %    MCV 85 82 - 98 fL    MCH 25.2 (L) 27.0 - 31.0 pg    MCHC 29.6 (L) 32.0 - 36.0 g/dL    RDW 19.6 (H) 11.5 - 14.5 %    Platelets 434 150 - 450 K/uL    MPV 11.8 9.2 - 12.9 fL    Immature Granulocytes 0.3 0.0 - 0.5 %    Gran # (ANC) 1.5 (L) 1.8 - 7.7 K/uL    Immature Grans (Abs) 0.01 0.00 - 0.04 K/uL    Lymph # 1.3 1.0 - 4.8 K/uL    Mono # 0.4 0.3 - 1.0 K/uL    Eos # 0.0 0.0 - 0.5 K/uL    Baso # 0.03 0.00 - 0.20 K/uL    nRBC 0 0 /100 WBC    Gran % 46.0 38.0 - 73.0 %    Lymph % 39.6 18.0 - 48.0 %    Mono % 12.0 4.0 - 15.0 %    Eosinophil % 1.2 0.0 - 8.0 %    Basophil % 0.9 0.0 - 1.9 %    Differential Method Automated    IRON AND TIBC    Collection Time: 01/06/23 10:34 AM   Result Value Ref Range    Iron 35 30 - 160 ug/dL    Transferrin 276 200 - 375 mg/dL    TIBC 408 250 - 450 ug/dL    Saturated Iron 9 (L) 20 - 50 %   VITAMIN B12    Collection Time: 01/06/23 10:34 AM   Result Value Ref Range    Vitamin B-12 1171 (H) 210 - 950 pg/mL   FERRITIN    Collection Time: 01/06/23 10:34 AM   Result Value Ref Range    Ferritin 86 20.0 - 300.0 ng/mL   FOLATE    Collection Time: 01/06/23 10:34 AM   Result Value Ref Range    Folate 17.6 4.0 - 24.0 ng/mL   TSH    Collection Time: 01/06/23 10:34 AM   Result Value Ref Range    TSH 2.478 0.400 - 4.000 uIU/mL   MMA    Collection Time: 01/06/23 10:34 AM   Result Value Ref Range    Methlymalonic Acid 0.12 <0.40 umol/L   HOMOCYST    Collection Time: 01/06/23 10:34 AM   Result Value Ref Range    Homocysteine 4.2 4.0 - 15.5 umol/L   CBC Auto Differential    Collection Time: 02/24/23  9:34 AM   Result Value Ref Range    WBC 4.23 3.90 - 12.70 K/uL    RBC 4.37 4.00 - 5.40 M/uL    Hemoglobin 10.4 (L) 12.0 - 16.0 g/dL    Hematocrit 33.4 (L) 37.0 - 48.5 %    MCV 76 (L) 82 - 98 fL    MCH 23.8 (L) 27.0 - 31.0 pg    MCHC 31.1 (L) 32.0 - 36.0 g/dL    RDW 17.3  (H) 11.5 - 14.5 %    Platelets 347 150 - 450 K/uL    MPV 11.0 9.2 - 12.9 fL    Immature Granulocytes 0.2 0.0 - 0.5 %    Gran # (ANC) 2.0 1.8 - 7.7 K/uL    Immature Grans (Abs) 0.01 0.00 - 0.04 K/uL    Lymph # 1.6 1.0 - 4.8 K/uL    Mono # 0.5 0.3 - 1.0 K/uL    Eos # 0.1 0.0 - 0.5 K/uL    Baso # 0.04 0.00 - 0.20 K/uL    nRBC 0 0 /100 WBC    Gran % 47.8 38.0 - 73.0 %    Lymph % 37.6 18.0 - 48.0 %    Mono % 11.1 4.0 - 15.0 %    Eosinophil % 2.4 0.0 - 8.0 %    Basophil % 0.9 0.0 - 1.9 %    Differential Method Automated    Basic Metabolic Panel    Collection Time: 02/24/23  9:34 AM   Result Value Ref Range    Sodium 137 136 - 145 mmol/L    Potassium 3.7 3.5 - 5.1 mmol/L    Chloride 108 95 - 110 mmol/L    CO2 19 (L) 23 - 29 mmol/L    Glucose 142 (H) 70 - 110 mg/dL    BUN 11 8 - 23 mg/dL    Creatinine 0.8 0.5 - 1.4 mg/dL    Calcium 8.4 (L) 8.7 - 10.5 mg/dL    Anion Gap 10 8 - 16 mmol/L    eGFR >60 >60 mL/min/1.73 m^2   Ferritin    Collection Time: 02/24/23  9:34 AM   Result Value Ref Range    Ferritin 20 20.0 - 300.0 ng/mL   Iron and TIBC    Collection Time: 02/24/23  9:34 AM   Result Value Ref Range    Iron 114 30 - 160 ug/dL    Transferrin 271 200 - 375 mg/dL    TIBC 401 250 - 450 ug/dL    Saturated Iron 28 20 - 50 %   Ferritin    Collection Time: 03/08/23 12:47 PM   Result Value Ref Range    Ferritin 16 (L) 20.0 - 300.0 ng/mL   Iron and TIBC    Collection Time: 03/08/23 12:47 PM   Result Value Ref Range    Iron 99 30 - 160 ug/dL    Transferrin 304 200 - 375 mg/dL    TIBC 450 250 - 450 ug/dL    Saturated Iron 22 20 - 50 %   Pap Smear, Thin Prep with Reflex to HPV    Collection Time: 03/08/23  1:25 PM   Result Value Ref Range    Cytology ThinPrep Pap Source Endocervix     Cytology ThinPrep Pap Report Status DNR     Cytology Thinprep PAP Clinical History Routine exam     Cytology ThinPrep Pap LMP x     Cytology ThinPrep Previous PAP Unknown     Cytology ThinPrep Previous Biopsy No     Cytology ThinPrep PAP Adequacy SEE  BELOW     Cytology ThinPrep PAP General Categorization DNR     Cytology ThinPrep PAP Interpretation SEE BELOW     Cytology ThinPrep PAP Comment SEE BELOW     Cytotechnologist SEE BELOW     Review Cytotechnologist DNR     Pathologist DNR     Cytology ThinPrep PAP Infection DNR     Cytology Thin Prep Pap Explanation SEE BELOW          Assessment    1. Primary hypertension    2. Chest discomfort    3. Coronary artery disease involving native heart, unspecified vessel or lesion type, unspecified whether angina present          Plan    Liliane was seen today for follow-up.    Diagnoses and all orders for this visit:    Primary hypertension  -     Ambulatory referral/consult to Cardiology; Future  -     E-Consult to Cardiology    Chest discomfort  -     Ambulatory referral/consult to Cardiology; Future  -     E-Consult to Cardiology    Coronary artery disease involving native heart, unspecified vessel or lesion type, unspecified whether angina present  -     Ambulatory referral/consult to Cardiology; Future  -     E-Consult to Cardiology      Physically, she looks and sounds fine today.      Chest discomfort has been going on for a while, but does not seem to be worsened with activity.  That said, would still like her to follow back up with Cardiology to get the heart catheterization.      Had extensive discussion regarding heart catheterization, procedure, possible interventions, such as balloon angioplasty, stent placement, bypass, etc..  She is agreeable to having the procedure done.    Initial blood pressure was elevated, 2nd reading was much better.  Still above goal.  For now, we will continue current medications.  Will have her follow up with Cardiology to schedule the heart catheterization.  If still elevated following that, will adjust medications, as needed.    FOLLOW-UP:  Follow up in about 6 months (around 9/28/2023) for check up.    I spent a total of 45 minutes face to face and non-face to face on the date  of this visit.This includes time preparing to see the patient (eg, review of tests, notes), obtaining and/or reviewing additional history from an independent historian and/or outside medical records, documenting clinical information in the electronic health record, independently interpreting results and/or communicating results to the patient/family/caregiver, or care coordinator.    Signed by:  Kishan Kelley MD

## 2023-03-29 NOTE — TELEPHONE ENCOUNTER
Please call pt and schedule f/u appt with me asap to readdress CV issues.    Dr Plascencia      Spoke to patient scheduled f/u

## 2023-05-26 ENCOUNTER — LAB VISIT (OUTPATIENT)
Dept: LAB | Facility: HOSPITAL | Age: 68
End: 2023-05-26
Attending: NURSE PRACTITIONER
Payer: COMMERCIAL

## 2023-05-26 DIAGNOSIS — D64.9 ANEMIA, UNSPECIFIED TYPE: ICD-10-CM

## 2023-05-26 LAB
ANION GAP SERPL CALC-SCNC: 11 MMOL/L (ref 8–16)
BASOPHILS # BLD AUTO: 0.01 K/UL (ref 0–0.2)
BASOPHILS NFR BLD: 0.2 % (ref 0–1.9)
BUN SERPL-MCNC: 12 MG/DL (ref 8–23)
CALCIUM SERPL-MCNC: 9.3 MG/DL (ref 8.7–10.5)
CHLORIDE SERPL-SCNC: 108 MMOL/L (ref 95–110)
CO2 SERPL-SCNC: 23 MMOL/L (ref 23–29)
CREAT SERPL-MCNC: 0.9 MG/DL (ref 0.5–1.4)
DIFFERENTIAL METHOD: ABNORMAL
EOSINOPHIL # BLD AUTO: 0.1 K/UL (ref 0–0.5)
EOSINOPHIL NFR BLD: 2.7 % (ref 0–8)
ERYTHROCYTE [DISTWIDTH] IN BLOOD BY AUTOMATED COUNT: 17.8 % (ref 11.5–14.5)
EST. GFR  (NO RACE VARIABLE): >60 ML/MIN/1.73 M^2
FERRITIN SERPL-MCNC: 57 NG/ML (ref 20–300)
GLUCOSE SERPL-MCNC: 156 MG/DL (ref 70–110)
HCT VFR BLD AUTO: 40.2 % (ref 37–48.5)
HGB BLD-MCNC: 12.7 G/DL (ref 12–16)
IMM GRANULOCYTES # BLD AUTO: 0.01 K/UL (ref 0–0.04)
IMM GRANULOCYTES NFR BLD AUTO: 0.2 % (ref 0–0.5)
IRON SERPL-MCNC: 102 UG/DL (ref 30–160)
LYMPHOCYTES # BLD AUTO: 1.7 K/UL (ref 1–4.8)
LYMPHOCYTES NFR BLD: 32.4 % (ref 18–48)
MCH RBC QN AUTO: 25.6 PG (ref 27–31)
MCHC RBC AUTO-ENTMCNC: 31.6 G/DL (ref 32–36)
MCV RBC AUTO: 81 FL (ref 82–98)
MONOCYTES # BLD AUTO: 0.4 K/UL (ref 0.3–1)
MONOCYTES NFR BLD: 7.5 % (ref 4–15)
NEUTROPHILS # BLD AUTO: 3 K/UL (ref 1.8–7.7)
NEUTROPHILS NFR BLD: 57 % (ref 38–73)
NRBC BLD-RTO: 0 /100 WBC
PLATELET # BLD AUTO: 262 K/UL (ref 150–450)
PMV BLD AUTO: 12.1 FL (ref 9.2–12.9)
POTASSIUM SERPL-SCNC: 4.1 MMOL/L (ref 3.5–5.1)
RBC # BLD AUTO: 4.96 M/UL (ref 4–5.4)
SATURATED IRON: 28 % (ref 20–50)
SODIUM SERPL-SCNC: 142 MMOL/L (ref 136–145)
TOTAL IRON BINDING CAPACITY: 367 UG/DL (ref 250–450)
TRANSFERRIN SERPL-MCNC: 248 MG/DL (ref 200–375)
WBC # BLD AUTO: 5.22 K/UL (ref 3.9–12.7)

## 2023-05-26 PROCEDURE — 85025 COMPLETE CBC W/AUTO DIFF WBC: CPT | Performed by: NURSE PRACTITIONER

## 2023-05-26 PROCEDURE — 82728 ASSAY OF FERRITIN: CPT | Performed by: NURSE PRACTITIONER

## 2023-05-26 PROCEDURE — 36415 COLL VENOUS BLD VENIPUNCTURE: CPT | Mod: PN | Performed by: NURSE PRACTITIONER

## 2023-05-26 PROCEDURE — 80048 BASIC METABOLIC PNL TOTAL CA: CPT | Performed by: NURSE PRACTITIONER

## 2023-05-26 PROCEDURE — 84466 ASSAY OF TRANSFERRIN: CPT | Performed by: NURSE PRACTITIONER

## 2023-05-26 NOTE — PROGRESS NOTES
The patient location is: home  The chief complaint leading to consultation is: lab discussion    Visit type: audiovisual    Face to Face time with patient: 15  30 minutes of total time spent on the encounter, which includes face to face time and non-face to face time preparing to see the patient (eg, review of tests), Obtaining and/or reviewing separately obtained history, Documenting clinical information in the electronic or other health record, Independently interpreting results (not separately reported) and communicating results to the patient/family/caregiver, or Care coordination (not separately reported).     Each patient to whom he or she provides medical services by telemedicine is:  (1) informed of the relationship between the physician and patient and the respective role of any other health care provider with respect to management of the patient; and (2) notified that he or she may decline to receive medical services by telemedicine and may withdraw from such care at any time.    Patient ID: Liliane Ford is a 67 y.o. female.    Chief Complaint: lab discussion    HPI:   Patient is a 67 year old female who presents today for further evaluation and recommendations of anemia.  She has been referred to the hematology clinic by her pcp, Dr. Kishan Kelley.  She has been found to have heavy uterine bleeding with fibroids and has been found with uterine fibroids.  Was do to have heart cath but this has been postponed due to severe anemia requiring blood transfusions hgb 4.9 on 12/14/2022 and received 3 received units of blood.  She is being followed by gynecology - is seeing gynecology in February. States that she started having abnormal uterine bleeding in 2020 then was given Megace and bleeding stopped and then restarted in again in 2020 and states that she bleed for 6 months.  She is currently taking Megace daily again which has stopped the abnormal uterine bleeding.  She states that she is feeling well  without any complaints currently     States taking iron tablets twice daily.  States taking celery juice and prune juice and is having BM 2-3 with no constipation.        Other diagnosis include cardiomegaly, htn, palpitations, abnormal ECG, and hypercholesterolemia followed by cardiology.       12/2007 colonoscopy: normal with recommendations to repeat in 10 years.   10/27/2022 PAP Specimen Adequacy   Unsatisfactory for evaluation. [place] Category   Unsatisfactory for evaluation. Specimen processed and examined, but unsatisfactory for evaluation of epithelial abnormality because of scant cellularity. Blood present  10/27/2022 Endometrial biopsy SCANT FRAGMENTS OF INACTIVE ENDOMETRIUM ADMIXED WITH ABUNDANT BLOOD CLOT   8/30/2022 Mammogram   Impression:   No mammographic evidence of malignancy.  BI-RADS Category 1: Negative  Recommendation:  Routine screening mammogram in 1 year is recommended.  9/2022 Cologuard - negative     Interval History:  2/27/2023  At last visit was found to be responding well to oral iron with no GI issues.  She presents today to continue to monitor response to oral iron therapy.  Heavy abnormal uterine bleeding has greatly reduced - just spotting with medication from GYN. She has been taking her oral iron once daily.  She has not been taking B12 since last visit. She took oral iron the morning of her labs.  States that she is feeling good with no complaints.      Interval History:  5/29/2023 Presents today to assess for recurrent EZEKIEL with h/o abnormal uterine bleeding.  3/8/2023 PAP - negative for intraepithelial lesions or malignancy.  Is taking megestrol and still with spotting with abnormal spotting but reduced.  Using panty liners.  Is having a pelvic US in 9/2023 for Dr. Ema Aquino and then will go from there per patient.  Was taking oral iron once daily and just started taking oral iron tablets twice a day since last Thursday. Not having GI side effects.  States that she is feeling  good.        Social History     Socioeconomic History    Marital status:     Number of children: 3   Tobacco Use    Smoking status: Former     Types: Cigarettes    Smokeless tobacco: Never   Substance and Sexual Activity    Alcohol use: Never    Drug use: Never    Sexual activity: Not Currently     Partners: Male     Birth control/protection: Post-menopausal       Family History   Problem Relation Age of Onset    Hypertension Mother        Past Surgical History:   Procedure Laterality Date     SECTION      x 2       Past Medical History:   Diagnosis Date    Hyperlipidemia     Hypertension     Pulmonary edema        Review of Systems   Constitutional: Negative.    HENT: Negative.     Eyes: Negative.    Respiratory: Negative.     Cardiovascular: Negative.    Gastrointestinal: Negative.    Endocrine: Negative.    Genitourinary: Negative.         Abnormal uterine bleeding   Musculoskeletal: Negative.    Skin: Negative.    Allergic/Immunologic: Negative.    Neurological: Negative.    Hematological: Negative.    Psychiatric/Behavioral: Negative.          Medication List with Changes/Refills   Current Medications    AMLODIPINE (NORVASC) 10 MG TABLET    Take 1 tablet (10 mg total) by mouth once daily.    ASCORBIC ACID, VITAMIN C, (VITAMIN C) 500 MG TABLET    Take 1 tablet (500 mg total) by mouth every evening.    ASPIRIN (ECOTRIN) 81 MG EC TABLET    Take 1 tablet (81 mg total) by mouth once daily.    B COMPLEX VITAMINS TABLET    Take 1 tablet by mouth once daily. B COMPLETE    FERROUS SULFATE 324 MG (65 MG IRON) TBEC    Take by mouth 2 (two) times daily with meals.    LOSARTAN (COZAAR) 100 MG TABLET    Take 1 tablet (100 mg total) by mouth once daily.    MEGESTROL (MEGACE) 40 MG TAB    Take 1 tablet (40 mg total) by mouth once daily.    MEGESTROL (MEGACE) 40 MG TAB    Take 1 tablet (40 mg total) by mouth once daily.    METOPROLOL SUCCINATE (TOPROL-XL) 50 MG 24 HR TABLET    Take 1 tablet (50 mg total) by mouth  once daily.    ROSUVASTATIN (CRESTOR) 20 MG TABLET    Take 1 tablet (20 mg total) by mouth every evening.        Objective:   There were no vitals filed for this visit.    Physical Exam  Constitutional:       Appearance: Normal appearance.   Pulmonary:      Effort: Pulmonary effort is normal.   Neurological:      Mental Status: She is alert and oriented to person, place, and time.   Psychiatric:         Mood and Affect: Mood normal.         Behavior: Behavior normal.         Thought Content: Thought content normal.         Judgment: Judgment normal.       Assessment:     Problem List Items Addressed This Visit    None  Visit Diagnoses       History of iron deficiency    -  Primary    Relevant Orders    CBC Auto Differential    Basic Metabolic Panel    Ferritin    Iron and TIBC    Microcytosis        Relevant Orders    CBC Auto Differential    Ferritin    Iron and TIBC            Lab Results   Component Value Date    WBC 5.22 05/26/2023    RBC 4.96 05/26/2023    HGB 12.7 05/26/2023    HCT 40.2 05/26/2023    MCV 81 (L) 05/26/2023    MCH 25.6 (L) 05/26/2023    MCHC 31.6 (L) 05/26/2023    RDW 17.8 (H) 05/26/2023     05/26/2023    MPV 12.1 05/26/2023    GRAN 3.0 05/26/2023    GRAN 57.0 05/26/2023    LYMPH 1.7 05/26/2023    LYMPH 32.4 05/26/2023    MONO 0.4 05/26/2023    MONO 7.5 05/26/2023    EOS 0.1 05/26/2023    BASO 0.01 05/26/2023    EOSINOPHIL 2.7 05/26/2023    BASOPHIL 0.2 05/26/2023      Lab Results   Component Value Date     05/26/2023    K 4.1 05/26/2023     05/26/2023    CO2 23 05/26/2023    BUN 12 05/26/2023    CREATININE 0.9 05/26/2023    CALCIUM 9.3 05/26/2023    ANIONGAP 11 05/26/2023    ESTGFRAFRICA >60 02/06/2020    EGFRNONAA >60 02/06/2020     Lab Results   Component Value Date    ALT 20 12/15/2022    AST 22 12/15/2022    ALKPHOS 50 (L) 12/15/2022    BILITOT 1.0 12/15/2022     Lab Results   Component Value Date    UIBC 167 11/19/2007    IRON 102 05/26/2023    TRANSFERRIN 248 05/26/2023     TIBC 367 05/26/2023    FESATURATED 28 05/26/2023      Lab Results   Component Value Date    FERRITIN 57 05/26/2023         Plan:   History of iron deficiency  -     CBC Auto Differential; Future; Expected date: 05/29/2023  -     Basic Metabolic Panel; Future; Expected date: 05/29/2023  -     Ferritin; Future; Expected date: 05/29/2023  -     Iron and TIBC; Future; Expected date: 05/29/2023    Microcytosis  -     CBC Auto Differential; Future; Expected date: 05/29/2023  -     Ferritin; Future; Expected date: 05/29/2023  -     Iron and TIBC; Future; Expected date: 05/29/2023    F/u schedule with gyn in 6/2023 with imaging to assess continued abnormal uterine bleeding.  Reduce oral iron tablets from 2/day to 1/day.  F/u in 4 months with labs prior in [place] and a VV after to discuss results.       Med Onc Chart Routing      Follow up with physician    Follow up with [facility] 4 months. f/u in 4 months with labs prior in [place] and a VV after to discuss results   Infusion scheduling note n/a   Injection scheduling note n/a   Labs   Scheduling:  Preferred lab:  Lab interval:  Cbc, bmp, iron studies   Imaging   N/a   Pharmacy appointment No pharmacy appointment needed      Other referrals  No additional referrals needed            Collaborating Provider:  Dr. Preston Brown    Thank You,  Angela Wagner, FNP-C  Hematology Oncology

## 2023-05-29 ENCOUNTER — OFFICE VISIT (OUTPATIENT)
Dept: HEMATOLOGY/ONCOLOGY | Facility: CLINIC | Age: 68
End: 2023-05-29
Payer: COMMERCIAL

## 2023-05-29 DIAGNOSIS — Z86.39 HISTORY OF IRON DEFICIENCY: Primary | ICD-10-CM

## 2023-05-29 DIAGNOSIS — R71.8 MICROCYTOSIS: ICD-10-CM

## 2023-05-29 PROCEDURE — 99214 OFFICE O/P EST MOD 30 MIN: CPT | Mod: 95,,, | Performed by: NURSE PRACTITIONER

## 2023-05-29 PROCEDURE — 99214 PR OFFICE/OUTPT VISIT, EST, LEVL IV, 30-39 MIN: ICD-10-PCS | Mod: 95,,, | Performed by: NURSE PRACTITIONER

## 2023-05-30 ENCOUNTER — OFFICE VISIT (OUTPATIENT)
Dept: CARDIOLOGY | Facility: CLINIC | Age: 68
End: 2023-05-30
Payer: COMMERCIAL

## 2023-05-30 VITALS
OXYGEN SATURATION: 98 % | WEIGHT: 186.94 LBS | SYSTOLIC BLOOD PRESSURE: 160 MMHG | BODY MASS INDEX: 34.4 KG/M2 | HEART RATE: 87 BPM | DIASTOLIC BLOOD PRESSURE: 80 MMHG | HEIGHT: 62 IN

## 2023-05-30 DIAGNOSIS — E78.00 HYPERCHOLESTEROLEMIA: ICD-10-CM

## 2023-05-30 DIAGNOSIS — R94.39 ABNORMAL NUCLEAR STRESS TEST: ICD-10-CM

## 2023-05-30 DIAGNOSIS — D64.9 SYMPTOMATIC ANEMIA: ICD-10-CM

## 2023-05-30 DIAGNOSIS — R00.2 PALPITATIONS: ICD-10-CM

## 2023-05-30 DIAGNOSIS — R94.31 ABNORMAL ECG: ICD-10-CM

## 2023-05-30 DIAGNOSIS — I10 ESSENTIAL HYPERTENSION: ICD-10-CM

## 2023-05-30 DIAGNOSIS — R93.89 ABNORMAL COMPUTED TOMOGRAPHY ANGIOGRAPHY (CTA): ICD-10-CM

## 2023-05-30 DIAGNOSIS — R07.89 CHEST DISCOMFORT: ICD-10-CM

## 2023-05-30 DIAGNOSIS — E66.09 CLASS 1 OBESITY DUE TO EXCESS CALORIES WITH SERIOUS COMORBIDITY AND BODY MASS INDEX (BMI) OF 34.0 TO 34.9 IN ADULT: ICD-10-CM

## 2023-05-30 DIAGNOSIS — E61.1 LOW IRON: ICD-10-CM

## 2023-05-30 DIAGNOSIS — I51.7 LAE (LEFT ATRIAL ENLARGEMENT): ICD-10-CM

## 2023-05-30 DIAGNOSIS — I10 PRIMARY HYPERTENSION: ICD-10-CM

## 2023-05-30 DIAGNOSIS — I25.10 CORONARY ARTERY DISEASE INVOLVING NATIVE CORONARY ARTERY OF NATIVE HEART WITHOUT ANGINA PECTORIS: Primary | ICD-10-CM

## 2023-05-30 DIAGNOSIS — I51.89 DIASTOLIC DYSFUNCTION: ICD-10-CM

## 2023-05-30 PROBLEM — E66.811 CLASS 1 OBESITY DUE TO EXCESS CALORIES WITH SERIOUS COMORBIDITY AND BODY MASS INDEX (BMI) OF 34.0 TO 34.9 IN ADULT: Status: ACTIVE | Noted: 2023-05-30

## 2023-05-30 PROCEDURE — 99213 OFFICE O/P EST LOW 20 MIN: CPT | Mod: PBBFAC | Performed by: INTERNAL MEDICINE

## 2023-05-30 PROCEDURE — 99215 PR OFFICE/OUTPT VISIT, EST, LEVL V, 40-54 MIN: ICD-10-PCS | Mod: S$GLB,,, | Performed by: INTERNAL MEDICINE

## 2023-05-30 PROCEDURE — 99215 OFFICE O/P EST HI 40 MIN: CPT | Mod: S$GLB,,, | Performed by: INTERNAL MEDICINE

## 2023-05-30 PROCEDURE — 99999 PR PBB SHADOW E&M-EST. PATIENT-LVL III: ICD-10-PCS | Mod: PBBFAC,,, | Performed by: INTERNAL MEDICINE

## 2023-05-30 PROCEDURE — 99999 PR PBB SHADOW E&M-EST. PATIENT-LVL III: CPT | Mod: PBBFAC,,, | Performed by: INTERNAL MEDICINE

## 2023-05-30 RX ORDER — NITROGLYCERIN 0.4 MG/1
0.4 TABLET SUBLINGUAL EVERY 5 MIN PRN
Qty: 20 TABLET | Refills: 12 | Status: SHIPPED | OUTPATIENT
Start: 2023-05-30 | End: 2024-05-29

## 2023-05-30 NOTE — PROGRESS NOTES
Subjective:    Patient ID:  Liliane Ford is a 67 y.o. female who presents for evaluation of Coronary Artery Disease, Hypertension, and Hyperlipidemia          HPI  Pt presents for eval.  Her current med conditions include CAD, HTN, obesity, LAE, DD, abnl ecg, hypercholesterolemia.  Nonsmoker.  Past hx pertinent for following:  Pt seen as new pt in Nov 2022; per Nov 2022 visit:  She thinks she may have developed a heart condition after having heavy fibroid bleeding issues 2 years ago, required 4 units PRBC.  PCP recently added Toprol for palpitations, HTN control.  Some vague palpitation sxs over last year.  No CP sxs.  No CHF sxs.  Walks daily for exercise.   Ecg 10/25/22 NSR, old septal infarct.  Old ecgs reviewed: Feb 2020 anterolateral infarct, and 8/30/22 NSR, old septal infarct.   Now here.  Pt had nuclear stress test Nov 2022.  Personally reviewed: 1.5 mm ST depression, apical defect on my view.  Report indicated normal study.  Due to concerns, I advised Coronary CTA which was done in Nov 2022.  Coronary CTA suggested 70% mid LAD stenosis.  Echo Nov 2022 normal EF, DD, LAE, LVH.  LHC scheduled in Dec 2022 but she had admission Dec 2022 for severe anemia, Hg 4.9  Required multiple PRBC.  LHC was cancelled.  Has been working w hematology on anemia.  Anemia improved, with iron pills.  No angina.  No CHF sxs.  Stable palpitations.  Some occasional spotting blood.  Due for GI u/s in Sept for reevaluation.  BP above goal.  Lipids need to be checked since statin was started last appt.      Past Medical History:   Diagnosis Date    Hyperlipidemia     Hypertension     Pulmonary edema      Current Outpatient Medications   Medication Instructions    amLODIPine (NORVASC) 10 mg, Oral, Daily    ascorbic acid (vitamin C) (VITAMIN C) 500 mg, Oral, Nightly    aspirin (ECOTRIN) 81 mg, Oral, Daily    b complex vitamins tablet 1 tablet, Oral, Daily, B COMPLETE    ferrous sulfate 324 mg (65 mg iron) TbEC Oral, 2 times daily  "with meals    losartan (COZAAR) 100 mg, Oral, Daily    megestroL (MEGACE) 40 mg, Oral, Daily    megestroL (MEGACE) 40 mg, Oral, Daily    metoprolol succinate (TOPROL-XL) 50 mg, Oral, Daily    nitroGLYCERIN (NITROSTAT) 0.4 mg, Sublingual, Every 5 min PRN    rosuvastatin (CRESTOR) 20 mg, Oral, Nightly         Review of Systems   Constitutional: Negative.   HENT: Negative.     Eyes: Negative.    Cardiovascular:  Positive for palpitations.   Respiratory: Negative.     Endocrine: Negative.    Hematologic/Lymphatic: Negative.    Skin: Negative.    Musculoskeletal: Negative.    Gastrointestinal: Negative.    Genitourinary: Negative.    Neurological: Negative.    Psychiatric/Behavioral: Negative.     Allergic/Immunologic: Negative.       BP (!) 160/80 (BP Location: Left arm, Patient Position: Sitting, BP Method: Large (Manual))   Pulse 87   Ht 5' 2" (1.575 m)   Wt 84.8 kg (186 lb 15.2 oz)   SpO2 98%   BMI 34.19 kg/m²     Wt Readings from Last 3 Encounters:   05/30/23 84.8 kg (186 lb 15.2 oz)   03/28/23 82.5 kg (181 lb 14.1 oz)   03/08/23 82.7 kg (182 lb 5.1 oz)     Temp Readings from Last 3 Encounters:   03/28/23 97.7 °F (36.5 °C)   12/21/22 97.7 °F (36.5 °C) (Temporal)   12/16/22 98 °F (36.7 °C) (Oral)     BP Readings from Last 3 Encounters:   05/30/23 (!) 160/80   03/28/23 (!) 142/84   03/08/23 132/84     Pulse Readings from Last 3 Encounters:   05/30/23 87   03/28/23 74   12/21/22 92       Objective:    Physical Exam  Vitals and nursing note reviewed.   Constitutional:       General: She is not in acute distress.     Appearance: Normal appearance. She is well-developed. She is not ill-appearing or diaphoretic.   HENT:      Head: Normocephalic.   Neck:      Thyroid: No thyromegaly.      Vascular: Normal carotid pulses. No carotid bruit, hepatojugular reflux or JVD.   Cardiovascular:      Rate and Rhythm: Normal rate and regular rhythm.      Chest Wall: PMI is not displaced.      Pulses: Normal pulses.           " Radial pulses are 2+ on the right side and 2+ on the left side.      Heart sounds: Normal heart sounds, S1 normal and S2 normal. No murmur heard.    No friction rub. No gallop.   Pulmonary:      Effort: Pulmonary effort is normal.      Breath sounds: Normal breath sounds. No wheezing or rales.   Abdominal:      General: Bowel sounds are normal. There is no abdominal bruit.      Palpations: Abdomen is soft. There is no hepatomegaly, splenomegaly or mass.      Tenderness: There is no abdominal tenderness.   Musculoskeletal:      Cervical back: Neck supple.   Lymphadenopathy:      Cervical: No cervical adenopathy.   Skin:     General: Skin is warm.   Neurological:      Mental Status: She is alert and oriented to person, place, and time.   Psychiatric:         Behavior: Behavior normal. Behavior is cooperative.       I have reviewed all pertinent labs and cardiac studies.      Chemistry        Component Value Date/Time     05/26/2023 1028    K 4.1 05/26/2023 1028     05/26/2023 1028    CO2 23 05/26/2023 1028    BUN 12 05/26/2023 1028    CREATININE 0.9 05/26/2023 1028     (H) 05/26/2023 1028        Component Value Date/Time    CALCIUM 9.3 05/26/2023 1028    ALKPHOS 50 (L) 12/15/2022 0605    AST 22 12/15/2022 0605    ALT 20 12/15/2022 0605    BILITOT 1.0 12/15/2022 0605    ESTGFRAFRICA >60 02/06/2020 0458    EGFRNONAA >60 02/06/2020 0458          Lab Results   Component Value Date    WBC 5.22 05/26/2023    HGB 12.7 05/26/2023    HCT 40.2 05/26/2023    MCV 81 (L) 05/26/2023     05/26/2023       Lab Results   Component Value Date    HGBA1C 4.9 08/30/2022     Lab Results   Component Value Date    CHOL 225 (H) 08/30/2022    CHOL 185 11/08/2007     Lab Results   Component Value Date    HDL 71 08/30/2022    HDL 56 11/03/2010    HDL 51 11/08/2007     Lab Results   Component Value Date    LDLCALC 142.4 08/30/2022    LDLCALC 160 (H) 11/03/2010    LDLCALC 124.0 11/08/2007     Lab Results   Component Value  Date    TRIG 58 08/30/2022    TRIG 77 11/03/2010    TRIG 50 11/08/2007     Lab Results   Component Value Date    CHOLHDL 31.6 08/30/2022    CHOLHDL 27.6 11/08/2007       Results for orders placed during the hospital encounter of 11/22/22    Echo    Interpretation Summary  · Concentric hypertrophy and normal systolic function.  · The estimated ejection fraction is 60%.  · Grade II left ventricular diastolic dysfunction.  · Normal right ventricular size with normal r`ight ventricular systolic function.  · Mild left atrial enlargement.        CONCLUSION:     1.  CAD-RADS 4/P1.     2.  Single-vessel CAD involving the mid LAD.  This is a 2.5 cm long mild (25-49%) lesion with a possible focal 70% stenosis.  FFR-CT is not available.     3. Coronary calcium score 13 (36 percentile).     4.  Post processed images are available for review in PACS under the heading Recons: HKD and HKD LAD CPR.      Assessment:       1. Coronary artery disease involving native coronary artery of native heart without angina pectoris    2. Abnormal nuclear stress test    3. Primary hypertension    4. Chest discomfort    5. Palpitations    6. Low iron    7. Hypercholesterolemia    8. Essential hypertension    9. Abnormal ECG    10. Abnormal computed tomography angiography (CTA)    11. Class 1 obesity due to excess calories with serious comorbidity and body mass index (BMI) of 34.0 to 34.9 in adult    12. Symptomatic anemia    13. LAE (left atrial enlargement)    14. Diastolic dysfunction         Plan:             Discussed her CV tests findings in detail.  Nuclear stress test probably correlates with her coronary CTA suggestive of 70% mid LAD stenosis.  LHC was cancelled Dec 2022 due to critical anemia.  Anemia now improved.  She does not have anginal sxs right now.  Discussed LHC, indications, risks/benefits to include PCI.  At present time, in light of her anemia issues although improved, and lack of clear anginal sxs the decision was made for  medical tx and observation.  Will consider LHC in future if anemia stays stable.  She has f/u appt w Gyn Sept 2023 with f/u u/s imaging to reassess bleeding.  Sublingual nitroglycerin 0.4 mg for concerning chest pain episodes or breakthrough angina.  Patient advised of indications, side effects of nitroglycerin and when to report to ER.   Reviewed all tests and above medical conditions with patient in detail and formulated treatment plan.  Continue optimal medical treatment for cardiovascular conditions.  Cardiac low salt diet advised.  Daily exercise encouraged, with the goal 30 +  minutes aerobic exercise as tolerated.  Obesity: Maintaining healthy weight and weight loss goals (if needed) were discussed in clinic.  HTN: Need for BP control and HTN goals (if needed) were discussed and tx plan formulated.  Goal < 130/80.  Continue current HTN meds.  Home BP monitoring advised.  Abnl ecg: monitor.  Lipids: Importance of optimal lipid control were discussed in detail as well as possible pharmacologic and lifestyle changes that may be needed.  Statin tx.  Update lipids.  Phone review.    F/u in 4 months.    I have reviewed all pertinent labs and cardiac studies independently. Plans and recommendations have been formulated under my direct supervision. All questions answered and patient voiced understanding.     Complex visit.

## 2023-06-02 ENCOUNTER — LAB VISIT (OUTPATIENT)
Dept: LAB | Facility: HOSPITAL | Age: 68
End: 2023-06-02
Attending: INTERNAL MEDICINE
Payer: COMMERCIAL

## 2023-06-02 DIAGNOSIS — E78.00 HYPERCHOLESTEROLEMIA: ICD-10-CM

## 2023-06-02 LAB
ALBUMIN SERPL BCP-MCNC: 4.1 G/DL (ref 3.5–5.2)
ALP SERPL-CCNC: 51 U/L (ref 55–135)
ALT SERPL W/O P-5'-P-CCNC: 17 U/L (ref 10–44)
ANION GAP SERPL CALC-SCNC: 9 MMOL/L (ref 8–16)
AST SERPL-CCNC: 20 U/L (ref 10–40)
BILIRUB SERPL-MCNC: 0.7 MG/DL (ref 0.1–1)
BUN SERPL-MCNC: 14 MG/DL (ref 8–23)
CALCIUM SERPL-MCNC: 9.6 MG/DL (ref 8.7–10.5)
CHLORIDE SERPL-SCNC: 108 MMOL/L (ref 95–110)
CHOLEST SERPL-MCNC: 121 MG/DL (ref 120–199)
CHOLEST/HDLC SERPL: 2.8 {RATIO} (ref 2–5)
CO2 SERPL-SCNC: 23 MMOL/L (ref 23–29)
CREAT SERPL-MCNC: 0.9 MG/DL (ref 0.5–1.4)
EST. GFR  (NO RACE VARIABLE): >60 ML/MIN/1.73 M^2
GLUCOSE SERPL-MCNC: 96 MG/DL (ref 70–110)
HDLC SERPL-MCNC: 43 MG/DL (ref 40–75)
HDLC SERPL: 35.5 % (ref 20–50)
LDLC SERPL CALC-MCNC: 67.4 MG/DL (ref 63–159)
NONHDLC SERPL-MCNC: 78 MG/DL
POTASSIUM SERPL-SCNC: 3.9 MMOL/L (ref 3.5–5.1)
PROT SERPL-MCNC: 7.5 G/DL (ref 6–8.4)
SODIUM SERPL-SCNC: 140 MMOL/L (ref 136–145)
TRIGL SERPL-MCNC: 53 MG/DL (ref 30–150)

## 2023-06-02 PROCEDURE — 80053 COMPREHEN METABOLIC PANEL: CPT | Performed by: INTERNAL MEDICINE

## 2023-06-02 PROCEDURE — 36415 COLL VENOUS BLD VENIPUNCTURE: CPT | Mod: PN | Performed by: INTERNAL MEDICINE

## 2023-06-02 PROCEDURE — 80061 LIPID PANEL: CPT | Performed by: INTERNAL MEDICINE

## 2023-09-05 ENCOUNTER — HOSPITAL ENCOUNTER (OUTPATIENT)
Dept: RADIOLOGY | Facility: HOSPITAL | Age: 68
Discharge: HOME | End: 2023-09-05
Attending: OBSTETRICS & GYNECOLOGY
Payer: COMMERCIAL

## 2023-09-05 ENCOUNTER — OFFICE VISIT (OUTPATIENT)
Dept: CARDIOLOGY | Facility: CLINIC | Age: 68
End: 2023-09-05
Payer: COMMERCIAL

## 2023-09-05 VITALS
BODY MASS INDEX: 34.93 KG/M2 | WEIGHT: 189.81 LBS | SYSTOLIC BLOOD PRESSURE: 160 MMHG | RESPIRATION RATE: 16 BRPM | HEIGHT: 62 IN | HEART RATE: 75 BPM | OXYGEN SATURATION: 99 % | DIASTOLIC BLOOD PRESSURE: 84 MMHG

## 2023-09-05 VITALS — WEIGHT: 185.19 LBS | BODY MASS INDEX: 34.08 KG/M2 | HEIGHT: 62 IN

## 2023-09-05 DIAGNOSIS — E66.09 CLASS 1 OBESITY DUE TO EXCESS CALORIES WITH SERIOUS COMORBIDITY AND BODY MASS INDEX (BMI) OF 34.0 TO 34.9 IN ADULT: ICD-10-CM

## 2023-09-05 DIAGNOSIS — E78.00 HYPERCHOLESTEROLEMIA: ICD-10-CM

## 2023-09-05 DIAGNOSIS — R00.2 PALPITATIONS: ICD-10-CM

## 2023-09-05 DIAGNOSIS — N95.0 POSTMENOPAUSAL BLEEDING: ICD-10-CM

## 2023-09-05 DIAGNOSIS — Z12.31 SCREENING MAMMOGRAM, ENCOUNTER FOR: ICD-10-CM

## 2023-09-05 DIAGNOSIS — I51.7 LAE (LEFT ATRIAL ENLARGEMENT): ICD-10-CM

## 2023-09-05 DIAGNOSIS — I10 ESSENTIAL HYPERTENSION: ICD-10-CM

## 2023-09-05 DIAGNOSIS — I51.89 DIASTOLIC DYSFUNCTION: ICD-10-CM

## 2023-09-05 DIAGNOSIS — R94.39 ABNORMAL NUCLEAR STRESS TEST: ICD-10-CM

## 2023-09-05 DIAGNOSIS — I25.10 CORONARY ARTERY DISEASE INVOLVING NATIVE CORONARY ARTERY OF NATIVE HEART WITHOUT ANGINA PECTORIS: Primary | ICD-10-CM

## 2023-09-05 DIAGNOSIS — R94.31 ABNORMAL ECG: ICD-10-CM

## 2023-09-05 DIAGNOSIS — R93.89 ABNORMAL COMPUTED TOMOGRAPHY ANGIOGRAPHY (CTA): ICD-10-CM

## 2023-09-05 PROCEDURE — 76856 US EXAM PELVIC COMPLETE: CPT | Mod: TC

## 2023-09-05 PROCEDURE — 99999 PR PBB SHADOW E&M-EST. PATIENT-LVL IV: ICD-10-PCS | Mod: PBBFAC,,, | Performed by: INTERNAL MEDICINE

## 2023-09-05 PROCEDURE — 77067 SCR MAMMO BI INCL CAD: CPT | Mod: 26,,, | Performed by: RADIOLOGY

## 2023-09-05 PROCEDURE — 76856 US EXAM PELVIC COMPLETE: CPT | Mod: 26,,, | Performed by: RADIOLOGY

## 2023-09-05 PROCEDURE — 77067 SCR MAMMO BI INCL CAD: CPT | Mod: TC

## 2023-09-05 PROCEDURE — 99999 PR PBB SHADOW E&M-EST. PATIENT-LVL IV: CPT | Mod: PBBFAC,,, | Performed by: INTERNAL MEDICINE

## 2023-09-05 PROCEDURE — 77067 MAMMO DIGITAL SCREENING BILAT WITH TOMO: ICD-10-PCS | Mod: 26,,, | Performed by: RADIOLOGY

## 2023-09-05 PROCEDURE — 99214 OFFICE O/P EST MOD 30 MIN: CPT | Mod: S$GLB,,, | Performed by: INTERNAL MEDICINE

## 2023-09-05 PROCEDURE — 99214 OFFICE O/P EST MOD 30 MIN: CPT | Mod: PBBFAC,25 | Performed by: INTERNAL MEDICINE

## 2023-09-05 PROCEDURE — 99214 PR OFFICE/OUTPT VISIT, EST, LEVL IV, 30-39 MIN: ICD-10-PCS | Mod: S$GLB,,, | Performed by: INTERNAL MEDICINE

## 2023-09-05 PROCEDURE — 77063 MAMMO DIGITAL SCREENING BILAT WITH TOMO: ICD-10-PCS | Mod: 26,,, | Performed by: RADIOLOGY

## 2023-09-05 PROCEDURE — 77063 BREAST TOMOSYNTHESIS BI: CPT | Mod: 26,,, | Performed by: RADIOLOGY

## 2023-09-05 PROCEDURE — 76830 TRANSVAGINAL US NON-OB: CPT | Mod: 26,,, | Performed by: RADIOLOGY

## 2023-09-05 PROCEDURE — 76830 US PELVIS COMP WITH TRANSVAG NON-OB (XPD): ICD-10-PCS | Mod: 26,,, | Performed by: RADIOLOGY

## 2023-09-05 PROCEDURE — 76856 US PELVIS COMP WITH TRANSVAG NON-OB (XPD): ICD-10-PCS | Mod: 26,,, | Performed by: RADIOLOGY

## 2023-09-05 NOTE — PROGRESS NOTES
Subjective:    Patient ID:  Liliane Ford is a 68 y.o. female who presents for evaluation of Hypertension, Hyperlipidemia, and Coronary Artery Disease          HPI  Pt presents for eval.  Her current med conditions include CAD, HTN, obesity, LAE, DD, abnl ecg, hypercholesterolemia.  Nonsmoker.  Past hx pertinent for following:  Pt seen as new pt in Nov 2022; per Nov 2022 visit:  She thinks she may have developed a heart condition after having heavy fibroid bleeding issues 2+ years ago, required 4 units PRBC.  PCP added Toprol for palpitations, HTN control in 2022.  Ecg 10/25/22 NSR, old septal infarct.  Old ecgs reviewed: Feb 2020 anterolateral infarct, and 8/30/22 NSR, old septal infarct.   Pt had nuclear stress test Nov 2022.  Personally reviewed: 1.5 mm ST depression, apical defect on my view.  Report indicated normal study.  Due to concerns, I advised Coronary CTA which was done in Nov 2022.  Coronary CTA suggested 70% mid LAD stenosis.  Echo Nov 2022 normal EF, DD, LAE, LVH.  LHC scheduled in Dec 2022 but she had admission Dec 2022 for severe anemia, Hg 4.9  Required multiple PRBC.  LHC was cancelled.  Has been working w hematology on anemia.  Anemia improved, with iron pills.  Now here.  Still w vaginal bleeding although not as bad.  Might need surgery per pt.  Has gyn u/s pending and f/u appt next week.  No angina.  Denies CP.  No CHF sxs.  No palpitations.  Feels good otherwise.  Walks for exercise regularly.  BP elevated.  Checks at home and is better though.  Lipids much improved on Rosuvastatin.        Past Medical History:   Diagnosis Date    Hyperlipidemia     Hypertension     Pulmonary edema      Current Outpatient Medications   Medication Instructions    amLODIPine (NORVASC) 10 mg, Oral, Daily    ascorbic acid (vitamin C) (VITAMIN C) 500 mg, Oral, Nightly    aspirin (ECOTRIN) 81 mg, Oral, Daily    b complex vitamins tablet 1 tablet, Oral, Daily, B COMPLETE    ferrous sulfate 324 mg (65 mg iron) TbEC  "Oral, 2 times daily with meals    losartan (COZAAR) 100 mg, Oral, Daily    megestroL (MEGACE) 40 mg, Oral, Daily    megestroL (MEGACE) 40 mg, Oral, Daily    metoprolol succinate (TOPROL-XL) 50 mg, Oral, Daily    nitroGLYCERIN (NITROSTAT) 0.4 mg, Sublingual, Every 5 min PRN    rosuvastatin (CRESTOR) 20 mg, Oral, Nightly         Review of Systems   Constitutional: Positive for weight gain.   HENT: Negative.     Eyes: Negative.    Cardiovascular: Negative.    Respiratory: Negative.     Endocrine: Negative.    Hematologic/Lymphatic: Negative.    Skin: Negative.    Musculoskeletal: Negative.    Gastrointestinal: Negative.    Genitourinary: Negative.    Neurological: Negative.    Psychiatric/Behavioral: Negative.     Allergic/Immunologic: Negative.         BP (!) 160/84 (BP Location: Left arm, Patient Position: Sitting, BP Method: Large (Manual))   Pulse 75   Resp 16   Ht 5' 2" (1.575 m)   Wt 86.1 kg (189 lb 13.1 oz)   SpO2 99%   BMI 34.72 kg/m²     Wt Readings from Last 3 Encounters:   09/05/23 86.1 kg (189 lb 13.1 oz)   09/05/23 84 kg (185 lb 3 oz)   05/30/23 84.8 kg (186 lb 15.2 oz)     Temp Readings from Last 3 Encounters:   03/28/23 97.7 °F (36.5 °C)   12/21/22 97.7 °F (36.5 °C) (Temporal)   12/16/22 98 °F (36.7 °C) (Oral)     BP Readings from Last 3 Encounters:   09/05/23 (!) 160/84   05/30/23 (!) 160/80   03/28/23 (!) 142/84     Pulse Readings from Last 3 Encounters:   09/05/23 75   05/30/23 87   03/28/23 74       Objective:    Physical Exam  Vitals and nursing note reviewed.   Constitutional:       General: She is not in acute distress.     Appearance: Normal appearance. She is well-developed. She is not ill-appearing or diaphoretic.   HENT:      Head: Normocephalic.   Neck:      Thyroid: No thyromegaly.      Vascular: No carotid bruit or JVD.   Cardiovascular:      Rate and Rhythm: Normal rate and regular rhythm.      Pulses: Normal pulses.           Radial pulses are 2+ on the right side and 2+ on the left " side.      Heart sounds: Normal heart sounds, S1 normal and S2 normal. No murmur heard.     No friction rub. No gallop.   Pulmonary:      Effort: Pulmonary effort is normal.      Breath sounds: Normal breath sounds. No wheezing or rales.   Abdominal:      General: Bowel sounds are normal. There is no abdominal bruit.      Palpations: Abdomen is soft.      Tenderness: There is no abdominal tenderness.   Musculoskeletal:      Cervical back: Neck supple.   Lymphadenopathy:      Cervical: No cervical adenopathy.   Skin:     General: Skin is warm.   Neurological:      Mental Status: She is alert and oriented to person, place, and time.   Psychiatric:         Behavior: Behavior normal. Behavior is cooperative.         I have reviewed all pertinent labs and cardiac studies.      Chemistry        Component Value Date/Time     06/02/2023 0709    K 3.9 06/02/2023 0709     06/02/2023 0709    CO2 23 06/02/2023 0709    BUN 14 06/02/2023 0709    CREATININE 0.9 06/02/2023 0709    GLU 96 06/02/2023 0709        Component Value Date/Time    CALCIUM 9.6 06/02/2023 0709    ALKPHOS 51 (L) 06/02/2023 0709    AST 20 06/02/2023 0709    ALT 17 06/02/2023 0709    BILITOT 0.7 06/02/2023 0709    ESTGFRAFRICA >60 02/06/2020 0458    EGFRNONAA >60 02/06/2020 0458          Lab Results   Component Value Date    WBC 5.22 05/26/2023    HGB 12.7 05/26/2023    HCT 40.2 05/26/2023    MCV 81 (L) 05/26/2023     05/26/2023       Lab Results   Component Value Date    HGBA1C 4.9 08/30/2022     Lab Results   Component Value Date    CHOL 121 06/02/2023    CHOL 225 (H) 08/30/2022    CHOL 185 11/08/2007     Lab Results   Component Value Date    HDL 43 06/02/2023    HDL 71 08/30/2022    HDL 56 11/03/2010     Lab Results   Component Value Date    LDLCALC 67.4 06/02/2023    LDLCALC 142.4 08/30/2022    LDLCALC 160 (H) 11/03/2010     Lab Results   Component Value Date    TRIG 53 06/02/2023    TRIG 58 08/30/2022    TRIG 77 11/03/2010     Lab Results    Component Value Date    CHOLHDL 35.5 06/02/2023    CHOLHDL 31.6 08/30/2022    CHOLHDL 27.6 11/08/2007       Results for orders placed during the hospital encounter of 11/22/22    Echo    Interpretation Summary  · Concentric hypertrophy and normal systolic function.  · The estimated ejection fraction is 60%.  · Grade II left ventricular diastolic dysfunction.  · Normal right ventricular size with normal r`ight ventricular systolic function.  · Mild left atrial enlargement.        CONCLUSION:     1.  CAD-RADS 4/P1.     2.  Single-vessel CAD involving the mid LAD.  This is a 2.5 cm long mild (25-49%) lesion with a possible focal 70% stenosis.  FFR-CT is not available.     3. Coronary calcium score 13 (36 percentile).     4.  Post processed images are available for review in PACS under the heading Recons: HKD and HKD LAD CPR.      Assessment:       1. Coronary artery disease involving native coronary artery of native heart without angina pectoris    2. Hypercholesterolemia    3. Abnormal ECG    4. Palpitations    5. Essential hypertension    6. Abnormal computed tomography angiography (CTA)    7. Diastolic dysfunction    8. LAE (left atrial enlargement)    9. Abnormal nuclear stress test    10. Class 1 obesity due to excess calories with serious comorbidity and body mass index (BMI) of 34.0 to 34.9 in adult         Plan:               Stable CV status on current med tx.  Nuclear stress test Nov 2022  correlates with her coronary CTA suggestive of 70% mid LAD stenosis.  LHC was cancelled Dec 2022 due to critical anemia.  She is not having any anginal sxs.  Unclear if she could tolerate DAPT should any PCI be performed due to bleeding, h/o anemia.  Recommend continue medical tx and risk factor modification.  Monitor BP at home.  If above goal, increase Toprol xl from 50 mg qd to 100 mg qd.  She would be regarded as moderate CV risk for any urological surgery.  She has f/u appt w Gyn Sept 2023 with f/u u/s imaging to  reassess bleeding.  Sublingual nitroglycerin 0.4 mg for concerning chest pain episodes or breakthrough angina.  Patient advised of indications, side effects of nitroglycerin and when to report to ER.   Reviewed all tests and above medical conditions with patient in detail and formulated treatment plan.  Continue optimal medical treatment for cardiovascular conditions.  Cardiac low salt diet advised.  Daily exercise encouraged, with the goal 30 +  minutes aerobic exercise as tolerated.  Obesity: Maintaining healthy weight and weight loss goals (if needed) were discussed in clinic.  HTN: Need for BP control and HTN goals (if needed) were discussed and tx plan formulated.  Goal < 130/80.  Continue current HTN meds.  Home BP monitoring advised.  Abnl ecg: monitor.  Lipids: Importance of optimal lipid control were discussed in detail as well as possible pharmacologic and lifestyle changes that may be needed.  Statin tx.      F/u in 3 months.    I have reviewed all pertinent labs and cardiac studies independently. Plans and recommendations have been formulated under my direct supervision. All questions answered and patient voiced understanding.

## 2023-09-06 NOTE — PROGRESS NOTES
Please advise patient the pelvic sono shows stable size uterus with fibroids; neither ovary could be seen.      The endometrial lining is 5.7 mm and should measure <4mm.  The recommended treatment is to proceed with an EMB to sample the tissue inside the uterus to make sure it is normal  Please remind pt to premedicate with nsaid or tylenol

## 2023-09-06 NOTE — PROGRESS NOTES
I am happy to report that your recent breast imaging did NOT show evidence of cancer. An annual mammogram is the best test to screen for breast cancer, but it is not perfect, and it can miss some cancers. So, even though your mammogram was normal, if you notice any lump or change in one of your breasts, please schedule an appointment with me for a proper evaluation. Thank you for letting me care for you. I look forward to seeing you again. Sincerely, Dr. Ema Aquino

## 2023-09-11 ENCOUNTER — TELEPHONE (OUTPATIENT)
Dept: OBSTETRICS AND GYNECOLOGY | Facility: CLINIC | Age: 68
End: 2023-09-11
Payer: COMMERCIAL

## 2023-09-11 NOTE — TELEPHONE ENCOUNTER
Attempted to contact patient, no answer. Left patient voice mail to return call to clinic.    Regarding 9/13/23 apt, provider not in clinic, pt need to rs apt.

## 2023-09-15 ENCOUNTER — TELEPHONE (OUTPATIENT)
Dept: OBSTETRICS AND GYNECOLOGY | Facility: CLINIC | Age: 68
End: 2023-09-15
Payer: COMMERCIAL

## 2023-09-20 ENCOUNTER — PATIENT OUTREACH (OUTPATIENT)
Dept: ADMINISTRATIVE | Facility: HOSPITAL | Age: 68
End: 2023-09-20
Payer: COMMERCIAL

## 2023-09-20 ENCOUNTER — TELEPHONE (OUTPATIENT)
Dept: PRIMARY CARE CLINIC | Facility: CLINIC | Age: 68
End: 2023-09-20
Payer: COMMERCIAL

## 2023-09-20 VITALS — DIASTOLIC BLOOD PRESSURE: 87 MMHG | SYSTOLIC BLOOD PRESSURE: 147 MMHG

## 2023-09-20 NOTE — PROGRESS NOTES
HTN Report: Patient states her home BP reading is 147/87, remote BP will be entered. Patient has an appointment already scheduled with PCP on 10/10/23 for follow up.

## 2023-09-22 ENCOUNTER — PROCEDURE VISIT (OUTPATIENT)
Dept: OBSTETRICS AND GYNECOLOGY | Facility: CLINIC | Age: 68
End: 2023-09-22
Payer: COMMERCIAL

## 2023-09-22 VITALS
BODY MASS INDEX: 34.78 KG/M2 | DIASTOLIC BLOOD PRESSURE: 96 MMHG | SYSTOLIC BLOOD PRESSURE: 154 MMHG | WEIGHT: 190.13 LBS

## 2023-09-22 DIAGNOSIS — N95.0 POSTMENOPAUSAL BLEEDING: Primary | ICD-10-CM

## 2023-09-22 PROCEDURE — 88305 TISSUE EXAM BY PATHOLOGIST: CPT | Mod: 26,,, | Performed by: PATHOLOGY

## 2023-09-22 PROCEDURE — 99499 NO LOS: ICD-10-PCS | Mod: S$GLB,,, | Performed by: OBSTETRICS & GYNECOLOGY

## 2023-09-22 PROCEDURE — 58100 BIOPSY OF UTERUS LINING: CPT | Mod: S$GLB,,, | Performed by: OBSTETRICS & GYNECOLOGY

## 2023-09-22 PROCEDURE — 88305 TISSUE EXAM BY PATHOLOGIST: ICD-10-PCS | Mod: 26,,, | Performed by: PATHOLOGY

## 2023-09-22 PROCEDURE — 99499 UNLISTED E&M SERVICE: CPT | Mod: S$GLB,,, | Performed by: OBSTETRICS & GYNECOLOGY

## 2023-09-22 PROCEDURE — 58100 BIOPSY OF UTERUS LINING: CPT | Mod: PBBFAC,PN | Performed by: OBSTETRICS & GYNECOLOGY

## 2023-09-22 PROCEDURE — 58100 PR BIOPSY OF UTERUS LINING: ICD-10-PCS | Mod: S$GLB,,, | Performed by: OBSTETRICS & GYNECOLOGY

## 2023-09-22 PROCEDURE — 88305 TISSUE EXAM BY PATHOLOGIST: CPT | Performed by: PATHOLOGY

## 2023-09-22 NOTE — PROCEDURES
Procedures    CC: ENDOMETRIAL BIOPSPY    Liliane Ford is a 68 y.o. female  presents for an endometrial biopsy secondary to postmenopausal bleeding.    Patient continues to bleed    Most recent sono showed lining 5.7 mm  Reports continues to take megace  PRE-ENDOMETRIAL BIOPSY COUNSELING:    The patient was informed of the risk of bleeding, infection, uterine perforation and pain and that the test will rule-out endometrial cancer with accuracy greater than 95%.  She was counseled on the alternatives to endometrial biopsy and agrees to proceed.      TIME OUT PERFORMED.    The cervix was visualized with a speculum.  A single tooth tenaculum was placed on the anterior lip prior to the biopsy.      A sterile endometrial pipelle was passed without difficulty to a depth of 12 cm.    Endometrial tissue was obtained.      The specimen was placed in formalyn and sent to Pathology of histology evaluation.    The patient tolerated the procedure well.      ASSESSMENT AND PLAN  Encounter Diagnosis   Name Primary?    Postmenopausal bleeding Yes         POST ENDOMETRIAL BIOPSY COUNSELING:  Manage post biopsy cramping with NSAIDs or Tylenol.  Expect spotting or light bleeding for a few days.  Report bleeding heavier than a period, fever > 101.0 F, worsening pain or a foul smelling vaginal discharge.      Counseling lasted approximately 15 minutes and all her questions were answered.      FOLLOW-UP:  Pending biopsy

## 2023-09-27 LAB
FINAL PATHOLOGIC DIAGNOSIS: NORMAL
GROSS: NORMAL
Lab: NORMAL

## 2023-09-27 NOTE — PROGRESS NOTES
The endometrial biopsy is normal  There is no atypical cells or hyperplasia identified    You should be able to stop the megace

## 2023-09-29 ENCOUNTER — LAB VISIT (OUTPATIENT)
Dept: LAB | Facility: HOSPITAL | Age: 68
End: 2023-09-29
Attending: NURSE PRACTITIONER
Payer: COMMERCIAL

## 2023-09-29 DIAGNOSIS — Z86.39 HISTORY OF IRON DEFICIENCY: ICD-10-CM

## 2023-09-29 DIAGNOSIS — R71.8 MICROCYTOSIS: ICD-10-CM

## 2023-09-29 LAB
ANION GAP SERPL CALC-SCNC: 10 MMOL/L (ref 8–16)
BASOPHILS # BLD AUTO: 0.03 K/UL (ref 0–0.2)
BASOPHILS NFR BLD: 0.5 % (ref 0–1.9)
BUN SERPL-MCNC: 13 MG/DL (ref 8–23)
CALCIUM SERPL-MCNC: 9.4 MG/DL (ref 8.7–10.5)
CHLORIDE SERPL-SCNC: 110 MMOL/L (ref 95–110)
CO2 SERPL-SCNC: 21 MMOL/L (ref 23–29)
CREAT SERPL-MCNC: 0.9 MG/DL (ref 0.5–1.4)
DIFFERENTIAL METHOD: ABNORMAL
EOSINOPHIL # BLD AUTO: 0.1 K/UL (ref 0–0.5)
EOSINOPHIL NFR BLD: 1 % (ref 0–8)
ERYTHROCYTE [DISTWIDTH] IN BLOOD BY AUTOMATED COUNT: 14.3 % (ref 11.5–14.5)
EST. GFR  (NO RACE VARIABLE): >60 ML/MIN/1.73 M^2
GLUCOSE SERPL-MCNC: 80 MG/DL (ref 70–110)
HCT VFR BLD AUTO: 42.4 % (ref 37–48.5)
HGB BLD-MCNC: 13.5 G/DL (ref 12–16)
IMM GRANULOCYTES # BLD AUTO: 0.01 K/UL (ref 0–0.04)
IMM GRANULOCYTES NFR BLD AUTO: 0.2 % (ref 0–0.5)
IRON SERPL-MCNC: 89 UG/DL (ref 30–160)
LYMPHOCYTES # BLD AUTO: 1.6 K/UL (ref 1–4.8)
LYMPHOCYTES NFR BLD: 27 % (ref 18–48)
MCH RBC QN AUTO: 26.8 PG (ref 27–31)
MCHC RBC AUTO-ENTMCNC: 31.8 G/DL (ref 32–36)
MCV RBC AUTO: 84 FL (ref 82–98)
MONOCYTES # BLD AUTO: 0.4 K/UL (ref 0.3–1)
MONOCYTES NFR BLD: 6.8 % (ref 4–15)
NEUTROPHILS # BLD AUTO: 3.7 K/UL (ref 1.8–7.7)
NEUTROPHILS NFR BLD: 64.5 % (ref 38–73)
NRBC BLD-RTO: 0 /100 WBC
PLATELET # BLD AUTO: 307 K/UL (ref 150–450)
PMV BLD AUTO: 12.1 FL (ref 9.2–12.9)
POTASSIUM SERPL-SCNC: 4.2 MMOL/L (ref 3.5–5.1)
RBC # BLD AUTO: 5.04 M/UL (ref 4–5.4)
SATURATED IRON: 21 % (ref 20–50)
SODIUM SERPL-SCNC: 141 MMOL/L (ref 136–145)
TOTAL IRON BINDING CAPACITY: 422 UG/DL (ref 250–450)
TRANSFERRIN SERPL-MCNC: 285 MG/DL (ref 200–375)
WBC # BLD AUTO: 5.77 K/UL (ref 3.9–12.7)

## 2023-09-29 PROCEDURE — 36415 COLL VENOUS BLD VENIPUNCTURE: CPT | Mod: PN | Performed by: NURSE PRACTITIONER

## 2023-09-29 PROCEDURE — 83540 ASSAY OF IRON: CPT | Performed by: NURSE PRACTITIONER

## 2023-09-29 PROCEDURE — 80048 BASIC METABOLIC PNL TOTAL CA: CPT | Performed by: NURSE PRACTITIONER

## 2023-09-29 PROCEDURE — 85025 COMPLETE CBC W/AUTO DIFF WBC: CPT | Performed by: NURSE PRACTITIONER

## 2023-09-29 PROCEDURE — 84466 ASSAY OF TRANSFERRIN: CPT | Performed by: NURSE PRACTITIONER

## 2023-09-29 PROCEDURE — 82728 ASSAY OF FERRITIN: CPT | Performed by: NURSE PRACTITIONER

## 2023-09-30 LAB — FERRITIN SERPL-MCNC: 36 NG/ML (ref 20–300)

## 2023-10-02 ENCOUNTER — OFFICE VISIT (OUTPATIENT)
Dept: HEMATOLOGY/ONCOLOGY | Facility: CLINIC | Age: 68
End: 2023-10-02
Payer: MEDICARE

## 2023-10-02 ENCOUNTER — PATIENT MESSAGE (OUTPATIENT)
Dept: OBSTETRICS AND GYNECOLOGY | Facility: CLINIC | Age: 68
End: 2023-10-02
Payer: COMMERCIAL

## 2023-10-02 DIAGNOSIS — N95.0 POSTMENOPAUSAL BLEEDING: ICD-10-CM

## 2023-10-02 DIAGNOSIS — Z86.39 HISTORY OF IRON DEFICIENCY: Primary | ICD-10-CM

## 2023-10-02 PROCEDURE — 99214 OFFICE O/P EST MOD 30 MIN: CPT | Mod: 95,,, | Performed by: NURSE PRACTITIONER

## 2023-10-02 PROCEDURE — 99214 PR OFFICE/OUTPT VISIT, EST, LEVL IV, 30-39 MIN: ICD-10-PCS | Mod: 95,,, | Performed by: NURSE PRACTITIONER

## 2023-10-02 NOTE — PROGRESS NOTES
The patient location is: home  The chief complaint leading to consultation is: lab discussion    Visit type: audiovisual    Face to Face time with patient: 15  30 minutes of total time spent on the encounter, which includes face to face time and non-face to face time preparing to see the patient (eg, review of tests), Obtaining and/or reviewing separately obtained history, Documenting clinical information in the electronic or other health record, Independently interpreting results (not separately reported) and communicating results to the patient/family/caregiver, or Care coordination (not separately reported).     Each patient to whom he or she provides medical services by telemedicine is:  (1) informed of the relationship between the physician and patient and the respective role of any other health care provider with respect to management of the patient; and (2) notified that he or she may decline to receive medical services by telemedicine and may withdraw from such care at any time.    Patient ID: Liliane Ford is a 68 y.o. female.    Chief Complaint: lab discussion    HPI:  68 year old female who presents today for further evaluation and recommendations of anemia.  She has been referred to the hematology clinic by her pcp, Dr. Kishan Kelley.  She has been found to have heavy uterine bleeding with fibroids and has been found with uterine fibroids.  Was do to have heart cath but this has been postponed due to severe anemia requiring blood transfusions hgb 4.9 on 12/14/2022 and received 3 received units of blood.  She is being followed by gynecology - is seeing gynecology in February. States that she started having abnormal uterine bleeding in 2020 then was given Megace and bleeding stopped and then restarted in again in 2020 and states that she bleed for 6 months.  She is currently taking Megace daily again which has stopped the abnormal uterine bleeding.  She states that she is feeling well without any  complaints currently     States taking iron tablets twice daily.  States taking celery juice and prune juice and is having BM 2-3 with no constipation.        Other diagnosis include cardiomegaly, htn, palpitations, abnormal ECG, and hypercholesterolemia followed by cardiology.       12/2007 colonoscopy: normal with recommendations to repeat in 10 years.   10/27/2022 PAP Specimen Adequacy   Unsatisfactory for evaluation. Pearson Category   Unsatisfactory for evaluation. Specimen processed and examined, but unsatisfactory for evaluation of epithelial abnormality because of scant cellularity. Blood present  10/27/2022 Endometrial biopsy SCANT FRAGMENTS OF INACTIVE ENDOMETRIUM ADMIXED WITH ABUNDANT BLOOD CLOT   8/30/2022 Mammogram   Impression:   No mammographic evidence of malignancy.  BI-RADS Category 1: Negative  Recommendation:  Routine screening mammogram in 1 year is recommended.  9/2022 Cologuard - negative     Interval History:  2/27/2023  At last visit was found to be responding well to oral iron with no GI issues.  She presents today to continue to monitor response to oral iron therapy.  Heavy abnormal uterine bleeding has greatly reduced - just spotting with medication from GYN. She has been taking her oral iron once daily.  She has not been taking B12 since last visit. She took oral iron the morning of her labs.  States that she is feeling good with no complaints.       Interval History:  5/29/2023 Presents today to assess for recurrent EZEKIEL with h/o abnormal uterine bleeding.  3/8/2023 PAP - negative for intraepithelial lesions or malignancy.  Is taking megestrol and still with spotting with abnormal spotting but reduced.  Using panty liners.  Is having a pelvic US in 9/2023 for Dr. Ema Aquino and then will go from there per patient.  Was taking oral iron once daily and just started taking oral iron tablets twice a day since last Thursday. Not having GI side effects.  States that she is feeling  good.    Interval History:  10/2/2023 Presents today to assess for recurrent iron deficiency anemia with h/o abnormal uterine bleeding. Is taking iron tablets once day.  Endometrial biopsy done 2023 no atypical hyperplasia or malignancy identified.  She was told to stop Megace on 2023.  States that she started taking oral iron twice daily last week.Still having to wear panty liners daily and bleeding has increased.  Is to f/u with Dr Auqino soon to discuss. Continues to take B complex vitamin daily.      Social History     Socioeconomic History    Marital status:     Number of children: 3   Tobacco Use    Smoking status: Former     Types: Cigarettes    Smokeless tobacco: Never   Substance and Sexual Activity    Alcohol use: Never    Drug use: Never    Sexual activity: Not Currently     Partners: Male     Birth control/protection: Post-menopausal       Family History   Problem Relation Age of Onset    Hypertension Mother        Past Surgical History:   Procedure Laterality Date     SECTION      x 2       Past Medical History:   Diagnosis Date    Hyperlipidemia     Hypertension     Pulmonary edema        Review of Systems   Constitutional: Negative.    HENT: Negative.     Eyes: Negative.    Respiratory: Negative.     Cardiovascular: Negative.    Gastrointestinal: Negative.    Endocrine: Negative.    Genitourinary: Negative.         Abnormal uterine bleeding - daily   Musculoskeletal: Negative.    Skin: Negative.    Allergic/Immunologic: Negative.    Neurological: Negative.    Hematological: Negative.    Psychiatric/Behavioral: Negative.            Medication List with Changes/Refills   Current Medications    AMLODIPINE (NORVASC) 10 MG TABLET    Take 1 tablet (10 mg total) by mouth once daily.    ASCORBIC ACID, VITAMIN C, (VITAMIN C) 500 MG TABLET    Take 1 tablet (500 mg total) by mouth every evening.    ASPIRIN (ECOTRIN) 81 MG EC TABLET    Take 1 tablet (81 mg total) by mouth once daily.    B  COMPLEX VITAMINS TABLET    Take 1 tablet by mouth once daily. B COMPLETE    FERROUS SULFATE 324 MG (65 MG IRON) TBEC    Take by mouth 2 (two) times daily with meals.    LOSARTAN (COZAAR) 100 MG TABLET    Take 1 tablet (100 mg total) by mouth once daily.    MEGESTROL (MEGACE) 40 MG TAB    Take 1 tablet (40 mg total) by mouth once daily.    MEGESTROL (MEGACE) 40 MG TAB    Take 1 tablet (40 mg total) by mouth once daily.    METOPROLOL SUCCINATE (TOPROL-XL) 50 MG 24 HR TABLET    Take 1 tablet (50 mg total) by mouth once daily.    NITROGLYCERIN (NITROSTAT) 0.4 MG SL TABLET    Place 1 tablet (0.4 mg total) under the tongue every 5 (five) minutes as needed.    ROSUVASTATIN (CRESTOR) 20 MG TABLET    Take 1 tablet (20 mg total) by mouth every evening.        Objective:   There were no vitals filed for this visit.    Physical Exam  Constitutional:       Appearance: Normal appearance.   Pulmonary:      Effort: Pulmonary effort is normal.   Neurological:      Mental Status: She is alert and oriented to person, place, and time.   Psychiatric:         Mood and Affect: Mood normal.         Behavior: Behavior normal.         Thought Content: Thought content normal.         Judgment: Judgment normal.         Assessment:     Problem List Items Addressed This Visit          Renal/    Postmenopausal bleeding    Relevant Orders    CBC Auto Differential    Iron and TIBC    Ferritin     Other Visit Diagnoses       History of iron deficiency    -  Primary    Relevant Orders    CBC Auto Differential    Iron and TIBC    Ferritin            Lab Results   Component Value Date    WBC 5.77 09/29/2023    RBC 5.04 09/29/2023    HGB 13.5 09/29/2023    HCT 42.4 09/29/2023    MCV 84 09/29/2023    MCH 26.8 (L) 09/29/2023    MCHC 31.8 (L) 09/29/2023    RDW 14.3 09/29/2023     09/29/2023    MPV 12.1 09/29/2023    GRAN 3.7 09/29/2023    GRAN 64.5 09/29/2023    LYMPH 1.6 09/29/2023    LYMPH 27.0 09/29/2023    MONO 0.4 09/29/2023    MONO 6.8  09/29/2023    EOS 0.1 09/29/2023    BASO 0.03 09/29/2023    EOSINOPHIL 1.0 09/29/2023    BASOPHIL 0.5 09/29/2023      Lab Results   Component Value Date     09/29/2023    K 4.2 09/29/2023     09/29/2023    CO2 21 (L) 09/29/2023    BUN 13 09/29/2023    CREATININE 0.9 09/29/2023    CALCIUM 9.4 09/29/2023    ANIONGAP 10 09/29/2023    ESTGFRAFRICA >60 02/06/2020    EGFRNONAA >60 02/06/2020     Lab Results   Component Value Date    ALT 17 06/02/2023    AST 20 06/02/2023    ALKPHOS 51 (L) 06/02/2023    BILITOT 0.7 06/02/2023     Lab Results   Component Value Date    UIBC 167 11/19/2007    IRON 89 09/29/2023    TRANSFERRIN 285 09/29/2023    TIBC 422 09/29/2023    FESATURATED 21 09/29/2023      Lab Results   Component Value Date    FERRITIN 36 09/29/2023         Plan:   History of iron deficiency  -     CBC Auto Differential; Future; Expected date: 10/02/2023  -     Iron and TIBC; Future; Expected date: 10/02/2023  -     Ferritin; Future; Expected date: 10/02/2023    Postmenopausal bleeding  -     CBC Auto Differential; Future; Expected date: 10/02/2023  -     Iron and TIBC; Future; Expected date: 10/02/2023  -     Ferritin; Future; Expected date: 10/02/2023      Med Onc Chart Routing      Follow up with physician    Follow up with [facility] . F/u 10 weeks with labs prior in [facility]   Infusion scheduling note   n/a   Injection scheduling note n/a   Labs   Scheduling:  Preferred lab:  Lab interval:  Cbc, iron studies   Imaging   N/a   Pharmacy appointment No pharmacy appointment needed      Other referrals       No additional referrals needed  n/a          Continue ferrous sulfate 325 mg PO daily.  Continue B complex vitamin daily    Collaborating Provider:  Dr. Preston Brown    Thank You,  Angela Wagner, FNP-C  Hematology Oncology

## 2023-10-10 ENCOUNTER — OFFICE VISIT (OUTPATIENT)
Dept: PRIMARY CARE CLINIC | Facility: CLINIC | Age: 68
End: 2023-10-10
Payer: COMMERCIAL

## 2023-10-10 VITALS
BODY MASS INDEX: 34.48 KG/M2 | WEIGHT: 187.38 LBS | DIASTOLIC BLOOD PRESSURE: 80 MMHG | SYSTOLIC BLOOD PRESSURE: 140 MMHG | TEMPERATURE: 98 F | HEART RATE: 74 BPM | HEIGHT: 62 IN

## 2023-10-10 DIAGNOSIS — I10 PRIMARY HYPERTENSION: ICD-10-CM

## 2023-10-10 DIAGNOSIS — D21.9 FIBROID: ICD-10-CM

## 2023-10-10 DIAGNOSIS — Z01.818 PREOP EXAMINATION: Primary | ICD-10-CM

## 2023-10-10 DIAGNOSIS — I25.10 CORONARY ARTERY DISEASE INVOLVING NATIVE HEART, UNSPECIFIED VESSEL OR LESION TYPE, UNSPECIFIED WHETHER ANGINA PRESENT: ICD-10-CM

## 2023-10-10 DIAGNOSIS — D64.9 SYMPTOMATIC ANEMIA: ICD-10-CM

## 2023-10-10 PROCEDURE — 99999 PR PBB SHADOW E&M-EST. PATIENT-LVL IV: CPT | Mod: PBBFAC,,, | Performed by: FAMILY MEDICINE

## 2023-10-10 PROCEDURE — 99999 PR PBB SHADOW E&M-EST. PATIENT-LVL IV: ICD-10-PCS | Mod: PBBFAC,,, | Performed by: FAMILY MEDICINE

## 2023-10-10 PROCEDURE — 99214 OFFICE O/P EST MOD 30 MIN: CPT | Mod: PBBFAC,PN | Performed by: FAMILY MEDICINE

## 2023-10-10 PROCEDURE — 99215 OFFICE O/P EST HI 40 MIN: CPT | Mod: S$GLB,,, | Performed by: FAMILY MEDICINE

## 2023-10-10 PROCEDURE — 99215 PR OFFICE/OUTPT VISIT, EST, LEVL V, 40-54 MIN: ICD-10-PCS | Mod: S$GLB,,, | Performed by: FAMILY MEDICINE

## 2023-10-10 RX ORDER — ROSUVASTATIN CALCIUM 20 MG/1
20 TABLET, COATED ORAL NIGHTLY
Qty: 90 TABLET | Refills: 3 | Status: SHIPPED | OUTPATIENT
Start: 2023-10-10 | End: 2023-12-06 | Stop reason: SDUPTHER

## 2023-10-10 RX ORDER — LOSARTAN POTASSIUM 100 MG/1
100 TABLET ORAL DAILY
Qty: 90 TABLET | Refills: 3 | Status: SHIPPED | OUTPATIENT
Start: 2023-10-10 | End: 2024-10-09

## 2023-10-10 RX ORDER — METOPROLOL SUCCINATE 50 MG/1
50 TABLET, EXTENDED RELEASE ORAL 2 TIMES DAILY
Qty: 180 TABLET | Refills: 3 | Status: SHIPPED | OUTPATIENT
Start: 2023-10-10

## 2023-10-10 RX ORDER — AMLODIPINE BESYLATE 10 MG/1
10 TABLET ORAL DAILY
Qty: 90 TABLET | Refills: 3 | Status: SHIPPED | OUTPATIENT
Start: 2023-10-10 | End: 2024-03-18

## 2023-10-10 NOTE — PROGRESS NOTES
"    [facility]       [address] [place] [address]      Phone: [phone]      Fax: [phone]    Kishan Kelley MD                Office Visit  10/10/2023        Subjective      HPI:  Liliane Ford is a 68 y.o. female presents today in clinic for "Follow-up  ."     68-year-old female presents today to follow-up on multiple issues.     Patient states that she feels relatively okay today.  No chest pains, shortness of breath.  No fever, chills, body aches.  No coughing, sneezing, URI type symptoms.  Appetite normal.  Bowel movements normal.  No urinary issues.      Last visit, however, she was having some chest discomfort.  Was able to see her cardiologist.  They did extensive workup.  Decided to optimize medical management and hold off on the left heart catheterization, for now.  He did evaluate her for procedure.  Determined her to be moderate cardiovascular risk.  Recommended increasing her metoprolol from 50 mg once a day to twice a day.    Still having some vaginal bleeding.  Saw Gynecology.  Did trial of Megace.  That did help control it, but was still having breakthrough bleeding.  Now, they are at the point where she is ready to have hysterectomy.  She has a preop eval with gyn scheduled in early November.    Has hypertension.  In addition to the Toprol-XL 50 mg, twice a day, she is also taking losartan 100 mg daily, amlodipine 10 mg daily.    PMH: HTN, fibroids  PSH:   F MH: None significant reported  allergies:  Penicillin caused a rash as a child.    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  .    T: No current use.  Quit 40+ years ago.    a: Denies  D: Denies    exercise: Daily    colon:  2022.  Cologuard negative.  Repeat three years ().    mm2022     gyn:  Dr. Aquino ([facility])    Has not had pneumonia vaccine.  She declines vaccines.        The following were updated and reviewed by myself in " the chart: medications, past medical history, past surgical history, family history, social history, and allergies.     Medications:  Current Outpatient Medications on File Prior to Visit   Medication Sig Dispense Refill    ascorbic acid, vitamin C, (VITAMIN C) 500 MG tablet Take 1 tablet (500 mg total) by mouth every evening.      aspirin (ECOTRIN) 81 MG EC tablet Take 1 tablet (81 mg total) by mouth once daily. 90 tablet 3    b complex vitamins tablet Take 1 tablet by mouth once daily. B COMPLETE      ferrous sulfate 324 mg (65 mg iron) TbEC Take by mouth 2 (two) times daily with meals.      megestroL (MEGACE) 40 MG Tab Take 1 tablet (40 mg total) by mouth once daily. 30 tablet 2    megestroL (MEGACE) 40 MG Tab Take 1 tablet (40 mg total) by mouth once daily. 90 tablet 3    nitroGLYCERIN (NITROSTAT) 0.4 MG SL tablet Place 1 tablet (0.4 mg total) under the tongue every 5 (five) minutes as needed. 20 tablet 12    [DISCONTINUED] amLODIPine (NORVASC) 10 MG tablet Take 1 tablet (10 mg total) by mouth once daily. 90 tablet 3    [DISCONTINUED] losartan (COZAAR) 100 MG tablet Take 1 tablet (100 mg total) by mouth once daily. 90 tablet 3    [DISCONTINUED] metoprolol succinate (TOPROL-XL) 50 MG 24 hr tablet Take 1 tablet (50 mg total) by mouth once daily. 90 tablet 4    [DISCONTINUED] rosuvastatin (CRESTOR) 20 MG tablet Take 1 tablet (20 mg total) by mouth every evening. 90 tablet 3     No current facility-administered medications on file prior to visit.        PMHx:  Past Medical History:   Diagnosis Date    Hyperlipidemia     Hypertension     Pulmonary edema       Patient Active Problem List    Diagnosis Date Noted    Coronary artery disease involving native coronary artery without angina pectoris 05/30/2023    Abnormal nuclear stress test 05/30/2023    Class 1 obesity due to excess calories with serious comorbidity and body mass index (BMI) of 34.0 to 34.9 in adult 05/30/2023    LAE (left atrial enlargement) 05/30/2023     Diastolic dysfunction 2023    Abnormal computed tomography angiography (CTA) 2022    History of endometrial hyperplasia 2022    Fibroids 2022    Palpitations 2022    Abnormal ECG 2022    Hypercholesterolemia 2022    Encounter for screening colonoscopy 2022    Delayed immunizations 2022    Bunion 2022    Symptomatic anemia 2020    Postmenopausal bleeding 2020    Essential hypertension 2020    Thrombocytosis 2020    Elevated AST (SGOT) 2020    Cardiomegaly 2020    Low iron 2020    Thyroid nodule 2020        PSHx:  Past Surgical History:   Procedure Laterality Date     SECTION      x 2        FHx:  Family History   Problem Relation Age of Onset    Hypertension Mother         Social:  Social History     Socioeconomic History    Marital status:     Number of children: 3   Tobacco Use    Smoking status: Former     Types: Cigarettes    Smokeless tobacco: Never   Substance and Sexual Activity    Alcohol use: Never    Drug use: Never    Sexual activity: Not Currently     Partners: Male     Birth control/protection: Post-menopausal        Allergies:  Review of patient's allergies indicates:   Allergen Reactions    Penicillins Rash        ROS:  Review of Systems   Constitutional:  Negative for activity change, appetite change, chills and fever.   HENT:  Negative for congestion, postnasal drip, rhinorrhea, sore throat and trouble swallowing.    Respiratory:  Negative for cough, shortness of breath and wheezing.    Cardiovascular:  Negative for chest pain and palpitations.   Gastrointestinal:  Negative for abdominal pain, constipation, diarrhea, nausea and vomiting.   Genitourinary:  Negative for difficulty urinating.   Musculoskeletal:  Negative for arthralgias and myalgias.   Skin:  Negative for color change and rash.   Neurological:  Negative for speech difficulty and headaches.   All other systems  "reviewed and are negative.         Objective      BP (!) 140/80   Pulse 74   Temp 98.4 °F (36.9 °C)   Ht 5' 2" (1.575 m)   Wt 85 kg (187 lb 6.3 oz)   BMI 34.27 kg/m²   Ht Readings from Last 3 Encounters:   10/10/23 5' 2" (1.575 m)   09/05/23 5' 2" (1.575 m)   09/05/23 5' 2" (1.575 m)     Wt Readings from Last 3 Encounters:   10/10/23 85 kg (187 lb 6.3 oz)   09/22/23 86.3 kg (190 lb 2.4 oz)   09/05/23 84 kg (185 lb 3 oz)       PHYSICAL EXAM:  Physical Exam  Vitals and nursing note reviewed.   Constitutional:       General: She is not in acute distress.     Appearance: Normal appearance.   HENT:      Head: Normocephalic and atraumatic.      Right Ear: Tympanic membrane, ear canal and external ear normal.      Left Ear: Tympanic membrane, ear canal and external ear normal.      Nose: Nose normal. No congestion or rhinorrhea.      Mouth/Throat:      Mouth: Mucous membranes are moist.      Pharynx: Oropharynx is clear. No oropharyngeal exudate or posterior oropharyngeal erythema.   Eyes:      Extraocular Movements: Extraocular movements intact.      Conjunctiva/sclera: Conjunctivae normal.      Pupils: Pupils are equal, round, and reactive to light.   Cardiovascular:      Rate and Rhythm: Normal rate and regular rhythm.   Pulmonary:      Effort: Pulmonary effort is normal. No respiratory distress.      Breath sounds: No wheezing, rhonchi or rales.   Musculoskeletal:         General: Normal range of motion.      Cervical back: Normal range of motion.   Lymphadenopathy:      Cervical: No cervical adenopathy.   Skin:     General: Skin is warm and dry.      Findings: No rash.   Neurological:      Mental Status: She is alert.              LABS / IMAGING:  Recent Results (from the past 4368 hour(s))   CBC Auto Differential    Collection Time: 05/26/23 10:28 AM   Result Value Ref Range    WBC 5.22 3.90 - 12.70 K/uL    RBC 4.96 4.00 - 5.40 M/uL    Hemoglobin 12.7 12.0 - 16.0 g/dL    Hematocrit 40.2 37.0 - 48.5 %    MCV 81 " (L) 82 - 98 fL    MCH 25.6 (L) 27.0 - 31.0 pg    MCHC 31.6 (L) 32.0 - 36.0 g/dL    RDW 17.8 (H) 11.5 - 14.5 %    Platelets 262 150 - 450 K/uL    MPV 12.1 9.2 - 12.9 fL    Immature Granulocytes 0.2 0.0 - 0.5 %    Gran # (ANC) 3.0 1.8 - 7.7 K/uL    Immature Grans (Abs) 0.01 0.00 - 0.04 K/uL    Lymph # 1.7 1.0 - 4.8 K/uL    Mono # 0.4 0.3 - 1.0 K/uL    Eos # 0.1 0.0 - 0.5 K/uL    Baso # 0.01 0.00 - 0.20 K/uL    nRBC 0 0 /100 WBC    Gran % 57.0 38.0 - 73.0 %    Lymph % 32.4 18.0 - 48.0 %    Mono % 7.5 4.0 - 15.0 %    Eosinophil % 2.7 0.0 - 8.0 %    Basophil % 0.2 0.0 - 1.9 %    Differential Method Automated    Basic Metabolic Panel    Collection Time: 05/26/23 10:28 AM   Result Value Ref Range    Sodium 142 136 - 145 mmol/L    Potassium 4.1 3.5 - 5.1 mmol/L    Chloride 108 95 - 110 mmol/L    CO2 23 23 - 29 mmol/L    Glucose 156 (H) 70 - 110 mg/dL    BUN 12 8 - 23 mg/dL    Creatinine 0.9 0.5 - 1.4 mg/dL    Calcium 9.3 8.7 - 10.5 mg/dL    Anion Gap 11 8 - 16 mmol/L    eGFR >60 >60 mL/min/1.73 m^2   Ferritin    Collection Time: 05/26/23 10:28 AM   Result Value Ref Range    Ferritin 57 20.0 - 300.0 ng/mL   Iron and TIBC    Collection Time: 05/26/23 10:28 AM   Result Value Ref Range    Iron 102 30 - 160 ug/dL    Transferrin 248 200 - 375 mg/dL    TIBC 367 250 - 450 ug/dL    Saturated Iron 28 20 - 50 %   Comprehensive Metabolic Panel    Collection Time: 06/02/23  7:09 AM   Result Value Ref Range    Sodium 140 136 - 145 mmol/L    Potassium 3.9 3.5 - 5.1 mmol/L    Chloride 108 95 - 110 mmol/L    CO2 23 23 - 29 mmol/L    Glucose 96 70 - 110 mg/dL    BUN 14 8 - 23 mg/dL    Creatinine 0.9 0.5 - 1.4 mg/dL    Calcium 9.6 8.7 - 10.5 mg/dL    Total Protein 7.5 6.0 - 8.4 g/dL    Albumin 4.1 3.5 - 5.2 g/dL    Total Bilirubin 0.7 0.1 - 1.0 mg/dL    Alkaline Phosphatase 51 (L) 55 - 135 U/L    AST 20 10 - 40 U/L    ALT 17 10 - 44 U/L    Anion Gap 9 8 - 16 mmol/L    eGFR >60.0 >60 mL/min/1.73 m^2   Lipid Panel    Collection Time: 06/02/23   7:09 AM   Result Value Ref Range    Cholesterol 121 120 - 199 mg/dL    Triglycerides 53 30 - 150 mg/dL    HDL 43 40 - 75 mg/dL    LDL Cholesterol 67.4 63.0 - 159.0 mg/dL    HDL/Cholesterol Ratio 35.5 20.0 - 50.0 %    Total Cholesterol/HDL Ratio 2.8 2.0 - 5.0    Non-HDL Cholesterol 78 mg/dL   Specimen to Pathology, Ob/Gyn    Collection Time: 09/22/23  1:33 PM   Result Value Ref Range    Final Pathologic Diagnosis       Small fragments of inactive endometrium showing changes compatible with progestin effect.  No atypical hyperplasia or malignancy identified.    Gross       Patient ID/MRN:  [id number]  Pathology label MRN:  [id number]    The specimen is received in formalin labeled &quot;EMB&quot;.  The specimen consists of multiple red-brown fragments of soft tissue admixed with hemorrhagic material measuring 2.7 x 2.7 x 0.7 cm in aggregate.  The specimen is submitted entirely in   cassette [id number] and [id number]    Alfreda Phillips MS  Grossing Technologist      Disclaimer       Unless the case is a 'gross only' or additional testing only, the final diagnosis for each specimen is based on a microscopic examination of appropriate tissue sections.   CBC Auto Differential    Collection Time: 09/29/23 10:40 AM   Result Value Ref Range    WBC 5.77 3.90 - 12.70 K/uL    RBC 5.04 4.00 - 5.40 M/uL    Hemoglobin 13.5 12.0 - 16.0 g/dL    Hematocrit 42.4 37.0 - 48.5 %    MCV 84 82 - 98 fL    MCH 26.8 (L) 27.0 - 31.0 pg    MCHC 31.8 (L) 32.0 - 36.0 g/dL    RDW 14.3 11.5 - 14.5 %    Platelets 307 150 - 450 K/uL    MPV 12.1 9.2 - 12.9 fL    Immature Granulocytes 0.2 0.0 - 0.5 %    Gran # (ANC) 3.7 1.8 - 7.7 K/uL    Immature Grans (Abs) 0.01 0.00 - 0.04 K/uL    Lymph # 1.6 1.0 - 4.8 K/uL    Mono # 0.4 0.3 - 1.0 K/uL    Eos # 0.1 0.0 - 0.5 K/uL    Baso # 0.03 0.00 - 0.20 K/uL    nRBC 0 0 /100 WBC    Gran % 64.5 38.0 - 73.0 %    Lymph % 27.0 18.0 - 48.0 %    Mono % 6.8 4.0 - 15.0 %    Eosinophil % 1.0 0.0 - 8.0 %    Basophil % 0.5  0.0 - 1.9 %    Differential Method Automated    Basic Metabolic Panel    Collection Time: 09/29/23 10:40 AM   Result Value Ref Range    Sodium 141 136 - 145 mmol/L    Potassium 4.2 3.5 - 5.1 mmol/L    Chloride 110 95 - 110 mmol/L    CO2 21 (L) 23 - 29 mmol/L    Glucose 80 70 - 110 mg/dL    BUN 13 8 - 23 mg/dL    Creatinine 0.9 0.5 - 1.4 mg/dL    Calcium 9.4 8.7 - 10.5 mg/dL    Anion Gap 10 8 - 16 mmol/L    eGFR >60 >60 mL/min/1.73 m^2   Ferritin    Collection Time: 09/29/23 10:40 AM   Result Value Ref Range    Ferritin 36 20.0 - 300.0 ng/mL   Iron and TIBC    Collection Time: 09/29/23 10:40 AM   Result Value Ref Range    Iron 89 30 - 160 ug/dL    Transferrin 285 200 - 375 mg/dL    TIBC 422 250 - 450 ug/dL    Saturated Iron 21 20 - 50 %         Assessment    1. Preop examination    2. Primary hypertension    3. Coronary artery disease involving native heart, unspecified vessel or lesion type, unspecified whether angina present    4. Symptomatic anemia    5. Fibroid          Jamaal Momin was seen today for follow-up.    Diagnoses and all orders for this visit:    Preop examination    Primary hypertension  -     amLODIPine (NORVASC) 10 MG tablet; Take 1 tablet (10 mg total) by mouth once daily.  -     losartan (COZAAR) 100 MG tablet; Take 1 tablet (100 mg total) by mouth once daily.  -     metoprolol succinate (TOPROL-XL) 50 MG 24 hr tablet; Take 1 tablet (50 mg total) by mouth 2 (two) times daily.  -     Lipid Panel; Future  -     TSH; Future  -     Comprehensive Metabolic Panel; Future  -     CBC Auto Differential; Future    Coronary artery disease involving native heart, unspecified vessel or lesion type, unspecified whether angina present  -     metoprolol succinate (TOPROL-XL) 50 MG 24 hr tablet; Take 1 tablet (50 mg total) by mouth 2 (two) times daily.  -     rosuvastatin (CRESTOR) 20 MG tablet; Take 1 tablet (20 mg total) by mouth every evening.    Symptomatic anemia    Fibroid    Other orders  -     Cancel:  Influenza - Quadrivalent (Adjuvanted)    Physically, everything looks pretty good today.      She has already seen cardiology who has deemed her a moderate risk from a cardiovascular standpoint.  At this point, she does appear to be medically optimized.  From our standpoint, she should be okay to proceed with a hysterectomy.    Medication refills, as above.    Orders placed for screening labs to be done prior to next visit.    FOLLOW-UP:  Follow up in about 6 months (around 4/10/2024) for check up.    I spent a total of 45 minutes face to face and non-face to face on the date of this visit.This includes time preparing to see the patient (eg, review of tests, notes), obtaining and/or reviewing additional history from an independent historian and/or outside medical records, documenting clinical information in the electronic health record, independently interpreting results and/or communicating results to the patient/family/caregiver, or care coordinator.    Signed by:  Kishan Kelley MD

## 2023-10-10 NOTE — PATIENT INSTRUCTIONS
Physically, everything looks good today.      Continue taking all your medications, as prescribed.      All of your recent blood work looks fine, as well.    From our perspective, you should be okay to proceed with your hysterectomy.  From his notes, it appears Dr. Plascencia also has given clearance.  From here, it will be up to your gynecologist to determine when to do the surgery.      Continue to eat a healthy diet.  Be careful with portion sizes.  Includes lots of fresh fruits, vegetables, whole grains, lean proteins.  See info below.    Keep hydrated.  Be sure to drink at least 8-10, 8 oz, glasses of water every day.    Stay active.  Try to do some sort of physical activity every day.  Nothing outrageous, just try walking for 10-15 minutes each day.

## 2023-11-07 ENCOUNTER — OFFICE VISIT (OUTPATIENT)
Dept: OBSTETRICS AND GYNECOLOGY | Facility: CLINIC | Age: 68
End: 2023-11-07
Payer: COMMERCIAL

## 2023-11-07 VITALS
BODY MASS INDEX: 35.3 KG/M2 | WEIGHT: 191.81 LBS | DIASTOLIC BLOOD PRESSURE: 86 MMHG | HEIGHT: 62 IN | SYSTOLIC BLOOD PRESSURE: 150 MMHG

## 2023-11-07 DIAGNOSIS — N85.2 ENLARGED UTERUS: ICD-10-CM

## 2023-11-07 DIAGNOSIS — N95.0 POSTMENOPAUSAL BLEEDING: Primary | ICD-10-CM

## 2023-11-07 DIAGNOSIS — D21.9 FIBROIDS: ICD-10-CM

## 2023-11-07 PROCEDURE — 99999 PR PBB SHADOW E&M-EST. PATIENT-LVL III: CPT | Mod: PBBFAC,,, | Performed by: OBSTETRICS & GYNECOLOGY

## 2023-11-07 PROCEDURE — 99213 PR OFFICE/OUTPT VISIT, EST, LEVL III, 20-29 MIN: ICD-10-PCS | Mod: S$GLB,,, | Performed by: OBSTETRICS & GYNECOLOGY

## 2023-11-07 PROCEDURE — 99213 OFFICE O/P EST LOW 20 MIN: CPT | Mod: S$GLB,,, | Performed by: OBSTETRICS & GYNECOLOGY

## 2023-11-07 PROCEDURE — 99213 OFFICE O/P EST LOW 20 MIN: CPT | Mod: PBBFAC | Performed by: OBSTETRICS & GYNECOLOGY

## 2023-11-07 PROCEDURE — 99999 PR PBB SHADOW E&M-EST. PATIENT-LVL III: ICD-10-PCS | Mod: PBBFAC,,, | Performed by: OBSTETRICS & GYNECOLOGY

## 2023-11-07 NOTE — PROGRESS NOTES
Subjective:       Patient ID: Liliane Ford is a 68 y.o. female.    Chief Complaint:  surgery consultation      History of Present Illness  HPI  Here for follow up  Patient with know fibroid uterus.  Feels menopause occurred age 55 and started bleeding approx 3 yrs ago  Transfused in ER for same    Had emb showing hyperplasia    Has had several cycles of megace    Most recent sono   2023  The uterus measures 15.6 cm in length. Multiple uterine fibroids are noted the 3 largest were measured including a 1.7 cm fibroid within the fundus a 2.9 cm fibroid within the anterior body and a 3.1 cm fibroid within the posterior body.  The endometrial stripe in this premenopausal patient measures 5.7 mm which is minimally thickened.  The ovaries are not visualized.  There appears to be a large fibroid seen the right adnexal region that measures up to 6 cm in size best seen on the transabdominal imaging.   emb after megace use and sono  2023  Small fragments of inactive endometrium showing changes compatible with progestin effect.  No atypical hyperplasia or malignancy identified    Patient reports she just now stopped bleeding    Patient here to reviewe options  GYN & OB History  No LMP recorded. Patient is postmenopausal.   Date of Last Pap: 11/3/2022    OB History    Para Term  AB Living   3 3 0     3   SAB IAB Ectopic Multiple Live Births         1        # Outcome Date GA Lbr Marvin/2nd Weight Sex Delivery Anes PTL Lv   3 Para 10/23/86     CS-LTranv      2 Para 82     CS-LTranv      1 Para 82     Vag-Spont          Review of Systems  Review of Systems   Genitourinary:  Positive for postmenopausal bleeding.   All other systems reviewed and are negative.          Objective:      Physical Exam:   Constitutional: She appears well-developed.     Eyes: Pupils are equal, round, and reactive to light. Conjunctivae and EOM are normal.      Pulmonary/Chest: Effort normal.        Abdominal: Soft.              Musculoskeletal: Normal range of motion.       Neurological: She is alert.    Skin: Skin is warm.    Psychiatric: She has a normal mood and affect.           Assessment:        1. Postmenopausal bleeding    2. Enlarged uterus    3. Fibroids               Plan:      Reviwed options  Continued observation; continue megace, hysteroscopy with d&c; mirena iud; hysterectomy  Pt wishes to proceed with definitive management  Reviewed robotic assisted laparoscopic hysterectomy with bilateral salpingectomy procedure in detail.  Patient wishes to retain ovaries--but wants them removed if visually abnormal.  .   Reviewed risks including but not limited to infection, bleeding, damage to bowel/bladder/ureter, cva;htn, dvt, death.   All questions answered to the best of my ability.  Post op course reviewed.  Recommend [facility] clinic for preop eval.  Consents signed and witnessed.  Case request submitted   Hysterectomy AVS info provided

## 2023-11-08 ENCOUNTER — PATIENT MESSAGE (OUTPATIENT)
Dept: OBSTETRICS AND GYNECOLOGY | Facility: CLINIC | Age: 68
End: 2023-11-08
Payer: COMMERCIAL

## 2023-11-08 DIAGNOSIS — D21.9 FIBROIDS: ICD-10-CM

## 2023-11-08 DIAGNOSIS — N85.2 ENLARGED UTERUS: ICD-10-CM

## 2023-11-08 DIAGNOSIS — N95.0 POSTMENOPAUSAL BLEEDING: ICD-10-CM

## 2023-11-08 DIAGNOSIS — Z01.818 PREOP EXAMINATION: Primary | ICD-10-CM

## 2023-11-16 ENCOUNTER — OFFICE VISIT (OUTPATIENT)
Dept: INTERNAL MEDICINE | Facility: CLINIC | Age: 68
End: 2023-11-16
Payer: COMMERCIAL

## 2023-11-16 ENCOUNTER — HOSPITAL ENCOUNTER (OUTPATIENT)
Dept: CARDIOLOGY | Facility: HOSPITAL | Age: 68
Discharge: HOME | End: 2023-11-16
Payer: COMMERCIAL

## 2023-11-16 VITALS
SYSTOLIC BLOOD PRESSURE: 156 MMHG | DIASTOLIC BLOOD PRESSURE: 84 MMHG | HEART RATE: 73 BPM | TEMPERATURE: 97 F | OXYGEN SATURATION: 100 %

## 2023-11-16 DIAGNOSIS — Z01.818 PRE-OP TESTING: ICD-10-CM

## 2023-11-16 DIAGNOSIS — Z01.818 PREOP EXAMINATION: ICD-10-CM

## 2023-11-16 DIAGNOSIS — R94.39 ABNORMAL NUCLEAR STRESS TEST: ICD-10-CM

## 2023-11-16 DIAGNOSIS — N95.0 POSTMENOPAUSAL BLEEDING: Primary | ICD-10-CM

## 2023-11-16 DIAGNOSIS — Z01.818 PRE-OP TESTING: Primary | ICD-10-CM

## 2023-11-16 DIAGNOSIS — I10 ESSENTIAL HYPERTENSION: ICD-10-CM

## 2023-11-16 PROCEDURE — 99999 PR PBB SHADOW E&M-EST. PATIENT-LVL III: ICD-10-PCS | Mod: PBBFAC,,,

## 2023-11-16 PROCEDURE — 99999 PR PBB SHADOW E&M-EST. PATIENT-LVL III: CPT | Mod: PBBFAC,,,

## 2023-11-16 PROCEDURE — 93010 ELECTROCARDIOGRAM REPORT: CPT | Mod: ,,, | Performed by: INTERNAL MEDICINE

## 2023-11-16 PROCEDURE — 99213 OFFICE O/P EST LOW 20 MIN: CPT | Mod: PBBFAC

## 2023-11-16 PROCEDURE — 93010 EKG 12-LEAD: ICD-10-PCS | Mod: ,,, | Performed by: INTERNAL MEDICINE

## 2023-11-16 PROCEDURE — 93005 ELECTROCARDIOGRAM TRACING: CPT

## 2023-11-16 NOTE — DISCHARGE INSTRUCTIONS
Pre op instructions reviewed with patient during Clinic Visit with Provider, verbalized understanding.    To confirm, Surgery is scheduled on 12/13/23. We will call you late afternoon the business day prior to surgery with your arrival time.    *Please report to the [facility] (1st Floor) located off of [facility] (2nd Entrance/Building on the left, in front of the flag pole).  Address: [address] [place] [address] [place]. [address]        INSTRUCTIONS IMPORTANT!!!  DO NOT Eat, Drink, or Smoke after 12 midnight unless instructed otherwise by your Surgeon. OK to brush teeth, no gum, candy or mints!    MORNING OF SURGERY, drink small sip of water with the following medications instructed by Pre-Admit Provider:  Amlodipine  Metoprolol    Diabetic Patients: If you take diabetic or weight loss medication, Do NOT take morning of surgery unless instructed by Doctor. Metformin to be stopped 24 hrs prior to surgery. Ozempic/ Mounjaro/ Wegovy/ Trulicity/ Semaglutide or any weight loss injections to be stopped 7 days prior to surgery. DO NOT take long-acting insulin the evening before surgery. Blood sugars will be checked in pre-op by Nurse.    *Patients should HOLD all vitamins, herbal supplements, weight loss medication, aspirin products & NSAIDS 7 days prior to surgery, as these can thin the blood. Ok to take Tylenol.    ____  Avoid Alcoholic beverages 3 days prior to surgery, as it can thin the blood.  ____  NO Acrylic/fake nails or nail polish worn day of surgery (specifically hand/arm & foot surgeries).  ____  NO powder, lotions, deodorants, oils or cream on body.  ____  Remove all jewelry, piercings, & foreign objects prior to arrival and leave at home.  ____  Remove Dentures, Hearing Aids & Contact Lens prior to surgery.  ____  Bring photo ID and insurance information to hospital (Leave Valuables at Home).  ____  If going home the same day, arrange for a ride home. You will not be able to drive for 24 hrs  if Anesthesia was used.   ____  Females (ages 11-60): may need to give a urine sample the morning of surgery; please see Pre op Nurse prior to using the restroom.  ____  Males: Stop ED medications (Viagra, Cialis) 24 hrs prior to surgery.  ____  Wear clean, loose fitting clothing to allow for dressings/ bandages.      Bathing Instructions:    -Shower with anti-bacterial Soap (Hibiclens or Dial) the night before surgery and the morning of.   -Do not use Hibiclens on your face or genitals.   -Apply clean clothes after shower.  -Do not shave your face or body 2 days prior to surgery unless instructed otherwise by your Surgeon.  -Do not shave pubic hair 7 days prior to surgery (gyn pt's).    [facility]Ride Policy:  Only 2 adults allowed in pre op/recovery area during your procedure. You MUST HAVE A RIDE HOME from a responsible adult that you know and trust. Medical Transport, Uber or Lyft can ONLY be used if patient has a responsible adult to accompany them during ride home.    Discharge Instructions: You will receive Post-op/Discharge instructions by your Discharge Nurse prior to going home.   *Prevention of surgical site infections:   -Keep incisions clean and dry.   -Do not soak/submerge incisions in water until completely healed.   -Do not apply lotions, powders, creams, or deodorants to site.   -Always make sure hands are cleaned with antibacterial soap/ alcohol-based  prior to touching the surgical site.        *Signs and symptoms of Infection Before or After Surgery:               !!!If you experience any fever, chills, nausea/ vomiting, foul odor/ excessive drainage from surgical site, flu-like symptoms, new wounds or cuts, PLEASE CALL THE SURGEON OFFICE at [phone] or SEND MESSAGE THROUGH [facility] PORTAL!!!       *If you are running late day of surgery, please call the Surgery Dept @ [phone].    *Billing question, please call  [phone]       Thank you,  -[facility]  Pre Admit Dept.  [phone] or [phone]  M-F 7:30 am-4:00 pm (Closed Major Holidays)    Additional Tests Scheduled Today:  EKG (4th Floor) Check in at the !    Additional Tests Scheduled Today:  LAB (1st Floor) Check in at the !    Clearances/ Appointments Requested before Surgery: Cardiac Clearance- Dr. Plascencia, 12/05/23

## 2023-11-16 NOTE — PRE-PROCEDURE INSTRUCTIONS
To confirm, Surgery is scheduled on 12/13/23. We will call you late afternoon the business day prior to surgery with your arrival time.    *Please report to the [facility] (1st Floor) located off of Counts include 234 beds at the [facility] (2nd Entrance/Building on the left, in front of the flag pole).  Address: [address] [place] [address] [place] [address]        INSTRUCTIONS IMPORTANT!!!  DO NOT Eat, Drink, or Smoke after 12 midnight unless instructed otherwise by your Surgeon. OK to brush teeth, no gum, candy or mints!    MORNING OF SURGERY, drink small sip of water with the following medications instructed by Pre-Admit Provider:  Amlodipine  Metoprolol    Diabetic Patients: If you take diabetic or weight loss medication, Do NOT take morning of surgery unless instructed by Doctor. Metformin to be stopped 24 hrs prior to surgery. Ozempic/ Mounjaro/ Wegovy/ Trulicity/ Semaglutide or any weight loss injections to be stopped 7 days prior to surgery. DO NOT take long-acting insulin the evening before surgery. Blood sugars will be checked in pre-op by Nurse.    *Patients should HOLD all vitamins, herbal supplements, weight loss medication, aspirin products & NSAIDS 7 days prior to surgery, as these can thin the blood. Ok to take Tylenol.    ____  Avoid Alcoholic beverages 3 days prior to surgery, as it can thin the blood.  ____  NO Acrylic/fake nails or nail polish worn day of surgery (specifically hand/arm & foot surgeries).  ____  NO powder, lotions, deodorants, oils or cream on body.  ____  Remove all jewelry, piercings, & foreign objects prior to arrival and leave at home.  ____  Remove Dentures, Hearing Aids & Contact Lens prior to surgery.  ____  Bring photo ID and insurance information to hospital (Leave Valuables at Home).  ____  If going home the same day, arrange for a ride home. You will not be able to drive for 24 hrs if Anesthesia was used.   ____  Females (ages 11-60): may need to give a urine sample the morning of  surgery; please see Pre op Nurse prior to using the restroom.  ____  Males: Stop ED medications (Viagra, Cialis) 24 hrs prior to surgery.  ____  Wear clean, loose fitting clothing to allow for dressings/ bandages.      Bathing Instructions:    -Shower with anti-bacterial Soap (Hibiclens or Dial) the night before surgery and the morning of.   -Do not use Hibiclens on your face or genitals.   -Apply clean clothes after shower.  -Do not shave your face or body 2 days prior to surgery unless instructed otherwise by your Surgeon.  -Do not shave pubic hair 7 days prior to surgery (gyn pt's).    [facility]/Ride Policy:  Only 2 adults allowed in pre op/recovery area during your procedure. You MUST HAVE A RIDE HOME from a responsible adult that you know and trust. Medical Transport, Uber or Lyft can ONLY be used if patient has a responsible adult to accompany them during ride home.    Discharge Instructions: You will receive Post-op/Discharge instructions by your Discharge Nurse prior to going home.   *Prevention of surgical site infections:   -Keep incisions clean and dry.   -Do not soak/submerge incisions in water until completely healed.   -Do not apply lotions, powders, creams, or deodorants to site.   -Always make sure hands are cleaned with antibacterial soap/ alcohol-based  prior to touching the surgical site.        *Signs and symptoms of Infection Before or After Surgery:               !!!If you experience any fever, chills, nausea/ vomiting, foul odor/ excessive drainage from surgical site, flu-like symptoms, new wounds or cuts, PLEASE CALL THE SURGEON OFFICE at [phone] or SEND MESSAGE THROUGH [facility]!!!       *If you are running late day of surgery, please call the Surgery Dept @ [phone].    *Billing question, please call  [phone]       Thank you,  -[facility] Pre Admit Dept.  [phone] or [phone]  M-F 7:30 am-4:00 pm (Closed Major  Holidays)    Additional Tests Scheduled Today:  EKG (4th Floor) Check in at the !    Additional Tests Scheduled Today:  LAB (1st Floor) Check in at the !    Clearances/ Appointments Requested before Surgery: Cardiac Clearance- Dr. Plascencia, 12/05/23

## 2023-11-16 NOTE — ASSESSMENT & PLAN NOTE
"Followed by Dr. Plascencia. Last Visit: 9/5/23    Per review of note:  "She would be regarded as moderate CV risk for any urological surgery "  Patient was planned for LHC but this was cancelled due to severe anemia. LHC is on hold and not stated as required for any surgical intervention.   --Scheduled for follow-up appt and Cardiac Clearance with Dr. Plascencia on 12/5/23  "

## 2023-11-16 NOTE — ASSESSMENT & PLAN NOTE
Planned for XI Robotic Hysterectomy with Ema Aquino MD on 12/13/23.     Known risk factors for perioperative complications: Anemia  Coronary disease    Difficulty with intubation is not anticipated.    Cardiac Risk Estimation: Based on the Revised Cardiac Risk index, patient is a Class 3 risk with a 10.1% risk of a major cardiac event in a low risk procedure.    1.) Preoperative workup as follows: ECG, hemoglobin, hematocrit, electrolytes, creatinine, glucose, liver function studies.  2.) Change in medication regimen before surgery: discontinue ASA, NSAIDs 7 days before surgery, hold Metformin 24 hours prior to surgery.  3.) Prophylaxis for cardiac events with perioperative beta-blockers: Continue BB as prescribed.  4.) Invasive hemodynamic monitoring perioperatively:At discretion of anesthesia.  5.) Deep vein thrombosis prophylaxis postoperatively: intermittent pneumatic compression boots and regimen to be chosen by surgical team.  6.) Surveillance for postoperative MI with ECG immediately postoperatively and on postoperati ve days 1 and 2 AND troponin levels 24 hours postoperatively and on day 4 or hospital discharge (whichever comes first): at the discretion of anesthesiologist.  7.) Current medications which may produce withdrawal symptoms if withheld perioperatively: None  8.) Other measures:  None        --Morning of Procedure: amlodipine, metoprolol  --Resume all medications post-operatively  --Hold ASA, NSAIDs and all vitamins/supplements 7 days prior to procedure

## 2023-11-16 NOTE — PROGRESS NOTES
Preoperative History and Physical                                                              Hospital Medicine                                                                      Chief Complaint: Preoperative evaluation     History of Present Illness:      Liliane Ford is a 68 y.o. female who presents to the office today for a preoperative consultation at the request of Ema Aquino MD who plans on performing XI Robotic Hysterectomy on .     Functional Status:      The patient is able to climb a flight of stairs. The patient is able to ambulate without difficulty. The patient's functional status is not affected by the surgical problem. The patient's functional status is not affected by shortness of breath, chest pain, dyspnea on exertion and fatigue.    MET score greater than 4    Past Medical History:      Past Medical History:   Diagnosis Date    Coronary artery disease     Encounter for blood transfusion     Hyperlipidemia     Hypertension     Pulmonary edema         Past Surgical History:      Past Surgical History:   Procedure Laterality Date     SECTION      x 2        Social History:      Social History     Socioeconomic History    Marital status:     Number of children: 3   Tobacco Use    Smoking status: Former     Types: Cigarettes    Smokeless tobacco: Never   Substance and Sexual Activity    Alcohol use: Never    Drug use: Never    Sexual activity: Not Currently     Partners: Male     Birth control/protection: Post-menopausal        Family History:      Family History   Problem Relation Age of Onset    Hypertension Mother        Allergies:      Review of patient's allergies indicates:   Allergen Reactions    Penicillins Rash       Medications:      Current Outpatient Medications   Medication Sig    amLODIPine (NORVASC) 10 MG tablet Take 1 tablet (10 mg total) by mouth once daily.    ascorbic acid, vitamin C, (VITAMIN C) 500 MG  tablet Take 1 tablet (500 mg total) by mouth every evening.    aspirin (ECOTRIN) 81 MG EC tablet Take 1 tablet (81 mg total) by mouth once daily.    b complex vitamins tablet Take 1 tablet by mouth once daily. B COMPLETE    ferrous sulfate 324 mg (65 mg iron) TbEC Take by mouth 2 (two) times daily with meals.    losartan (COZAAR) 100 MG tablet Take 1 tablet (100 mg total) by mouth once daily.    metoprolol succinate (TOPROL-XL) 50 MG 24 hr tablet Take 1 tablet (50 mg total) by mouth 2 (two) times daily.    nitroGLYCERIN (NITROSTAT) 0.4 MG SL tablet Place 1 tablet (0.4 mg total) under the tongue every 5 (five) minutes as needed.    rosuvastatin (CRESTOR) 20 MG tablet Take 1 tablet (20 mg total) by mouth every evening.     No current facility-administered medications for this visit.       Vitals:      Vitals:    11/16/23 1103   BP: (!) 156/84   Pulse: 73   Temp: 97.4 °F (36.3 °C)       Review of Systems:        Constitutional: Negative for fever, chills, weight loss, malaise/fatigue and diaphoresis.   HENT: Negative for hearing loss, ear pain, nosebleeds, congestion, sore throat, neck pain, tinnitus and ear discharge.    Eyes: Negative for blurred vision, double vision, photophobia, pain, discharge and redness.   Respiratory: Negative for cough, hemoptysis, sputum production, shortness of breath, wheezing and stridor.    Cardiovascular: Negative for chest pain, palpitations, orthopnea, claudication, leg swelling and PND.   Gastrointestinal: Negative for heartburn, nausea, vomiting, abdominal pain, diarrhea, constipation, blood in stool and melena.   Genitourinary: Negative for dysuria, urgency, frequency, hematuria and flank pain.   Musculoskeletal: Negative for myalgias, back pain, joint pain and falls.   Skin: Negative for itching and rash.   Neurological: Negative for dizziness, tingling, tremors, sensory change, speech change, focal weakness, seizures, loss of consciousness, weakness and headaches.    Endo/Heme/Allergies: Negative for environmental allergies and polydipsia. Does not bruise/bleed easily.   Psychiatric/Behavioral: Negative for depression, suicidal ideas, hallucinations, memory loss and substance abuse. The patient is not nervous/anxious and does not have insomnia.    All 14 systems reviewed and negative except as noted above.    Physical Exam:      Constitutional: Appears well-developed, well-nourished and in no acute distress.  Patient is oriented to person, place, and time.   Head: Normocephalic and atraumatic. Mucous membranes moist.  Neck: Neck supple no mass.   Cardiovascular: Normal rate and regular rhythm.  S1 S2 appreciated by ascultation.  Pulmonary/Chest: Effort normal and clear to auscultation bilaterally. No respiratory distress.   Abdomen: Soft. Non-tender and non-distended. Bowel sounds are normal.   Neurological: Patient is alert and oriented to person, place and time. Moves all extremities.  Skin: Warm and dry. No lesions.  Extremities: No clubbing, cyanosis or edema.    Laboratory data:      Reviewed and noted in plan where applicable. Please see chart for full laboratory data.    @OKNHYHTDK35(cpk,cpkmb,troponini,mb)@ @ZSSIDYXWW60(poctglucose)@     Lab Results   Component Value Date    INR 1.0 02/04/2020       Lab Results   Component Value Date    WBC 5.28 11/16/2023    HGB 11.0 (L) 11/16/2023    HCT 35.7 (L) 11/16/2023    MCV 82 11/16/2023     11/16/2023       @OGQPYFDYB57(GLU,NA,K,Cl,CO2,BUN,Creatinine,Calcium,MG)@    Predictors of intubation difficulty:       Morbid obesity? no   Anatomically abnormal facies? no   Prominent incisors? no   Receding mandible? no   Short, thick neck? no   Neck range of motion: normal   Dentition: No chipped, loose, or missing teeth.  Based on the Modified Mallampati, patient is a mallampati score: I (soft palate, uvula, fauces, and tonsillar pillars visible)    Cardiographics:      ECG: Sinus bradycardia with 1st degree A-V block    Anteroseptal infarct (cited on or before 03-FEB-2020)   Abnormal ECG   When compared with ECG of 22-NOV-2022 09:46,   Questionable change in The axis   Confirmed by MARISELA BERRIOS, [facility] ([id number]) on 11/17/2023 5:12:08 AM     Scheduled to see Cardiology 12/5/23    Echocardiogram: not indicated    Imaging:      Chest x-ray: not indicated    Assessment and Plan:      Postmenopausal bleeding  Planned for XI Robotic Hysterectomy with Ema Aquino MD on 12/13/23.     Known risk factors for perioperative complications: Anemia  Coronary disease    Difficulty with intubation is not anticipated.    Cardiac Risk Estimation: Based on the Revised Cardiac Risk index, patient is a Class 3 risk with a 10.1% risk of a major cardiac event in a low risk procedure.    1.) Preoperative workup as follows: ECG, hemoglobin, hematocrit, electrolytes, creatinine, glucose, liver function studies.  2.) Change in medication regimen before surgery: discontinue ASA, NSAIDs 7 days before surgery, hold Metformin 24 hours prior to surgery.  3.) Prophylaxis for cardiac events with perioperative beta-blockers: Continue BB as prescribed.  4.) Invasive hemodynamic monitoring perioperatively:At discretion of anesthesia.  5.) Deep vein thrombosis prophylaxis postoperatively: intermittent pneumatic compression boots and regimen to be chosen by surgical team.  6.) Surveillance for postoperative MI with ECG immediately postoperatively and on postoperati ve days 1 and 2 AND troponin levels 24 hours postoperatively and on day 4 or hospital discharge (whichever comes first): at the discretion of anesthesiologist.  7.) Current medications which may produce withdrawal symptoms if withheld perioperatively: None  8.) Other measures:  None        --Morning of Procedure: amlodipine, metoprolol  --Resume all medications post-operatively  --Hold ASA, NSAIDs and all vitamins/supplements 7 days prior to procedure       Essential hypertension  Chronic, well controlled    --See  "medication recommendations as above     Abnormal nuclear stress test  Followed by Dr. Plascencia. Last Visit: 9/5/23    Per review of note:  "She would be regarded as moderate CV risk for any urological surgery "  Patient was planned for LHC but this was cancelled due to severe anemia. LHC is on hold and not stated as required for any surgical intervention.   --Scheduled for follow-up appt and Cardiac Clearance with Dr. Plascencia on 12/5/23          Electronically signed by Jennifer Burciaga NP on 11/17/2023 at 10:03 AM.     "

## 2023-11-17 ENCOUNTER — TELEPHONE (OUTPATIENT)
Dept: INFUSION THERAPY | Facility: HOSPITAL | Age: 68
End: 2023-11-17
Payer: COMMERCIAL

## 2023-11-17 ENCOUNTER — TELEPHONE (OUTPATIENT)
Dept: HEMATOLOGY/ONCOLOGY | Facility: CLINIC | Age: 68
End: 2023-11-17
Payer: COMMERCIAL

## 2023-11-17 DIAGNOSIS — D50.9 IRON DEFICIENCY ANEMIA, UNSPECIFIED IRON DEFICIENCY ANEMIA TYPE: Primary | ICD-10-CM

## 2023-11-17 RX ORDER — HEPARIN 100 UNIT/ML
500 SYRINGE INTRAVENOUS
Status: CANCELLED | OUTPATIENT
Start: 2023-11-17

## 2023-11-17 RX ORDER — EPINEPHRINE 0.3 MG/.3ML
0.3 INJECTION SUBCUTANEOUS ONCE AS NEEDED
Status: CANCELLED | OUTPATIENT
Start: 2023-11-17

## 2023-11-17 RX ORDER — SODIUM CHLORIDE 0.9 % (FLUSH) 0.9 %
10 SYRINGE (ML) INJECTION
Status: CANCELLED | OUTPATIENT
Start: 2023-11-17

## 2023-11-17 RX ORDER — DIPHENHYDRAMINE HYDROCHLORIDE 50 MG/ML
50 INJECTION INTRAMUSCULAR; INTRAVENOUS ONCE AS NEEDED
Status: CANCELLED | OUTPATIENT
Start: 2023-11-17

## 2023-11-17 NOTE — TELEPHONE ENCOUNTER
----- Message from Melissa Wagner NP sent at 11/17/2023  1:54 PM CST -----  Please let patientknow that she has continuing worsening anemia despite oral iron therapy.  Would like to set her up with some IV iron infusions - venofer 200 mg IV push on D1 and D4 x 5 doses.  Please find out if she is ok with this and which locati  on she would prefer.

## 2023-11-17 NOTE — TELEPHONE ENCOUNTER
----- Message from Daniela Gonzalez MA sent at 11/17/2023  2:20 PM CST -----  Good Afternoon   Can some one please contact this pt in regards to scheduling her iron infusions. Pt prefers to be scheduled at the [facility].    Thank you   Daniela   ----- Message -----  From: Melissa Wagner NP  Sent: 11/17/2023   1:54 PM CST  To: [facility] Staff    Please let patientknow that she has continuing worsening anemia despite oral iron therapy.  Would like to set her up with some IV iron infusions - venofer 200 mg IV push on D1 and D4 x 5 doses.  Please find out if she is ok with this and which locati  on she would prefer.

## 2023-11-20 ENCOUNTER — PATIENT MESSAGE (OUTPATIENT)
Dept: HEMATOLOGY/ONCOLOGY | Facility: CLINIC | Age: 68
End: 2023-11-20
Payer: COMMERCIAL

## 2023-11-21 ENCOUNTER — PATIENT MESSAGE (OUTPATIENT)
Dept: PRIMARY CARE CLINIC | Facility: CLINIC | Age: 68
End: 2023-11-21
Payer: COMMERCIAL

## 2023-11-21 VITALS — DIASTOLIC BLOOD PRESSURE: 73 MMHG | SYSTOLIC BLOOD PRESSURE: 136 MMHG

## 2023-11-28 ENCOUNTER — PATIENT MESSAGE (OUTPATIENT)
Dept: PRIMARY CARE CLINIC | Facility: CLINIC | Age: 68
End: 2023-11-28
Payer: COMMERCIAL

## 2023-11-28 ENCOUNTER — TELEPHONE (OUTPATIENT)
Dept: PRIMARY CARE CLINIC | Facility: CLINIC | Age: 68
End: 2023-11-28
Payer: COMMERCIAL

## 2023-11-29 VITALS — SYSTOLIC BLOOD PRESSURE: 127 MMHG | DIASTOLIC BLOOD PRESSURE: 65 MMHG

## 2023-12-04 ENCOUNTER — INFUSION (OUTPATIENT)
Dept: INFUSION THERAPY | Facility: HOSPITAL | Age: 68
End: 2023-12-04
Attending: NURSE PRACTITIONER
Payer: COMMERCIAL

## 2023-12-04 VITALS
HEART RATE: 89 BPM | TEMPERATURE: 97 F | SYSTOLIC BLOOD PRESSURE: 165 MMHG | DIASTOLIC BLOOD PRESSURE: 85 MMHG | RESPIRATION RATE: 16 BRPM | OXYGEN SATURATION: 98 %

## 2023-12-04 DIAGNOSIS — D50.9 IRON DEFICIENCY ANEMIA, UNSPECIFIED IRON DEFICIENCY ANEMIA TYPE: Primary | ICD-10-CM

## 2023-12-04 PROCEDURE — 96374 THER/PROPH/DIAG INJ IV PUSH: CPT

## 2023-12-04 PROCEDURE — 96375 TX/PRO/DX INJ NEW DRUG ADDON: CPT

## 2023-12-04 PROCEDURE — 25000003 PHARM REV CODE 250: Performed by: NURSE PRACTITIONER

## 2023-12-04 PROCEDURE — 63600175 PHARM REV CODE 636 W HCPCS: Performed by: NURSE PRACTITIONER

## 2023-12-04 PROCEDURE — A4216 STERILE WATER/SALINE, 10 ML: HCPCS | Performed by: NURSE PRACTITIONER

## 2023-12-04 RX ORDER — EPINEPHRINE 0.3 MG/.3ML
0.3 INJECTION SUBCUTANEOUS ONCE AS NEEDED
Status: CANCELLED | OUTPATIENT
Start: 2023-12-11

## 2023-12-04 RX ORDER — DIPHENHYDRAMINE HYDROCHLORIDE 50 MG/ML
25 INJECTION INTRAMUSCULAR; INTRAVENOUS
Status: CANCELLED
Start: 2023-12-11

## 2023-12-04 RX ORDER — DIPHENHYDRAMINE HYDROCHLORIDE 50 MG/ML
50 INJECTION INTRAMUSCULAR; INTRAVENOUS ONCE AS NEEDED
Status: DISCONTINUED | OUTPATIENT
Start: 2023-12-04 | End: 2023-12-04 | Stop reason: HOSPADM

## 2023-12-04 RX ORDER — HEPARIN 100 UNIT/ML
500 SYRINGE INTRAVENOUS
Status: CANCELLED | OUTPATIENT
Start: 2023-12-11

## 2023-12-04 RX ORDER — DIPHENHYDRAMINE HYDROCHLORIDE 50 MG/ML
50 INJECTION INTRAMUSCULAR; INTRAVENOUS ONCE AS NEEDED
Status: CANCELLED | OUTPATIENT
Start: 2023-12-11

## 2023-12-04 RX ORDER — DIPHENHYDRAMINE HYDROCHLORIDE 50 MG/ML
25 INJECTION INTRAMUSCULAR; INTRAVENOUS
Status: COMPLETED | OUTPATIENT
Start: 2023-12-04 | End: 2023-12-04

## 2023-12-04 RX ORDER — SODIUM CHLORIDE 0.9 % (FLUSH) 0.9 %
10 SYRINGE (ML) INJECTION
Status: DISCONTINUED | OUTPATIENT
Start: 2023-12-04 | End: 2023-12-04 | Stop reason: HOSPADM

## 2023-12-04 RX ORDER — METHYLPREDNISOLONE SOD SUCC 125 MG
80 VIAL (EA) INJECTION ONCE
Status: COMPLETED | OUTPATIENT
Start: 2023-12-04 | End: 2023-12-04

## 2023-12-04 RX ORDER — SODIUM CHLORIDE 0.9 % (FLUSH) 0.9 %
10 SYRINGE (ML) INJECTION
Status: CANCELLED | OUTPATIENT
Start: 2023-12-11

## 2023-12-04 RX ORDER — DIPHENHYDRAMINE HYDROCHLORIDE 50 MG/ML
25 INJECTION INTRAMUSCULAR; INTRAVENOUS
Status: CANCELLED
Start: 2023-12-04

## 2023-12-04 RX ADMIN — IRON SUCROSE 100 MG: 20 INJECTION, SOLUTION INTRAVENOUS at 01:12

## 2023-12-04 RX ADMIN — DIPHENHYDRAMINE HYDROCHLORIDE 25 MG: 50 INJECTION INTRAMUSCULAR; INTRAVENOUS at 02:12

## 2023-12-04 RX ADMIN — SODIUM CHLORIDE, PRESERVATIVE FREE 10 ML: 5 INJECTION INTRAVENOUS at 01:12

## 2023-12-04 RX ADMIN — METHYLPREDNISOLONE SODIUM SUCCINATE 80 MG: 125 INJECTION, POWDER, FOR SOLUTION INTRAMUSCULAR; INTRAVENOUS at 01:12

## 2023-12-04 NOTE — PLAN OF CARE
Plan of care reviewed with patient. Discussed if there are any new or ongoing concerns. Denies.   Problem: Adult Inpatient Plan of Care  Goal: Plan of Care Review  Outcome: Ongoing, Progressing  Flowsheets (Taken 12/4/2023 1510)  Plan of Care Reviewed With: patient  Goal: Absence of Hospital-Acquired Illness or Injury  Outcome: Ongoing, Progressing  Intervention: Identify and Manage Fall Risk  Flowsheets (Taken 12/4/2023 1510)  Safety Promotion/Fall Prevention: in recliner, wheels locked  Goal: Optimal Comfort and Wellbeing  Outcome: Ongoing, Progressing  Intervention: Provide Person-Centered Care  Flowsheets (Taken 12/4/2023 1510)  Trust Relationship/Rapport:   care explained   reassurance provided   choices provided   thoughts/feelings acknowledged   emotional support provided   empathic listening provided   questions answered   questions encouraged

## 2023-12-04 NOTE — NURSING
Infusion # 1 of 5 - Venofer 200 mg (only received 10 mg)     Premeds-none     Venofer 200 mg administered IV at a 5 minute rate per orders; received 100 mg then started itching suddenly. LASHAY Contreras in infusion bay assessing patient. Ordered to administer 80 mg Solumedrol IVP. Large hives noted on arms that developed after LASHAY Contreras left infusion bay and before Solumedrol given. Notified LASHAY Contreras. Hives also noted to abdomen, on side of abdomen, and to upper back. Knot-like swelling noted to right and left hand, larger on right.  Pt also reports feeling hives on side of right leg. LASHAY Contreras notified. Benadryl given as ordered. Monitored patient for 30 minutes. Pt states itching has subsided. Hives still present but not has significant.  See MAR and vitals for more details.  LASHAY Contreras ok for patient to go home with instruction to report to ER or UC immediately for return of any symptoms or presentation of facial swelling, tongue swelling, difficulty breathing, difficulty swallowing. Pt verbalizes understanding. Discharged and ambulatory out of clinic.

## 2023-12-05 ENCOUNTER — OFFICE VISIT (OUTPATIENT)
Dept: CARDIOLOGY | Facility: CLINIC | Age: 68
End: 2023-12-05
Payer: COMMERCIAL

## 2023-12-05 ENCOUNTER — OFFICE VISIT (OUTPATIENT)
Dept: HEMATOLOGY/ONCOLOGY | Facility: CLINIC | Age: 68
End: 2023-12-05
Payer: COMMERCIAL

## 2023-12-05 VITALS
BODY MASS INDEX: 36.07 KG/M2 | WEIGHT: 196 LBS | HEART RATE: 80 BPM | DIASTOLIC BLOOD PRESSURE: 78 MMHG | SYSTOLIC BLOOD PRESSURE: 136 MMHG | HEIGHT: 62 IN | OXYGEN SATURATION: 99 %

## 2023-12-05 DIAGNOSIS — R93.89 ABNORMAL COMPUTED TOMOGRAPHY ANGIOGRAPHY (CTA): ICD-10-CM

## 2023-12-05 DIAGNOSIS — E78.00 HYPERCHOLESTEROLEMIA: ICD-10-CM

## 2023-12-05 DIAGNOSIS — I51.7 LAE (LEFT ATRIAL ENLARGEMENT): ICD-10-CM

## 2023-12-05 DIAGNOSIS — D21.9 FIBROIDS: ICD-10-CM

## 2023-12-05 DIAGNOSIS — R94.39 ABNORMAL NUCLEAR STRESS TEST: ICD-10-CM

## 2023-12-05 DIAGNOSIS — D50.9 IRON DEFICIENCY ANEMIA, UNSPECIFIED IRON DEFICIENCY ANEMIA TYPE: Primary | ICD-10-CM

## 2023-12-05 DIAGNOSIS — I10 ESSENTIAL HYPERTENSION: ICD-10-CM

## 2023-12-05 DIAGNOSIS — Z01.810 PREOP CARDIOVASCULAR EXAM: Primary | ICD-10-CM

## 2023-12-05 DIAGNOSIS — E66.09 CLASS 1 OBESITY DUE TO EXCESS CALORIES WITH SERIOUS COMORBIDITY AND BODY MASS INDEX (BMI) OF 34.0 TO 34.9 IN ADULT: ICD-10-CM

## 2023-12-05 DIAGNOSIS — I51.89 DIASTOLIC DYSFUNCTION: ICD-10-CM

## 2023-12-05 DIAGNOSIS — R94.31 ABNORMAL ECG: ICD-10-CM

## 2023-12-05 DIAGNOSIS — I25.10 CORONARY ARTERY DISEASE INVOLVING NATIVE CORONARY ARTERY OF NATIVE HEART WITHOUT ANGINA PECTORIS: ICD-10-CM

## 2023-12-05 DIAGNOSIS — R00.2 PALPITATIONS: ICD-10-CM

## 2023-12-05 PROCEDURE — 99213 OFFICE O/P EST LOW 20 MIN: CPT | Mod: PBBFAC | Performed by: INTERNAL MEDICINE

## 2023-12-05 PROCEDURE — 99999 PR PBB SHADOW E&M-EST. PATIENT-LVL III: ICD-10-PCS | Mod: PBBFAC,,, | Performed by: INTERNAL MEDICINE

## 2023-12-05 PROCEDURE — 99214 OFFICE O/P EST MOD 30 MIN: CPT | Mod: S$GLB,,, | Performed by: INTERNAL MEDICINE

## 2023-12-05 PROCEDURE — 99214 OFFICE O/P EST MOD 30 MIN: CPT | Mod: 95,,,

## 2023-12-05 PROCEDURE — 99214 PR OFFICE/OUTPT VISIT, EST, LEVL IV, 30-39 MIN: ICD-10-PCS | Mod: 95,,,

## 2023-12-05 PROCEDURE — 99999 PR PBB SHADOW E&M-EST. PATIENT-LVL III: CPT | Mod: PBBFAC,,, | Performed by: INTERNAL MEDICINE

## 2023-12-05 PROCEDURE — 99214 PR OFFICE/OUTPT VISIT, EST, LEVL IV, 30-39 MIN: ICD-10-PCS | Mod: S$GLB,,, | Performed by: INTERNAL MEDICINE

## 2023-12-05 NOTE — PROGRESS NOTES
Subjective:    Patient ID:  Liliane Ford is a 68 y.o. female who presents for evaluation of Pre-op Exam and Coronary Artery Disease          HPI  Pt presents for eval.  Her current med conditions include CAD, HTN, obesity, LAE, DD, abnl ecg, hypercholesterolemia.  Nonsmoker.  Past hx pertinent for following:  Pt seen as new pt in Nov 2022; per Nov 2022 visit:  She thinks she may have developed a heart condition after having heavy fibroid bleeding issues 2+ years ago, required 4 units PRBC.  PCP added Toprol for palpitations, HTN control in 2022.  Ecg 10/25/22 NSR, old septal infarct.  Old ecgs reviewed: Feb 2020 anterolateral infarct, and 8/30/22 NSR, old septal infarct.   Pt had nuclear stress test Nov 2022.  Personally reviewed: 1.5 mm ST depression, apical defect on my view.  Report indicated normal study.  Due to concerns, I advised Coronary CTA which was done in Nov 2022.  Coronary CTA suggested 70% mid LAD stenosis.  Echo Nov 2022 normal EF, DD, LAE, LVH.  LHC scheduled in Dec 2022 but she had admission Dec 2022 for severe anemia, Hg 4.9  Required multiple PRBC.  LHC was cancelled.  Has been working w hematology on anemia.  Now here.  Due for hysterectomy due to fibroid bleeding.  Asked to get clearance.  Preop ecg 11/16/23 sinus erick 59 bpm, 1 av block, anteroseptal infarct (old).  Ecg shows no acute changes vs old ecgs over last 4 years.  Fibroid bleeding better as of late.  She seems to want to wait after the holidays.  CAD is stable.  Denies chest pain/angina.  Walks for exercise few miles.  No CHF sxs.  No dizziness.  No syncope.  Weight up a few pounds.  BP controlled.  Lipids well controlled on statin tx.    Past Medical History:   Diagnosis Date    Coronary artery disease     Encounter for blood transfusion     Hyperlipidemia     Hypertension     Pulmonary edema      Current Outpatient Medications   Medication Instructions    amLODIPine (NORVASC) 10 mg, Oral, Daily    ascorbic acid (vitamin C)  "(VITAMIN C) 500 mg, Oral, Nightly    aspirin (ECOTRIN) 81 mg, Oral, Daily    b complex vitamins tablet 1 tablet, Oral, Daily, B COMPLETE    ferrous sulfate 324 mg (65 mg iron) TbEC Oral, 2 times daily with meals    losartan (COZAAR) 100 mg, Oral, Daily    metoprolol succinate (TOPROL-XL) 50 mg, Oral, 2 times daily    nitroGLYCERIN (NITROSTAT) 0.4 mg, Sublingual, Every 5 min PRN    rosuvastatin (CRESTOR) 20 mg, Oral, Nightly         Review of Systems   Constitutional: Positive for weight gain.   HENT: Negative.     Eyes: Negative.    Cardiovascular: Negative.    Respiratory: Negative.     Endocrine: Negative.    Hematologic/Lymphatic: Negative.    Skin: Negative.    Musculoskeletal: Negative.    Gastrointestinal: Negative.    Genitourinary: Negative.    Neurological: Negative.    Psychiatric/Behavioral: Negative.     Allergic/Immunologic: Negative.         /78 (BP Location: Right arm, Patient Position: Sitting)   Pulse 80   Ht 5' 2" (1.575 m)   Wt 88.9 kg (195 lb 15.8 oz)   SpO2 99%   BMI 35.85 kg/m²     Wt Readings from Last 3 Encounters:   12/05/23 88.9 kg (195 lb 15.8 oz)   11/07/23 87 kg (191 lb 12.8 oz)   10/10/23 85 kg (187 lb 6.3 oz)     Temp Readings from Last 3 Encounters:   12/04/23 97.4 °F (36.3 °C)   11/16/23 97.4 °F (36.3 °C) (Temporal)   10/10/23 98.4 °F (36.9 °C)     BP Readings from Last 3 Encounters:   12/05/23 136/78   12/04/23 (!) 165/85   11/29/23 127/65     Pulse Readings from Last 3 Encounters:   12/05/23 80   12/04/23 89   11/16/23 73       Objective:    Physical Exam  Vitals and nursing note reviewed.   Constitutional:       General: She is not in acute distress.     Appearance: Normal appearance. She is well-developed. She is not ill-appearing or diaphoretic.   HENT:      Head: Normocephalic.   Neck:      Thyroid: No thyromegaly.      Vascular: No carotid bruit or JVD.   Cardiovascular:      Rate and Rhythm: Normal rate and regular rhythm.      Pulses: Normal pulses.           " Radial pulses are 2+ on the right side and 2+ on the left side.      Heart sounds: Normal heart sounds, S1 normal and S2 normal. No murmur heard.     No friction rub. No gallop.   Pulmonary:      Effort: Pulmonary effort is normal.      Breath sounds: Normal breath sounds. No wheezing or rales.   Abdominal:      General: Bowel sounds are normal. There is no abdominal bruit.      Palpations: Abdomen is soft.      Tenderness: There is no abdominal tenderness.   Musculoskeletal:      Cervical back: Neck supple.   Lymphadenopathy:      Cervical: No cervical adenopathy.   Skin:     General: Skin is warm.   Neurological:      Mental Status: She is alert and oriented to person, place, and time.   Psychiatric:         Behavior: Behavior normal. Behavior is cooperative.         I have reviewed all pertinent labs and cardiac studies.      Chemistry        Component Value Date/Time     11/16/2023 1154    K 4.7 11/16/2023 1154     11/16/2023 1154    CO2 24 11/16/2023 1154    BUN 7 (L) 11/16/2023 1154    CREATININE 0.9 11/16/2023 1154     11/16/2023 1154        Component Value Date/Time    CALCIUM 9.3 11/16/2023 1154    ALKPHOS 51 (L) 06/02/2023 0709    AST 20 06/02/2023 0709    ALT 17 06/02/2023 0709    BILITOT 0.7 06/02/2023 0709    ESTGFRAFRICA >60 02/06/2020 0458    EGFRNONAA >60 02/06/2020 0458          Lab Results   Component Value Date    WBC 5.28 11/16/2023    HGB 11.0 (L) 11/16/2023    HCT 35.7 (L) 11/16/2023    MCV 82 11/16/2023     11/16/2023       Lab Results   Component Value Date    HGBA1C 4.9 08/30/2022     Lab Results   Component Value Date    CHOL 121 06/02/2023    CHOL 225 (H) 08/30/2022    CHOL 185 11/08/2007     Lab Results   Component Value Date    HDL 43 06/02/2023    HDL 71 08/30/2022    HDL 56 11/03/2010     Lab Results   Component Value Date    LDLCALC 67.4 06/02/2023    LDLCALC 142.4 08/30/2022    LDLCALC 160 (H) 11/03/2010     Lab Results   Component Value Date    TRIG 53  06/02/2023    TRIG 58 08/30/2022    TRIG 77 11/03/2010     Lab Results   Component Value Date    CHOLHDL 35.5 06/02/2023    CHOLHDL 31.6 08/30/2022    CHOLHDL 27.6 11/08/2007       Results for orders placed during the hospital encounter of 11/22/22    Echo    Interpretation Summary  · Concentric hypertrophy and normal systolic function.  · The estimated ejection fraction is 60%.  · Grade II left ventricular diastolic dysfunction.  · Normal right ventricular size with normal r`ight ventricular systolic function.  · Mild left atrial enlargement.        CONCLUSION:     1.  CAD-RADS 4/P1.     2.  Single-vessel CAD involving the mid LAD.  This is a 2.5 cm long mild (25-49%) lesion with a possible focal 70% stenosis.  FFR-CT is not available.     3. Coronary calcium score 13 (36 percentile).     4.  Post processed images are available for review in PACS under the heading Recons: HKD and HKD LAD CPR.      Assessment:       1. Preop cardiovascular exam    2. Abnormal nuclear stress test    3. Abnormal ECG    4. Abnormal computed tomography angiography (CTA)    5. Class 1 obesity due to excess calories with serious comorbidity and body mass index (BMI) of 34.0 to 34.9 in adult    6. Coronary artery disease involving native coronary artery of native heart without angina pectoris    7. Palpitations    8. LAE (left atrial enlargement)    9. Hypercholesterolemia    10. Essential hypertension    11. Diastolic dysfunction         Plan:               Stable CV status on current med tx.  CAD: Nuclear stress test Nov 2022  correlates with her coronary CTA suggestive of 70% mid LAD stenosis.  LHC was cancelled Dec 2022 due to critical anemia.  She is not having any anginal or CHF sxs.  Can walk up stairs routinely without CV sxs.  I highly doubt she could tolerate DAPT should any PCI be performed due to bleeding, h/o severe anemia.  Recommend continue medical tx and risk factor modification.  Sublingual nitroglycerin 0.4 mg for  concerning chest pain episodes or breakthrough angina.  Patient advised of indications, side effects of nitroglycerin and when to report to ER.   Preop eval: Pt regarded as moderate CV risk for any ob/gyn surgery.  Reviewed all tests and above medical conditions with patient in detail and formulated treatment plan.  Continue optimal medical treatment for cardiovascular conditions.  Cardiac low salt diet advised.  Daily exercise encouraged, with the goal 30 +  minutes aerobic exercise as tolerated.  Obesity: Maintaining healthy weight and weight loss goals (if needed) were discussed in clinic.  HTN: Need for BP control and HTN goals (if needed) were discussed and tx plan formulated.  Goal < 130/80.  Continue current HTN meds.  Home BP monitoring advised.  Abnl ecg: chronically abnormal with anteroseptal infarct for years. Continue to monitor.  Lipids: Importance of optimal lipid control were discussed in detail as well as possible pharmacologic and lifestyle changes that may be needed.  Statin tx.      F/u in 4 months.    I have reviewed all pertinent labs and cardiac studies independently. Plans and recommendations have been formulated under my direct supervision. All questions answered and patient voiced understanding.

## 2023-12-05 NOTE — PROGRESS NOTES
Subjective:       Patient ID: Liliane Ford is a 68 y.o. female.    Chief Complaint: Anemia    HPI: Ms. Ford is a 68 year old female who is following up for her iron def anemia. She had 12 dose of IV iron venofer. She was previously taking oral iron. 12/2007 colonoscopy: normal with recommendations to repeat in 10 years   Anemia Hx: She has been found to have heavy uterine bleeding with fibroids and has been found with uterine fibroids.  Was do to have heart cath but this has been postponed due to severe anemia requiring blood transfusions hgb 4.9 on 12/14/2022 and received 3 received units of blood.  She is being followed by gynecology -having hysterectomy on 12/13/23.    Today:  She states that she is been feeling a little bit better since yesterday.  She states the Trev gone down, with some remaining puffiness.  She denies any fatigue, shortness of breath, chest pain, fevers.  She is unsure if she is going to follow through with hysterectomy on the 13th.  She would like to wait and see how she feels after possible surgery and see if more IV iron as needed.    Social History     Socioeconomic History    Marital status:     Number of children: 3   Tobacco Use    Smoking status: Former     Types: Cigarettes    Smokeless tobacco: Never   Substance and Sexual Activity    Alcohol use: Never    Drug use: Never    Sexual activity: Not Currently     Partners: Male     Birth control/protection: Post-menopausal     Social Determinants of Health     Financial Resource Strain: Low Risk  (12/5/2023)    Overall Financial Resource Strain (CARDIA)     Difficulty of Paying Living Expenses: Not hard at all   Food Insecurity: No Food Insecurity (12/5/2023)    Hunger Vital Sign     Worried About Running Out of Food in the Last Year: Never true     Ran Out of Food in the Last Year: Never true   Transportation Needs: No Transportation Needs (12/5/2023)    PRAPARE - Transportation     Lack of Transportation (Medical): No      Lack of Transportation (Non-Medical): No   Physical Activity: Sufficiently Active (2023)    Exercise Vital Sign     Days of Exercise per Week: 6 days     Minutes of Exercise per Session: 40 min   Stress: No Stress Concern Present (2023)    [employer] [place] [employer] - Occupational Stress Questionnaire     Feeling of Stress : Not at all   Social Connections: Unknown (2023)    Social Connection and Isolation Panel [NHANES]     Frequency of Communication with Friends and Family: Once a week     Frequency of Social Gatherings with Friends and Family: Once a week     Active Member of Clubs or Organizations: Yes     Attends Club or Organization Meetings: More than 4 times per year     Marital Status:    Housing Stability: Low Risk  (2023)    Housing Stability Vital Sign     Unable to Pay for Housing in the Last Year: No     Number of Places Lived in the Last Year: 1     Unstable Housing in the Last Year: No       Past Medical History:   Diagnosis Date    Coronary artery disease     Encounter for blood transfusion     Hyperlipidemia     Hypertension     Pulmonary edema        Family History   Problem Relation Age of Onset    Hypertension Mother        Past Surgical History:   Procedure Laterality Date     SECTION      x 2       Review of Systems   Constitutional:  Negative for activity change, appetite change, chills, diaphoresis, fatigue, fever and unexpected weight change.   Respiratory:  Negative for cough and shortness of breath.    Cardiovascular:  Negative for chest pain and leg swelling.   Gastrointestinal:  Negative for abdominal pain, anal bleeding, blood in stool, constipation, diarrhea, nausea and vomiting.   Genitourinary:  Negative for hematuria.   Musculoskeletal:  Negative for myalgias.   Skin:  Negative for pallor, rash and wound.         Medication List with Changes/Refills   Current Medications    AMLODIPINE (NORVASC) 10 MG TABLET    Take 1 tablet (10 mg  total) by mouth once daily.    ASCORBIC ACID, VITAMIN C, (VITAMIN C) 500 MG TABLET    Take 1 tablet (500 mg total) by mouth every evening.    ASPIRIN (ECOTRIN) 81 MG EC TABLET    Take 1 tablet (81 mg total) by mouth once daily.    B COMPLEX VITAMINS TABLET    Take 1 tablet by mouth once daily. B COMPLETE    FERROUS SULFATE 324 MG (65 MG IRON) TBEC    Take by mouth 2 (two) times daily with meals.    LOSARTAN (COZAAR) 100 MG TABLET    Take 1 tablet (100 mg total) by mouth once daily.    METOPROLOL SUCCINATE (TOPROL-XL) 50 MG 24 HR TABLET    Take 1 tablet (50 mg total) by mouth 2 (two) times daily.    NITROGLYCERIN (NITROSTAT) 0.4 MG SL TABLET    Place 1 tablet (0.4 mg total) under the tongue every 5 (five) minutes as needed.    ROSUVASTATIN (CRESTOR) 20 MG TABLET    Take 1 tablet (20 mg total) by mouth every evening.     Objective:   There were no vitals filed for this visit.    Physical Exam  Constitutional:       General: She is not in acute distress.     Appearance: She is not ill-appearing, toxic-appearing or diaphoretic.   Neurological:      Mental Status: She is alert.   Psychiatric:         Mood and Affect: Mood normal.            Physical exam limited due to video visit    Labs/Results:  Lab Results   Component Value Date    WBC 5.28 11/16/2023    RBC 4.33 11/16/2023    HGB 11.0 (L) 11/16/2023    HCT 35.7 (L) 11/16/2023    MCV 82 11/16/2023    MCH 25.4 (L) 11/16/2023    MCHC 30.8 (L) 11/16/2023    RDW 13.2 11/16/2023     11/16/2023    MPV 10.3 11/16/2023    GRAN 3.3 11/16/2023    GRAN 61.9 11/16/2023    LYMPH 1.5 11/16/2023    LYMPH 27.7 11/16/2023    MONO 0.5 11/16/2023    MONO 8.5 11/16/2023    EOS 0.1 11/16/2023    BASO 0.03 11/16/2023    EOSINOPHIL 0.9 11/16/2023    BASOPHIL 0.6 11/16/2023      Latest Reference Range & Units 11/16/23 11:54   Iron 30 - 160 ug/dL 177 (H)   TIBC 250 - 450 ug/dL 441   Saturated Iron 20 - 50 % 40   Transferrin 200 - 375 mg/dL 298   Ferritin 20.0 - 300.0 ng/mL 17 (L)        Assessment:     Problem List Items Addressed This Visit          Oncology    Fibroids    Iron deficiency anemia - Primary     Plan:     Iron deficiency anemia, unspecified iron deficiency anemia type  --continues to follow with obgyn for postmenopausal bleeding, planning for hysterectomy on 12/13/23   --previously took oral iron  --encouraged to incorporate iron rich foods into diet  --s/p IV iron venofer but had reaction to his, welts/hives. This was discontinued  --hgb: 11  --iron: 177, sat: 40%, ferritin: 17  --patient would like to hold off on any further IV iron. We will reassess at the end of January and see if more IV iron is warranted. IF needed, will premed with steroids and also change formulary.     Follow-Up: beginning of February with cbc iron/tibc ferritin prior. VV ok    Grace Sung PA-C  Hematology Oncology    The patient location is: home  The chief complaint leading to consultation is: anemia  Visit type:  Synchronous audio video  Face to Face time with patient: 15 minutes of total time spent on the encounter, which includes face to face time and non-face to face time preparing to see the patient (eg, review of tests), Obtaining and/or reviewing separately obtained history, Documenting clinical information in the electronic or other health record, Independently interpreting results (not separately reported) and communicating results to the patient/family/caregiver, or Care coordination (not separately reported).   Each patient to whom he or she provides medical services by telemedicine is:  (1) informed of the relationship between the provider and patient and the respective role of any other health care provider with respect to management of the patient; and (2) notified that he or she may decline to receive medical services

## 2023-12-06 ENCOUNTER — PATIENT MESSAGE (OUTPATIENT)
Dept: PRIMARY CARE CLINIC | Facility: CLINIC | Age: 68
End: 2023-12-06
Payer: COMMERCIAL

## 2023-12-06 ENCOUNTER — PATIENT MESSAGE (OUTPATIENT)
Dept: OBSTETRICS AND GYNECOLOGY | Facility: CLINIC | Age: 68
End: 2023-12-06
Payer: COMMERCIAL

## 2023-12-06 DIAGNOSIS — I25.10 CORONARY ARTERY DISEASE INVOLVING NATIVE HEART, UNSPECIFIED VESSEL OR LESION TYPE, UNSPECIFIED WHETHER ANGINA PRESENT: ICD-10-CM

## 2023-12-06 RX ORDER — ROSUVASTATIN CALCIUM 20 MG/1
20 TABLET, COATED ORAL NIGHTLY
Qty: 90 TABLET | Refills: 3 | Status: SHIPPED | OUTPATIENT
Start: 2023-12-06 | End: 2024-12-05

## 2023-12-06 NOTE — TELEPHONE ENCOUNTER
No care due was identified.  Health [facility] Embedded Care Due Messages. Reference number: [id number].   12/06/2023 10:31:02 AM CST

## 2024-01-31 DIAGNOSIS — Z78.0 MENOPAUSE: ICD-10-CM

## 2024-02-06 ENCOUNTER — LAB VISIT (OUTPATIENT)
Dept: LAB | Facility: HOSPITAL | Age: 69
End: 2024-02-06
Attending: NURSE PRACTITIONER
Payer: COMMERCIAL

## 2024-02-06 DIAGNOSIS — D50.0 IRON DEFICIENCY ANEMIA DUE TO CHRONIC BLOOD LOSS: ICD-10-CM

## 2024-02-06 DIAGNOSIS — E61.1 LOW IRON: ICD-10-CM

## 2024-02-06 LAB
BASOPHILS # BLD AUTO: 0.04 K/UL (ref 0–0.2)
BASOPHILS NFR BLD: 0.8 % (ref 0–1.9)
DIFFERENTIAL METHOD BLD: ABNORMAL
EOSINOPHIL # BLD AUTO: 0.1 K/UL (ref 0–0.5)
EOSINOPHIL NFR BLD: 1.6 % (ref 0–8)
ERYTHROCYTE [DISTWIDTH] IN BLOOD BY AUTOMATED COUNT: 18 % (ref 11.5–14.5)
HCT VFR BLD AUTO: 40.8 % (ref 37–48.5)
HGB BLD-MCNC: 13 G/DL (ref 12–16)
IMM GRANULOCYTES # BLD AUTO: 0.01 K/UL (ref 0–0.04)
IMM GRANULOCYTES NFR BLD AUTO: 0.2 % (ref 0–0.5)
IRON SERPL-MCNC: 92 UG/DL (ref 30–160)
LYMPHOCYTES # BLD AUTO: 1.4 K/UL (ref 1–4.8)
LYMPHOCYTES NFR BLD: 28.6 % (ref 18–48)
MCH RBC QN AUTO: 24.3 PG (ref 27–31)
MCHC RBC AUTO-ENTMCNC: 31.9 G/DL (ref 32–36)
MCV RBC AUTO: 76 FL (ref 82–98)
MONOCYTES # BLD AUTO: 0.4 K/UL (ref 0.3–1)
MONOCYTES NFR BLD: 8 % (ref 4–15)
NEUTROPHILS # BLD AUTO: 3 K/UL (ref 1.8–7.7)
NEUTROPHILS NFR BLD: 60.8 % (ref 38–73)
NRBC BLD-RTO: 0 /100 WBC
PLATELET # BLD AUTO: 259 K/UL (ref 150–450)
PMV BLD AUTO: 11.9 FL (ref 9.2–12.9)
RBC # BLD AUTO: 5.36 M/UL (ref 4–5.4)
SATURATED IRON: 21 % (ref 20–50)
TOTAL IRON BINDING CAPACITY: 443 UG/DL (ref 250–450)
TRANSFERRIN SERPL-MCNC: 299 MG/DL (ref 200–375)
WBC # BLD AUTO: 4.86 K/UL (ref 3.9–12.7)

## 2024-02-06 PROCEDURE — 36415 COLL VENOUS BLD VENIPUNCTURE: CPT | Performed by: NURSE PRACTITIONER

## 2024-02-06 PROCEDURE — 85025 COMPLETE CBC W/AUTO DIFF WBC: CPT | Performed by: NURSE PRACTITIONER

## 2024-02-06 PROCEDURE — 83540 ASSAY OF IRON: CPT | Performed by: NURSE PRACTITIONER

## 2024-02-06 PROCEDURE — 82728 ASSAY OF FERRITIN: CPT | Performed by: NURSE PRACTITIONER

## 2024-02-07 LAB — FERRITIN SERPL-MCNC: 14 NG/ML (ref 20–300)

## 2024-02-08 ENCOUNTER — OFFICE VISIT (OUTPATIENT)
Dept: HEMATOLOGY/ONCOLOGY | Facility: CLINIC | Age: 69
End: 2024-02-08
Payer: MEDICARE

## 2024-02-08 DIAGNOSIS — D50.9 IRON DEFICIENCY ANEMIA, UNSPECIFIED IRON DEFICIENCY ANEMIA TYPE: Primary | ICD-10-CM

## 2024-02-08 PROCEDURE — 99214 OFFICE O/P EST MOD 30 MIN: CPT | Mod: 95,,, | Performed by: NURSE PRACTITIONER

## 2024-02-08 NOTE — PROGRESS NOTES
Subjective:       Patient ID: Liliane Ford is a 68 y.o. female.    Chief Complaint: review labs. H/o EZKEIEL    The patient location is: home  The chief complaint leading to consultation is: EZEKIEL    Visit type: audiovisual    Face to Face time with patient: 20 minutes  35 minutes of total time spent on the encounter, which includes face to face time and non-face to face time preparing to see the patient (eg, review of tests), Obtaining and/or reviewing separately obtained history, Documenting clinical information in the electronic or other health record, Independently interpreting results (not separately reported) and communicating results to the patient/family/caregiver, or Care coordination (not separately reported).         Each patient to whom he or she provides medical services by telemedicine is:  (1) informed of the relationship between the physician and patient and the respective role of any other health care provider with respect to management of the patient; and (2) notified that he or she may decline to receive medical services by telemedicine and may withdraw from such care at any time.    Notes:         HPI: 68 y.o female presenting today for follow up of iron deficiency anemia previously attributed to post menopausal bleeding secondary to uterine fibroids. She has taken oral iron supplement consistently over the past several years since bleeding onset which she recalls 2075-9030. She notes no vaginal bleeding since 2 days prior to Thanksgiving 2023.       Social History     Socioeconomic History    Marital status:     Number of children: 3   Tobacco Use    Smoking status: Former     Types: Cigarettes    Smokeless tobacco: Never   Substance and Sexual Activity    Alcohol use: Never    Drug use: Never    Sexual activity: Not Currently     Partners: Male     Birth control/protection: Post-menopausal     Social Determinants of Health     Financial Resource Strain: Low Risk  (12/5/2023)     Overall Financial Resource Strain (CARDIA)     Difficulty of Paying Living Expenses: Not hard at all   Food Insecurity: No Food Insecurity (2023)    Hunger Vital Sign     Worried About Running Out of Food in the Last Year: Never true     Ran Out of Food in the Last Year: Never true   Transportation Needs: No Transportation Needs (2023)    PRAPARE - Transportation     Lack of Transportation (Medical): No     Lack of Transportation (Non-Medical): No   Physical Activity: Sufficiently Active (2023)    Exercise Vital Sign     Days of Exercise per Week: 6 days     Minutes of Exercise per Session: 40 min   Stress: No Stress Concern Present (2023)    [employer] - Occupational Stress Questionnaire     Feeling of Stress : Not at all   Social Connections: Unknown (2023)    Social Connection and Isolation Panel [NHANES]     Frequency of Communication with Friends and Family: Once a week     Frequency of Social Gatherings with Friends and Family: Once a week     Active Member of Clubs or Organizations: Yes     Attends Club or Organization Meetings: More than 4 times per year     Marital Status:    Housing Stability: Low Risk  (2023)    Housing Stability Vital Sign     Unable to Pay for Housing in the Last Year: No     Number of Places Lived in the Last Year: 1     Unstable Housing in the Last Year: No       Past Medical History:   Diagnosis Date    Coronary artery disease     Encounter for blood transfusion     Hyperlipidemia     Hypertension     Pulmonary edema        Family History   Problem Relation Age of Onset    Hypertension Mother        Past Surgical History:   Procedure Laterality Date     SECTION      x 2       Review of Systems   Constitutional:  Negative for activity change, appetite change, chills, fatigue, fever and unexpected weight change.   HENT:  Negative for congestion, mouth sores, nosebleeds, sore throat,  trouble swallowing and voice change.    Respiratory:  Negative for cough, chest tightness, shortness of breath and wheezing.    Cardiovascular:  Negative for chest pain and leg swelling.   Gastrointestinal:  Negative for abdominal distention, abdominal pain, blood in stool, constipation, diarrhea, nausea and vomiting.   Genitourinary:  Negative for difficulty urinating, dysuria, hematuria and vaginal bleeding.   Musculoskeletal:  Negative for arthralgias, back pain and myalgias.   Skin:  Negative for pallor, rash and wound.   Neurological:  Negative for dizziness, syncope, weakness and headaches.   Hematological:  Negative for adenopathy. Does not bruise/bleed easily.   Psychiatric/Behavioral:  The patient is nervous/anxious.        Medication List with Changes/Refills   Current Medications    AMLODIPINE (NORVASC) 10 MG TABLET    Take 1 tablet (10 mg total) by mouth once daily.    ASCORBIC ACID, VITAMIN C, (VITAMIN C) 500 MG TABLET    Take 1 tablet (500 mg total) by mouth every evening.    ASPIRIN (ECOTRIN) 81 MG EC TABLET    Take 1 tablet (81 mg total) by mouth once daily.    B COMPLEX VITAMINS TABLET    Take 1 tablet by mouth once daily. B COMPLETE    FERROUS SULFATE 324 MG (65 MG IRON) TBEC    Take by mouth 2 (two) times daily with meals.    LOSARTAN (COZAAR) 100 MG TABLET    Take 1 tablet (100 mg total) by mouth once daily.    METOPROLOL SUCCINATE (TOPROL-XL) 50 MG 24 HR TABLET    Take 1 tablet (50 mg total) by mouth 2 (two) times daily.    NITROGLYCERIN (NITROSTAT) 0.4 MG SL TABLET    Place 1 tablet (0.4 mg total) under the tongue every 5 (five) minutes as needed.    ROSUVASTATIN (CRESTOR) 20 MG TABLET    Take 1 tablet (20 mg total) by mouth every evening.     Objective:   There were no vitals filed for this visit.  Lab Results   Component Value Date    WBC 4.86 02/06/2024    HGB 13.0 02/06/2024    HCT 40.8 02/06/2024    MCV 76 (L) 02/06/2024     02/06/2024       BMP  Lab Results   Component Value Date      11/16/2023    K 4.7 11/16/2023     11/16/2023    CO2 24 11/16/2023    BUN 7 (L) 11/16/2023    CREATININE 0.9 11/16/2023    CALCIUM 9.3 11/16/2023    ANIONGAP 10 11/16/2023    EGFRNORACEVR >60 11/16/2023     Lab Results   Component Value Date    UIBC 167 11/19/2007    IRON 92 02/06/2024    TRANSFERRIN 299 02/06/2024    TIBC 443 02/06/2024    FESATURATED 21 02/06/2024        Physical Exam  Pulmonary:      Effort: Pulmonary effort is normal. No respiratory distress.   Neurological:      Mental Status: She is alert and oriented to person, place, and time.        Assessment:     Problem List Items Addressed This Visit          Oncology    Iron deficiency anemia - Primary     Patient with prior post menopausal bleeding. Reports no bleeding since 2 days prior to thanksgiving 2023. Hysterectomy scheduled later this month but she is unsure if she will proceed. She is taking daily multivitamin with iron and separate iron supplement.     Iron levels essentially unchanged from prior. Normal except low ferritin. Interval resolution of anemia    She notes cologuard testing recently negative. But no recent colonoscopy or EGD    Continue multivitamin. Hold iron supplement. F.u 3 months with cbc iron ferritin. Discussed S&S to report sooner              Plan:     Iron deficiency anemia, unspecified iron deficiency anemia type        Med Onc Chart Routing      Follow up with physician    Follow up with [facility] 3 months.   Infusion scheduling note    Injection scheduling note    Labs CBC, ferritin and iron and TIBC   Scheduling:  Preferred lab:  Lab interval:  1-2 days prior   Imaging None      Pharmacy appointment No pharmacy appointment needed      Other referrals       No additional referrals needed           NELSY Shell

## 2024-02-16 ENCOUNTER — APPOINTMENT (OUTPATIENT)
Dept: RADIOLOGY | Facility: HOSPITAL | Age: 69
End: 2024-02-16
Attending: FAMILY MEDICINE
Payer: COMMERCIAL

## 2024-02-16 DIAGNOSIS — Z78.0 MENOPAUSE: ICD-10-CM

## 2024-02-16 PROCEDURE — 77080 DXA BONE DENSITY AXIAL: CPT | Mod: 26,,, | Performed by: RADIOLOGY

## 2024-02-16 PROCEDURE — 77080 DXA BONE DENSITY AXIAL: CPT | Mod: TC

## 2024-02-20 ENCOUNTER — PATIENT MESSAGE (OUTPATIENT)
Dept: OBSTETRICS AND GYNECOLOGY | Facility: CLINIC | Age: 69
End: 2024-02-20
Payer: COMMERCIAL

## 2024-02-27 ENCOUNTER — TELEPHONE (OUTPATIENT)
Dept: PREADMISSION TESTING | Facility: HOSPITAL | Age: 69
End: 2024-02-27
Payer: COMMERCIAL

## 2024-02-27 ENCOUNTER — ANESTHESIA EVENT (OUTPATIENT)
Dept: SURGERY | Facility: HOSPITAL | Age: 69
End: 2024-02-27
Payer: COMMERCIAL

## 2024-02-27 DIAGNOSIS — Z00.00 ENCOUNTER FOR MEDICARE ANNUAL WELLNESS EXAM: ICD-10-CM

## 2024-02-27 RX ORDER — CEFAZOLIN SODIUM 2 G/50ML
2 SOLUTION INTRAVENOUS
Status: CANCELLED | OUTPATIENT
Start: 2024-02-27

## 2024-02-27 NOTE — TELEPHONE ENCOUNTER
----- Message from Ema Aquino MD sent at 2/27/2024 11:54 AM CST -----  Regarding: FW: surgery 2/28  yes  ----- Message -----  From: Lili Samano LPN  Sent: 2/27/2024  11:40 AM CST  To: Ema Aquino MD  Subject: FW: surgery 2/28                                 Dr. Aquino:    Please see message from pre-op nurse and advise.    J  ----- Message -----  From: Rachel Templeton RN  Sent: 2/27/2024  11:34 AM CST  To: [facility] Staff  Subject: surgery 2/28                                     Good Morning!    Pt scheduled for surgery on 2/28/24.  Pt states she never stopped Aspirin 81 mg, last dose this morning.    Okay to proceed with surgery?    Please Advise    Thank you,    Rachel STAHL

## 2024-02-27 NOTE — ANESTHESIA PREPROCEDURE EVALUATION
Patient Active Problem List   Diagnosis    Thyroid nodule    Symptomatic anemia    Postmenopausal bleeding    Essential hypertension    Thrombocytosis    Elevated AST (SGOT)    Cardiomegaly    Low iron    Preop cardiovascular exam    Delayed immunizations    Bunion    Palpitations    Abnormal ECG    Hypercholesterolemia    Abnormal computed tomography angiography (CTA)    History of endometrial hyperplasia    Fibroids    Coronary artery disease involving native coronary artery without angina pectoris    Abnormal nuclear stress test    Class 1 obesity due to excess calories with serious comorbidity and body mass index (BMI) of 34.0 to 34.9 in adult    LAE (left atrial enlargement)    Diastolic dysfunction    Iron deficiency anemia           Pre-op Assessment    I have reviewed the Patient Summary Reports.    I have reviewed the NPO Status.   I have reviewed the Medications.     Review of Systems  Anesthesia Hx:  No previous Anesthesia   Neg history of prior surgery.          Denies Family Hx of Anesthesia complications.     Social:  Non-Smoker, No Alcohol Use       Cardiovascular:     Hypertension   CAD              ECG has been reviewed.                          Pulmonary:        Denies Recent URI.                 Endocrine:     BMI 34            Physical Exam  General: Well nourished, Cooperative, Alert and Oriented    Airway:  Mallampati: II   Mouth Opening: Normal  TM Distance: Normal  Tongue: Normal  Neck ROM: Normal ROM    Dental:  Intact  Patient denies any currently loose or chipped teeth; Patient denies any removable dental appliances    Chest/Lungs:  Clear to auscultation, Normal Respiratory Rate    Heart:  Rate: Normal  Rhythm: Regular Rhythm        Anesthesia Plan  Type of Anesthesia, risks & benefits discussed:    Anesthesia Type: Gen ETT  Intra-op Monitoring Plan: Standard ASA Monitors  Post Op Pain Control Plan: multimodal analgesia and IV/PO Opioids PRN  Induction:  IV  Airway Plan: Direct,  Post-Induction  Informed Consent: Informed consent signed with the Patient and all parties understand the risks and agree with anesthesia plan.  All questions answered.   ASA Score: 3  Day of Surgery Review of History & Physical: H&P Update referred to the surgeon/provider.    Ready For Surgery From Anesthesia Perspective.     .  Lab Results   Component Value Date    WBC 4.86 02/06/2024    HGB 13.0 02/06/2024    HCT 40.8 02/06/2024    MCV 76 (L) 02/06/2024     02/06/2024          Chemistry        Component Value Date/Time     11/16/2023 1154    K 4.7 11/16/2023 1154     11/16/2023 1154    CO2 24 11/16/2023 1154    BUN 7 (L) 11/16/2023 1154    CREATININE 0.9 11/16/2023 1154     11/16/2023 1154        Component Value Date/Time    CALCIUM 9.3 11/16/2023 1154    ALKPHOS 51 (L) 06/02/2023 0709    AST 20 06/02/2023 0709    ALT 17 06/02/2023 0709    BILITOT 0.7 06/02/2023 0709    ESTGFRAFRICA >60 02/06/2020 0458    EGFRNONAA >60 02/06/2020 0458       Results for orders placed during the hospital encounter of 11/22/22    Echo  Interpretation Summary  · Concentric hypertrophy and normal systolic function.  · The estimated ejection fraction is 60%.  · Grade II left ventricular diastolic dysfunction.  · Normal right ventricular size with normal right ventricular systolic function.  · Mild left atrial enlargement.

## 2024-02-27 NOTE — TELEPHONE ENCOUNTER
Pre op instructions reviewed with Pt over telephone, verbalized understanding.    To confirm, Surgery is scheduled on 2/28/24.   PLEASE ARRIVE AT 5:30 AM  *Please report to the [facility] (1st Floor) located off of [facility] (2nd Entrance/Building on the left, in front of the flag pole).  Address: [address] [place] [address] [place] [address]        INSTRUCTIONS IMPORTANT!!!  DO NOT Eat, Drink, or Smoke after 12 midnight unless instructed otherwise by your Surgeon. OK to brush teeth, no gum, candy or mints!    >>>MEDICATION INSTRUCTIONS<<<: Morning of Surgery, take small sip of water with ONLY these medications:  AMLODIPINE  METOPROLOL    *Diabetic Patients: !!!If you take diabetic or weight loss medication, Do NOT take morning of surgery unless instructed by Doctor!!!  Metformin to be stopped 24 hrs prior to surgery.   Ozempic/ Mounjaro/ Wegovy/ Trulicity/ Semaglutide injections or weight loss medication to be stopped 7 days prior to surgery.    Vitamins/supplements/ Aspirin products: Stop 7 days prior to surgery!    Weight Loss Injections: Stop 7 days prior to surgery!    ____  Avoid Alcoholic beverages 3 days prior to surgery, as it can thin the blood.  ____  NO Acrylic/fake nails or nail polish worn day of surgery (specifically hand/arm & foot surgeries).  ____  NO powder, lotions, deodorants, oils or cream on body.  ____  Remove all jewelry & piercings & foreign objects before arrival & leave at home.  ____  Remove Dentures, Hearing Aids & Contact Lens prior to surgery.  ____  Bring photo ID and insurance information to hospital (Leave Valuables at Home).  ____  If going home the same day, arrange for a ride home. You will not be able to drive for 24 hrs if Anesthesia was used.   ____  Females (ages 11-60): may need to give a urine sample the morning of surgery; please see Pre op Nurse prior to using the restroom.  ____  Males: Stop ED medications (Viagra, Cialis) 24 hrs prior to surgery.  ____   Wear clean, loose fitting clothing to allow for dressings/ bandages.      Bathing Instructions:    -Shower with anti-bacterial Soap (Hibiclens or Dial) the night before surgery and the morning of.   -Do not use Hibiclens on your face or genitals.   -Apply clean clothes after shower.  -Do not shave your face or body 2 days prior to surgery unless instructed otherwise by your Surgeon.  -Do not shave pubic hair 7 days prior to surgery (gyn pt's).    [facility] Vis[facility]/Ride Policy:  Only 2 adults allowed in pre op/recovery area during your procedure. You MUST HAVE A RIDE HOME from a responsible adult that you know and trust. Medical Transport, Uber or Lyft can ONLY be used if patient has a responsible adult to accompany them during ride home.       *Signs and symptoms of Infection Before or After Surgery:               !!!If you experience any fever, chills, nausea/ vomiting, foul odor/ excessive drainage from surgical site, flu-like symptoms, new wounds or cuts, PLEASE CALL THE SURGEON OFFICE at [phone] or SEND MESSAGE THROUGH [employer] PORTAL!!!       *If you are running late the morning of surgery, please call the Hospital Surgery Dept @ [phone].     *Billing questions:  [phone]       Thank you,  -[facility] Pre Admit Dept.  [phone] or [phone]  M-F 7:30 am-4:00 pm (Closed Major Holidays)

## 2024-02-28 ENCOUNTER — HOSPITAL ENCOUNTER (OUTPATIENT)
Facility: HOSPITAL | Age: 69
Discharge: HOME | End: 2024-02-28
Attending: OBSTETRICS & GYNECOLOGY
Payer: COMMERCIAL

## 2024-02-28 ENCOUNTER — ANESTHESIA (OUTPATIENT)
Dept: SURGERY | Facility: HOSPITAL | Age: 69
End: 2024-02-28
Payer: COMMERCIAL

## 2024-02-28 DIAGNOSIS — D21.9 FIBROIDS: ICD-10-CM

## 2024-02-28 DIAGNOSIS — Z87.42 HISTORY OF ENDOMETRIAL HYPERPLASIA: ICD-10-CM

## 2024-02-28 DIAGNOSIS — Z90.710 S/P LAPAROSCOPIC HYSTERECTOMY: Primary | ICD-10-CM

## 2024-02-28 DIAGNOSIS — N95.0 POSTMENOPAUSAL BLEEDING: ICD-10-CM

## 2024-02-28 PROCEDURE — 71000033 HC RECOVERY, INTIAL HOUR: Performed by: OBSTETRICS & GYNECOLOGY

## 2024-02-28 PROCEDURE — 88341 IMHCHEM/IMCYTCHM EA ADD ANTB: CPT | Mod: 59 | Performed by: PATHOLOGY

## 2024-02-28 PROCEDURE — 37000009 HC ANESTHESIA EA ADD 15 MINS: Performed by: OBSTETRICS & GYNECOLOGY

## 2024-02-28 PROCEDURE — 25000003 PHARM REV CODE 250: Performed by: NURSE ANESTHETIST, CERTIFIED REGISTERED

## 2024-02-28 PROCEDURE — 88307 TISSUE EXAM BY PATHOLOGIST: CPT | Mod: 26,,, | Performed by: PATHOLOGY

## 2024-02-28 PROCEDURE — 88342 IMHCHEM/IMCYTCHM 1ST ANTB: CPT | Performed by: PATHOLOGY

## 2024-02-28 PROCEDURE — 88341 IMHCHEM/IMCYTCHM EA ADD ANTB: CPT | Mod: 26,,, | Performed by: PATHOLOGY

## 2024-02-28 PROCEDURE — 63600175 PHARM REV CODE 636 W HCPCS: Performed by: NURSE ANESTHETIST, CERTIFIED REGISTERED

## 2024-02-28 PROCEDURE — 37000008 HC ANESTHESIA 1ST 15 MINUTES: Performed by: OBSTETRICS & GYNECOLOGY

## 2024-02-28 PROCEDURE — 36000713 HC OR TIME LEV V EA ADD 15 MIN: Performed by: OBSTETRICS & GYNECOLOGY

## 2024-02-28 PROCEDURE — C2628 CATHETER, OCCLUSION: HCPCS | Performed by: OBSTETRICS & GYNECOLOGY

## 2024-02-28 PROCEDURE — 36000712 HC OR TIME LEV V 1ST 15 MIN: Performed by: OBSTETRICS & GYNECOLOGY

## 2024-02-28 PROCEDURE — 88342 IMHCHEM/IMCYTCHM 1ST ANTB: CPT | Mod: 26,,, | Performed by: PATHOLOGY

## 2024-02-28 PROCEDURE — 88307 TISSUE EXAM BY PATHOLOGIST: CPT | Performed by: PATHOLOGY

## 2024-02-28 PROCEDURE — 71000015 HC POSTOP RECOV 1ST HR: Performed by: OBSTETRICS & GYNECOLOGY

## 2024-02-28 PROCEDURE — 25000003 PHARM REV CODE 250: Performed by: OBSTETRICS & GYNECOLOGY

## 2024-02-28 PROCEDURE — 27201423 OPTIME MED/SURG SUP & DEVICES STERILE SUPPLY: Performed by: OBSTETRICS & GYNECOLOGY

## 2024-02-28 PROCEDURE — 71000039 HC RECOVERY, EACH ADD'L HOUR: Performed by: OBSTETRICS & GYNECOLOGY

## 2024-02-28 PROCEDURE — 25000003 PHARM REV CODE 250: Performed by: ANESTHESIOLOGY

## 2024-02-28 PROCEDURE — 58573 TLH W/T/O UTERUS OVER 250 G: CPT | Mod: ,,, | Performed by: OBSTETRICS & GYNECOLOGY

## 2024-02-28 RX ORDER — HYDROMORPHONE HYDROCHLORIDE 2 MG/ML
0.2 INJECTION, SOLUTION INTRAMUSCULAR; INTRAVENOUS; SUBCUTANEOUS EVERY 5 MIN PRN
Status: DISCONTINUED | OUTPATIENT
Start: 2024-02-28 | End: 2024-02-28 | Stop reason: HOSPADM

## 2024-02-28 RX ORDER — FENTANYL CITRATE 50 UG/ML
25 INJECTION, SOLUTION INTRAMUSCULAR; INTRAVENOUS EVERY 5 MIN PRN
Status: DISCONTINUED | OUTPATIENT
Start: 2024-02-28 | End: 2024-02-28 | Stop reason: HOSPADM

## 2024-02-28 RX ORDER — HYDROCODONE BITARTRATE AND ACETAMINOPHEN 5; 325 MG/1; MG/1
1 TABLET ORAL EVERY 4 HOURS PRN
Status: CANCELLED | OUTPATIENT
Start: 2024-02-28

## 2024-02-28 RX ORDER — ONDANSETRON HYDROCHLORIDE 2 MG/ML
INJECTION, SOLUTION INTRAVENOUS
Status: DISCONTINUED | OUTPATIENT
Start: 2024-02-28 | End: 2024-02-28

## 2024-02-28 RX ORDER — ONDANSETRON 8 MG/1
8 TABLET, ORALLY DISINTEGRATING ORAL EVERY 8 HOURS PRN
Status: CANCELLED | OUTPATIENT
Start: 2024-02-28

## 2024-02-28 RX ORDER — LIDOCAINE HYDROCHLORIDE 20 MG/ML
INJECTION, SOLUTION EPIDURAL; INFILTRATION; INTRACAUDAL; PERINEURAL
Status: DISCONTINUED | OUTPATIENT
Start: 2024-02-28 | End: 2024-02-28

## 2024-02-28 RX ORDER — CHLORHEXIDINE GLUCONATE ORAL RINSE 1.2 MG/ML
10 SOLUTION DENTAL
Status: DISCONTINUED | OUTPATIENT
Start: 2024-02-28 | End: 2024-02-28 | Stop reason: HOSPADM

## 2024-02-28 RX ORDER — DIPHENHYDRAMINE HYDROCHLORIDE 50 MG/ML
25 INJECTION INTRAMUSCULAR; INTRAVENOUS EVERY 4 HOURS PRN
Status: CANCELLED | OUTPATIENT
Start: 2024-02-28

## 2024-02-28 RX ORDER — IBUPROFEN 800 MG/1
800 TABLET ORAL EVERY 8 HOURS PRN
Qty: 30 TABLET | Refills: 0 | Status: SHIPPED | OUTPATIENT
Start: 2024-02-28 | End: 2024-03-09

## 2024-02-28 RX ORDER — OXYCODONE AND ACETAMINOPHEN 5; 325 MG/1; MG/1
1 TABLET ORAL
Status: DISCONTINUED | OUTPATIENT
Start: 2024-02-28 | End: 2024-02-28 | Stop reason: HOSPADM

## 2024-02-28 RX ORDER — FENTANYL CITRATE 50 UG/ML
INJECTION, SOLUTION INTRAMUSCULAR; INTRAVENOUS
Status: DISCONTINUED | OUTPATIENT
Start: 2024-02-28 | End: 2024-02-28

## 2024-02-28 RX ORDER — HYDROCODONE BITARTRATE AND ACETAMINOPHEN 5; 325 MG/1; MG/1
1 TABLET ORAL EVERY 4 HOURS PRN
Qty: 8 TABLET | Refills: 0 | Status: CANCELLED | OUTPATIENT
Start: 2024-02-28

## 2024-02-28 RX ORDER — CEFAZOLIN SODIUM 1 G/3ML
INJECTION, POWDER, FOR SOLUTION INTRAMUSCULAR; INTRAVENOUS
Status: DISCONTINUED | OUTPATIENT
Start: 2024-02-28 | End: 2024-02-28

## 2024-02-28 RX ORDER — ACETAMINOPHEN 500 MG
1000 TABLET ORAL
Status: COMPLETED | OUTPATIENT
Start: 2024-02-28 | End: 2024-02-28

## 2024-02-28 RX ORDER — ONDANSETRON 8 MG/1
8 TABLET, ORALLY DISINTEGRATING ORAL EVERY 8 HOURS PRN
Status: DISCONTINUED | OUTPATIENT
Start: 2024-02-28 | End: 2024-02-28 | Stop reason: HOSPADM

## 2024-02-28 RX ORDER — MIDAZOLAM HYDROCHLORIDE 1 MG/ML
INJECTION, SOLUTION INTRAMUSCULAR; INTRAVENOUS
Status: DISCONTINUED | OUTPATIENT
Start: 2024-02-28 | End: 2024-02-28

## 2024-02-28 RX ORDER — DIPHENHYDRAMINE HYDROCHLORIDE 50 MG/ML
25 INJECTION INTRAMUSCULAR; INTRAVENOUS EVERY 6 HOURS PRN
Status: DISCONTINUED | OUTPATIENT
Start: 2024-02-28 | End: 2024-02-28 | Stop reason: HOSPADM

## 2024-02-28 RX ORDER — IBUPROFEN 800 MG/1
800 TABLET ORAL EVERY 8 HOURS PRN
Qty: 30 TABLET | Refills: 0 | Status: CANCELLED | OUTPATIENT
Start: 2024-02-28 | End: 2024-03-09

## 2024-02-28 RX ORDER — SODIUM CHLORIDE 9 MG/ML
INJECTION, SOLUTION INTRAVENOUS CONTINUOUS
Status: DISCONTINUED | OUTPATIENT
Start: 2024-02-28 | End: 2024-02-28 | Stop reason: HOSPADM

## 2024-02-28 RX ORDER — MEPERIDINE HYDROCHLORIDE 25 MG/ML
12.5 INJECTION INTRAMUSCULAR; INTRAVENOUS; SUBCUTANEOUS 2 TIMES DAILY PRN
Status: DISCONTINUED | OUTPATIENT
Start: 2024-02-28 | End: 2024-02-28 | Stop reason: HOSPADM

## 2024-02-28 RX ORDER — KETOROLAC TROMETHAMINE 30 MG/ML
INJECTION, SOLUTION INTRAMUSCULAR; INTRAVENOUS
Status: DISCONTINUED | OUTPATIENT
Start: 2024-02-28 | End: 2024-02-28

## 2024-02-28 RX ORDER — DEXAMETHASONE SODIUM PHOSPHATE 4 MG/ML
INJECTION, SOLUTION INTRA-ARTICULAR; INTRALESIONAL; INTRAMUSCULAR; INTRAVENOUS; SOFT TISSUE
Status: DISCONTINUED | OUTPATIENT
Start: 2024-02-28 | End: 2024-02-28

## 2024-02-28 RX ORDER — HYDROCODONE BITARTRATE AND ACETAMINOPHEN 5; 325 MG/1; MG/1
1 TABLET ORAL EVERY 4 HOURS PRN
Qty: 14 TABLET | Refills: 0 | Status: SHIPPED | OUTPATIENT
Start: 2024-02-28 | End: 2024-03-13

## 2024-02-28 RX ORDER — ROCURONIUM BROMIDE 10 MG/ML
INJECTION, SOLUTION INTRAVENOUS
Status: DISCONTINUED | OUTPATIENT
Start: 2024-02-28 | End: 2024-02-28

## 2024-02-28 RX ORDER — FAMOTIDINE 20 MG/1
20 TABLET, FILM COATED ORAL
Status: COMPLETED | OUTPATIENT
Start: 2024-02-28 | End: 2024-02-28

## 2024-02-28 RX ORDER — PROPOFOL 10 MG/ML
VIAL (ML) INTRAVENOUS
Status: DISCONTINUED | OUTPATIENT
Start: 2024-02-28 | End: 2024-02-28

## 2024-02-28 RX ORDER — DIPHENHYDRAMINE HCL 25 MG
25 CAPSULE ORAL EVERY 4 HOURS PRN
Status: CANCELLED | OUTPATIENT
Start: 2024-02-28

## 2024-02-28 RX ADMIN — PROPOFOL 20 MG: 10 INJECTION, EMULSION INTRAVENOUS at 09:02

## 2024-02-28 RX ADMIN — SUGAMMADEX 200 MG: 100 INJECTION, SOLUTION INTRAVENOUS at 09:02

## 2024-02-28 RX ADMIN — KETOROLAC TROMETHAMINE 15 MG: 30 INJECTION, SOLUTION INTRAMUSCULAR; INTRAVENOUS at 09:02

## 2024-02-28 RX ADMIN — DEXAMETHASONE SODIUM PHOSPHATE 8 MG: 4 INJECTION, SOLUTION INTRA-ARTICULAR; INTRALESIONAL; INTRAMUSCULAR; INTRAVENOUS; SOFT TISSUE at 07:02

## 2024-02-28 RX ADMIN — MIDAZOLAM 2 MG: 1 INJECTION INTRAMUSCULAR; INTRAVENOUS at 07:02

## 2024-02-28 RX ADMIN — CEFAZOLIN 2 G: 330 INJECTION, POWDER, FOR SOLUTION INTRAMUSCULAR; INTRAVENOUS at 07:02

## 2024-02-28 RX ADMIN — LIDOCAINE HYDROCHLORIDE 50 MG: 20 INJECTION, SOLUTION EPIDURAL; INFILTRATION; INTRACAUDAL; PERINEURAL at 07:02

## 2024-02-28 RX ADMIN — FENTANYL CITRATE 100 MCG: 50 INJECTION, SOLUTION INTRAMUSCULAR; INTRAVENOUS at 07:02

## 2024-02-28 RX ADMIN — SODIUM CHLORIDE, SODIUM LACTATE, POTASSIUM CHLORIDE, AND CALCIUM CHLORIDE: .6; .31; .03; .02 INJECTION, SOLUTION INTRAVENOUS at 07:02

## 2024-02-28 RX ADMIN — FENTANYL CITRATE 50 MCG: 50 INJECTION, SOLUTION INTRAMUSCULAR; INTRAVENOUS at 09:02

## 2024-02-28 RX ADMIN — FAMOTIDINE 20 MG: 20 TABLET ORAL at 06:02

## 2024-02-28 RX ADMIN — ONDANSETRON 4 MG: 2 INJECTION INTRAMUSCULAR; INTRAVENOUS at 09:02

## 2024-02-28 RX ADMIN — OXYCODONE HYDROCHLORIDE AND ACETAMINOPHEN 1 TABLET: 5; 325 TABLET ORAL at 12:02

## 2024-02-28 RX ADMIN — CHLORHEXIDINE GLUCONATE 0.12% ORAL RINSE 10 ML: 1.2 LIQUID ORAL at 06:02

## 2024-02-28 RX ADMIN — ROCURONIUM BROMIDE 50 MG: 10 INJECTION, SOLUTION INTRAVENOUS at 07:02

## 2024-02-28 RX ADMIN — ACETAMINOPHEN 1000 MG: 500 TABLET ORAL at 06:02

## 2024-02-28 RX ADMIN — PROPOFOL 125 MG: 10 INJECTION, EMULSION INTRAVENOUS at 07:02

## 2024-02-28 RX ADMIN — ROCURONIUM BROMIDE 20 MG: 10 INJECTION, SOLUTION INTRAVENOUS at 08:02

## 2024-02-28 NOTE — OP NOTE
'[place] - Surgery ([facility])  General Surgery  Operative Note    SUMMARY     Date of Procedure: 2/28/2024     Procedure: Procedure(s) (LRB):  XI ROBOTIC HYSTERECTOMY,WITH SALPINGO-OOPHORECTOMY (Right)       Surgeon(s) and Role:     * Ema Aquino MD - Primary    Assistant:  enriqueta casas    Pre-Operative Diagnosis: Postmenopausal bleeding [N95.0]  Enlarged uterus [N85.2]  Fibroids [D21.9]    Post-Operative Diagnosis: Post-Op Diagnosis Codes:     * Postmenopausal bleeding [N95.0]     * Enlarged uterus [N85.2]     * Fibroids [D21.9]    Anesthesia: General    Operative Findings (including complications, if any): 16 wk size uterus, multiple fibroids, normal left ovary; enlarged right ovary (approx 8 cm); tubes with evidence of ligation    Description of Technical Procedures:   Robotic assisted laparoscopic hysterectomy with bilateral salpingectomy, right oophorectomy    The patient was taken to the Operating Room where general        endotracheal anesthesia was induced and found to be adequate.  She was       then placed in the dorsal lithotomy position in the [facility] and her        arms tucked at her sides.  Her perineum was then prepped and draped in a          normal sterile fashion.  A August catheter was placed in her bladder and hung to     gravity.  A KEIRA intrauterine manipulator with a KOH colpotomizer cup and    a vaginal occluder balloon were then placed with a good fit.  All other        instruments were removed from the vagina, and her legs were placed in a      low lithotomy position.  The patient's abdomen was then prepped and draped in    the normal sterile fashion.  Pre-op antibiotics were given.  Time out was performed.    The periumbilical skin was then tented up with perforating towel clamps,     and the Veress needle  was inserted through the umbilicus    into the intraperitoneal cavity.  Intraperitoneal placement was  confirmed with                     a water drop test, and pneumoperitoneum  was achieved with carbon dioxide     gas up to a pressure of 15 mmHg.  The Veress needle was then removed, and an 8mm  skin incision was made  3 cm to the right and slightly above  the  umbilicus.  An 8mm trocar was inserted through this incision.          The scope was then inserted through this trocar.             Inspection of underlying organs revealed no damage or injury.  Then 3 additional, 8-mm    trocars were placed bilaterally under direct visualization. (1 to the left of the umbilicus and the remaining incisions 8-10 cm lateral to the previous incisions.)   A additional 5 mm accessory port was placed in the left uper quadrant.  All    instruments were then removed from the trocars and the patient was placed    in Trendelenburg position.  The patient cart for the [employer] surgical       system was then docked at the bedside.  We then proceeded with  the             hysterectomy.                                                                 The Cardere was placed in arm 1 and used for retraction  Of tissue.                                                                  Arm 3-bipolar graspers; Arm 2-camera; Arm 4-scissors;           The left round ligament was then cauterized and transected.  This opened     up  the leaf of the broad ligament on the left side, and this incision was       carried anteromedially to create a bladder flap.  The left infindibular ligament was then   doubly cauterized and transected.      The uterus was then sharply retroflexed, and an anterior colpotomy was made with the monopolar EndoShears along the level of the KOH cup.        We then proceeded to take down the    right round ligament.  The right round ligament was cauterized and transected.  This opened up the broad ligament on the right side and this incision was carried anteromedially.  The bladder flap was then completed using both cautery and blunt dissection.  The right infindublar ligament was then doubly cauterized  and then transected.       The uterus was then sharply anteflexed, and the posterior colpotomy was made with the monopolar EndoShears along the level of the KOH cup. The right uterine artery was skeletonized.  The right uterine artery was then triple cauterized and transeted in succession to the level of the cervix.  The colpotomy incision was the completed  Working anteriorly to posteriorly on the right.  The left uterine artery was then skeletonized. The left uterine artery triple cauterized and transected in succession until the level of the cervix.  The colpotomy was then completed on the right.   The   uterus and cervix with both tubes were morcellated in the vagina and were then removed and remained in the vagina to help       maintain pneumoperitoneum for the rest of the case.   The infundibular ligament was doubly cauterized and transected.  The ovary was then placed in the bag and ovary and bag removed through the vagina  No spillage was noted.    The cuff was  irrigated  and cleared of all clots and debris.  Hemostasis at the cuff was achieved with cautery.  The vaginal cuff was then reapproximated with 0-Vicryl running  sutures. Lap ty placed at end of each sutrue and starting at each angle and tying midline.   Again, the pelvis was irrigated and cleared of all clots and debris.  Excellent hemostasis was noted.      The bilateral ureters were seen in their normal anatomic location and were vermiculating.    At this point, all instruments were removed from the trocars and the patient cart was undocked.  Pneumoperitoneum was then allowed to escape, and all trocars were removed.     Hemostasis at all skin sites was achieved with Bovie cautery.  All skin      sites were closed with 4-0 monocryl in a running subcuticular fashion.  The    patient tolerated the procedure well.  Sponge, lap and needle counts         correct x2.  She will go to Recovery in stable condition.  Significant Surgical Tasks Conducted by  the Assistant(s), if Applicable: 1st assistant    Estimated Blood Loss (EBL): 100 mL           Implants: * No implants in log *    Specimens:   Specimen (24h ago, onward)       Start     Ordered    02/28/24 0921  Specimen to Pathology, Surgery Gynecology and Obstetrics  Once        Comments: Pre-op Diagnosis: Postmenopausal bleeding [N95.0]Enlarged uterus [N85.2]Fibroids [D21.9]Procedure(s):XI ROBOTIC HYSTERECTOMYXI ROBOTIC SALPINGECTOMY Number of specimens: 1Name of specimens: Uterus, cervix,right tube and ovary- PERM     References:    Click here for ordering Quick Tip   Question Answer Comment   Procedure Type: Gynecology and Obstetrics    Specimen Class: Routine/Screening    Which provider would you like to cc? HANSEL DUNBAR    Release to patient Immediate        02/28/24 0978                            Condition: Good    Disposition: PACU - hemodynamically stable.    Attestation: I performed the procedure.

## 2024-02-28 NOTE — SUBJECTIVE & OBJECTIVE
OB History    Para Term  AB Living   3 3 0 0 0 3   SAB IAB Ectopic Multiple Live Births   0 0 0 1 0      # Outcome Date GA Lbr Marvin/2nd Weight Sex Delivery Anes PTL Lv   3 Para 10/23/86     CS-LTranv      2 Para 82     CS-LTranv      1 Para 82     Vag-Spont        Past Medical History:   Diagnosis Date    Coronary artery disease     Encounter for blood transfusion     Hyperlipidemia     Hypertension     Pulmonary edema      Past Surgical History:   Procedure Laterality Date     SECTION      x 2       PTA Medications   Medication Sig    amLODIPine (NORVASC) 10 MG tablet Take 1 tablet (10 mg total) by mouth once daily.    ascorbic acid, vitamin C, (VITAMIN C) 500 MG tablet Take 1 tablet (500 mg total) by mouth every evening.    aspirin (ECOTRIN) 81 MG EC tablet Take 1 tablet (81 mg total) by mouth once daily.    b complex vitamins tablet Take 1 tablet by mouth once daily. B COMPLETE    ferrous sulfate 324 mg (65 mg iron) TbEC Take by mouth 2 (two) times daily with meals.    losartan (COZAAR) 100 MG tablet Take 1 tablet (100 mg total) by mouth once daily.    metoprolol succinate (TOPROL-XL) 50 MG 24 hr tablet Take 1 tablet (50 mg total) by mouth 2 (two) times daily.    rosuvastatin (CRESTOR) 20 MG tablet Take 1 tablet (20 mg total) by mouth every evening.    nitroGLYCERIN (NITROSTAT) 0.4 MG SL tablet Place 1 tablet (0.4 mg total) under the tongue every 5 (five) minutes as needed.       Review of patient's allergies indicates:   Allergen Reactions    Penicillins Rash    Venofer [iron sucrose] Hives, Itching and Other (See Comments)     darion        Family History       Problem Relation (Age of Onset)    Hypertension Mother          Tobacco Use    Smoking status: Former     Types: Cigarettes    Smokeless tobacco: Never   Substance and Sexual Activity    Alcohol use: Never    Drug use: Never    Sexual activity: Not Currently     Partners: Male     Birth control/protection:  Post-menopausal     Review of Systems   Genitourinary:  Positive for postmenopausal bleeding.   All other systems reviewed and are negative.     Objective:     Vital Signs (Most Recent):  Temp: 97.7 °F (36.5 °C) (02/28/24 0543)  Pulse: 65 (02/28/24 0543)  Resp: 16 (02/28/24 0543)  BP: (!) 181/81 (02/28/24 0543)  SpO2: 100 % (02/28/24 0543) Vital Signs (24h Range):  Temp:  [97.7 °F (36.5 °C)] 97.7 °F (36.5 °C)  Pulse:  [65] 65  Resp:  [16] 16  SpO2:  [100 %] 100 %  BP: (181)/(81) 181/81     Weight: 84.7 kg (186 lb 11.7 oz)  Body mass index is 34.15 kg/m².    No LMP recorded. Patient is postmenopausal.     Physical Exam:   Constitutional: She appears well-developed.     Eyes: Pupils are equal, round, and reactive to light. Conjunctivae and EOM are normal.      Pulmonary/Chest: Effort normal.        Abdominal: Soft.             Musculoskeletal: Normal range of motion.       Neurological: She is alert.    Skin: Skin is warm.    Psychiatric: She has a normal mood and affect.        Laboratory:  Recent Lab Results       None          I have personallly reviewed all pertinent lab results from the last 24 hours.    Diagnostic Results:  Labs: Reviewed  US: Reviewed

## 2024-02-28 NOTE — HPI
69 y/o   with know fibroid uterus.  Feels menopause occurred age 55 and started bleeding approx 3 yrs ago  Transfused in ER for same   Had emb showing hyperplasia; has had several courses of megace    Most recent sono   2023  The uterus measures 15.6 cm in length. Multiple uterine fibroids are noted the 3 largest were measured including a 1.7 cm fibroid within the fundus a 2.9 cm fibroid within the anterior body and a 3.1 cm fibroid within the posterior body.  The endometrial stripe in this premenopausal patient measures 5.7 mm which is minimally thickened.  The ovaries are not visualized.  There appears to be a large fibroid seen the right adnexal region that measures up to 6 cm in size best seen on the transabdominal imaging.   emb after megace use and sono  2023  Small fragments of inactive endometrium showing changes compatible with progestin effect.  No atypical hyperplasia or malignancy identified     Patient ready to proceed with definitive management of symtpomatic fibroids and postmenopausal bleeding with hysterectomy    She is aware if procedure cannot be completed with a minimally invasive approach and open incision will be made

## 2024-02-28 NOTE — H&P
[facility] ([facility])  Obstetrics & Gynecology  History & Physical    Patient Name: Liliane Ford  MRN: [id number]  Admission Date: 2024  Primary Care Provider: Kishan Kelley MD    Subjective:     Chief Complaint/Reason for Admission: postmenopausl bleeding    History of Present Illness:  69 y/o   with know fibroid uterus.  Feels menopause occurred age 55 and started bleeding approx 3 yrs ago  Transfused in ER for same   Had emb showing hyperplasia; has had several courses of megace    Most recent sono   2023  The uterus measures 15.6 cm in length. Multiple uterine fibroids are noted the 3 largest were measured including a 1.7 cm fibroid within the fundus a 2.9 cm fibroid within the anterior body and a 3.1 cm fibroid within the posterior body.  The endometrial stripe in this premenopausal patient measures 5.7 mm which is minimally thickened.  The ovaries are not visualized.  There appears to be a large fibroid seen the right adnexal region that measures up to 6 cm in size best seen on the transabdominal imaging.   emb after megace use and sono  2023  Small fragments of inactive endometrium showing changes compatible with progestin effect.  No atypical hyperplasia or malignancy identified     Patient ready to proceed with definitive management of symtpomatic fibroids and postmenopausal bleeding with hysterectomy    She is aware if procedure cannot be completed with a minimally invasive approach and open incision will be made        OB History    Para Term  AB Living   3 3 0 0 0 3   SAB IAB Ectopic Multiple Live Births   0 0 0 1 0      # Outcome Date GA Lbr Marvin/2nd Weight Sex Delivery Anes PTL Lv   3 Para 10/23/86     CS-LTranv      2 Para 82     CS-LTranv      1 Para 82     Vag-Spont        Past Medical History:   Diagnosis Date    Coronary artery disease     Encounter for blood transfusion     Hyperlipidemia     Hypertension     Pulmonary edema      Past Surgical  History:   Procedure Laterality Date     SECTION      x 2       PTA Medications   Medication Sig    amLODIPine (NORVASC) 10 MG tablet Take 1 tablet (10 mg total) by mouth once daily.    ascorbic acid, vitamin C, (VITAMIN C) 500 MG tablet Take 1 tablet (500 mg total) by mouth every evening.    aspirin (ECOTRIN) 81 MG EC tablet Take 1 tablet (81 mg total) by mouth once daily.    b complex vitamins tablet Take 1 tablet by mouth once daily. B COMPLETE    ferrous sulfate 324 mg (65 mg iron) TbEC Take by mouth 2 (two) times daily with meals.    losartan (COZAAR) 100 MG tablet Take 1 tablet (100 mg total) by mouth once daily.    metoprolol succinate (TOPROL-XL) 50 MG 24 hr tablet Take 1 tablet (50 mg total) by mouth 2 (two) times daily.    rosuvastatin (CRESTOR) 20 MG tablet Take 1 tablet (20 mg total) by mouth every evening.    nitroGLYCERIN (NITROSTAT) 0.4 MG SL tablet Place 1 tablet (0.4 mg total) under the tongue every 5 (five) minutes as needed.       Review of patient's allergies indicates:   Allergen Reactions    Penicillins Rash    Venofer [iron sucrose] Hives, Itching and Other (See Comments)     welts        Family History       Problem Relation (Age of Onset)    Hypertension Mother          Tobacco Use    Smoking status: Former     Types: Cigarettes    Smokeless tobacco: Never   Substance and Sexual Activity    Alcohol use: Never    Drug use: Never    Sexual activity: Not Currently     Partners: Male     Birth control/protection: Post-menopausal     Review of Systems   Genitourinary:  Positive for postmenopausal bleeding.   All other systems reviewed and are negative.     Objective:     Vital Signs (Most Recent):  Temp: 97.7 °F (36.5 °C) (24)  Pulse: 65 (24)  Resp: 16 (24)  BP: (!) 181/81 (24)  SpO2: 100 % (24) Vital Signs (24h Range):  Temp:  [97.7 °F (36.5 °C)] 97.7 °F (36.5 °C)  Pulse:  [65] 65  Resp:  [16] 16  SpO2:  [100 %] 100 %  BP: (181)/(81)  181/81     Weight: 84.7 kg (186 lb 11.7 oz)  Body mass index is 34.15 kg/m².    No LMP recorded. Patient is postmenopausal.     Physical Exam:   Constitutional: She appears well-developed.     Eyes: Pupils are equal, round, and reactive to light. Conjunctivae and EOM are normal.      Pulmonary/Chest: Effort normal.        Abdominal: Soft.             Musculoskeletal: Normal range of motion.       Neurological: She is alert.    Skin: Skin is warm.    Psychiatric: She has a normal mood and affect.        Laboratory:  Recent Lab Results       None          I have personallly reviewed all pertinent lab results from the last 24 hours.    Diagnostic Results:  Labs: Reviewed  US: Reviewed  Assessment/Plan:     Renal/  * Postmenopausal bleeding  02/28/2024  Reviewed robotic assisted laparoscopic hysterectomy with bilateral salpingectomy procedure in detail.    Reviewed risks including but not limited to infection, bleeding, damage to bowel/bladder. ureter, cva;htn, dvt, death.       All questions answered to the best of my ability.  Alternatives reviewed --continue megace, lupron, uae;  Consents previously signed and witnessed.  Post op course reviewed  Hgb 13        Ema Aquino MD  Obstetrics & Gynecology  [facility] ([facility])

## 2024-02-28 NOTE — ANESTHESIA POSTPROCEDURE EVALUATION
Anesthesia Post Evaluation    Patient: Liliane Ford    Procedure(s) Performed: Procedure(s) (LRB):  XI ROBOTIC HYSTERECTOMY,WITH SALPINGO-OOPHORECTOMY (Right)    Final Anesthesia Type: general      Patient location during evaluation: PACU  Patient participation: Yes- Able to Participate  Level of consciousness: awake and alert  Post-procedure vital signs: reviewed and stable  Pain management: adequate  Airway patency: patent  BENJA mitigation strategies: Verification of full reversal of neuromuscular block  PONV status at discharge: No PONV  Anesthetic complications: no      Cardiovascular status: hemodynamically stable  Respiratory status: spontaneous ventilation  Hydration status: euvolemic  Follow-up not needed.              Vitals Value Taken Time   /73 02/28/24 1145   Temp 98.0 02/28/24 1145   Pulse 66 02/28/24 1145   Resp 23 02/28/24 1145   SpO2 89 % 02/28/24 1145   Vitals shown include unvalidated device data.      No case tracking events are documented in the log.      Pain/Seth Score: Pain Rating Prior to Med Admin: 0 (2/28/2024  6:22 AM)  Seth Score: 9 (2/28/2024 11:00 AM)

## 2024-02-28 NOTE — ASSESSMENT & PLAN NOTE
02/28/2024  Reviewed robotic assisted laparoscopic hysterectomy with bilateral salpingectomy procedure in detail.    Reviewed risks including but not limited to infection, bleeding, damage to bowel/bladder. ureter, cva;htn, dvt, death.       All questions answered to the best of my ability.  Alternatives reviewed --continue megace, lupron, uae;  Consents previously signed and witnessed.  Post op course reviewed  Hgb 13

## 2024-02-28 NOTE — BRIEF OP NOTE
[facility])  Brief Operative Note    Surgery Date: 2/28/2024     Surgeon(s) and Role:     * Ema Ayers MD - Primary    Assistant:  Jacqueline Kowalski    Pre-op Diagnosis:  Postmenopausal bleeding [N95.0]  Enlarged uterus [N85.2]  Fibroids [D21.9]    Post-op Diagnosis:  Post-Op Diagnosis Codes:     * Postmenopausal bleeding [N95.0]     * Enlarged uterus [N85.2]     * Fibroids [D21.9]    Procedure(s) (LRB):  XI ROBOTIC HYSTERECTOMY,WITH SALPINGO-OOPHORECTOMY (Right)    Anesthesia: General    Operative Findings: 16 wk size uterus with multiple fibroids, normal left ovary; enlarged right ovary (8 cm); tubes with evidence of remote ligation    Estimated Blood Loss: 100 mL         Specimens:   Specimen (24h ago, onward)       Start     Ordered    02/28/24 0921  Specimen to Pathology, Surgery Gynecology and Obstetrics  Once        Comments: Pre-op Diagnosis: Postmenopausal bleeding [N95.0]Enlarged uterus [N85.2]Fibroids [D21.9]Procedure(s):XI ROBOTIC HYSTERECTOMYXI ROBOTIC SALPINGECTOMY Number of specimens: 1Name of specimens: Uterus, cervix,right tube and ovary- PERM     References:    Click here for ordering Quick Tip   Question Answer Comment   Procedure Type: Gynecology and Obstetrics    Specimen Class: Routine/Screening    Which provider would you like to cc? EMA AYERS    Release to patient Immediate        02/28/24 0944                      Discharge Note    OUTCOME: Patient tolerated treatment/procedure well without complication and is now ready for discharge.    DISPOSITION: Home or Self Care    FINAL DIAGNOSIS:  S/P robotic assisted laparoscopic hysterectomy with bilateral salpingectomy; right oophorectomy    FOLLOWUP: In clinic, dr ayers, 2 wkspricila    DISCHARGE INSTRUCTIONS:  No discharge procedures on file.

## 2024-02-28 NOTE — TRANSFER OF CARE
"Anesthesia Transfer of Care Note    Patient: Liliane Ford    Procedure(s) Performed: Procedure(s) (LRB):  XI ROBOTIC HYSTERECTOMY,WITH SALPINGO-OOPHORECTOMY (Right)    Patient location: PACU    Anesthesia Type: general    Transport from OR: Transported from OR on room air with adequate spontaneous ventilation    Post pain: adequate analgesia    Post assessment: no apparent anesthetic complications    Post vital signs: stable    Level of consciousness: sedated    Nausea/Vomiting: no nausea/vomiting    Complications: none    Transfer of care protocol was followed      Last vitals: Visit Vitals  BP (!) 181/81   Pulse 65   Temp 36.5 °C (97.7 °F) (Temporal)   Resp 16   Ht 5' 2" (1.575 m)   Wt 84.7 kg (186 lb 11.7 oz)   SpO2 100%   Breastfeeding No   BMI 34.15 kg/m²     "

## 2024-02-29 VITALS
RESPIRATION RATE: 15 BRPM | SYSTOLIC BLOOD PRESSURE: 141 MMHG | OXYGEN SATURATION: 98 % | HEART RATE: 61 BPM | DIASTOLIC BLOOD PRESSURE: 82 MMHG | HEIGHT: 62 IN | BODY MASS INDEX: 34.37 KG/M2 | TEMPERATURE: 98 F | WEIGHT: 186.75 LBS

## 2024-03-04 LAB
FINAL PATHOLOGIC DIAGNOSIS: NORMAL
GROSS: NORMAL
Lab: NORMAL

## 2024-03-05 ENCOUNTER — TELEPHONE (OUTPATIENT)
Dept: GYNECOLOGIC ONCOLOGY | Facility: CLINIC | Age: 69
End: 2024-03-05
Payer: COMMERCIAL

## 2024-03-05 ENCOUNTER — TELEPHONE (OUTPATIENT)
Dept: OBSTETRICS AND GYNECOLOGY | Facility: HOSPITAL | Age: 69
End: 2024-03-05
Payer: COMMERCIAL

## 2024-03-05 DIAGNOSIS — C56.1: Primary | ICD-10-CM

## 2024-03-05 NOTE — TELEPHONE ENCOUNTER
Spoke with our patient about her schedule appointment ----- Message from Mckenna Eng MD sent at 3/5/2024  9:29 AM CST -----  Thank you Dr. Aquino.     [place]-- please reach out to this patient for consult. She is aware.   ----- Message -----  From: Ema Aquino MD  Sent: 3/5/2024   9:21 AM CST  To: Mckenna Eng MD    Spoke with patient this am, and placed referral; she prefers to see you in [place]  ----- Message -----  From: Mckenna Eng MD  Sent: 3/4/2024   2:15 PM CST  To: Ema Aquino MD    Hi Dr. Aquino--  Thank you for reaching out. Yes, she will need to see and follow with gyn oncology. Would offer removal of the remaining ovary and surgical staging to guide adjuvant treatment.   Let us know when you have discussed the referral with her and we will be happy to reach out to her. We can see her in [place] which many of our [place] patients like compared to driving to [place].  - KSW     ----- Message -----  From: Ema Aquino MD  Sent: 3/4/2024  12:39 PM CST  To: Mckenna Eng MD    Incidental finding of ovary of an adult granulosa tumor     Does she need follow up with gyn onc?

## 2024-03-05 NOTE — TELEPHONE ENCOUNTER
Spoke with patient and given path report from mallorie garcia, right oophorectomy    Adult granulosa cell tumor    Per discussion with gyn onc--referral will be placed    Pt acknowledged understanding

## 2024-03-13 ENCOUNTER — OFFICE VISIT (OUTPATIENT)
Dept: OBSTETRICS AND GYNECOLOGY | Facility: CLINIC | Age: 69
End: 2024-03-13
Payer: COMMERCIAL

## 2024-03-13 VITALS
SYSTOLIC BLOOD PRESSURE: 142 MMHG | BODY MASS INDEX: 31.24 KG/M2 | WEIGHT: 183 LBS | HEIGHT: 64 IN | DIASTOLIC BLOOD PRESSURE: 78 MMHG

## 2024-03-13 DIAGNOSIS — Z90.710 S/P LAPAROSCOPIC HYSTERECTOMY: Primary | ICD-10-CM

## 2024-03-13 DIAGNOSIS — C56.1: ICD-10-CM

## 2024-03-13 PROCEDURE — 3077F SYST BP >= 140 MM HG: CPT | Mod: CPTII,S$GLB,, | Performed by: OBSTETRICS & GYNECOLOGY

## 2024-03-13 PROCEDURE — 1101F PT FALLS ASSESS-DOCD LE1/YR: CPT | Mod: CPTII,S$GLB,, | Performed by: OBSTETRICS & GYNECOLOGY

## 2024-03-13 PROCEDURE — 3288F FALL RISK ASSESSMENT DOCD: CPT | Mod: CPTII,S$GLB,, | Performed by: OBSTETRICS & GYNECOLOGY

## 2024-03-13 PROCEDURE — 1159F MED LIST DOCD IN RCRD: CPT | Mod: CPTII,S$GLB,, | Performed by: OBSTETRICS & GYNECOLOGY

## 2024-03-13 PROCEDURE — 99024 POSTOP FOLLOW-UP VISIT: CPT | Mod: S$GLB,,, | Performed by: OBSTETRICS & GYNECOLOGY

## 2024-03-13 PROCEDURE — 1126F AMNT PAIN NOTED NONE PRSNT: CPT | Mod: CPTII,S$GLB,, | Performed by: OBSTETRICS & GYNECOLOGY

## 2024-03-13 PROCEDURE — 99999 PR PBB SHADOW E&M-EST. PATIENT-LVL III: CPT | Mod: PBBFAC,,, | Performed by: OBSTETRICS & GYNECOLOGY

## 2024-03-13 PROCEDURE — 3078F DIAST BP <80 MM HG: CPT | Mod: CPTII,S$GLB,, | Performed by: OBSTETRICS & GYNECOLOGY

## 2024-03-13 PROCEDURE — 4010F ACE/ARB THERAPY RXD/TAKEN: CPT | Mod: CPTII,S$GLB,, | Performed by: OBSTETRICS & GYNECOLOGY

## 2024-03-13 NOTE — PROGRESS NOTES
Subjective:       Patient ID: Liliane Ford is a 68 y.o. female.    Chief Complaint:  Post-op Evaluation      History of Present Illness  HPI  Postoperative Follow-up  Patient presents to the clinic 2 weeks status post Davinci assisted hysterectomy with rso for abnormal uterine bleeding and adnexal mass. Eating a regular diet without difficulty. Bowel movements are normal. The patient is not having any pain.  Denies vaginal bleeding  Emotionally well  No prob void  GYN & OB History  No LMP recorded. Patient is postmenopausal.   Date of Last Pap: 11/3/2022    OB History    Para Term  AB Living   2 2 0     3   SAB IAB Ectopic Multiple Live Births         1 2      # Outcome Date GA Lbr Marvin/2nd Weight Sex Delivery Anes PTL Lv   2 Para 10/23/86     CS-LTranv      1A Para 82    F CS-Unspec   SHERIDAN   1B Para     F Vag-Spont   SHERIDAN       Review of Systems  Review of Systems   All other systems reviewed and are negative.          Objective:      Physical Exam:   Constitutional: She is oriented to person, place, and time. She appears well-developed.     Eyes: Pupils are equal, round, and reactive to light. Conjunctivae and EOM are normal.      Pulmonary/Chest: Effort normal.        Abdominal: Soft. She exhibits abdominal incision (incisions well healed x 4).             Musculoskeletal: Normal range of motion.       Neurological: She is alert and oriented to person, place, and time.    Skin: Skin is warm.    Psychiatric: She has a normal mood and affect.           Assessment:        1. S/P laparoscopic robotic assisted hysterectomy with left salpmingectomy and right salpingo ophorectomysalpingo opp    2. Malignant granulosa cell tumor of ovary, right               Plan:      Continue pelvic rest x 10 wks  Reviewed surgery findings and path report  Has f/u with gyn onc  Showers for 6 wks

## 2024-03-16 DIAGNOSIS — I10 PRIMARY HYPERTENSION: ICD-10-CM

## 2024-03-16 NOTE — TELEPHONE ENCOUNTER
Refill Routing Note   Medication(s) are not appropriate for processing by [facility] for the following reason(s):        Required vitals abnormal    [facility] action(s):  Defer     Requires labs : Yes      Medication Therapy Plan: BP 3/13/24 (!) 142/78      Appointments  past 12m or future 3m with PCP    Date Provider   Last Visit   10/10/2023 Kishan Kelley MD   Next Visit   4/10/2024 Kishan Kelley MD   ED visits in past 90 days: 0        Note composed:10:47 AM 03/16/2024

## 2024-03-16 NOTE — TELEPHONE ENCOUNTER
Care Due:                  Date            Visit Type   Department     Provider  --------------------------------------------------------------------------------                                EP -                              PRIMARY      [facility] PRIMARY  Last Visit: 10-      CARE ([facility])   ROBINSON Kelley                              EP -                              PRIMARY      [facility] PRIMARY  Next Visit: 04-      CARE ([facility])   ROBINSON Kelley                                                            Last  Test          Frequency    Reason                     Performed    Due Date  --------------------------------------------------------------------------------    CMP.........  12 months..  rosuvastatin.............  06- 05-    Lipid Panel.  12 months..  rosuvastatin.............  06- 05-    [employer] Embedded Care Due Messages. Reference number: [id number].   3/16/2024 4:09:43 AM CDT

## 2024-03-18 RX ORDER — AMLODIPINE BESYLATE 10 MG/1
10 TABLET ORAL
Qty: 90 TABLET | Refills: 3 | Status: SHIPPED | OUTPATIENT
Start: 2024-03-18 | End: 2024-04-10 | Stop reason: SDUPTHER

## 2024-03-19 ENCOUNTER — OFFICE VISIT (OUTPATIENT)
Dept: GYNECOLOGIC ONCOLOGY | Facility: CLINIC | Age: 69
End: 2024-03-19
Payer: COMMERCIAL

## 2024-03-19 ENCOUNTER — TELEPHONE (OUTPATIENT)
Dept: GYNECOLOGIC ONCOLOGY | Facility: CLINIC | Age: 69
End: 2024-03-19
Payer: COMMERCIAL

## 2024-03-19 VITALS
HEIGHT: 64 IN | SYSTOLIC BLOOD PRESSURE: 167 MMHG | WEIGHT: 183.44 LBS | BODY MASS INDEX: 31.32 KG/M2 | DIASTOLIC BLOOD PRESSURE: 87 MMHG | HEART RATE: 67 BPM

## 2024-03-19 DIAGNOSIS — C56.1: Primary | ICD-10-CM

## 2024-03-19 DIAGNOSIS — C56.1: ICD-10-CM

## 2024-03-19 PROCEDURE — 3288F FALL RISK ASSESSMENT DOCD: CPT | Mod: CPTII,S$GLB,, | Performed by: OBSTETRICS & GYNECOLOGY

## 2024-03-19 PROCEDURE — 3079F DIAST BP 80-89 MM HG: CPT | Mod: CPTII,S$GLB,, | Performed by: OBSTETRICS & GYNECOLOGY

## 2024-03-19 PROCEDURE — 3077F SYST BP >= 140 MM HG: CPT | Mod: CPTII,S$GLB,, | Performed by: OBSTETRICS & GYNECOLOGY

## 2024-03-19 PROCEDURE — 99205 OFFICE O/P NEW HI 60 MIN: CPT | Mod: 24,S$GLB,, | Performed by: OBSTETRICS & GYNECOLOGY

## 2024-03-19 PROCEDURE — 3008F BODY MASS INDEX DOCD: CPT | Mod: CPTII,S$GLB,, | Performed by: OBSTETRICS & GYNECOLOGY

## 2024-03-19 PROCEDURE — 4010F ACE/ARB THERAPY RXD/TAKEN: CPT | Mod: CPTII,S$GLB,, | Performed by: OBSTETRICS & GYNECOLOGY

## 2024-03-19 PROCEDURE — 1159F MED LIST DOCD IN RCRD: CPT | Mod: CPTII,S$GLB,, | Performed by: OBSTETRICS & GYNECOLOGY

## 2024-03-19 PROCEDURE — 1101F PT FALLS ASSESS-DOCD LE1/YR: CPT | Mod: CPTII,S$GLB,, | Performed by: OBSTETRICS & GYNECOLOGY

## 2024-03-19 PROCEDURE — 99999 PR PBB SHADOW E&M-EST. PATIENT-LVL IV: CPT | Mod: PBBFAC,,, | Performed by: OBSTETRICS & GYNECOLOGY

## 2024-03-19 PROCEDURE — 1126F AMNT PAIN NOTED NONE PRSNT: CPT | Mod: CPTII,S$GLB,, | Performed by: OBSTETRICS & GYNECOLOGY

## 2024-03-19 NOTE — PROGRESS NOTES
Subjective     Patient ID: Liliane Ford is a 68 y.o. female.    Chief Complaint: Ovarian Cancer and Advice Only    68 year old female  referred by Dr. Aquino. 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 Patient underwent RALH BS RO on 2024 for PMB. Patient reports she had been bleeding intermittently for 4 years. Multiple EMBx were negative. She is recovering appropriately. Denies any vaginal bleeding.     LMP- age 55 denies any HRT  Denies any abnormal pap smears.    2024  Robotic assisted laparoscopic hysterectomy, bilateral salpingectomy and right oophorectomy.   Uterus, cervix, right ovary and bilateral fallopian tubes weighing 381 g show a granulosa cell tumor, adult type with areas of hemorrhage and necrosis present in the right ovary.    US 2023  FINDINGS:  The uterus measures 15.6 cm in length. Multiple uterine fibroids are noted the 3 largest were measured including a 1.7 cm fibroid within the fundus a 2.9 cm fibroid within the anterior body and a 3.1 cm fibroid within the posterior body.  The endometrial stripe in this premenopausal patient measures 5.7 mm which is minimally thickened.  The ovaries are not visualized.  There appears to be a large fibroid seen the right adnexal region that measures up to 6 cm in size best seen on the transabdominal imaging.     Co-morbidites include HTN, HLD, and CAD    Surgical history includes c/s x 2 (mid-line vertical) and recent RALH BS RO.         Review of Systems   Constitutional:  Negative for chills and fever.   HENT:  Negative for mouth sores.    Respiratory:  Negative for chest tightness and shortness of breath.    Cardiovascular:  Negative for chest pain.   Gastrointestinal:  Negative for abdominal distention, nausea and vomiting.   Genitourinary:  Negative for dysuria, hematuria, pelvic pain, vaginal bleeding and vaginal discharge.   Neurological:  Negative for syncope and weakness.          Objective   BP (!)  "167/87 (BP Location: Right arm, Patient Position: Sitting)   Pulse 67   Ht 5' 3.5" (1.613 m)   Wt 83.2 kg (183 lb 6.8 oz)   BMI 31.98 kg/m²     Physical Exam  Vitals reviewed. Exam conducted with a chaperone present.   Constitutional:       Appearance: Normal appearance.   HENT:      Head: Normocephalic and atraumatic.   Eyes:      Extraocular Movements: Extraocular movements intact.      Conjunctiva/sclera: Conjunctivae normal.      Pupils: Pupils are equal, round, and reactive to light.   Pulmonary:      Effort: Pulmonary effort is normal. No respiratory distress.   Abdominal:      General: There is no distension.      Palpations: Abdomen is soft.      Tenderness: There is no abdominal tenderness.   Musculoskeletal:         General: Normal range of motion.   Skin:     General: Skin is warm and dry.   Neurological:      General: No focal deficit present.      Mental Status: She is alert and oriented to person, place, and time. Mental status is at baseline.   Psychiatric:         Mood and Affect: Mood normal.         Behavior: Behavior normal.         Thought Content: Thought content normal.         Judgment: Judgment normal.          Assessment and Plan     1. Malignant granulosa cell tumor of ovary, right  -     Ambulatory referral/consult to Gynecologic Oncology  -     Inhibin; Future; Expected date: 03/19/2024  -     Inhibin B; Future; Expected date: 03/19/2024  -     CT Chest Abdomen Pelvis With IV Contrast (XPD) Routine Oral Contrast; Future; Expected date: 03/19/2024  -     Creatinine, serum; Future; Expected date: 03/19/2024      We discussed the natural history of adult granulosa cell tumors of the ovary. Presumed Stage IA. I discussed and recommended further work up and evaluation with CT imaging for metastatic survey, tumor markers and surgical staging. Based on this information we can have a better informed discussion regarding Stage, whether any adjuvant therapy is recommended and overall prognosis. "      Plan for April 19-- Robotic USO + staging    The risks, benefits, and indications of the procedure were discussed with the patient and her family members if present.  These included bleeding, transfusion, infection, damage to surrounding tissues (bowel, bladder, ureter), wound separation, lymphedema, conversion to laparotomy if laparoscopic, perioperative cardiac events, VTE, pneumonia, and possible death.  She voiced understanding, all questions were answered and consents were signed.    I spent approximately 60 minutes reviewing the available records and evaluating the patient, out of which over 50% of the time was spent face to face with the patient in counseling and coordinating this patient's care.           No follow-ups on file.

## 2024-03-20 DIAGNOSIS — C56.1: Primary | ICD-10-CM

## 2024-03-28 ENCOUNTER — TELEPHONE (OUTPATIENT)
Dept: GYNECOLOGIC ONCOLOGY | Facility: CLINIC | Age: 69
End: 2024-03-28
Payer: COMMERCIAL

## 2024-03-28 RX ORDER — SODIUM CHLORIDE 9 MG/ML
INJECTION, SOLUTION INTRAVENOUS CONTINUOUS
Status: CANCELLED | OUTPATIENT
Start: 2024-03-28

## 2024-03-28 RX ORDER — FAMOTIDINE 20 MG/1
20 TABLET, FILM COATED ORAL
Status: CANCELLED | OUTPATIENT
Start: 2024-03-28

## 2024-03-28 RX ORDER — MUPIROCIN 20 MG/G
OINTMENT TOPICAL
Status: CANCELLED | OUTPATIENT
Start: 2024-03-28

## 2024-03-28 RX ORDER — HEPARIN SODIUM 5000 [USP'U]/ML
5000 INJECTION, SOLUTION INTRAVENOUS; SUBCUTANEOUS ONCE
Status: CANCELLED | OUTPATIENT
Start: 2024-04-19

## 2024-03-28 RX ORDER — CLINDAMYCIN PHOSPHATE 900 MG/50ML
900 INJECTION, SOLUTION INTRAVENOUS
Status: CANCELLED | OUTPATIENT
Start: 2024-03-28

## 2024-03-28 NOTE — TELEPHONE ENCOUNTER
----- Message from Marina Nicole, RT sent at 3/28/2024  8:01 AM CDT -----  Good morning. This patient is scheduled to have a ct with contrast soon and will need labs prior to her test. Can you please order and schedule a bmp or creatinine and have the patient come to any [facility] facility 24-48 hours prior to her test to have the labs done. Her test is scheduled for 4/2/24. We do not have stat labs here in [place].    Thanks   Marina

## 2024-04-01 ENCOUNTER — LAB VISIT (OUTPATIENT)
Dept: LAB | Facility: HOSPITAL | Age: 69
End: 2024-04-01
Attending: OBSTETRICS & GYNECOLOGY
Payer: COMMERCIAL

## 2024-04-01 DIAGNOSIS — C56.1: ICD-10-CM

## 2024-04-01 LAB
CANCER AG125 SERPL-ACNC: 13 U/ML (ref 0–30)
CREAT SERPL-MCNC: 0.8 MG/DL (ref 0.5–1.4)
EST. GFR  (NO RACE VARIABLE): >60 ML/MIN/1.73 M^2

## 2024-04-01 PROCEDURE — 86336 INHIBIN A: CPT | Performed by: OBSTETRICS & GYNECOLOGY

## 2024-04-01 PROCEDURE — 36415 COLL VENOUS BLD VENIPUNCTURE: CPT | Mod: PN | Performed by: OBSTETRICS & GYNECOLOGY

## 2024-04-01 PROCEDURE — 86304 IMMUNOASSAY TUMOR CA 125: CPT | Performed by: OBSTETRICS & GYNECOLOGY

## 2024-04-01 PROCEDURE — 83520 IMMUNOASSAY QUANT NOS NONAB: CPT | Performed by: OBSTETRICS & GYNECOLOGY

## 2024-04-01 PROCEDURE — 82565 ASSAY OF CREATININE: CPT | Performed by: OBSTETRICS & GYNECOLOGY

## 2024-04-02 ENCOUNTER — TELEPHONE (OUTPATIENT)
Dept: GYNECOLOGIC ONCOLOGY | Facility: CLINIC | Age: 69
End: 2024-04-02
Payer: COMMERCIAL

## 2024-04-02 ENCOUNTER — HOSPITAL ENCOUNTER (OUTPATIENT)
Dept: RADIOLOGY | Facility: HOSPITAL | Age: 69
Discharge: HOME | End: 2024-04-02
Attending: OBSTETRICS & GYNECOLOGY
Payer: COMMERCIAL

## 2024-04-02 DIAGNOSIS — C56.1: ICD-10-CM

## 2024-04-02 PROCEDURE — 71260 CT THORAX DX C+: CPT | Mod: 26,,, | Performed by: RADIOLOGY

## 2024-04-02 PROCEDURE — 25500020 PHARM REV CODE 255: Mod: PN | Performed by: OBSTETRICS & GYNECOLOGY

## 2024-04-02 PROCEDURE — A9698 NON-RAD CONTRAST MATERIALNOC: HCPCS | Mod: PN | Performed by: OBSTETRICS & GYNECOLOGY

## 2024-04-02 PROCEDURE — 74177 CT ABD & PELVIS W/CONTRAST: CPT | Mod: TC,PN

## 2024-04-02 PROCEDURE — 74177 CT ABD & PELVIS W/CONTRAST: CPT | Mod: 26,,, | Performed by: RADIOLOGY

## 2024-04-02 RX ADMIN — IOHEXOL 100 ML: 350 INJECTION, SOLUTION INTRAVENOUS at 08:04

## 2024-04-02 RX ADMIN — IOHEXOL 1000 ML: 12 SOLUTION ORAL at 08:04

## 2024-04-03 LAB — INHIBIN A SERPL-MCNC: <2 PG/ML

## 2024-04-04 LAB — INHIBIN B SERPL IA-MCNC: <10 PG/ML

## 2024-04-10 ENCOUNTER — TELEPHONE (OUTPATIENT)
Dept: OPHTHALMOLOGY | Facility: CLINIC | Age: 69
End: 2024-04-10
Payer: COMMERCIAL

## 2024-04-10 ENCOUNTER — OFFICE VISIT (OUTPATIENT)
Dept: PRIMARY CARE CLINIC | Facility: CLINIC | Age: 69
End: 2024-04-10
Payer: COMMERCIAL

## 2024-04-10 VITALS
OXYGEN SATURATION: 97 % | BODY MASS INDEX: 31.69 KG/M2 | WEIGHT: 185.63 LBS | HEIGHT: 64 IN | SYSTOLIC BLOOD PRESSURE: 148 MMHG | DIASTOLIC BLOOD PRESSURE: 90 MMHG | HEART RATE: 68 BPM | TEMPERATURE: 97 F

## 2024-04-10 DIAGNOSIS — I10 PRIMARY HYPERTENSION: Primary | ICD-10-CM

## 2024-04-10 DIAGNOSIS — H02.823 CYST, EYELID, RIGHT: ICD-10-CM

## 2024-04-10 DIAGNOSIS — I70.0 AORTIC ATHEROSCLEROSIS: ICD-10-CM

## 2024-04-10 DIAGNOSIS — E78.00 HYPERCHOLESTEROLEMIA: ICD-10-CM

## 2024-04-10 DIAGNOSIS — R73.09 ELEVATED GLUCOSE: ICD-10-CM

## 2024-04-10 DIAGNOSIS — I25.10 CORONARY ARTERY DISEASE INVOLVING NATIVE HEART, UNSPECIFIED VESSEL OR LESION TYPE, UNSPECIFIED WHETHER ANGINA PRESENT: ICD-10-CM

## 2024-04-10 PROCEDURE — 1126F AMNT PAIN NOTED NONE PRSNT: CPT | Mod: CPTII,S$GLB,, | Performed by: FAMILY MEDICINE

## 2024-04-10 PROCEDURE — 3288F FALL RISK ASSESSMENT DOCD: CPT | Mod: CPTII,S$GLB,, | Performed by: FAMILY MEDICINE

## 2024-04-10 PROCEDURE — 1101F PT FALLS ASSESS-DOCD LE1/YR: CPT | Mod: CPTII,S$GLB,, | Performed by: FAMILY MEDICINE

## 2024-04-10 PROCEDURE — 99999 PR PBB SHADOW E&M-EST. PATIENT-LVL IV: CPT | Mod: PBBFAC,,, | Performed by: FAMILY MEDICINE

## 2024-04-10 PROCEDURE — G2211 COMPLEX E/M VISIT ADD ON: HCPCS | Mod: S$GLB,,, | Performed by: FAMILY MEDICINE

## 2024-04-10 PROCEDURE — 3077F SYST BP >= 140 MM HG: CPT | Mod: CPTII,S$GLB,, | Performed by: FAMILY MEDICINE

## 2024-04-10 PROCEDURE — 4010F ACE/ARB THERAPY RXD/TAKEN: CPT | Mod: CPTII,S$GLB,, | Performed by: FAMILY MEDICINE

## 2024-04-10 PROCEDURE — 3008F BODY MASS INDEX DOCD: CPT | Mod: CPTII,S$GLB,, | Performed by: FAMILY MEDICINE

## 2024-04-10 PROCEDURE — 3080F DIAST BP >= 90 MM HG: CPT | Mod: CPTII,S$GLB,, | Performed by: FAMILY MEDICINE

## 2024-04-10 PROCEDURE — 99215 OFFICE O/P EST HI 40 MIN: CPT | Mod: S$GLB,,, | Performed by: FAMILY MEDICINE

## 2024-04-10 RX ORDER — METOPROLOL SUCCINATE 50 MG/1
50 TABLET, EXTENDED RELEASE ORAL 2 TIMES DAILY
Qty: 180 TABLET | Refills: 3 | Status: SHIPPED | OUTPATIENT
Start: 2024-04-10

## 2024-04-10 RX ORDER — ROSUVASTATIN CALCIUM 20 MG/1
20 TABLET, COATED ORAL NIGHTLY
Qty: 90 TABLET | Refills: 3 | Status: SHIPPED | OUTPATIENT
Start: 2024-04-10 | End: 2025-04-10

## 2024-04-10 RX ORDER — AMLODIPINE BESYLATE 10 MG/1
10 TABLET ORAL DAILY
Qty: 90 TABLET | Refills: 3 | Status: SHIPPED | OUTPATIENT
Start: 2024-04-10

## 2024-04-10 RX ORDER — OLMESARTAN MEDOXOMIL AND HYDROCHLOROTHIAZIDE 40/25 40; 25 MG/1; MG/1
1 TABLET ORAL DAILY
Qty: 90 TABLET | Refills: 3 | Status: SHIPPED | OUTPATIENT
Start: 2024-04-10 | End: 2025-04-10

## 2024-04-10 NOTE — TELEPHONE ENCOUNTER
Spoke with Ms. Ford and she would like someone from   Dr Salomon staff to call her tomorrow. She needs to reschedule her procedure. kf

## 2024-04-10 NOTE — PROGRESS NOTES
"    [facility]       [address] [place] [address]      Phone: [phone]      Fax: [phone]    Kishan Kelley MD                Office Visit  04/10/2024        Subjective      HPI:  Liliane Ford is a 68 y.o. female presents today in clinic for "Follow-up  ."     68-year-old female presents today to follow-up on multiple issues.     Patient states that she feels pretty good today.  No chest pains, shortness of breath.  No fever, chills, body aches.  No coughing, sneezing, URI type symptoms.  Appetite normal.  Bowel movements normal.  No urinary issues.      Has a spot on her right, upper, eyelid that has been there for decades.  Not really changing in size.  Bothers her.  She would like to get rid of it.  Sometimes affects vision because when she blinks, she can see it.    Last visit, she was having some vaginal bleeding.  Wound up having a hysterectomy for fibroids.  During the procedure, one of her ovaries looked suspicious, so they removed that plus fallopian tube on that side.  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  She followed up with gyn/onc.  They had her do CT scans, blood work.  Was told it had not spread anywhere, but they would like to remove the other ovary/fallopian tube, just to be safe.  That procedure is scheduled in a couple of weeks.    Has follow-up appointment with the cardiologist next week.  They will discuss whether or not to do a heart catheterization.  She would really prefer not.      Has hypertension.  In addition to the Toprol-XL 50 mg, twice a day, she is also taking losartan 100 mg daily, amlodipine 10 mg daily.  Blood pressure still slightly elevated.    PMH: HTN, fibroids  PSH:   F MH: None significant reported  allergies:  Penicillin caused a rash as a child.    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  .    T: No current use.  Quit 40+ years ago.    a: Denies  D: Denies    exercise: " Daily    Colon:  2022.  Cologuard negative.  Repeat three years ().    MM2023     gyn:  Dr. Aquino ([facility])    Has not had pneumonia vaccine.  She declines vaccines.        The following were updated and reviewed by myself in the chart: medications, past medical history, past surgical history, family history, social history, and allergies.     Medications:  Current Outpatient Medications on File Prior to Visit   Medication Sig Dispense Refill    ascorbic acid, vitamin C, (VITAMIN C) 500 MG tablet Take 1 tablet (500 mg total) by mouth every evening.      aspirin (ECOTRIN) 81 MG EC tablet Take 1 tablet (81 mg total) by mouth once daily. 90 tablet 3    b complex vitamins tablet Take 1 tablet by mouth once daily. B COMPLETE      nitroGLYCERIN (NITROSTAT) 0.4 MG SL tablet Place 1 tablet (0.4 mg total) under the tongue every 5 (five) minutes as needed. 20 tablet 12    [DISCONTINUED] amLODIPine (NORVASC) 10 MG tablet TAKE 1 TABLET(10 MG) BY MOUTH EVERY DAY 90 tablet 3    [DISCONTINUED] losartan (COZAAR) 100 MG tablet Take 1 tablet (100 mg total) by mouth once daily. 90 tablet 3    [DISCONTINUED] metoprolol succinate (TOPROL-XL) 50 MG 24 hr tablet Take 1 tablet (50 mg total) by mouth 2 (two) times daily. 180 tablet 3    [DISCONTINUED] rosuvastatin (CRESTOR) 20 MG tablet Take 1 tablet (20 mg total) by mouth every evening. 90 tablet 3     No current facility-administered medications on file prior to visit.        PMHx:  Past Medical History:   Diagnosis Date    Coronary artery disease     Encounter for blood transfusion     Hyperlipidemia     Hypertension     Pulmonary edema       Patient Active Problem List    Diagnosis Date Noted    Aortic atherosclerosis 04/10/2024    S/P laparoscopic robotic assisted hysterectomy with left salpmingectomy and right salpingo ophorectomysalpingo opp 2024    Iron deficiency anemia 2023    Coronary artery disease involving native coronary artery without angina  pectoris 2023    Abnormal nuclear stress test 2023    Class 1 obesity due to excess calories with serious comorbidity and body mass index (BMI) of 34.0 to 34.9 in adult 2023    LAE (left atrial enlargement) 2023    Diastolic dysfunction 2023    Abnormal computed tomography angiography (CTA) 2022    History of endometrial hyperplasia 2022    Fibroids 2022    Palpitations 2022    Abnormal ECG 2022    Hypercholesterolemia 2022    Preop cardiovascular exam 2022    Delayed immunizations 2022    Bunion 2022    Symptomatic anemia 2020    Postmenopausal bleeding 2020    Essential hypertension 2020    Thrombocytosis 2020    Elevated AST (SGOT) 2020    Cardiomegaly 2020    Low iron 2020    Thyroid nodule 2020        PSHx:  Past Surgical History:   Procedure Laterality Date     SECTION      x 2    EYE SURGERY          TUBAL LIGATION          XI ROBOTIC HYSTERECTOMY, WITH SALPINGO-OOPHORECTOMY Right 2024    Procedure: XI ROBOTIC HYSTERECTOMY,WITH SALPINGO-OOPHORECTOMY;  Surgeon: Ema Aquino MD;  Location: [facility];  Service: OB/GYN;  Laterality: Right;        FHx:  Family History   Problem Relation Age of Onset    Hypertension Mother         Social:  Social History     Socioeconomic History    Marital status:     Number of children: 3   Tobacco Use    Smoking status: Former     Current packs/day: 0.25     Average packs/day: 0.3 packs/day for 41.0 years (10.3 ttl pk-yrs)     Types: Cigarettes    Smokeless tobacco: Never   Substance and Sexual Activity    Alcohol use: Never    Drug use: Never    Sexual activity: Not Currently     Partners: Male     Birth control/protection: Post-menopausal     Social Determinants of Health     Financial Resource Strain: Low Risk  (2023)    Overall Financial Resource Strain (CARDIA)     Difficulty of Paying Living Expenses: Not  hard at all   Food Insecurity: No Food Insecurity (12/5/2023)    Hunger Vital Sign     Worried About Running Out of Food in the Last Year: Never true     Ran Out of Food in the Last Year: Never true   Transportation Needs: No Transportation Needs (12/5/2023)    PRAPARE - Transportation     Lack of Transportation (Medical): No     Lack of Transportation (Non-Medical): No   Physical Activity: Sufficiently Active (12/5/2023)    Exercise Vital Sign     Days of Exercise per Week: 6 days     Minutes of Exercise per Session: 40 min   Stress: No Stress Concern Present (12/5/2023)    [employer] - Occupational Stress Questionnaire     Feeling of Stress : Not at all   Social Connections: Unknown (12/5/2023)    Social Connection and Isolation Panel [NHANES]     Frequency of Communication with Friends and Family: Once a week     Frequency of Social Gatherings with Friends and Family: Once a week     Active Member of Clubs or Organizations: Yes     Attends Club or Organization Meetings: More than 4 times per year     Marital Status:    Housing Stability: Low Risk  (12/5/2023)    Housing Stability Vital Sign     Unable to Pay for Housing in the Last Year: No     Number of Places Lived in the Last Year: 1     Unstable Housing in the Last Year: No        Allergies:  Review of patient's allergies indicates:   Allergen Reactions    Penicillins Rash    Venofer [iron sucrose] Hives, Itching and Other (See Comments)     welts        ROS:  Review of Systems   Constitutional:  Negative for activity change, appetite change, chills and fever.   HENT:  Negative for congestion, postnasal drip, rhinorrhea, sore throat and trouble swallowing.    Respiratory:  Negative for cough, shortness of breath and wheezing.    Cardiovascular:  Negative for chest pain and palpitations.   Gastrointestinal:  Negative for abdominal pain, constipation, diarrhea, nausea and vomiting.   Genitourinary:  Negative for difficulty  "urinating.   Musculoskeletal:  Negative for arthralgias and myalgias.   Skin:  Negative for color change and rash.   Neurological:  Negative for speech difficulty and headaches.   All other systems reviewed and are negative.         Objective      BP (!) 148/90   Pulse 68   Temp 97.1 °F (36.2 °C)   Ht 5' 3.5" (1.613 m)   Wt 84.2 kg (185 lb 10 oz)   SpO2 97%   BMI 32.37 kg/m²   Ht Readings from Last 3 Encounters:   04/10/24 5' 3.5" (1.613 m)   03/19/24 5' 3.5" (1.613 m)   03/13/24 5' 3.5" (1.613 m)     Wt Readings from Last 3 Encounters:   04/10/24 84.2 kg (185 lb 10 oz)   03/19/24 83.2 kg (183 lb 6.8 oz)   03/13/24 83 kg (182 lb 15.7 oz)       PHYSICAL EXAM:  Physical Exam  Vitals and nursing note reviewed.   Constitutional:       General: She is not in acute distress.     Appearance: Normal appearance.   HENT:      Head: Normocephalic and atraumatic.      Right Ear: Tympanic membrane, ear canal and external ear normal.      Left Ear: Tympanic membrane, ear canal and external ear normal.      Nose: Nose normal. No congestion or rhinorrhea.      Mouth/Throat:      Mouth: Mucous membranes are moist.      Pharynx: Oropharynx is clear. No oropharyngeal exudate or posterior oropharyngeal erythema.   Eyes:      Extraocular Movements: Extraocular movements intact.      Conjunctiva/sclera: Conjunctivae normal.      Pupils: Pupils are equal, round, and reactive to light.   Cardiovascular:      Rate and Rhythm: Normal rate and regular rhythm.   Pulmonary:      Effort: Pulmonary effort is normal. No respiratory distress.      Breath sounds: No wheezing, rhonchi or rales.   Musculoskeletal:         General: Normal range of motion.      Cervical back: Normal range of motion.   Lymphadenopathy:      Cervical: No cervical adenopathy.   Skin:     General: Skin is warm and dry.      Findings: No rash.   Neurological:      Mental Status: She is alert.              LABS / IMAGING:  Recent Results (from the past 4368 hour(s)) "   CBC Auto Differential    Collection Time: 11/16/23 11:54 AM   Result Value Ref Range    WBC 5.28 3.90 - 12.70 K/uL    RBC 4.33 4.00 - 5.40 M/uL    Hemoglobin 11.0 (L) 12.0 - 16.0 g/dL    Hematocrit 35.7 (L) 37.0 - 48.5 %    MCV 82 82 - 98 fL    MCH 25.4 (L) 27.0 - 31.0 pg    MCHC 30.8 (L) 32.0 - 36.0 g/dL    RDW 13.2 11.5 - 14.5 %    Platelets 426 150 - 450 K/uL    MPV 10.3 9.2 - 12.9 fL    Immature Granulocytes 0.4 0.0 - 0.5 %    Gran # (ANC) 3.3 1.8 - 7.7 K/uL    Immature Grans (Abs) 0.02 0.00 - 0.04 K/uL    Lymph # 1.5 1.0 - 4.8 K/uL    Mono # 0.5 0.3 - 1.0 K/uL    Eos # 0.1 0.0 - 0.5 K/uL    Baso # 0.03 0.00 - 0.20 K/uL    nRBC 0 0 /100 WBC    Gran % 61.9 38.0 - 73.0 %    Lymph % 27.7 18.0 - 48.0 %    Mono % 8.5 4.0 - 15.0 %    Eosinophil % 0.9 0.0 - 8.0 %    Basophil % 0.6 0.0 - 1.9 %    Differential Method Automated    Iron and TIBC    Collection Time: 11/16/23 11:54 AM   Result Value Ref Range    Iron 177 (H) 30 - 160 ug/dL    Transferrin 298 200 - 375 mg/dL    TIBC 441 250 - 450 ug/dL    Saturated Iron 40 20 - 50 %   Ferritin    Collection Time: 11/16/23 11:54 AM   Result Value Ref Range    Ferritin 17 (L) 20.0 - 300.0 ng/mL   Basic Metabolic Panel    Collection Time: 11/16/23 11:54 AM   Result Value Ref Range    Sodium 141 136 - 145 mmol/L    Potassium 4.7 3.5 - 5.1 mmol/L    Chloride 107 95 - 110 mmol/L    CO2 24 23 - 29 mmol/L    Glucose 107 70 - 110 mg/dL    BUN 7 (L) 8 - 23 mg/dL    Creatinine 0.9 0.5 - 1.4 mg/dL    Calcium 9.3 8.7 - 10.5 mg/dL    Anion Gap 10 8 - 16 mmol/L    eGFR >60 >60 mL/min/1.73 m^2   CBC Auto Differential    Collection Time: 02/06/24 11:11 AM   Result Value Ref Range    WBC 4.86 3.90 - 12.70 K/uL    RBC 5.36 4.00 - 5.40 M/uL    Hemoglobin 13.0 12.0 - 16.0 g/dL    Hematocrit 40.8 37.0 - 48.5 %    MCV 76 (L) 82 - 98 fL    MCH 24.3 (L) 27.0 - 31.0 pg    MCHC 31.9 (L) 32.0 - 36.0 g/dL    RDW 18.0 (H) 11.5 - 14.5 %    Platelets 259 150 - 450 K/uL    MPV 11.9 9.2 - 12.9 fL     Immature Granulocytes 0.2 0.0 - 0.5 %    Gran # (ANC) 3.0 1.8 - 7.7 K/uL    Immature Grans (Abs) 0.01 0.00 - 0.04 K/uL    Lymph # 1.4 1.0 - 4.8 K/uL    Mono # 0.4 0.3 - 1.0 K/uL    Eos # 0.1 0.0 - 0.5 K/uL    Baso # 0.04 0.00 - 0.20 K/uL    nRBC 0 0 /100 WBC    Gran % 60.8 38.0 - 73.0 %    Lymph % 28.6 18.0 - 48.0 %    Mono % 8.0 4.0 - 15.0 %    Eosinophil % 1.6 0.0 - 8.0 %    Basophil % 0.8 0.0 - 1.9 %    Differential Method Automated    Ferritin    Collection Time: 02/06/24 11:11 AM   Result Value Ref Range    Ferritin 14 (L) 20.0 - 300.0 ng/mL   Iron and TIBC    Collection Time: 02/06/24 11:11 AM   Result Value Ref Range    Iron 92 30 - 160 ug/dL    Transferrin 299 200 - 375 mg/dL    TIBC 443 250 - 450 ug/dL    Saturated Iron 21 20 - 50 %   Specimen to Pathology, Surgery Gynecology and Obstetrics    Collection Time: 02/28/24  9:44 AM   Result Value Ref Range    Final Pathologic Diagnosis       Uterus, cervix, right ovary and bilateral fallopian tubes weighing 381 g show a granulosa cell tumor, adult type with areas of hemorrhage and necrosis present in the right ovary.  See synoptic report:  Procedure:  Total hysterectomy, right salpingo-oophorectomy and left salpingectomy  Specimen integrity:  Right ovarian capsule is disrupted but was intact prior to surgery.  Tumor site:  Right ovary  Tumor size:  2.5 cm  Histologic type:  Granulosa cell tumor, adult type.  There is 1 mitotic figure per 10 high-power fields identified.  No anaplastic features are identified.  Histologic grade:  Not applicable   Ovarian surface involvement:  Not identified  Other tissue or organ involvement:  None involved  Peritoneal/ascitic fluid:  Not submitted/unknown   Regional lymph nodes:  None submitted  Distant metastasis: Not applicable  Special studies:  Immunostain for inhibin is positive.  Immunostain for calretinin is positive.  Immunostain for CD56 is focally positive.  Immunostain for EMA i s negative.  All the controls stain  appropriately.  These findings support the diagnosis of   an adult granulosa cell tumor.  Additional findings:  The endometrium shows areas of complex hyperplasia without atypia and extensive adenomyosis.  No malignancy identified.  The entire endometrial cavity is submitted for histologic evaluation.  Also, two intramural leiomyomas are   identified.   Tumor stage: pT1a Nx    FIGO IA  Note:  Tumor is present in slides/blocks J through Q for additional testing as needed.      Gross       Surgery ID:  [id number]   Pathology ID:  [id number]  1. Received in formalin labeled &quot;uterus, cervix, right tube and ovary and left tube&quot; is a 381 g, 13 x 13 x 7 cm aggregate of fragmented uterus with detached cervix and bilateral, fimbriated fallopian tubes with an ovary received attached to 1   of the fallopian tubes.  The specimen can not be oriented at the time of grossing due to fragmentation.  The uterine serosa is pink-red.  The exocervix is 3.5 x 3.4 cm, pink-red, and slightly disrupted with a patent, circular os measuring 0.9 cm in   diameter.  The cervical canal is lined by pink-red, unremarkable mucosa.  The endometrial cavity is 4.5 x 2.9 cm and is lined by pink-red endometrium averaging 0.1 cm in thickness.  The myometrium is tan-pink and trabeculated and has multiple smooth   lined, brown fluid filled cysts measuring up to 0.4 cm in greatest dimension.  Two tan-white, whorled, rubbery intramural nodules ranging from 1.8-2.1 cm in greatest dimension are identified.  The  ovary is 146 g, 7.4 x 6.8 x 5.5 cm, tan-pink, smooth, and   has a 2.5 cm in diameter disrupted area.  Sectioning through the ovary reveals a lobulated, tan-yellow, rubbery, solid cut surface with few scattered smooth lined, hemorrhagic fluid filled cysts.  There is a 2.5 x 2 x 1.5 cm friable area of the cut   surface located at the previously described disrupted area.  The fallopian tube attached to this ovary is 6.2 cm in length by 0.6 cm in  diameter, pink-red, patent, and unremarkable.  The remaining fallopian tube is 3.1 cm in length by 0.5 cm in diameter   pink-red, patent, and has multiple paratubal cysts measuring up to 1.2 cm in greatest dimension.  Representative sections are submitted as follows:   YME--1-A: Representative sections of cervix  FYD--1-B: Representative sections of endomyometrium  HAL--1-C: Representative sections of endomyometrium  FAG--1-D: Representative sections of nodules   STR--1-E: Fimbriated end of fallopian tube with at tached ovary, bisected, submitted entirely  ZGU--1-F - QDH--1-G: Fallopian tube with attached ovary, submitted entirely  DEM--1-H: Fimbriated end of remaining fallopian tube, bisected, submitted entirely  VBM--1-I:  Remaining fallopian tube, submitted entirely  OTL--1-J: Disrupted area of ovary with exposed, friable tissue  QPY--1-K: Disrupted area of ovary with exposed, friable tissue  GDW--1-L - XMT--1-Q: Representative sections of ovary    Following initial microscopic examination, the remainder of the endometrium is submitted in cassettes 1R-1AC.    Grossed by: Laureen Soto MS, PA([facility])           Disclaimer       Unless the case is a 'gross only' or additional testing only, the final diagnosis for each specimen is based on a microscopic examination of appropriate tissue sections.   Inhibin    Collection Time: 04/01/24  9:06 AM   Result Value Ref Range    Inhibin A <2.0 pg/mL   Inhibin B    Collection Time: 04/01/24  9:06 AM   Result Value Ref Range    Inhibin B, Serum <10 pg/mL   Creatinine, serum    Collection Time: 04/01/24  9:06 AM   Result Value Ref Range    Creatinine 0.8 0.5 - 1.4 mg/dL    eGFR >60.0 >60 mL/min/1.73 m^2   CANCER ANTIGEN 125    Collection Time: 04/01/24  9:06 AM   Result Value Ref Range     13 0 - 30 U/mL         Assessment    1. Primary hypertension    2. Coronary artery disease  involving native heart, unspecified vessel or lesion type, unspecified whether angina present    3. Hypercholesterolemia    4. Aortic atherosclerosis    5. Cyst, eyelid, right          Plan    Liliane was seen today for follow-up.    Diagnoses and all orders for this visit:    Primary hypertension  -     amLODIPine (NORVASC) 10 MG tablet; Take 1 tablet (10 mg total) by mouth once daily.  -     olmesartan-hydrochlorothiazide (BENICAR HCT) 40-25 mg per tablet; Take 1 tablet by mouth once daily.  -     metoprolol succinate (TOPROL-XL) 50 MG 24 hr tablet; Take 1 tablet (50 mg total) by mouth 2 (two) times daily.  -     Hypertension Digital Medicine (HDMP) Enrollment Order    Coronary artery disease involving native heart, unspecified vessel or lesion type, unspecified whether angina present  -     rosuvastatin (CRESTOR) 20 MG tablet; Take 1 tablet (20 mg total) by mouth every evening.  -     metoprolol succinate (TOPROL-XL) 50 MG 24 hr tablet; Take 1 tablet (50 mg total) by mouth 2 (two) times daily.    Hypercholesterolemia  -     Lipids Digital Medicine (LDMP) Enrollment Order  -     Lipids Digital Medicine (LDMP) Enrollment Order    Aortic atherosclerosis    Cyst, eyelid, right  -     Ambulatory referral/consult to Ophthalmology; Future      Physically, everything looks good today.      Spoke with Dr. Cuenca, Ophthalmology, about the cyst on her eyelid.  He can remove that easily.  He is doing procedures on Friday, so he will reach out and get her scheduled.  Referral placed.    Blood pressure a bit elevated today.  Continue amlodipine, Toprol-XL.  Will change losartan to olmesartan-HCTZ.  She has an appointment with Cardiology next week so we can get a blood pressure reading that day.  Additionally, we will make sure she gets enrolled in digital medicine program so we can track her pressure at home.  Enrollment orders placed.    She has a lab appointment scheduled for 05/14/2024.  I am going to place additional  orders to check A1c, TSH, lipid panel at that visit.    FOLLOW-UP:  Follow up in about 6 months (around 10/10/2024) for check up.    I spent a total of 45 minutes face to face and non-face to face on the date of this visit.This includes time preparing to see the patient (eg, review of tests, notes), obtaining and/or reviewing additional history from an independent historian and/or outside medical records, documenting clinical information in the electronic health record, independently interpreting results and/or communicating results to the patient/family/caregiver, or care coordinator.  Visit today included increased complexity associated with the care of the episodic problem addressed and managing the longitudinal care of the patient due to the serious and/or complex managed problem(s).    Signed by:  Kishan Kelley MD

## 2024-04-10 NOTE — PATIENT INSTRUCTIONS
Physically, everything looks pretty good today.      Blood pressure, however, is a bit higher than we would like.  Keep taking your amlodipine, but let us change your losartan to olmesartan-HCTZ.  I am sending a new prescription to the pharmacy today.  Go ahead and fill the prescription and make the switch.      Would also like you to stop by our [facility] in the lobby to speak with Mr. Gonzalez.  He will connect your phone to [employer] so that when you check your pressure at home, it will send us the readings.    My eye doctor, Dr. Cuenca, can certainly get rid of that cyst on your eyelid.  He is doing procedures on Friday.  Should be able to see you that day at 1:00 p.m..  His office should reach out to schedule an appointment.      Keep your appointment with Cardiology next week.    Continue to eat a healthy diet.  Be careful with portion sizes.  Includes lots of fresh fruits, vegetables, whole grains, lean proteins.  See info below.    Keep hydrated.  Be sure to drink at least 8-10, 8 oz, glasses of water every day.    Stay active.  Try to do some sort of physical activity every day.  Nothing outrageous, just try walking for 10-15 minutes each day.

## 2024-04-12 ENCOUNTER — HOSPITAL ENCOUNTER (OUTPATIENT)
Dept: PREADMISSION TESTING | Facility: OTHER | Age: 69
Discharge: HOME | End: 2024-04-12
Attending: OBSTETRICS & GYNECOLOGY
Payer: COMMERCIAL

## 2024-04-12 ENCOUNTER — ANESTHESIA EVENT (OUTPATIENT)
Dept: SURGERY | Facility: OTHER | Age: 69
DRG: 738 | End: 2024-04-12
Payer: COMMERCIAL

## 2024-04-12 VITALS
WEIGHT: 182 LBS | RESPIRATION RATE: 21 BRPM | HEIGHT: 64 IN | DIASTOLIC BLOOD PRESSURE: 79 MMHG | HEART RATE: 73 BPM | TEMPERATURE: 98 F | BODY MASS INDEX: 31.07 KG/M2 | OXYGEN SATURATION: 98 % | SYSTOLIC BLOOD PRESSURE: 144 MMHG

## 2024-04-12 DIAGNOSIS — Z01.818 PREOP TESTING: Primary | ICD-10-CM

## 2024-04-12 DIAGNOSIS — C56.1: ICD-10-CM

## 2024-04-12 LAB
ABO + RH BLD: NORMAL
ANION GAP SERPL CALC-SCNC: 13 MMOL/L (ref 8–16)
BASOPHILS # BLD AUTO: 0.04 K/UL (ref 0–0.2)
BASOPHILS NFR BLD: 0.7 % (ref 0–1.9)
BLD GP AB SCN CELLS X3 SERPL QL: NORMAL
BUN SERPL-MCNC: 15 MG/DL (ref 8–23)
CALCIUM SERPL-MCNC: 10.4 MG/DL (ref 8.7–10.5)
CHLORIDE SERPL-SCNC: 102 MMOL/L (ref 95–110)
CO2 SERPL-SCNC: 27 MMOL/L (ref 23–29)
CREAT SERPL-MCNC: 1 MG/DL (ref 0.5–1.4)
DIFFERENTIAL METHOD BLD: ABNORMAL
EOSINOPHIL # BLD AUTO: 0.1 K/UL (ref 0–0.5)
EOSINOPHIL NFR BLD: 1.1 % (ref 0–8)
ERYTHROCYTE [DISTWIDTH] IN BLOOD BY AUTOMATED COUNT: 16.7 % (ref 11.5–14.5)
EST. GFR  (NO RACE VARIABLE): >60 ML/MIN/1.73 M^2
GLUCOSE SERPL-MCNC: 137 MG/DL (ref 70–110)
HCT VFR BLD AUTO: 43.7 % (ref 37–48.5)
HGB BLD-MCNC: 13.7 G/DL (ref 12–16)
IMM GRANULOCYTES # BLD AUTO: 0.01 K/UL (ref 0–0.04)
IMM GRANULOCYTES NFR BLD AUTO: 0.2 % (ref 0–0.5)
LYMPHOCYTES # BLD AUTO: 1.6 K/UL (ref 1–4.8)
LYMPHOCYTES NFR BLD: 29.6 % (ref 18–48)
MCH RBC QN AUTO: 25.2 PG (ref 27–31)
MCHC RBC AUTO-ENTMCNC: 31.4 G/DL (ref 32–36)
MCV RBC AUTO: 81 FL (ref 82–98)
MONOCYTES # BLD AUTO: 0.4 K/UL (ref 0.3–1)
MONOCYTES NFR BLD: 6.9 % (ref 4–15)
NEUTROPHILS # BLD AUTO: 3.4 K/UL (ref 1.8–7.7)
NEUTROPHILS NFR BLD: 61.5 % (ref 38–73)
NRBC BLD-RTO: 0 /100 WBC
PLATELET # BLD AUTO: 260 K/UL (ref 150–450)
PMV BLD AUTO: 11.1 FL (ref 9.2–12.9)
POTASSIUM SERPL-SCNC: 4 MMOL/L (ref 3.5–5.1)
RBC # BLD AUTO: 5.43 M/UL (ref 4–5.4)
SODIUM SERPL-SCNC: 142 MMOL/L (ref 136–145)
SPECIMEN OUTDATE: NORMAL
WBC # BLD AUTO: 5.54 K/UL (ref 3.9–12.7)

## 2024-04-12 PROCEDURE — 86901 BLOOD TYPING SEROLOGIC RH(D): CPT | Performed by: OBSTETRICS & GYNECOLOGY

## 2024-04-12 PROCEDURE — 80048 BASIC METABOLIC PNL TOTAL CA: CPT | Performed by: ANESTHESIOLOGY

## 2024-04-12 PROCEDURE — 36415 COLL VENOUS BLD VENIPUNCTURE: CPT | Performed by: ANESTHESIOLOGY

## 2024-04-12 PROCEDURE — 85025 COMPLETE CBC W/AUTO DIFF WBC: CPT | Performed by: ANESTHESIOLOGY

## 2024-04-12 RX ORDER — LIDOCAINE HYDROCHLORIDE 10 MG/ML
0.5 INJECTION, SOLUTION EPIDURAL; INFILTRATION; INTRACAUDAL; PERINEURAL ONCE
Status: CANCELLED | OUTPATIENT
Start: 2024-04-12 | End: 2024-04-12

## 2024-04-12 RX ORDER — ACETAMINOPHEN 500 MG
1000 TABLET ORAL
Status: CANCELLED | OUTPATIENT
Start: 2024-04-12 | End: 2024-04-12

## 2024-04-12 RX ORDER — SODIUM CHLORIDE, SODIUM LACTATE, POTASSIUM CHLORIDE, CALCIUM CHLORIDE 600; 310; 30; 20 MG/100ML; MG/100ML; MG/100ML; MG/100ML
INJECTION, SOLUTION INTRAVENOUS CONTINUOUS
Status: CANCELLED | OUTPATIENT
Start: 2024-04-12

## 2024-04-12 NOTE — ANESTHESIA PREPROCEDURE EVALUATION
04/12/2024  Liliane Ford is a 68 y.o., female.      Pre-op Assessment    I have reviewed the Patient Summary Reports.     I have reviewed the Nursing Notes. I have reviewed the NPO Status.   I have reviewed the Medications.     Review of Systems  Anesthesia Hx:             Denies Family Hx of Anesthesia complications.    Denies Personal Hx of Anesthesia complications.                    Social:  Former Smoker       Hematology/Oncology:       -- Anemia:               Hematology Comments: Hb 13    Current/Recent Cancer. (Ovarian)                Cardiovascular:     Hypertension   CAD    Dysrhythmias (palpitations on B-blocker)  Angina     hyperlipidemia    Pt with positive stress. CT angiogram showed 70% LAD lesion.  [facility] scheduled for 12/2022 cancelled due to symptomatic anemia. Decision made to treat CAD medically. Moderate risk per cardiology note 12/2023                         Pulmonary:  Pulmonary Normal                       Renal/:  Renal/ Normal                 Hepatic/GI:      Liver Disease,            Musculoskeletal:  Musculoskeletal Normal                Neurological:  Neurology Normal                                      Endocrine:        Obesity / BMI > 30      Physical Exam  General: Well nourished, Cooperative, Alert and Oriented    Airway:  Mallampati: II   Mouth Opening: Normal  TM Distance: Normal  Tongue: Normal  Neck ROM: Normal ROM    Dental:  Intact      Anesthesia Plan  Type of Anesthesia, risks & benefits discussed:    Anesthesia Type: Gen ETT  Intra-op Monitoring Plan: Standard ASA Monitors and Art Line  Post Op Pain Control Plan: multimodal analgesia  Induction:  IV  Airway Plan: Video, Post-Induction  Informed Consent: Informed consent signed with the Patient and all parties understand the risks and agree with anesthesia plan.  All questions answered.   ASA Score: 3  Anesthesia  Plan Notes: Positive Stress 11/22. CAD treated medically. Moderate cardiac risk.  Had hysterectomy 2/2024 without difficulty.  Consider A-line. CBC, BMP, T&S    Ready For Surgery From Anesthesia Perspective.     .

## 2024-04-12 NOTE — DISCHARGE INSTRUCTIONS
Information to Prepare you for your Surgery    PRE-ADMIT TESTING -  [phone]    [address]  [facility]          Your surgery has been scheduled at [facility]. We are pleased to have the opportunity to serve you. For Further Information please call [phone].    On the day of surgery please report to the Information Desk on the 1st floor.    CONTACT YOUR PHYSICIAN'S OFFICE THE DAY PRIOR TO YOUR SURGERY TO OBTAIN YOUR ARRIVAL TIME.     The evening before surgery do not eat anything after 9 p.m. ( this includes hard candy, chewing gum and mints).  You may only have GATORADE, POWERADE AND WATER  from 9 p.m. until you leave your home.   DO NOT DRINK ANY LIQUIDS ON THE WAY TO THE HOSPITAL.      Why does your anesthesiologist allow you to drink Gatorade/Powerade before surgery?  Gatorade/Powerade helps to increase your comfort before surgery and to decrease your nausea after surgery. The carbohydrates in Gatorade/Powerade help reduce your body's stress response to surgery.  If you are a diabetic-drink only water prior to surgery.    Outpatient Surgery- May allow 2 adult (18 and older) Support Persons (1 being the designated ) for all surgical/procedural patients. A breastfeeding mother will be allowed her infant and 2 adult Support Persons. No one under the age of 18 will be allowed in the building. No swapping out of visitors in the [facility].      SPECIAL MEDICATION INSTRUCTIONS: TAKE medications checked off by the Anesthesiologist on your Medication List.    Angiogram Patients: Take medications as instructed by your physician, including aspirin.     Surgery Patients:    If you take ASPIRIN - Your PHYSICIAN/SURGEON will need to inform you IF/OR when you need to stop taking aspirin prior to your surgery.     The week prior to surgery do not ot take any medications containing IBUPROFEN or NSAIDS ( Advil, Motrin, Goodys, BC, Aleve, Naproxen etc)  If you are not sure if you should take a medicine please call your surgeon's office.  Ok to take Tylenol    Do Not Wear any make-up (especially eye make-up) to surgery. Please remove any false eyelashes or eyelash extensions. If you arrive the day of surgery with makeup/eyelashes on you will be required to remove prior to surgery. (There is a risk of corneal abrasions if eye makeup/eyelash extensions are not removed)      Leave all valuables at home.   Do Not wear any jewelry or watches, including any metal in body piercings. Jewelry must be removed prior to coming to the hospital.  There is a possibility that rings that are unable to be removed may be cut off if they are on the surgical extremity.    Please remove all hair extensions, wigs, clips and any other metal accessories/ ornaments from your hair.  These items may pose a flammable/fire risk in Surgery and must be removed.    Do not shave your surgical area at least 5 days prior to your surgery. The surgical prep will be performed at the hospital according to Infection Control regulations.    Contact Lens must be removed before surgery. Either do not wear the contact lens or bring a case and solution for storage.  Please bring a container for eyeglasses or dentures as required.  Bring any paperwork your physician has provided, such as consent forms,  history and physicals, doctor's orders, etc.   Bring comfortable clothes that are loose fitting to wear upon discharge. Take into consideration the type of surgery being performed.  Maintain your diet as advised per your physician the day prior to surgery.      Adequate rest the night before surgery is advised.   Park in the Parking lot behind the hospital or in the [facility] across the street from the parking lot. Parking is complimentary.  If you will be discharged the same day as your procedure, please arrange for a responsible adult to drive you home or to accompany you if traveling by taxi.    YOU WILL NOT BE PERMITTED TO DRIVE OR TO LEAVE THE HOSPITAL ALONE AFTER SURGERY.   If you are being discharged the same day, it is strongly recommended that you arrange for someone to remain with you for the first 24 hrs following your surgery.    The Surgeon will speak to your family/visitor after your surgery regarding the outcome of your surgery and post op care.  The Surgeon may speak to you after your surgery, but there is a possibility you may not remember the details.  Please check with your family members regarding the conversation with the Surgeon.    We strongly recommend whoever is bringing you home be present for discharge instructions.  This will ensure a thorough understanding for your post op home care.          Thank you for your cooperation.  The Staff of [facility].            Bathing Instructions with Hibiclens    Shower the evening before and morning of your procedure with Chlorhexidine (Hibiclens)  do not use Chlorhexidine on your face or genitals. Do not get in your eyes.  Wash your face with water and your regular face wash/soap  Use your regular shampoo  Apply Chlorhexidine (Hibiclens) directly on your skin or on a wet washcloth and wash gently. When showering: Move away from the shower stream when applying Chlorhexidine (Hibiclens) to avoid rinsing off too soon.  Rinse thoroughly with warm water  Do not dilute Chlorhexidine (Hibiclens)   Dry off as usual, do not use any deodorant, powder, body lotions, perfume, after shave or cologne.

## 2024-04-16 ENCOUNTER — OFFICE VISIT (OUTPATIENT)
Dept: CARDIOLOGY | Facility: CLINIC | Age: 69
End: 2024-04-16
Payer: COMMERCIAL

## 2024-04-16 VITALS
BODY MASS INDEX: 31.47 KG/M2 | WEIGHT: 184.31 LBS | OXYGEN SATURATION: 94 % | HEART RATE: 62 BPM | SYSTOLIC BLOOD PRESSURE: 136 MMHG | HEIGHT: 64 IN | DIASTOLIC BLOOD PRESSURE: 80 MMHG

## 2024-04-16 DIAGNOSIS — R93.89 ABNORMAL COMPUTED TOMOGRAPHY ANGIOGRAPHY (CTA): ICD-10-CM

## 2024-04-16 DIAGNOSIS — I70.0 AORTIC ATHEROSCLEROSIS: ICD-10-CM

## 2024-04-16 DIAGNOSIS — E66.09 CLASS 1 OBESITY DUE TO EXCESS CALORIES WITH SERIOUS COMORBIDITY AND BODY MASS INDEX (BMI) OF 34.0 TO 34.9 IN ADULT: ICD-10-CM

## 2024-04-16 DIAGNOSIS — Z01.810 PREOP CARDIOVASCULAR EXAM: Primary | ICD-10-CM

## 2024-04-16 DIAGNOSIS — I10 ESSENTIAL HYPERTENSION: ICD-10-CM

## 2024-04-16 DIAGNOSIS — Z78.0 POSTMENOPAUSAL: ICD-10-CM

## 2024-04-16 DIAGNOSIS — R94.39 ABNORMAL NUCLEAR STRESS TEST: ICD-10-CM

## 2024-04-16 DIAGNOSIS — R00.2 PALPITATIONS: ICD-10-CM

## 2024-04-16 DIAGNOSIS — I51.89 DIASTOLIC DYSFUNCTION: ICD-10-CM

## 2024-04-16 DIAGNOSIS — I25.10 CORONARY ARTERY DISEASE INVOLVING NATIVE CORONARY ARTERY OF NATIVE HEART WITHOUT ANGINA PECTORIS: ICD-10-CM

## 2024-04-16 DIAGNOSIS — R94.31 ABNORMAL ECG: ICD-10-CM

## 2024-04-16 DIAGNOSIS — I51.7 LAE (LEFT ATRIAL ENLARGEMENT): ICD-10-CM

## 2024-04-16 PROCEDURE — 3008F BODY MASS INDEX DOCD: CPT | Mod: CPTII,S$GLB,, | Performed by: INTERNAL MEDICINE

## 2024-04-16 PROCEDURE — 1159F MED LIST DOCD IN RCRD: CPT | Mod: CPTII,S$GLB,, | Performed by: INTERNAL MEDICINE

## 2024-04-16 PROCEDURE — 1160F RVW MEDS BY RX/DR IN RCRD: CPT | Mod: CPTII,S$GLB,, | Performed by: INTERNAL MEDICINE

## 2024-04-16 PROCEDURE — 99214 OFFICE O/P EST MOD 30 MIN: CPT | Mod: S$GLB,,, | Performed by: INTERNAL MEDICINE

## 2024-04-16 PROCEDURE — 4010F ACE/ARB THERAPY RXD/TAKEN: CPT | Mod: CPTII,S$GLB,, | Performed by: INTERNAL MEDICINE

## 2024-04-16 PROCEDURE — 3079F DIAST BP 80-89 MM HG: CPT | Mod: CPTII,S$GLB,, | Performed by: INTERNAL MEDICINE

## 2024-04-16 PROCEDURE — 1126F AMNT PAIN NOTED NONE PRSNT: CPT | Mod: CPTII,S$GLB,, | Performed by: INTERNAL MEDICINE

## 2024-04-16 PROCEDURE — 3075F SYST BP GE 130 - 139MM HG: CPT | Mod: CPTII,S$GLB,, | Performed by: INTERNAL MEDICINE

## 2024-04-16 PROCEDURE — 99999 PR PBB SHADOW E&M-EST. PATIENT-LVL III: CPT | Mod: PBBFAC,,, | Performed by: INTERNAL MEDICINE

## 2024-04-16 NOTE — PROGRESS NOTES
Subjective:    Patient ID:  Liliane Ford is a 68 y.o. female who presents for evaluation of Hypertension, Hyperlipidemia, and Coronary Artery Disease          HPI:  Pt presents for eval.  Her current med conditions include CAD, HTN, ovarian cancer (dx Feb 2024), obesity, LAE, DD, abnl ecg, hypercholesterolemia.  Nonsmoker.  Past hx pertinent for following:  Pt seen as new pt in Nov 2022; per Nov 2022 visit:  She thinks she may have developed a heart condition after having heavy fibroid bleeding issues 2+ years ago, required 4 units PRBC.  PCP added Toprol for palpitations, HTN control in 2022.  Ecg 10/25/22 NSR, old septal infarct.  Old ecgs reviewed: Feb 2020 anterolateral infarct, and 8/30/22 NSR, old septal infarct.   Pt had nuclear stress test Nov 2022.  Personally reviewed: 1.5 mm ST depression, apical defect on my view.  Report indicated normal study.  Due to concerns, I advised Coronary CTA which was done in Nov 2022.  Coronary CTA suggested 70% mid LAD stenosis.  Echo Nov 2022 normal EF, DD, LAE, LVH.  LHC scheduled in Dec 2022 but she had admission Dec 2022 for severe anemia, Hg 4.9  Required multiple PRBC.  LHC was cancelled.  Has been working w hematology on anemia.  Now here.  S/p hysterectomy + bilateral salpingectomy and right oophorectomy due to fibroid bleeding; dx w ovarian cancer.  Due for left oophorectomy this week.  ecg 11/16/23 sinus erick 59 bpm, 1 av block, anteroseptal infarct (old).  CAD is stable.  Denies chest pain/angina.  Walks for exercise.  No CHF sxs.  No dizziness.  No syncope.  BP controlled.  Lipids well controlled on statin tx.  Compliant w meds.      Past Medical History:   Diagnosis Date    Coronary artery disease     Encounter for blood transfusion     Hyperlipidemia     Hypertension     Pulmonary edema      Current Outpatient Medications   Medication Instructions    amLODIPine (NORVASC) 10 mg, Oral, Daily    ascorbic acid (vitamin C) (VITAMIN C) 500 mg, Oral, Nightly     "aspirin (ECOTRIN) 81 mg, Oral, Daily    b complex vitamins tablet 1 tablet, Oral, Daily, B COMPLETE    metoprolol succinate (TOPROL-XL) 50 mg, Oral, 2 times daily    nitroGLYCERIN (NITROSTAT) 0.4 mg, Sublingual, Every 5 min PRN    olmesartan-hydrochlorothiazide (BENICAR HCT) 40-25 mg per tablet 1 tablet, Oral, Daily    rosuvastatin (CRESTOR) 20 mg, Oral, Nightly         Review of Systems   Constitutional: Negative.   HENT: Negative.     Eyes: Negative.    Cardiovascular: Negative.    Respiratory: Negative.     Endocrine: Negative.    Hematologic/Lymphatic: Negative.    Skin: Negative.    Musculoskeletal: Negative.    Gastrointestinal: Negative.    Genitourinary: Negative.    Neurological: Negative.    Psychiatric/Behavioral: Negative.     Allergic/Immunologic: Negative.         /80   Pulse 62   Ht 5' 3.5" (1.613 m)   Wt 83.6 kg (184 lb 4.9 oz)   SpO2 (!) 94%   BMI 32.14 kg/m²     Wt Readings from Last 3 Encounters:   04/16/24 83.6 kg (184 lb 4.9 oz)   04/12/24 82.6 kg (182 lb)   04/10/24 84.2 kg (185 lb 10 oz)     Temp Readings from Last 3 Encounters:   04/12/24 98.3 °F (36.8 °C) (Skin)   04/10/24 97.1 °F (36.2 °C)   02/28/24 98.1 °F (36.7 °C) (Temporal)     BP Readings from Last 3 Encounters:   04/16/24 136/80   04/12/24 (!) 144/79   04/10/24 (!) 148/90     Pulse Readings from Last 3 Encounters:   04/16/24 62   04/12/24 73   04/10/24 68       Objective:    Physical Exam  Vitals and nursing note reviewed.   Constitutional:       General: She is not in acute distress.     Appearance: Normal appearance. She is well-developed. She is not ill-appearing or diaphoretic.   HENT:      Head: Normocephalic.   Neck:      Thyroid: No thyromegaly.      Vascular: No carotid bruit or JVD.   Cardiovascular:      Rate and Rhythm: Normal rate and regular rhythm.      Pulses: Normal pulses.           Radial pulses are 2+ on the right side and 2+ on the left side.      Heart sounds: Normal heart sounds, S1 normal and S2 " normal. No murmur heard.     No friction rub. No gallop.   Pulmonary:      Effort: Pulmonary effort is normal.      Breath sounds: Normal breath sounds. No wheezing or rales.   Abdominal:      General: Bowel sounds are normal. There is no abdominal bruit.      Palpations: Abdomen is soft.      Tenderness: There is no abdominal tenderness.   Musculoskeletal:      Cervical back: Neck supple.   Lymphadenopathy:      Cervical: No cervical adenopathy.   Skin:     General: Skin is warm.   Neurological:      Mental Status: She is alert and oriented to person, place, and time.   Psychiatric:         Behavior: Behavior normal. Behavior is cooperative.         I have reviewed all pertinent labs and cardiac studies.      Chemistry        Component Value Date/Time     04/12/2024 1223    K 4.0 04/12/2024 1223     04/12/2024 1223    CO2 27 04/12/2024 1223    BUN 15 04/12/2024 1223    CREATININE 1.0 04/12/2024 1223     (H) 04/12/2024 1223        Component Value Date/Time    CALCIUM 10.4 04/12/2024 1223    ALKPHOS 51 (L) 06/02/2023 0709    AST 20 06/02/2023 0709    ALT 17 06/02/2023 0709    BILITOT 0.7 06/02/2023 0709    ESTGFRAFRICA >60 02/06/2020 0458    EGFRNONAA >60 02/06/2020 0458          Lab Results   Component Value Date    WBC 5.54 04/12/2024    HGB 13.7 04/12/2024    HCT 43.7 04/12/2024    MCV 81 (L) 04/12/2024     04/12/2024       Lab Results   Component Value Date    HGBA1C 4.9 08/30/2022     Lab Results   Component Value Date    CHOL 121 06/02/2023    CHOL 225 (H) 08/30/2022    CHOL 185 11/08/2007     Lab Results   Component Value Date    HDL 43 06/02/2023    HDL 71 08/30/2022    HDL 56 11/03/2010     Lab Results   Component Value Date    LDLCALC 67.4 06/02/2023    LDLCALC 142.4 08/30/2022    LDLCALC 160 (H) 11/03/2010     Lab Results   Component Value Date    TRIG 53 06/02/2023    TRIG 58 08/30/2022    TRIG 77 11/03/2010     Lab Results   Component Value Date    CHOLHDL 35.5 06/02/2023     CHOLHDL 31.6 08/30/2022    CHOLHDL 27.6 11/08/2007       Results for orders placed during the hospital encounter of 11/22/22    Echo    Interpretation Summary  · Concentric hypertrophy and normal systolic function.  · The estimated ejection fraction is 60%.  · Grade II left ventricular diastolic dysfunction.  · Normal right ventricular size with normal r`ight ventricular systolic function.  · Mild left atrial enlargement.        CONCLUSION:     1.  CAD-RADS 4/P1.     2.  Single-vessel CAD involving the mid LAD.  This is a 2.5 cm long mild (25-49%) lesion with a possible focal 70% stenosis.  FFR-CT is not available.     3. Coronary calcium score 13 (36 percentile).     4.  Post processed images are available for review in PACS under the heading Recons: HKD and HKD LAD CPR.      Assessment:       1. Preop cardiovascular exam    2. Coronary artery disease involving native coronary artery of native heart without angina pectoris    3. Abnormal ECG    4. Abnormal nuclear stress test    5. Abnormal computed tomography angiography (CTA)    6. Aortic atherosclerosis    7. Class 1 obesity due to excess calories with serious comorbidity and body mass index (BMI) of 34.0 to 34.9 in adult    8. LAE (left atrial enlargement)    9. Palpitations    10. Essential hypertension    11. Diastolic dysfunction    12. Postmenopausal         Plan:               Stable CV status on current med tx.  Preop CV eval: may proceed with ovarian surgery this week at moderate CV risk; no CV complications with recent surgery.  CAD: Nuclear stress test Nov 2022  correlates with her coronary CTA suggestive of 70% mid LAD stenosis.  LHC was cancelled Dec 2022 due to critical anemia.  On prior clinic visits over last 1.5 years, I highly doubted she could tolerate DAPT should any PCI be performed due to bleeding, h/o severe anemia.  Recommended continue medical tx and risk factor modification.  Sublingual nitroglycerin 0.4 mg for concerning chest pain  episodes or breakthrough angina.  Patient advised of indications, side effects of nitroglycerin and when to report to ER.   Reviewed all tests and above medical conditions with patient in detail and formulated treatment plan.  Continue optimal medical treatment for cardiovascular conditions.  Cardiac low salt diet advised.  Daily exercise encouraged, with the goal 30 +  minutes aerobic exercise as tolerated.  Obesity: Maintaining healthy weight and weight loss goals (if needed) were discussed in clinic.  HTN: Need for BP control and HTN goals (if needed) were discussed and tx plan formulated.  Goal < 130/80.  Continue current HTN meds.  Home BP monitoring advised.  Abnl ecg: chronically abnormal with anteroseptal infarct for years. Continue to monitor.  Lipids: Importance of optimal lipid control were discussed in detail as well as possible pharmacologic and lifestyle changes that may be needed.  Statin tx.      F/u in 4 - 6 months.    I have reviewed all pertinent labs and cardiac studies independently. Plans and recommendations have been formulated under my direct supervision. All questions answered and patient voiced understanding.

## 2024-04-17 ENCOUNTER — TELEPHONE (OUTPATIENT)
Dept: GYNECOLOGIC ONCOLOGY | Facility: CLINIC | Age: 69
End: 2024-04-17
Payer: COMMERCIAL

## 2024-04-19 ENCOUNTER — ANESTHESIA (OUTPATIENT)
Dept: SURGERY | Facility: OTHER | Age: 69
DRG: 738 | End: 2024-04-19
Payer: COMMERCIAL

## 2024-04-19 ENCOUNTER — HOSPITAL ENCOUNTER (INPATIENT)
Facility: OTHER | Age: 69
LOS: 1 days | Discharge: HOME | DRG: 738 | End: 2024-04-19
Attending: OBSTETRICS & GYNECOLOGY | Admitting: OBSTETRICS & GYNECOLOGY
Payer: COMMERCIAL

## 2024-04-19 DIAGNOSIS — Z90.710 S/P LAPAROSCOPIC HYSTERECTOMY: Primary | ICD-10-CM

## 2024-04-19 DIAGNOSIS — C56.1: ICD-10-CM

## 2024-04-19 PROCEDURE — 36000713 HC OR TIME LEV V EA ADD 15 MIN: Performed by: OBSTETRICS & GYNECOLOGY

## 2024-04-19 PROCEDURE — 63600175 PHARM REV CODE 636 W HCPCS: Performed by: OBSTETRICS & GYNECOLOGY

## 2024-04-19 PROCEDURE — 63600175 PHARM REV CODE 636 W HCPCS

## 2024-04-19 PROCEDURE — 71000016 HC POSTOP RECOV ADDL HR: Performed by: OBSTETRICS & GYNECOLOGY

## 2024-04-19 PROCEDURE — 25000003 PHARM REV CODE 250: Performed by: ANESTHESIOLOGY

## 2024-04-19 PROCEDURE — 88309 TISSUE EXAM BY PATHOLOGIST: CPT | Mod: 26,,, | Performed by: PATHOLOGY

## 2024-04-19 PROCEDURE — 63600175 PHARM REV CODE 636 W HCPCS: Performed by: STUDENT IN AN ORGANIZED HEALTH CARE EDUCATION/TRAINING PROGRAM

## 2024-04-19 PROCEDURE — 25000003 PHARM REV CODE 250: Performed by: OBSTETRICS & GYNECOLOGY

## 2024-04-19 PROCEDURE — 27201423 OPTIME MED/SURG SUP & DEVICES STERILE SUPPLY: Performed by: OBSTETRICS & GYNECOLOGY

## 2024-04-19 PROCEDURE — 88305 TISSUE EXAM BY PATHOLOGIST: CPT | Mod: 59 | Performed by: PATHOLOGY

## 2024-04-19 PROCEDURE — 63600175 PHARM REV CODE 636 W HCPCS: Performed by: NURSE ANESTHETIST, CERTIFIED REGISTERED

## 2024-04-19 PROCEDURE — 3E1M48X IRRIGATION OF PERITONEAL CAVITY USING IRRIGATING SUBSTANCE, PERCUTANEOUS ENDOSCOPIC APPROACH, DIAGNOSTIC: ICD-10-PCS | Performed by: OBSTETRICS & GYNECOLOGY

## 2024-04-19 PROCEDURE — 63600175 PHARM REV CODE 636 W HCPCS: Performed by: ANESTHESIOLOGY

## 2024-04-19 PROCEDURE — 88307 TISSUE EXAM BY PATHOLOGIST: CPT | Performed by: PATHOLOGY

## 2024-04-19 PROCEDURE — D9220A PRA ANESTHESIA: Mod: ANES,,, | Performed by: ANESTHESIOLOGY

## 2024-04-19 PROCEDURE — 25000003 PHARM REV CODE 250: Performed by: NURSE ANESTHETIST, CERTIFIED REGISTERED

## 2024-04-19 PROCEDURE — 37000008 HC ANESTHESIA 1ST 15 MINUTES: Performed by: OBSTETRICS & GYNECOLOGY

## 2024-04-19 PROCEDURE — 8E0W4CZ ROBOTIC ASSISTED PROCEDURE OF TRUNK REGION, PERCUTANEOUS ENDOSCOPIC APPROACH: ICD-10-PCS | Performed by: OBSTETRICS & GYNECOLOGY

## 2024-04-19 PROCEDURE — 11000001 HC ACUTE MED/SURG PRIVATE ROOM

## 2024-04-19 PROCEDURE — D9220A PRA ANESTHESIA: Mod: CRNA,,, | Performed by: NURSE ANESTHETIST, CERTIFIED REGISTERED

## 2024-04-19 PROCEDURE — 58661 LAPAROSCOPY REMOVE ADNEXA: CPT | Mod: 22,AS,58,LT | Performed by: PHYSICIAN ASSISTANT

## 2024-04-19 PROCEDURE — 36000712 HC OR TIME LEV V 1ST 15 MIN: Performed by: OBSTETRICS & GYNECOLOGY

## 2024-04-19 PROCEDURE — 71000015 HC POSTOP RECOV 1ST HR: Performed by: OBSTETRICS & GYNECOLOGY

## 2024-04-19 PROCEDURE — 71000033 HC RECOVERY, INTIAL HOUR: Performed by: OBSTETRICS & GYNECOLOGY

## 2024-04-19 PROCEDURE — 0DBW4ZZ EXCISION OF PERITONEUM, PERCUTANEOUS ENDOSCOPIC APPROACH: ICD-10-PCS | Performed by: OBSTETRICS & GYNECOLOGY

## 2024-04-19 PROCEDURE — 58661 LAPAROSCOPY REMOVE ADNEXA: CPT | Mod: 22,58,LT, | Performed by: OBSTETRICS & GYNECOLOGY

## 2024-04-19 PROCEDURE — 88305 TISSUE EXAM BY PATHOLOGIST: CPT | Mod: 26,,, | Performed by: PATHOLOGY

## 2024-04-19 PROCEDURE — 0UT64ZZ RESECTION OF LEFT FALLOPIAN TUBE, PERCUTANEOUS ENDOSCOPIC APPROACH: ICD-10-PCS | Performed by: OBSTETRICS & GYNECOLOGY

## 2024-04-19 PROCEDURE — 88309 TISSUE EXAM BY PATHOLOGIST: CPT | Performed by: PATHOLOGY

## 2024-04-19 PROCEDURE — 88305 TISSUE EXAM BY PATHOLOGIST: CPT | Performed by: PATHOLOGY

## 2024-04-19 PROCEDURE — 88112 CYTOPATH CELL ENHANCE TECH: CPT | Performed by: PATHOLOGY

## 2024-04-19 PROCEDURE — 71000039 HC RECOVERY, EACH ADD'L HOUR: Performed by: OBSTETRICS & GYNECOLOGY

## 2024-04-19 PROCEDURE — 76937 US GUIDE VASCULAR ACCESS: CPT | Mod: 26,,, | Performed by: ANESTHESIOLOGY

## 2024-04-19 PROCEDURE — P9045 ALBUMIN (HUMAN), 5%, 250 ML: HCPCS | Mod: JZ,JG | Performed by: NURSE ANESTHETIST, CERTIFIED REGISTERED

## 2024-04-19 PROCEDURE — 37000009 HC ANESTHESIA EA ADD 15 MINS: Performed by: OBSTETRICS & GYNECOLOGY

## 2024-04-19 PROCEDURE — 76937 US GUIDE VASCULAR ACCESS: CPT | Performed by: ANESTHESIOLOGY

## 2024-04-19 PROCEDURE — 88112 CYTOPATH CELL ENHANCE TECH: CPT | Mod: 26,,, | Performed by: PATHOLOGY

## 2024-04-19 PROCEDURE — 36620 INSERTION CATHETER ARTERY: CPT | Mod: 59,,, | Performed by: ANESTHESIOLOGY

## 2024-04-19 PROCEDURE — 0UT14ZZ RESECTION OF LEFT OVARY, PERCUTANEOUS ENDOSCOPIC APPROACH: ICD-10-PCS | Performed by: OBSTETRICS & GYNECOLOGY

## 2024-04-19 PROCEDURE — 0DTU4ZZ RESECTION OF OMENTUM, PERCUTANEOUS ENDOSCOPIC APPROACH: ICD-10-PCS | Performed by: OBSTETRICS & GYNECOLOGY

## 2024-04-19 RX ORDER — KETAMINE HCL IN 0.9 % NACL 50 MG/5 ML
SYRINGE (ML) INTRAVENOUS
Status: DISCONTINUED | OUTPATIENT
Start: 2024-04-19 | End: 2024-04-19

## 2024-04-19 RX ORDER — IBUPROFEN 600 MG/1
600 TABLET ORAL EVERY 6 HOURS
Qty: 30 TABLET | Refills: 0 | Status: SHIPPED | OUTPATIENT
Start: 2024-04-19

## 2024-04-19 RX ORDER — DIPHENHYDRAMINE HYDROCHLORIDE 50 MG/ML
12.5 INJECTION INTRAMUSCULAR; INTRAVENOUS EVERY 30 MIN PRN
Status: DISCONTINUED | OUTPATIENT
Start: 2024-04-19 | End: 2024-04-22 | Stop reason: HOSPADM

## 2024-04-19 RX ORDER — LIDOCAINE HYDROCHLORIDE 10 MG/ML
0.5 INJECTION, SOLUTION EPIDURAL; INFILTRATION; INTRACAUDAL; PERINEURAL ONCE
Status: DISCONTINUED | OUTPATIENT
Start: 2024-04-19 | End: 2024-04-22 | Stop reason: HOSPADM

## 2024-04-19 RX ORDER — MEPERIDINE HYDROCHLORIDE 25 MG/ML
12.5 INJECTION INTRAMUSCULAR; INTRAVENOUS; SUBCUTANEOUS ONCE AS NEEDED
Status: DISCONTINUED | OUTPATIENT
Start: 2024-04-19 | End: 2024-04-20 | Stop reason: HOSPADM

## 2024-04-19 RX ORDER — PROPOFOL 10 MG/ML
VIAL (ML) INTRAVENOUS
Status: DISCONTINUED | OUTPATIENT
Start: 2024-04-19 | End: 2024-04-19

## 2024-04-19 RX ORDER — DEXTROMETHORPHAN HYDROBROMIDE, GUAIFENESIN 5; 100 MG/5ML; MG/5ML
650 LIQUID ORAL EVERY 6 HOURS
Qty: 30 TABLET | Refills: 0 | Status: SHIPPED | OUTPATIENT
Start: 2024-04-19 | End: 2024-04-19

## 2024-04-19 RX ORDER — ACETAMINOPHEN 500 MG
1000 TABLET ORAL
Status: COMPLETED | OUTPATIENT
Start: 2024-04-19 | End: 2024-04-19

## 2024-04-19 RX ORDER — DOCUSATE SODIUM 100 MG/1
100 CAPSULE, LIQUID FILLED ORAL 2 TIMES DAILY
Qty: 60 CAPSULE | Refills: 0 | Status: SHIPPED | OUTPATIENT
Start: 2024-04-19

## 2024-04-19 RX ORDER — OXYCODONE HYDROCHLORIDE 5 MG/1
5 TABLET ORAL EVERY 4 HOURS PRN
Qty: 10 TABLET | Refills: 0 | Status: SHIPPED | OUTPATIENT
Start: 2024-04-19

## 2024-04-19 RX ORDER — DIPHENHYDRAMINE HYDROCHLORIDE 50 MG/ML
INJECTION INTRAMUSCULAR; INTRAVENOUS
Status: DISCONTINUED | OUTPATIENT
Start: 2024-04-19 | End: 2024-04-19

## 2024-04-19 RX ORDER — ROCURONIUM BROMIDE 10 MG/ML
INJECTION, SOLUTION INTRAVENOUS
Status: DISCONTINUED | OUTPATIENT
Start: 2024-04-19 | End: 2024-04-19

## 2024-04-19 RX ORDER — ACETAMINOPHEN 500 MG
1000 TABLET ORAL
Status: DISCONTINUED | OUTPATIENT
Start: 2024-04-19 | End: 2024-04-19 | Stop reason: HOSPADM

## 2024-04-19 RX ORDER — ACETAMINOPHEN 500 MG
1000 TABLET ORAL EVERY 6 HOURS PRN
Status: DISCONTINUED | OUTPATIENT
Start: 2024-04-19 | End: 2024-04-22 | Stop reason: HOSPADM

## 2024-04-19 RX ORDER — KETOROLAC TROMETHAMINE 30 MG/ML
15 INJECTION, SOLUTION INTRAMUSCULAR; INTRAVENOUS EVERY 6 HOURS PRN
Status: DISCONTINUED | OUTPATIENT
Start: 2024-04-19 | End: 2024-04-22 | Stop reason: HOSPADM

## 2024-04-19 RX ORDER — ALBUMIN HUMAN 50 G/1000ML
SOLUTION INTRAVENOUS
Status: DISCONTINUED | OUTPATIENT
Start: 2024-04-19 | End: 2024-04-19

## 2024-04-19 RX ORDER — CELECOXIB 200 MG/1
400 CAPSULE ORAL
Status: DISCONTINUED | OUTPATIENT
Start: 2024-04-19 | End: 2024-04-19 | Stop reason: HOSPADM

## 2024-04-19 RX ORDER — ONDANSETRON HYDROCHLORIDE 2 MG/ML
4 INJECTION, SOLUTION INTRAVENOUS EVERY 4 HOURS PRN
Status: DISCONTINUED | OUTPATIENT
Start: 2024-04-19 | End: 2024-04-22 | Stop reason: HOSPADM

## 2024-04-19 RX ORDER — HYDROMORPHONE HYDROCHLORIDE 2 MG/ML
0.4 INJECTION, SOLUTION INTRAMUSCULAR; INTRAVENOUS; SUBCUTANEOUS EVERY 5 MIN PRN
Status: DISCONTINUED | OUTPATIENT
Start: 2024-04-19 | End: 2024-04-22 | Stop reason: HOSPADM

## 2024-04-19 RX ORDER — SODIUM CHLORIDE 9 MG/ML
INJECTION, SOLUTION INTRAVENOUS CONTINUOUS
Status: DISCONTINUED | OUTPATIENT
Start: 2024-04-19 | End: 2024-04-22 | Stop reason: HOSPADM

## 2024-04-19 RX ORDER — DOCUSATE SODIUM 100 MG/1
100 CAPSULE, LIQUID FILLED ORAL 2 TIMES DAILY
Qty: 60 CAPSULE | Refills: 0 | Status: SHIPPED | OUTPATIENT
Start: 2024-04-19 | End: 2024-04-19

## 2024-04-19 RX ORDER — FAMOTIDINE 20 MG/1
20 TABLET, FILM COATED ORAL
Status: COMPLETED | OUTPATIENT
Start: 2024-04-19 | End: 2024-04-19

## 2024-04-19 RX ORDER — IBUPROFEN 600 MG/1
600 TABLET ORAL EVERY 6 HOURS
Qty: 30 TABLET | Refills: 0 | Status: SHIPPED | OUTPATIENT
Start: 2024-04-19 | End: 2024-04-19

## 2024-04-19 RX ORDER — CLINDAMYCIN PHOSPHATE 900 MG/50ML
900 INJECTION, SOLUTION INTRAVENOUS
Status: COMPLETED | OUTPATIENT
Start: 2024-04-19 | End: 2024-04-19

## 2024-04-19 RX ORDER — DEXTROMETHORPHAN HYDROBROMIDE, GUAIFENESIN 5; 100 MG/5ML; MG/5ML
650 LIQUID ORAL EVERY 6 HOURS
Qty: 30 TABLET | Refills: 0 | Status: SHIPPED | OUTPATIENT
Start: 2024-04-19

## 2024-04-19 RX ORDER — FENTANYL CITRATE 50 UG/ML
INJECTION, SOLUTION INTRAMUSCULAR; INTRAVENOUS
Status: DISCONTINUED | OUTPATIENT
Start: 2024-04-19 | End: 2024-04-19

## 2024-04-19 RX ORDER — PROCHLORPERAZINE EDISYLATE 5 MG/ML
5 INJECTION INTRAMUSCULAR; INTRAVENOUS EVERY 30 MIN PRN
Status: DISCONTINUED | OUTPATIENT
Start: 2024-04-19 | End: 2024-04-22 | Stop reason: HOSPADM

## 2024-04-19 RX ORDER — MIDAZOLAM HYDROCHLORIDE 1 MG/ML
INJECTION INTRAMUSCULAR; INTRAVENOUS
Status: DISCONTINUED | OUTPATIENT
Start: 2024-04-19 | End: 2024-04-19

## 2024-04-19 RX ORDER — SODIUM CHLORIDE 0.9 % (FLUSH) 0.9 %
3 SYRINGE (ML) INJECTION
Status: DISCONTINUED | OUTPATIENT
Start: 2024-04-19 | End: 2024-04-22 | Stop reason: HOSPADM

## 2024-04-19 RX ORDER — HEPARIN SODIUM 5000 [USP'U]/ML
5000 INJECTION, SOLUTION INTRAVENOUS; SUBCUTANEOUS ONCE
Status: COMPLETED | OUTPATIENT
Start: 2024-04-19 | End: 2024-04-19

## 2024-04-19 RX ORDER — HYDROMORPHONE HYDROCHLORIDE 2 MG/ML
0.2 INJECTION, SOLUTION INTRAMUSCULAR; INTRAVENOUS; SUBCUTANEOUS
Status: DISCONTINUED | OUTPATIENT
Start: 2024-04-19 | End: 2024-04-22 | Stop reason: HOSPADM

## 2024-04-19 RX ORDER — OXYCODONE HYDROCHLORIDE 5 MG/1
5 TABLET ORAL EVERY 4 HOURS PRN
Status: DISCONTINUED | OUTPATIENT
Start: 2024-04-19 | End: 2024-04-22 | Stop reason: HOSPADM

## 2024-04-19 RX ORDER — DEXAMETHASONE SODIUM PHOSPHATE 4 MG/ML
INJECTION, SOLUTION INTRA-ARTICULAR; INTRALESIONAL; INTRAMUSCULAR; INTRAVENOUS; SOFT TISSUE
Status: DISCONTINUED | OUTPATIENT
Start: 2024-04-19 | End: 2024-04-19

## 2024-04-19 RX ORDER — PHENYLEPHRINE HYDROCHLORIDE 10 MG/ML
INJECTION INTRAVENOUS
Status: DISCONTINUED | OUTPATIENT
Start: 2024-04-19 | End: 2024-04-19

## 2024-04-19 RX ORDER — OXYCODONE HYDROCHLORIDE 5 MG/1
5 TABLET ORAL EVERY 4 HOURS PRN
Qty: 10 TABLET | Refills: 0 | Status: SHIPPED | OUTPATIENT
Start: 2024-04-19 | End: 2024-04-19

## 2024-04-19 RX ORDER — SODIUM CHLORIDE, SODIUM LACTATE, POTASSIUM CHLORIDE, CALCIUM CHLORIDE 600; 310; 30; 20 MG/100ML; MG/100ML; MG/100ML; MG/100ML
INJECTION, SOLUTION INTRAVENOUS CONTINUOUS
Status: DISCONTINUED | OUTPATIENT
Start: 2024-04-19 | End: 2024-04-22 | Stop reason: HOSPADM

## 2024-04-19 RX ORDER — LIDOCAINE HYDROCHLORIDE 10 MG/ML
INJECTION, SOLUTION INTRAVENOUS
Status: DISCONTINUED | OUTPATIENT
Start: 2024-04-19 | End: 2024-04-19

## 2024-04-19 RX ORDER — MUPIROCIN 20 MG/G
OINTMENT TOPICAL
Status: DISCONTINUED | OUTPATIENT
Start: 2024-04-19 | End: 2024-04-22 | Stop reason: HOSPADM

## 2024-04-19 RX ADMIN — DEXAMETHASONE SODIUM PHOSPHATE 4 MG: 4 INJECTION, SOLUTION INTRAMUSCULAR; INTRAVENOUS at 11:04

## 2024-04-19 RX ADMIN — ROCURONIUM BROMIDE 25 MG: 10 SOLUTION INTRAVENOUS at 12:04

## 2024-04-19 RX ADMIN — FENTANYL CITRATE 25 MCG: 50 INJECTION, SOLUTION INTRAMUSCULAR; INTRAVENOUS at 01:04

## 2024-04-19 RX ADMIN — Medication 50 MG: at 11:04

## 2024-04-19 RX ADMIN — CLINDAMYCIN PHOSPHATE 900 MG: 18 INJECTION, SOLUTION INTRAVENOUS at 11:04

## 2024-04-19 RX ADMIN — GENTAMICIN SULFATE 327 MG: 40 INJECTION, SOLUTION INTRAMUSCULAR; INTRAVENOUS at 11:04

## 2024-04-19 RX ADMIN — SODIUM CHLORIDE, SODIUM LACTATE, POTASSIUM CHLORIDE, AND CALCIUM CHLORIDE: 600; 310; 30; 20 INJECTION, SOLUTION INTRAVENOUS at 06:04

## 2024-04-19 RX ADMIN — SUGAMMADEX 200 MG: 100 INJECTION, SOLUTION INTRAVENOUS at 01:04

## 2024-04-19 RX ADMIN — HYDROMORPHONE HYDROCHLORIDE 0.4 MG: 2 INJECTION INTRAMUSCULAR; INTRAVENOUS; SUBCUTANEOUS at 02:04

## 2024-04-19 RX ADMIN — PROPOFOL 200 MG: 10 INJECTION, EMULSION INTRAVENOUS at 11:04

## 2024-04-19 RX ADMIN — CARBOXYMETHYLCELLULOSE SODIUM 2 DROP: 2.5 SOLUTION/ DROPS OPHTHALMIC at 11:04

## 2024-04-19 RX ADMIN — GLYCOPYRROLATE 0.2 MG: 0.2 INJECTION, SOLUTION INTRAMUSCULAR; INTRAVITREAL at 11:04

## 2024-04-19 RX ADMIN — ALBUMIN (HUMAN) 200 ML: 2.5 SOLUTION INTRAVENOUS at 11:04

## 2024-04-19 RX ADMIN — FENTANYL CITRATE 100 MCG: 50 INJECTION, SOLUTION INTRAMUSCULAR; INTRAVENOUS at 11:04

## 2024-04-19 RX ADMIN — PHENYLEPHRINE HYDROCHLORIDE 200 MCG: 10 INJECTION INTRAVENOUS at 12:04

## 2024-04-19 RX ADMIN — ONDANSETRON 4 MG: 2 INJECTION INTRAMUSCULAR; INTRAVENOUS at 04:04

## 2024-04-19 RX ADMIN — HEPARIN SODIUM 5000 UNITS: 5000 INJECTION INTRAVENOUS; SUBCUTANEOUS at 05:04

## 2024-04-19 RX ADMIN — LIDOCAINE HYDROCHLORIDE 100 MG: 10 INJECTION, SOLUTION INTRAVENOUS at 11:04

## 2024-04-19 RX ADMIN — DIPHENHYDRAMINE HYDROCHLORIDE 25 MG: 50 INJECTION, SOLUTION INTRAMUSCULAR; INTRAVENOUS at 07:04

## 2024-04-19 RX ADMIN — MIDAZOLAM HYDROCHLORIDE 2 MG: 1 INJECTION INTRAMUSCULAR; INTRAVENOUS at 10:04

## 2024-04-19 RX ADMIN — ALBUMIN (HUMAN) 150 ML: 2.5 SOLUTION INTRAVENOUS at 12:04

## 2024-04-19 RX ADMIN — ALBUMIN (HUMAN) 200 ML: 2.5 SOLUTION INTRAVENOUS at 12:04

## 2024-04-19 RX ADMIN — ROCURONIUM BROMIDE 50 MG: 10 SOLUTION INTRAVENOUS at 11:04

## 2024-04-19 RX ADMIN — OXYCODONE 5 MG: 5 TABLET ORAL at 02:04

## 2024-04-19 RX ADMIN — ACETAMINOPHEN 1000 MG: 500 TABLET ORAL at 05:04

## 2024-04-19 RX ADMIN — FAMOTIDINE 20 MG: 20 TABLET ORAL at 05:04

## 2024-04-19 RX ADMIN — ALBUMIN (HUMAN) 50 ML: 2.5 SOLUTION INTRAVENOUS at 12:04

## 2024-04-19 RX ADMIN — MUPIROCIN: 20 OINTMENT TOPICAL at 05:04

## 2024-04-19 RX ADMIN — ALBUMIN (HUMAN) 150 ML: 2.5 SOLUTION INTRAVENOUS at 11:04

## 2024-04-19 NOTE — ANESTHESIA PROCEDURE NOTES
Intubation    Date/Time: 4/19/2024 11:12 AM    Performed by: Roberto Alarcon CRNA  Authorized by: Trevor Jacobs MD    Intubation:     Induction:  Intravenous    Intubated:  Postinduction    Mask Ventilation:  Easy mask    Attempts:  1    Method of Intubation:  Video laryngoscopy    Blade:  Zee 3    Laryngeal View Grade: Grade I - full view of cords      Difficult Airway Encountered?: No      Complications:  None    Airway Device:  Oral endotracheal tube    Airway Device Size:  7.5    Style/Cuff Inflation:  Cuffed    Inflation Amount (mL):  4    Tube secured:  22    Secured at:  The lips    Placement Verified By:  Capnometry    Complicating Factors:  None    Findings Post-Intubation:  BS equal bilateral

## 2024-04-19 NOTE — ANESTHESIA POSTPROCEDURE EVALUATION
Anesthesia Post Evaluation    Patient: Liliane Ford    Procedure(s) Performed: Procedure(s) (LRB):  XI ROBOTIC SALPINGO-OOPHORECTOMY (Left)  STAGING (N/A)  XI ROBOTIC OMENTECTOMY (N/A)    Final Anesthesia Type: general      Patient location during evaluation: PACU  Patient participation: Yes- Able to Participate  Level of consciousness: awake and alert  Post-procedure vital signs: reviewed and stable  Pain management: adequate  Airway patency: patent    PONV status at discharge: No PONV  Anesthetic complications: no    Bonita-operative Events Comments: Following placement of right radial catheter patient noted to have right hand diffuse swelling; A-line removed; good cap refill; findings suggestive of venous congestion vs local allergic reaction; evaluation at bedside by Dr Andres (hand surgeon) whom recommended elevation and delay case a few hours to allow swelling to subside; upon reevaluation swelling had subsided and patient underwent uneventful surgery; postop the hand swelling also noted to have continued to improve  Cardiovascular status: blood pressure returned to baseline  Respiratory status: spontaneous ventilation  Hydration status: euvolemic  Follow-up not needed.          Vitals Value Taken Time   /61 04/19/24 1540   Temp 36.7 °C (98 °F) 04/19/24 1510   Pulse 67 04/19/24 1540   Resp 16 04/19/24 1540   SpO2 97 % 04/19/24 1540         Event Time   Out of Recovery 15:06:00         Pain/Seth Score: Pain Rating Prior to Med Admin: 7 (4/19/2024  2:06 PM)  Pain Rating Post Med Admin: 0 (4/19/2024  3:10 PM)  Seth Score: 10 (4/19/2024  3:40 PM)

## 2024-04-19 NOTE — ANESTHESIA PROCEDURE NOTES
Arterial    Diagnosis: CAD    Patient location during procedure: pre-op  Procedure end time: 4/19/2024 6:45 AM    Staffing  Authorizing Provider: Trevor Jacobs MD  Performing Provider: Trevor Jacobs MD    Staffing  Performed by: Trevor Jacobs MD  Authorized by: Trevor Jacobs MD    Anesthesiologist was present at the time of the procedure.    Preanesthetic Checklist  Completed: patient identified, IV checked, site marked, risks and benefits discussed, surgical consent, monitors and equipment checked, pre-op evaluation, timeout performed and anesthesia consent givenArterial  Skin Prep: chlorhexidine gluconate  Local Infiltration: lidocaine  Location: radial    Catheter Size: 20 G  Catheter placement by Ultrasound guidance. Heme positive aspiration all ports.   Vessel Caliber: small, medium, patent, compressibility normal  Needle advanced into vessel with real time Ultrasound guidance.  Guidewire confirmed in vessel.  Image recorded and saved.Insertion Attempts: 1  Assessment  Dressing: secured with tape and tegaderm

## 2024-04-19 NOTE — OPERATIVE NOTE ADDENDUM
Certification of Assistant at Surgery       Surgery Date: 4/19/2024     Participating Surgeons:  Surgeons and Role:     * Mckenna Eng MD - Primary     * Nohemi Gaffney MD - Assisting    Procedures:  Procedure(s) (LRB):  XI ROBOTIC SALPINGO-OOPHORECTOMY (Left)  STAGING (N/A)  XI ROBOTIC OMENTECTOMY (N/A)    Assistant Surgeon's Certification of Necessity:  I understand that section 1842 (b) (6) (d) of the Social Security Act generally prohibits Medicare Part B reasonable charge payment for the services of assistants at surgery in teaching hospitals when qualified residents are available to furnish such services. I certify that the services for which payment is claimed were medically necessary, and that no qualified resident was available to perform the services. I further understand that these services are subject to post-payment review by the Medicare carrier.      DIEGO Dias    04/19/2024  1:30 PM

## 2024-04-19 NOTE — H&P
Liliane Ford is 68 y.o.  presenting for scheduled RA-LO/Omentectomy/Other Indicated Procedures.    Temp:  [98.3 °F (36.8 °C)] 98.3 °F (36.8 °C)  Pulse:  [83] 83  Resp:  [20] 20  SpO2:  [99 %] 99 %  BP: (162)/(90) 162/90    General: NAD, alert, oriented, cooperative  HEENT: NCAT, EOM grossly intact  Lungs: Normal WOB  Heart: regular rate  Abdomen: soft, nondistended, nontender, no rebound or guarding    Consents in chart. Pre-operative heparin given at 0555 . All questions answered and concerns addressed. To OR for planned procedure.    Nohemi Gaffney MD/MPH  OB/GYN PGY-3  [facility]       Subjective  Patient ID: Liliane Ford is a 68 y.o. female.     Chief Complaint: Ovarian Cancer and Advice Only     68 year old female  referred by Dr. Aquino. 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 Patient underwent RALH BS RO on 2024 for PMB. Patient reports she had been bleeding intermittently for 4 years. Multiple EMBx were negative. She is recovering appropriately. Denies any vaginal bleeding.      LMP- age 55 denies any HRT  Denies any abnormal pap smears.     2024  Robotic assisted laparoscopic hysterectomy, bilateral salpingectomy and right oophorectomy.   Uterus, cervix, right ovary and bilateral fallopian tubes weighing 381 g show a granulosa cell tumor, adult type with areas of hemorrhage and necrosis present in the right ovary.     US 2023  FINDINGS:  The uterus measures 15.6 cm in length. Multiple uterine fibroids are noted the 3 largest were measured including a 1.7 cm fibroid within the fundus a 2.9 cm fibroid within the anterior body and a 3.1 cm fibroid within the posterior body.  The endometrial stripe in this premenopausal patient measures 5.7 mm which is minimally thickened.  The ovaries are not visualized.  There appears to be a large fibroid seen the right adnexal region that measures up to 6 cm in size best seen on the transabdominal  "imaging.     Co-morbidites include HTN, HLD, and CAD     Surgical history includes c/s x 2 (mid-line vertical) and recent RALH BS RO.      Review of Systems   Constitutional:  Negative for chills and fever.   HENT:  Negative for mouth sores.    Respiratory:  Negative for chest tightness and shortness of breath.    Cardiovascular:  Negative for chest pain.   Gastrointestinal:  Negative for abdominal distention, nausea and vomiting.   Genitourinary:  Negative for dysuria, hematuria, pelvic pain, vaginal bleeding and vaginal discharge.   Neurological:  Negative for syncope and weakness.      Objective  BP (!) 167/87 (BP Location: Right arm, Patient Position: Sitting)   Pulse 67   Ht 5' 3.5" (1.613 m)   Wt 83.2 kg (183 lb 6.8 oz)   BMI 31.98 kg/m²      Physical Exam  Vitals reviewed. Exam conducted with a chaperone present.   Constitutional:       Appearance: Normal appearance.   HENT:      Head: Normocephalic and atraumatic.   Eyes:      Extraocular Movements: Extraocular movements intact.      Conjunctiva/sclera: Conjunctivae normal.      Pupils: Pupils are equal, round, and reactive to light.   Pulmonary:      Effort: Pulmonary effort is normal. No respiratory distress.   Abdominal:      General: There is no distension.      Palpations: Abdomen is soft.      Tenderness: There is no abdominal tenderness.   Musculoskeletal:         General: Normal range of motion.   Skin:     General: Skin is warm and dry.   Neurological:      General: No focal deficit present.      Mental Status: She is alert and oriented to person, place, and time. Mental status is at baseline.   Psychiatric:         Mood and Affect: Mood normal.         Behavior: Behavior normal.         Thought Content: Thought content normal.         Judgment: Judgment normal.      Assessment and Plan  1. Malignant granulosa cell tumor of ovary, right  -     Ambulatory referral/consult to Gynecologic Oncology  -     Inhibin; Future; Expected date: " 03/19/2024  -     Inhibin B; Future; Expected date: 03/19/2024  -     CT Chest Abdomen Pelvis With IV Contrast (XPD) Routine Oral Contrast; Future; Expected date: 03/19/2024  -     Creatinine, serum; Future; Expected date: 03/19/2024        We discussed the natural history of adult granulosa cell tumors of the ovary. Presumed Stage IA. I discussed and recommended further work up and evaluation with CT imaging for metastatic survey, tumor markers and surgical staging. Based on this information we can have a better informed discussion regarding Stage, whether any adjuvant therapy is recommended and overall prognosis.       Plan for April 19-- Robotic USO + staging     The risks, benefits, and indications of the procedure were discussed with the patient and her family members if present.  These included bleeding, transfusion, infection, damage to surrounding tissues (bowel, bladder, ureter), wound separation, lymphedema, conversion to laparotomy if laparoscopic, perioperative cardiac events, VTE, pneumonia, and possible death.  She voiced understanding, all questions were answered and consents were signed.     I spent approximately 60 minutes reviewing the available records and evaluating the patient, out of which over 50% of the time was spent face to face with the patient in counseling and coordinating this patient's care.     No follow-ups on file.   Yes

## 2024-04-19 NOTE — TRANSFER OF CARE
"Anesthesia Transfer of Care Note    Patient: Liliane Ford    Procedure(s) Performed: Procedure(s) (LRB):  XI ROBOTIC SALPINGO-OOPHORECTOMY (Left)  STAGING (N/A)  XI ROBOTIC OMENTECTOMY (N/A)    Patient location: PACU    Anesthesia Type: general    Transport from OR: Transported from OR on 6-10 L/min O2 by face mask with adequate spontaneous ventilation    Post pain: adequate analgesia    Post assessment: no apparent anesthetic complications    Post vital signs: stable    Level of consciousness: awake    Nausea/Vomiting: no nausea/vomiting    Complications: none    Transfer of care protocol was followed    Last vitals: Visit Vitals  BP (!) 146/70 (BP Location: Left arm, Patient Position: Lying)   Pulse 66   Temp 36.3 °C (97.3 °F) (Skin)   Resp 16   Ht 5' 3.5" (1.613 m)   Wt 82.6 kg (182 lb)   SpO2 100%   Breastfeeding No   BMI 31.73 kg/m²     "

## 2024-04-19 NOTE — DISCHARGE SUMMARY
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    Estimated Blood Loss: minimal         Specimens:   Specimen (24h ago, onward)       Start     Ordered    04/19/24 1143  Cytology, Fluid/Wash/Brush  Once        Question Answer Comment   Source: Peritoneal/abdominal/pelvic wash    Clinical Information: Pelvic washing    Specific Site: Pelvis    Other Requests: n/a    Release to patient Immediate        04/19/24 1143                    Discharge Provider: Aleida West    Diagnoses:  There are no hospital problems to display for this patient.      Discharged Condition: good    Hospital Course:   Patient was admitted for outpatient procedure as above, and tolerated the procedure well with no complications. Please see operative report for further details. Following the procedure, the patient was awakened from anesthesia and transferred to the recovery area in stable condition. She was discharged to home once ambulating, voiding, tolerating PO intake, and pain was well-controlled. Patient was given routine post-op instructions and prescriptions for pain medication to take as needed. Patient instructed to follow up with Dr. Mckenna Eng in 4 weeks.    Final Diagnoses: Same as principal problem.    Disposition: Home or Self Care    Follow up/Patient Instructions:    Medications:  Reconciled Home  Medications:      Medication List        START taking these medications      acetaminophen 650 MG Tbsr  Commonly known as: TYLENOL  Take 1 tablet (650 mg total) by mouth every 6 (six) hours. Alternate with ibuprofen every 3 hours.     docusate sodium 100 MG capsule  Commonly known as: COLACE  Take 1 capsule (100 mg total) by mouth 2 (two) times daily.     ibuprofen 600 MG tablet  Commonly known as: ADVIL,MOTRIN  Take 1 tablet (600 mg total) by mouth every 6 (six) hours. Alternate with tylenol every 3 hours.     oxyCODONE 5 MG immediate release tablet  Commonly known as: ROXICODONE  Take 1 tablet (5 mg total) by mouth every 4 (four) hours as needed for Pain.            CONTINUE taking these medications      amLODIPine 10 MG tablet  Commonly known as: NORVASC  Take 1 tablet (10 mg total) by mouth once daily.     ascorbic acid (vitamin C) 500 MG tablet  Commonly known as: VITAMIN C  Take 1 tablet (500 mg total) by mouth every evening.     aspirin 81 MG EC tablet  Commonly known as: ECOTRIN  Take 1 tablet (81 mg total) by mouth once daily.     b complex vitamins tablet  Take 1 tablet by mouth once daily. B COMPLETE     metoprolol succinate 50 MG 24 hr tablet  Commonly known as: TOPROL-XL  Take 1 tablet (50 mg total) by mouth 2 (two) times daily.     nitroGLYCERIN 0.4 MG SL tablet  Commonly known as: NITROSTAT  Place 1 tablet (0.4 mg total) under the tongue every 5 (five) minutes as needed.     olmesartan-hydrochlorothiazide 40-25 mg per tablet  Commonly known as: BENICAR HCT  Take 1 tablet by mouth once daily.     rosuvastatin 20 MG tablet  Commonly known as: CRESTOR  Take 1 tablet (20 mg total) by mouth every evening.            Discharge Procedure Orders   Diet general     Lifting restrictions   Order Comments: No lifting greater than 15 pounds for six weeks.     Other restrictions (specify):   Order Comments: PELVIC REST:  No douching, tampons, or intercourse for 6 weeks.    If prescribed, vaginal estrogen cream may  be used during the postoperative period.     DRIVING:  No driving while on narcotics. Driving may be resumed initially with a competent passenger one to two weeks after surgery if no longer taking narcotics.     EXERCISE:  For six weeks your exercise should be limited to walking. You may walk as far as you wish, as long as you increase your level of exertion gradually and avoid slippery surfaces. You may climb stairs as needed to get around, but should not use stair climbing for exercise.     Remove dressing in 24 hours   Order Comments: If you have a bandage on wound, you may remove it the day after dismissal.  If you had steri-strips remove them once they begin to peel off (usually 2 weeks). Keep incision clean and dry.  Inspect the incision daily for signs and symptoms of infection.     Wound care routine (specify)   Order Comments: WOUND CARE:  If you have a band-aid or bandage on your wound, you may remove it the day after dismissal.  If you had steri-strips remove them once they begin to peel off (usually 2 weeks).  If your steri-strips still haven't come off in 2 weeks, please remove them. You may wash the wound with mild soap and water.   You may shower at any time but should avoid immersing any abdominal incisions in water for at least two weeks after surgery or until the wound is completely healed. If given, please shower with Hibiclens soap until bottle is completely finished. Keep your wound clean and dry.  You should observe your incision for signs of infection which include redness, warmth, drainage or fever.     Call MD for:  temperature >100.4     Call MD for:  persistent nausea and vomiting     Call MD for:  severe uncontrolled pain     Call MD for:  difficulty breathing, headache or visual disturbances     Call MD for:  redness, tenderness, or signs of infection (pain, swelling, redness, odor or green/yellow discharge around incision site)     Call MD for:  hives     Call MD for:   Order Comments:  inability to void,urine is ketchup colored or you have large clots, vaginal bleeding is heavier than a period.    VAGINAL DISCHARGE: You may develop a vaginal discharge and intermittent vaginal spotting after surgery and up to 6 weeks postoperatively.  The discharge may have an odor and may change in color but it is normal.  This is due to dissolving stiches.  Contact your surgical team if you develop vaginal or vulvar irritation along with a discharge.  Also contact your surgical team if you have vaginal discharge that smells like urine or stool.    CONSTIPATION REMEDIES: Patients are often constipated after surgery or with use of oral narcotic medicine. You should continue to take the stool softener, Senokot-S during the next six weeks, and consume adequate amounts of water.  If you have not had a bowel movement for 3 days after dismissal, or are uncomfortable and unable to pass stool, please try one or all of the following measures:  1.  Milk of Magnesia - 30 cc by mouth every 12 hours   2.  Dulcolax suppository - One suppository per rectum every 4-6 hours   3.  Metamucil, Fibercon or other bulk former - use as directed  4.  Fleets Enema        PAIN MEDICATIONS:     Take your pain medications as instructed. It is best to take pain medications before your pain becomes severe. This will allow you to take less medication yet have better pain relief. For the first 2 or 3 days it may be helpful to take your pain medications on a regular schedule (e.g. every 4 to 6 hours). This will help you to keep your pain under better control. You should then begin to take fewer medications each day until you no longer need them. Do not take pain medication on an empty stomach. This may lead to nausea and vomiting.     Activity as tolerated   Order Comments: Return to normal activity slowly as you feel able.  For 6 weeks your exercise should be limited to walking.  You may walk as far as you wish, as long as you increase your level  of exertion gradually and avoid slippery surfaces.    If you had a hysterectomy at the surgery do not insert anything in your vagina for 9 weeks.     Shower on day dressing removed (No bath)   Order Comments: You may shower at any time but should avoid immersing any abdominal incisions in water for at least 2 weeks after surgery or until the wound is completely healed.  If given, please shower with Hibaclens soap until bottle is completely finish      Follow-up Information       Mckenna Eng MD Follow up on 5/8/2024.    Specialty: Gynecologic Oncology  Why: post-op follow up  Contact information:  [facility] [facility] [address]  [phone]                             Aleida West MD   PGY-4, OB-GYN

## 2024-04-20 NOTE — PLAN OF CARE
Liliane Ford has met all discharge criteria from Phase II. Vital Signs are stable, ambulating  without difficulty. Discharge instructions given, patient verbalized understanding. Discharged from facility via wheelchair in stable condition.

## 2024-04-22 VITALS
WEIGHT: 182 LBS | DIASTOLIC BLOOD PRESSURE: 67 MMHG | HEART RATE: 61 BPM | HEIGHT: 64 IN | SYSTOLIC BLOOD PRESSURE: 123 MMHG | OXYGEN SATURATION: 98 % | RESPIRATION RATE: 16 BRPM | TEMPERATURE: 98 F | BODY MASS INDEX: 31.07 KG/M2

## 2024-04-22 NOTE — OP NOTE
DATE OF PROCEDURE:  04/19/2024     SURGEON:  Mckenna Eng M.D.     ASSISTANTS: [facility]-- No qualified resident was available for her portion of the procedure. Aleida West MD (RES)      PREOPERATIVE DIAGNOSIS:   1. Adult granulosa cell tumor of the right ovary  2. Prior hysterectomy/RSO     POSTOPERATIVE DIAGNOSES:    1. Adult granulosa cell tumor of the right ovary  2. Prior hysterectomy/RSO    PROCEDURE PERFORMED:  Robotic-assisted left salpingo-oophorectomy, peritonectomy, omentectomy    ANESTHESIA:  General endotracheal anesthesia.     SPECIMENS REMOVED:  1.  Left fallopian tube and ovary  2.  Anterior abdominal wall peritoneum  3. Right pelvic peritoneum/IP ligament remnant  4. Omentum  5. Pelvic washings     ESTIMATED BLOOD LOSS:  15 mL.     COMPLICATIONS:  None.     FINDINGS: Scattered dark splotchy implants along the anterior abdominal wall peritoneum and right pelvic sidewall along the remnant IP. Resected in their entirety with peritonectomy. Left ovary/fallopian tube adherent to left pelvic sidewall with similar surrounding dark implants on the peritoneum which were resected. Left ovary itself appeared normal. No gross omental masses. No ascites. Normal diaphragms, liver and splenic capsule. No obvious intraperitoneal upper abdominal disease. Sigmoid colon draped adherent at the vaginal cuff and left pelvic sidewall.      PROCEDURE IN DETAIL:  The patient was taken to the Operating Room.  Informed consent had been obtained.  She underwent general endotracheal anesthesia without difficulty, was prepped and draped in the normal sterile fashion in a dorsal lithotomy position.  Timeout was performed.  All parties agreed to the planned procedure.  Perioperative antibiotics were administered.  August catheter was placed under sterile conditions. Gloves were changed and attention was turned to the patient's abdomen.       Veress needle was gently inserted.  Intra-abdominal placement  was confirmed with low CO2 pressure and water drop test.  Abdomen was insufflated and pneumoperitoneum was obtained.  Skin incision was made superior to the umbilicus.  Robotic trocar was introduced.  Intra-abdominal placement was confirmed.  Additional trocars were placed, 2 robotic trocars to the left of the camera, 1 robotic trocar to the right of the camera and an additional 8 mm assist port to the right of the camera.  The patient was placed in steep Trendelenburg. Robot was docked and operating surgeon reported to the console.       Survey of the abdomen and pelvis revealed the above findings. Pelvic washings were obtained. The posterior leaf of the broad ligament was then opened on the left facilitating access to the retroperitoneum.  The infundibulopelvic ligaments were then skeletonized with good visualization of the ureter beneath.  These were cauterized and transected. The remainder of peritoneal attachments of the left fallopian tube and ovary were then released incorporating surrounding peritoneal lesions. We then proceeded with peritonectomy of the anterior abdominal wall and right pelvic sidewall as described above. The connective tissue underlying the peritoneum was released with monopolar cautery.     We then proceeded with omentectomy by elevating the omentum and serially cauterizing and transecting the omentum along the curvature of the transverse colon. We then placed each specimen in an endocatch bag and removed them from the abdomen through a slightly extended trocar incision. At this point the procedure was deemed complete. We reapproximated the fascia of the extended trocar incision with vicryl suture in a running fashion. The robotic trocars were then removed under direct visualization and the robot was undocked. Skin incisions were then rendered hemostatic. These were closed with 4-0 Monocryl in a subcuticular fashion and topped with sterile Dermabond. The patient tolerated the procedure  well.  Sponge, lap, needle and instrument counts were correct x2 as reported by the circulating nurse.  She was awakened from anesthesia and taken to recovery in stable condition.

## 2024-04-23 ENCOUNTER — PATIENT OUTREACH (OUTPATIENT)
Dept: ADMINISTRATIVE | Facility: CLINIC | Age: 69
End: 2024-04-23
Payer: COMMERCIAL

## 2024-04-23 NOTE — PROGRESS NOTES
C3 nurse spoke with Liliane Ford  for a [facility] post hospital discharge follow up call. Nurse offered to scheduled [facility] hospital follow-up appointment. The patient declined appointment at this time.

## 2024-04-24 LAB
FINAL PATHOLOGIC DIAGNOSIS: NORMAL
Lab: NORMAL

## 2024-04-29 LAB
COMMENT: NORMAL
FINAL PATHOLOGIC DIAGNOSIS: NORMAL
GROSS: NORMAL
Lab: NORMAL

## 2024-05-02 ENCOUNTER — TELEPHONE (OUTPATIENT)
Dept: GYNECOLOGIC ONCOLOGY | Facility: CLINIC | Age: 69
End: 2024-05-02
Payer: COMMERCIAL

## 2024-05-02 NOTE — TELEPHONE ENCOUNTER
Spoke with patient. She reports doing well following surgery. No concerns. Reviewed pathology from staging and removal of remaining ovary which was negative. Stage IA. Tumor size 2.5cm, 1 mitotic figure/10hpf. Capsule intact prior to surgery and no intra-abdominal spillage according to outside operative report. No adjuvant therapy is recommended. Discussed surveillance. She voiced understanding.

## 2024-05-07 PROBLEM — D64.9 SYMPTOMATIC ANEMIA: Status: RESOLVED | Noted: 2020-02-04 | Resolved: 2024-05-07

## 2024-05-07 PROBLEM — M85.89 OSTEOPENIA OF MULTIPLE SITES: Status: ACTIVE | Noted: 2024-05-07

## 2024-05-07 PROBLEM — D75.839 THROMBOCYTOSIS: Status: RESOLVED | Noted: 2020-02-04 | Resolved: 2024-05-07

## 2024-05-07 PROBLEM — C56.1: Status: ACTIVE | Noted: 2024-05-07

## 2024-05-08 ENCOUNTER — OFFICE VISIT (OUTPATIENT)
Dept: GYNECOLOGIC ONCOLOGY | Facility: CLINIC | Age: 69
End: 2024-05-08
Payer: COMMERCIAL

## 2024-05-08 VITALS
DIASTOLIC BLOOD PRESSURE: 84 MMHG | HEART RATE: 72 BPM | BODY MASS INDEX: 31.24 KG/M2 | HEIGHT: 64 IN | SYSTOLIC BLOOD PRESSURE: 157 MMHG | WEIGHT: 183 LBS

## 2024-05-08 DIAGNOSIS — C56.1: Primary | ICD-10-CM

## 2024-05-08 PROCEDURE — 99024 POSTOP FOLLOW-UP VISIT: CPT | Mod: S$GLB,,, | Performed by: OBSTETRICS & GYNECOLOGY

## 2024-05-08 PROCEDURE — 3077F SYST BP >= 140 MM HG: CPT | Mod: CPTII,S$GLB,, | Performed by: OBSTETRICS & GYNECOLOGY

## 2024-05-08 PROCEDURE — 99999 PR PBB SHADOW E&M-EST. PATIENT-LVL III: CPT | Mod: PBBFAC,,, | Performed by: OBSTETRICS & GYNECOLOGY

## 2024-05-08 PROCEDURE — 3288F FALL RISK ASSESSMENT DOCD: CPT | Mod: CPTII,S$GLB,, | Performed by: OBSTETRICS & GYNECOLOGY

## 2024-05-08 PROCEDURE — 1101F PT FALLS ASSESS-DOCD LE1/YR: CPT | Mod: CPTII,S$GLB,, | Performed by: OBSTETRICS & GYNECOLOGY

## 2024-05-08 PROCEDURE — 4010F ACE/ARB THERAPY RXD/TAKEN: CPT | Mod: CPTII,S$GLB,, | Performed by: OBSTETRICS & GYNECOLOGY

## 2024-05-08 PROCEDURE — 3079F DIAST BP 80-89 MM HG: CPT | Mod: CPTII,S$GLB,, | Performed by: OBSTETRICS & GYNECOLOGY

## 2024-05-08 PROCEDURE — 1159F MED LIST DOCD IN RCRD: CPT | Mod: CPTII,S$GLB,, | Performed by: OBSTETRICS & GYNECOLOGY

## 2024-05-08 PROCEDURE — 1126F AMNT PAIN NOTED NONE PRSNT: CPT | Mod: CPTII,S$GLB,, | Performed by: OBSTETRICS & GYNECOLOGY

## 2024-05-08 NOTE — PROGRESS NOTES
Subjective     Patient ID: Liliane Ford is a 68 y.o. female.    Chief Complaint: Post-op Evaluation (2 week post op )    S/p robotic LSO/staging 2024  Reviewed pathology from staging and removal of remaining ovary which was negative.   Stage IA. Tumor size 2.5cm, 1 mitotic figure/10hpf. Capsule intact prior to surgery and no intra-abdominal spillage according to outside operative report.   No adjuvant therapy is recommended.     Presents today for post operative visit. Recovering appropriately from surgery. Up and about, eating, +BM.    _______________________  68 year old female  referred by Dr. Aquino. 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 Patient underwent RALH BS RO on 2024 for PMB. Patient reports she had been bleeding intermittently for 4 years. Multiple EMBx were negative. She is recovering appropriately. Denies any vaginal bleeding.     LMP- age 55 denies any HRT  Denies any abnormal pap smears.    2024  Robotic assisted laparoscopic hysterectomy, bilateral salpingectomy and right oophorectomy.   Uterus, cervix, right ovary and bilateral fallopian tubes weighing 381 g show a granulosa cell tumor, adult type with areas of hemorrhage and necrosis present in the right ovary.    US 2023  FINDINGS:  The uterus measures 15.6 cm in length. Multiple uterine fibroids are noted the 3 largest were measured including a 1.7 cm fibroid within the fundus a 2.9 cm fibroid within the anterior body and a 3.1 cm fibroid within the posterior body.  The endometrial stripe in this premenopausal patient measures 5.7 mm which is minimally thickened.  The ovaries are not visualized.  There appears to be a large fibroid seen the right adnexal region that measures up to 6 cm in size best seen on the transabdominal imaging.     Co-morbidites include HTN, HLD, and CAD    Surgical history includes c/s x 2 (mid-line vertical) and recent RALH BS RO.         Review of Systems  "  Constitutional:  Negative for chills and fever.   HENT:  Negative for mouth sores.    Respiratory:  Negative for chest tightness and shortness of breath.    Cardiovascular:  Negative for chest pain.   Gastrointestinal:  Negative for abdominal distention, nausea and vomiting.   Genitourinary:  Negative for dysuria, hematuria, pelvic pain, vaginal bleeding and vaginal discharge.   Neurological:  Negative for syncope and weakness.          Objective   BP (!) 157/84 (BP Location: Left arm, Patient Position: Sitting, BP Method: Medium (Automatic))   Pulse 72   Ht 5' 4" (1.626 m)   Wt 83 kg (183 lb)   BMI 31.41 kg/m²     Physical Exam  Vitals reviewed. Exam conducted with a chaperone present.   Constitutional:       Appearance: Normal appearance.   HENT:      Head: Normocephalic and atraumatic.   Eyes:      Extraocular Movements: Extraocular movements intact.      Conjunctiva/sclera: Conjunctivae normal.      Pupils: Pupils are equal, round, and reactive to light.   Pulmonary:      Effort: Pulmonary effort is normal. No respiratory distress.   Abdominal:      General: There is no distension.      Palpations: Abdomen is soft.      Tenderness: There is no abdominal tenderness.   Musculoskeletal:         General: Normal range of motion.   Skin:     General: Skin is warm and dry.   Neurological:      General: No focal deficit present.      Mental Status: She is alert and oriented to person, place, and time. Mental status is at baseline.   Psychiatric:         Mood and Affect: Mood normal.         Behavior: Behavior normal.         Thought Content: Thought content normal.         Judgment: Judgment normal.          Assessment and Plan     1. Malignant granulosa cell tumor of ovary, right  -     Inhibin; Future; Expected date: 05/08/2024  -     Inhibin B; Future; Expected date: 05/08/2024  -     ; Future; Expected date: 05/08/2024      Recovering appropriately from surgery.   Negative completion surgery and staging. "   Stage IA. Tumor size 2.5cm, 1 mitotic figure/10hpf. Capsule intact prior to surgery and no intra-abdominal spillage according to outside operative report.   No adjuvant therapy is recommended.       Per SGO 2017, the interval for surveillance should be dependent on stage and it would be reasonable to extend to every 6 to 12 months for those with early stage, low risk disease and every 4 - 6 months for those with high risk disease.  The utility of imaging is limited and should be reserved for patients with symptoms, elevated biomarkers, or suspicious findings on physical examination.  Granulosa cell tumors have the possibility of late recurrence with a reported median time to recurrence of 4-6 years and therefore, surveillance should occur for an extended period.    RTC 6 months with tumor markers or sooner if needed.          No follow-ups on file.

## 2024-07-01 ENCOUNTER — OFFICE VISIT (OUTPATIENT)
Dept: PRIMARY CARE CLINIC | Facility: CLINIC | Age: 69
End: 2024-07-01
Payer: COMMERCIAL

## 2024-07-01 VITALS
HEART RATE: 72 BPM | DIASTOLIC BLOOD PRESSURE: 84 MMHG | HEIGHT: 64 IN | SYSTOLIC BLOOD PRESSURE: 136 MMHG | RESPIRATION RATE: 18 BRPM | WEIGHT: 187.81 LBS | OXYGEN SATURATION: 99 % | BODY MASS INDEX: 32.06 KG/M2

## 2024-07-01 DIAGNOSIS — E04.1 THYROID NODULE: ICD-10-CM

## 2024-07-01 DIAGNOSIS — E66.09 CLASS 1 OBESITY DUE TO EXCESS CALORIES WITH SERIOUS COMORBIDITY AND BODY MASS INDEX (BMI) OF 31.0 TO 31.9 IN ADULT: ICD-10-CM

## 2024-07-01 DIAGNOSIS — I25.10 CORONARY ARTERY DISEASE INVOLVING NATIVE CORONARY ARTERY OF NATIVE HEART WITHOUT ANGINA PECTORIS: ICD-10-CM

## 2024-07-01 DIAGNOSIS — I51.89 DIASTOLIC DYSFUNCTION: ICD-10-CM

## 2024-07-01 DIAGNOSIS — C56.1 GRANULOSA CELL CARCINOMA OF RIGHT OVARY: ICD-10-CM

## 2024-07-01 DIAGNOSIS — Z00.00 ENCOUNTER FOR MEDICARE ANNUAL WELLNESS EXAM: Primary | ICD-10-CM

## 2024-07-01 DIAGNOSIS — I51.7 LAE (LEFT ATRIAL ENLARGEMENT): ICD-10-CM

## 2024-07-01 DIAGNOSIS — E78.00 HYPERCHOLESTEROLEMIA: ICD-10-CM

## 2024-07-01 DIAGNOSIS — R74.01 ELEVATED AST (SGOT): ICD-10-CM

## 2024-07-01 DIAGNOSIS — I10 ESSENTIAL HYPERTENSION: ICD-10-CM

## 2024-07-01 DIAGNOSIS — Z90.710 S/P LAPAROSCOPIC HYSTERECTOMY: ICD-10-CM

## 2024-07-01 DIAGNOSIS — M85.89 OSTEOPENIA OF MULTIPLE SITES: ICD-10-CM

## 2024-07-01 DIAGNOSIS — D50.9 IRON DEFICIENCY ANEMIA, UNSPECIFIED IRON DEFICIENCY ANEMIA TYPE: ICD-10-CM

## 2024-07-01 DIAGNOSIS — I70.0 AORTIC ATHEROSCLEROSIS: ICD-10-CM

## 2024-07-01 PROCEDURE — 3075F SYST BP GE 130 - 139MM HG: CPT | Mod: CPTII,S$GLB,, | Performed by: NURSE PRACTITIONER

## 2024-07-01 PROCEDURE — 3079F DIAST BP 80-89 MM HG: CPT | Mod: CPTII,S$GLB,, | Performed by: NURSE PRACTITIONER

## 2024-07-01 PROCEDURE — 1159F MED LIST DOCD IN RCRD: CPT | Mod: CPTII,S$GLB,, | Performed by: NURSE PRACTITIONER

## 2024-07-01 PROCEDURE — 99999 PR PBB SHADOW E&M-EST. PATIENT-LVL V: CPT | Mod: PBBFAC,,, | Performed by: NURSE PRACTITIONER

## 2024-07-01 PROCEDURE — 4010F ACE/ARB THERAPY RXD/TAKEN: CPT | Mod: CPTII,S$GLB,, | Performed by: NURSE PRACTITIONER

## 2024-07-01 PROCEDURE — 1126F AMNT PAIN NOTED NONE PRSNT: CPT | Mod: CPTII,S$GLB,, | Performed by: NURSE PRACTITIONER

## 2024-07-01 PROCEDURE — 1101F PT FALLS ASSESS-DOCD LE1/YR: CPT | Mod: CPTII,S$GLB,, | Performed by: NURSE PRACTITIONER

## 2024-07-01 PROCEDURE — 1158F ADVNC CARE PLAN TLK DOCD: CPT | Mod: CPTII,S$GLB,, | Performed by: NURSE PRACTITIONER

## 2024-07-01 PROCEDURE — 1160F RVW MEDS BY RX/DR IN RCRD: CPT | Mod: CPTII,S$GLB,, | Performed by: NURSE PRACTITIONER

## 2024-07-01 PROCEDURE — G0439 PPPS, SUBSEQ VISIT: HCPCS | Mod: S$GLB,,, | Performed by: NURSE PRACTITIONER

## 2024-07-01 PROCEDURE — 3288F FALL RISK ASSESSMENT DOCD: CPT | Mod: CPTII,S$GLB,, | Performed by: NURSE PRACTITIONER

## 2024-07-01 PROCEDURE — 1170F FXNL STATUS ASSESSED: CPT | Mod: CPTII,S$GLB,, | Performed by: NURSE PRACTITIONER

## 2024-07-01 NOTE — PROGRESS NOTES
"  Liliane Ford presented for a Medicare AWV today. The following components were reviewed and updated:    Medical history  Family History  Social history  Allergies and Current Medications  Health Risk Assessment  Health Maintenance  Care Team    **See Completed Assessments for Annual Wellness visit with in the encounter summary    The following assessments were completed:  Depression Screening  Cognitive function Screening  Timed Get Up Test  Whisper Test        Opioid documentation:      Patient does not have a current opioid prescription.          Vitals:    07/01/24 1114 07/01/24 1137   BP: (!) 142/84 136/84   Pulse: 72    Resp: 18    SpO2: 99%    Weight: 85.2 kg (187 lb 13.3 oz)    Height: 5' 4" (1.626 m)      Body mass index is 32.24 kg/m².       Physical Exam  Constitutional:       Appearance: Normal appearance.   HENT:      Head: Normocephalic and atraumatic.   Neck:      Vascular: No carotid bruit.   Cardiovascular:      Rate and Rhythm: Normal rate and regular rhythm.      Pulses: Normal pulses.      Heart sounds: Normal heart sounds.   Pulmonary:      Effort: Pulmonary effort is normal.      Breath sounds: Normal breath sounds.   Musculoskeletal:         General: Normal range of motion.   Skin:     General: Skin is warm and dry.   Neurological:      General: No focal deficit present.      Mental Status: She is alert and oriented to person, place, and time.      Motor: No weakness.      Gait: Gait normal.   Psychiatric:         Mood and Affect: Mood normal.         Behavior: Behavior normal.         Thought Content: Thought content normal.         Judgment: Judgment normal.       Diagnoses and health risks identified today and associated recommendations/orders:  1. Encounter for Medicare annual wellness exam  Reviewed and discussed preventive health screenings and vaccinations with the patient.   Mammogram due 9/5/2024 / patient will schedule independently through portal     2. 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 S/P laparoscopic robotic assisted hysterectomy with left salpmingectomy and right salpingo ophorectomysalpingo opp  Initial surgery 2/2024 with Robotic USO + staging 4/2024. Followed by GynOnc. No adjuvant therapy recommended. Follow up with GynOnc as recommended for monitoring.     4. Coronary artery disease involving native coronary artery of native heart without angina pectoris  On ASA, Toprol, and Crestor. Stable. Continue current treatment plan as previously prescribed with your Cardiologist.     5. LAE (left atrial enlargement)  Recommend optimal BP control. Stable. Continue current treatment plan as previously prescribed with your Cardiologist.     6. Diastolic dysfunction  Stable. Continue current treatment plan as previously prescribed with your Cardiologist.     7. Aortic atherosclerosis  Minimal per CT 4/2/2024.  On ASA and statin. Stable. Continue current treatment plan as previously prescribed with your Cardiologist.     8. Essential hypertension  On Amlodipine, Toprol, and Benicar HCT. Stable / patient reports blood pressures are almost always <140/90 on home monitor. Continue current treatment plan as previously prescribed with your Cardiologist and PCP     9. Hypercholesterolemia  On Crestor. Stable. Continue current treatment plan as previously prescribed with your PCP and cardiologist.     Lab Results   Component Value Date    CHOL 121 06/02/2023    CHOL 225 (H) 08/30/2022    CHOL 185 11/08/2007     Lab Results   Component Value Date    HDL 43 06/02/2023    HDL 71 08/30/2022    HDL 56 11/03/2010     Lab Results   Component Value Date    LDLCALC 67.4 06/02/2023    LDLCALC 142.4 08/30/2022    LDLCALC 160 (H) 11/03/2010     Lab Results   Component Value Date    TRIG 53 06/02/2023    TRIG 58 08/30/2022    TRIG 77 11/03/2010       Lab Results   Component Value Date    CHOLHDL 35.5 06/02/2023    CHOLHDL 31.6 08/30/2022    CHOLHDL 27.6 11/08/2007         10. Iron deficiency anemia,  unspecified iron deficiency anemia type  Stable. Continue current treatment plan as previously prescribed with your HemOnc.     Lab Results   Component Value Date    UIBC 167 11/19/2007    IRON 92 02/06/2024    TRANSFERRIN 299 02/06/2024    TIBC 443 02/06/2024    FESATURATED 21 02/06/2024      Lab Results   Component Value Date    WBC 5.54 04/12/2024    HGB 13.7 04/12/2024    HCT 43.7 04/12/2024    MCV 81 (L) 04/12/2024     04/12/2024       11. Osteopenia of multiple sites  Stable / discussed vitamin D and calcium recommendations. Continue current treatment plan as previously prescribed with your PCP     12. Class 1 obesity due to excess calories with serious comorbidity and body mass index (BMI) of 31.0 to 31.9 in adult  Discussed comprehensive lifestyle interventions for obesity, including focusing on a diet that is low-fat/low-calorie with emphasis on fresh vegetables, fruits, and lean meats, as well as a physical activity goal of at least 150 minutes of moderate intensity activity per week.     13. Elevated AST (SGOT)  Stable. Continue current treatment plan as previously prescribed with your PCP     Lab Results   Component Value Date    ALT 17 06/02/2023    AST 20 06/02/2023    ALKPHOS 51 (L) 06/02/2023    BILITOT 0.7 06/02/2023         14. Thyroid nodule  US 2018 with OLOL. Stable. Continue current treatment plan as previously prescribed with your PCP     Lab Results   Component Value Date    TSH 2.478 01/06/2023         Provided Liliane with a 5-10 year written screening schedule and personal prevention plan. Recommendations were developed using the USPSTF age appropriate recommendations. Education, counseling, and referrals were provided as needed.  After Visit Summary printed and given to patient which includes a list of additional screenings\tests needed.    Follow up in about 1 year (around 7/1/2025).      Tanya Hammer NP    I offered to discuss advanced care planning, including how to pick a  person who would make decisions for you if you were unable to make them for yourself, called a health care power of , and what kind of decisions you might make such as use of life sustaining treatments such as ventilators and tube feeding when faced with a life limiting illness recorded on a living will that they will need to know. (How you want to be cared for as you near the end of your natural life)     X Patient is interested in learning more about how to make advanced directives.  I provided them paperwork and offered to discuss this with them.

## 2024-07-01 NOTE — PATIENT INSTRUCTIONS
1,200 mg Calcium daily   Vitamin D3 1,000-2,000 iu   Omega-3 daily     Counseling and Referral of Other Preventative  (Italic type indicates deductible and co-insurance are waived)    Patient Name: Liliane Ford  Today's Date: 7/1/2024    Health Maintenance       Date Due Completion Date    Pneumococcal Vaccines (Age 65+) (1 of 2 - PCV) Never done ---    Shingles Vaccine (1 of 2) Never done ---    RSV Vaccine (Age 60+ and Pregnant patients) (1 - 1-dose 60+ series) Never done ---    TETANUS VACCINE 11/08/2017 11/8/2007    COVID-19 Vaccine (5 - 2023-24 season) 09/01/2023 7/14/2022    Mammogram 09/05/2024 9/5/2023    Influenza Vaccine (1) 09/01/2024 ---    High Dose Statin 05/08/2025 5/8/2024    Hemoglobin A1c (Diabetic Prevention Screening) 08/30/2025 8/30/2022    Colorectal Cancer Screening 09/30/2025 9/30/2022    DEXA Scan 02/16/2027 2/16/2024    Lipid Panel 06/02/2028 6/2/2023        No orders of the defined types were placed in this encounter.    The following information is provided to all patients.  This information is to help you find resources for any of the problems found today that may be affecting your health:                  Living healthy guide: www.[facility].gov      Understanding Diabetes: www.diabetes.org      Eating healthy: www.cdc.gov/healthyweight      CDC home safety checklist: www.cdc.gov/steadi/patient.html      Agency on Aging: www.goea.[place].gov      Alcoholics anonymous (AA): www.aa.org      Physical Activity: www.ty.nih.gov/rh8vlvm      Tobacco use: www.quitwithusla.org

## 2024-08-18 NOTE — PROGRESS NOTES
Subjective:    Patient ID:  Liliane Ford is a 69 y.o. female who presents for evaluation of Coronary Artery Disease          HPI:  Pt presents for eval.  Her current med conditions include CAD, HTN, ovarian cancer (dx Feb 2024), obesity, LAE, DD, abnl ecg, hypercholesterolemia.  Nonsmoker.  Past hx pertinent for following:  Pt seen as new pt in Nov 2022; per Nov 2022 visit:  She thinks she may have developed a heart condition after having heavy fibroid bleeding issues 2+ years ago, required 4 units PRBC.  PCP added Toprol for palpitations, HTN control in 2022.  Ecg 10/25/22 NSR, old septal infarct.  Old ecgs reviewed: Feb 2020 anterolateral infarct, and 8/30/22 NSR, old septal infarct.   Pt had nuclear stress test Nov 2022.  Personally reviewed: 1.5 mm ST depression, apical defect on my view.  Report indicated normal study.  Due to concerns, I advised Coronary CTA which was done in Nov 2022.  Coronary CTA suggested 70% mid LAD stenosis.  Echo Nov 2022 normal EF, DD, LAE, LVH.  LHC scheduled in Dec 2022 but she had admission Dec 2022 for severe anemia, Hg 4.9  Required multiple PRBC.  LHC was cancelled.  Has been working w hematology on anemia.  S/p hysterectomy + bilateral salpingectomy and right oophorectomy due to fibroid bleeding; dx w ovarian cancer.  S/p left oophorectomy.  ecg 11/16/23 sinus erick 59 bpm, 1 av block, anteroseptal infarct (old).  Now here.  No acute issues noted.  CAD is stable.  Denies chest pain/angina.  Walks for exercise.  No CHF sxs.  No dizziness.  No syncope.  BP controlled.  She checks at home and seems at goal when she checks it.  Lipids well controlled on statin tx.  Compliant w meds.  Weight up over last year or two.  Chart/labs reviewed.      Past Medical History:   Diagnosis Date    Coronary artery disease     Encounter for blood transfusion     Hyperlipidemia     Hypertension     Obesity     Ovarian cancer     Pulmonary edema      Current Outpatient Medications   Medication  "Instructions    acetaminophen (TYLENOL) 650 mg, Oral, Every 6 hours, Alternate with ibuprofen every 3 hours.    amLODIPine (NORVASC) 10 mg, Oral, Daily    ascorbic acid (vitamin C) (VITAMIN C) 500 mg, Oral, Nightly    aspirin (ECOTRIN) 81 mg, Oral, Daily    b complex vitamins tablet 1 tablet, Oral, Daily, B COMPLETE    ibuprofen (ADVIL,MOTRIN) 600 mg, Oral, Every 6 hours, Alternate with tylenol every 3 hours.    metoprolol succinate (TOPROL-XL) 50 mg, Oral, 2 times daily    nitroGLYCERIN (NITROSTAT) 0.4 mg, Sublingual, Every 5 min PRN    olmesartan-hydrochlorothiazide (BENICAR HCT) 40-25 mg per tablet 1 tablet, Oral, Daily    rosuvastatin (CRESTOR) 20 mg, Oral, Nightly         Review of Systems   Constitutional: Negative.   HENT: Negative.     Eyes: Negative.    Cardiovascular: Negative.    Respiratory: Negative.     Endocrine: Negative.    Hematologic/Lymphatic: Negative.    Skin: Negative.    Musculoskeletal: Negative.    Gastrointestinal: Negative.    Genitourinary: Negative.    Neurological: Negative.    Psychiatric/Behavioral: Negative.     Allergic/Immunologic: Negative.         BP (!) 142/80 (BP Location: Left arm, Patient Position: Sitting, BP Method: Large (Manual))   Pulse 75   Ht 5' 4" (1.626 m)   Wt 86.7 kg (191 lb 2.2 oz)   SpO2 95%   BMI 32.81 kg/m²     Wt Readings from Last 3 Encounters:   08/19/24 86.7 kg (191 lb 2.2 oz)   07/01/24 85.2 kg (187 lb 13.3 oz)   05/08/24 83 kg (183 lb)     Temp Readings from Last 3 Encounters:   04/19/24 98 °F (36.7 °C) (Oral)   04/12/24 98.3 °F (36.8 °C) (Skin)   04/10/24 97.1 °F (36.2 °C)     BP Readings from Last 3 Encounters:   08/19/24 (!) 142/80   07/01/24 136/84   05/08/24 (!) 157/84     Pulse Readings from Last 3 Encounters:   08/19/24 75   07/01/24 72   05/08/24 72       Objective:    Physical Exam  Vitals and nursing note reviewed.   Constitutional:       General: She is not in acute distress.     Appearance: Normal appearance. She is well-developed. She " is not ill-appearing or diaphoretic.   HENT:      Head: Normocephalic.   Neck:      Thyroid: No thyromegaly.      Vascular: No carotid bruit or JVD.   Cardiovascular:      Rate and Rhythm: Normal rate and regular rhythm.      Pulses: Normal pulses.           Radial pulses are 2+ on the right side and 2+ on the left side.      Heart sounds: Normal heart sounds, S1 normal and S2 normal. No murmur heard.     No friction rub. No gallop.   Pulmonary:      Effort: Pulmonary effort is normal.      Breath sounds: Normal breath sounds. No wheezing or rales.   Abdominal:      General: Bowel sounds are normal. There is no abdominal bruit.      Palpations: Abdomen is soft.      Tenderness: There is no abdominal tenderness.   Musculoskeletal:      Cervical back: Neck supple.   Lymphadenopathy:      Cervical: No cervical adenopathy.   Skin:     General: Skin is warm.   Neurological:      Mental Status: She is alert and oriented to person, place, and time.   Psychiatric:         Behavior: Behavior normal. Behavior is cooperative.         I have reviewed all pertinent labs and cardiac studies.      Chemistry        Component Value Date/Time     04/12/2024 1223    K 4.0 04/12/2024 1223     04/12/2024 1223    CO2 27 04/12/2024 1223    BUN 15 04/12/2024 1223    CREATININE 1.0 04/12/2024 1223     (H) 04/12/2024 1223        Component Value Date/Time    CALCIUM 10.4 04/12/2024 1223    ALKPHOS 51 (L) 06/02/2023 0709    AST 20 06/02/2023 0709    ALT 17 06/02/2023 0709    BILITOT 0.7 06/02/2023 0709    ESTGFRAFRICA >60 02/06/2020 0458    EGFRNONAA >60 02/06/2020 0458          Lab Results   Component Value Date    WBC 5.54 04/12/2024    HGB 13.7 04/12/2024    HCT 43.7 04/12/2024    MCV 81 (L) 04/12/2024     04/12/2024       Lab Results   Component Value Date    HGBA1C 4.9 08/30/2022     Lab Results   Component Value Date    CHOL 121 06/02/2023    CHOL 225 (H) 08/30/2022    CHOL 185 11/08/2007     Lab Results    Component Value Date    HDL 43 06/02/2023    HDL 71 08/30/2022    HDL 56 11/03/2010     Lab Results   Component Value Date    LDLCALC 67.4 06/02/2023    LDLCALC 142.4 08/30/2022    LDLCALC 160 (H) 11/03/2010     Lab Results   Component Value Date    TRIG 53 06/02/2023    TRIG 58 08/30/2022    TRIG 77 11/03/2010     Lab Results   Component Value Date    CHOLHDL 35.5 06/02/2023    CHOLHDL 31.6 08/30/2022    CHOLHDL 27.6 11/08/2007       Results for orders placed during the hospital encounter of 11/22/22    Echo    Interpretation Summary  · Concentric hypertrophy and normal systolic function.  · The estimated ejection fraction is 60%.  · Grade II left ventricular diastolic dysfunction.  · Normal right ventricular size with normal r`ight ventricular systolic function.  · Mild left atrial enlargement.        CONCLUSION:     1.  CAD-RADS 4/P1.     2.  Single-vessel CAD involving the mid LAD.  This is a 2.5 cm long mild (25-49%) lesion with a possible focal 70% stenosis.  FFR-CT is not available.     3. Coronary calcium score 13 (36 percentile).     4.  Post processed images are available for review in PACS under the heading Recons: HKD and HKD LAD CPR.      Assessment:       1. Coronary artery disease involving native coronary artery of native heart without angina pectoris    2. Abnormal nuclear stress test    3. Aortic atherosclerosis    4. Abnormal ECG    5. Abnormal computed tomography angiography (CTA)    6. Cardiomegaly    7. Class 1 obesity due to excess calories with serious comorbidity and body mass index (BMI) of 31.0 to 31.9 in adult    8. Diastolic dysfunction    9. Essential hypertension    10. LAE (left atrial enlargement)    11. Hypercholesterolemia    12. Iron deficiency anemia, unspecified iron deficiency anemia type    13. Palpitations         Plan:               Stable CV status on current med tx.  CAD: Nuclear stress test Nov 2022  correlates with her coronary CTA suggestive of 70% mid LAD  stenosis.  Continue medical tx for CAD.  [facility] was cancelled Dec 2022 due to critical anemia.  On prior clinic visits over last 2 years, I highly doubted she could tolerate DAPT should any PCI be performed due to bleeding, h/o severe anemia.  Recommended continue medical tx and risk factor modification.  Sublingual nitroglycerin 0.4 mg for concerning chest pain episodes or breakthrough angina.  Patient advised of indications, side effects of nitroglycerin and when to report to ER.   Reviewed all tests and above medical conditions with patient in detail and formulated treatment plan.  Continue optimal medical treatment for cardiovascular conditions.  Cardiac low salt diet advised.  Daily exercise encouraged, with the goal 30 +  minutes aerobic exercise as tolerated.  Obesity: Maintaining healthy weight and weight loss goals (if needed) were discussed in clinic.  HTN: Need for BP control and HTN goals (if needed) were discussed and tx plan formulated.  Goal < 130/80.  Continue current HTN meds.  Home BP monitoring advised.  Abnl ecg: chronically abnormal with anteroseptal infarct for years. Continue to monitor.  Lipids:  Statin tx.  Palpitations: Continue beta-blocker tx.  Anemia: f/u w heme/onc as advised.      F/u in 6 months w stress test/echo.    I have reviewed all pertinent labs and cardiac studies independently. Plans and recommendations have been formulated under my direct supervision. All questions answered and patient voiced understanding.

## 2024-08-19 ENCOUNTER — OFFICE VISIT (OUTPATIENT)
Dept: CARDIOLOGY | Facility: CLINIC | Age: 69
End: 2024-08-19
Payer: COMMERCIAL

## 2024-08-19 VITALS
SYSTOLIC BLOOD PRESSURE: 142 MMHG | BODY MASS INDEX: 32.63 KG/M2 | OXYGEN SATURATION: 95 % | DIASTOLIC BLOOD PRESSURE: 80 MMHG | WEIGHT: 191.13 LBS | HEIGHT: 64 IN | HEART RATE: 75 BPM

## 2024-08-19 DIAGNOSIS — I70.0 AORTIC ATHEROSCLEROSIS: ICD-10-CM

## 2024-08-19 DIAGNOSIS — E78.00 HYPERCHOLESTEROLEMIA: ICD-10-CM

## 2024-08-19 DIAGNOSIS — I10 ESSENTIAL HYPERTENSION: ICD-10-CM

## 2024-08-19 DIAGNOSIS — R94.31 ABNORMAL ECG: ICD-10-CM

## 2024-08-19 DIAGNOSIS — D50.9 IRON DEFICIENCY ANEMIA, UNSPECIFIED IRON DEFICIENCY ANEMIA TYPE: ICD-10-CM

## 2024-08-19 DIAGNOSIS — I51.89 DIASTOLIC DYSFUNCTION: ICD-10-CM

## 2024-08-19 DIAGNOSIS — R94.39 ABNORMAL NUCLEAR STRESS TEST: ICD-10-CM

## 2024-08-19 DIAGNOSIS — I51.7 LAE (LEFT ATRIAL ENLARGEMENT): ICD-10-CM

## 2024-08-19 DIAGNOSIS — R93.89 ABNORMAL COMPUTED TOMOGRAPHY ANGIOGRAPHY (CTA): ICD-10-CM

## 2024-08-19 DIAGNOSIS — I25.10 CORONARY ARTERY DISEASE INVOLVING NATIVE CORONARY ARTERY OF NATIVE HEART WITHOUT ANGINA PECTORIS: Primary | ICD-10-CM

## 2024-08-19 DIAGNOSIS — I51.7 CARDIOMEGALY: ICD-10-CM

## 2024-08-19 DIAGNOSIS — R00.2 PALPITATIONS: ICD-10-CM

## 2024-08-19 DIAGNOSIS — E66.09 CLASS 1 OBESITY DUE TO EXCESS CALORIES WITH SERIOUS COMORBIDITY AND BODY MASS INDEX (BMI) OF 31.0 TO 31.9 IN ADULT: ICD-10-CM

## 2024-08-19 PROCEDURE — 1160F RVW MEDS BY RX/DR IN RCRD: CPT | Mod: CPTII,S$GLB,, | Performed by: INTERNAL MEDICINE

## 2024-08-19 PROCEDURE — 3008F BODY MASS INDEX DOCD: CPT | Mod: CPTII,S$GLB,, | Performed by: INTERNAL MEDICINE

## 2024-08-19 PROCEDURE — 4010F ACE/ARB THERAPY RXD/TAKEN: CPT | Mod: CPTII,S$GLB,, | Performed by: INTERNAL MEDICINE

## 2024-08-19 PROCEDURE — 99214 OFFICE O/P EST MOD 30 MIN: CPT | Mod: S$GLB,,, | Performed by: INTERNAL MEDICINE

## 2024-08-19 PROCEDURE — 1159F MED LIST DOCD IN RCRD: CPT | Mod: CPTII,S$GLB,, | Performed by: INTERNAL MEDICINE

## 2024-08-19 PROCEDURE — 3079F DIAST BP 80-89 MM HG: CPT | Mod: CPTII,S$GLB,, | Performed by: INTERNAL MEDICINE

## 2024-08-19 PROCEDURE — 1126F AMNT PAIN NOTED NONE PRSNT: CPT | Mod: CPTII,S$GLB,, | Performed by: INTERNAL MEDICINE

## 2024-08-19 PROCEDURE — 99999 PR PBB SHADOW E&M-EST. PATIENT-LVL III: CPT | Mod: PBBFAC,,, | Performed by: INTERNAL MEDICINE

## 2024-08-19 PROCEDURE — 3077F SYST BP >= 140 MM HG: CPT | Mod: CPTII,S$GLB,, | Performed by: INTERNAL MEDICINE

## 2024-09-25 ENCOUNTER — PATIENT MESSAGE (OUTPATIENT)
Dept: PRIMARY CARE CLINIC | Facility: CLINIC | Age: 69
End: 2024-09-25
Payer: COMMERCIAL

## 2024-10-10 ENCOUNTER — HOSPITAL ENCOUNTER (OUTPATIENT)
Dept: RADIOLOGY | Facility: HOSPITAL | Age: 69
Discharge: HOME | End: 2024-10-10
Attending: FAMILY MEDICINE
Payer: MEDICARE

## 2024-10-10 ENCOUNTER — OFFICE VISIT (OUTPATIENT)
Dept: PRIMARY CARE CLINIC | Facility: CLINIC | Age: 69
End: 2024-10-10
Payer: COMMERCIAL

## 2024-10-10 VITALS
HEART RATE: 77 BPM | HEIGHT: 64 IN | DIASTOLIC BLOOD PRESSURE: 82 MMHG | BODY MASS INDEX: 32.14 KG/M2 | SYSTOLIC BLOOD PRESSURE: 136 MMHG | WEIGHT: 188.25 LBS

## 2024-10-10 DIAGNOSIS — Z12.31 BREAST CANCER SCREENING BY MAMMOGRAM: ICD-10-CM

## 2024-10-10 DIAGNOSIS — L03.011 PARONYCHIA OF FINGER, RIGHT: Primary | ICD-10-CM

## 2024-10-10 PROCEDURE — 77063 BREAST TOMOSYNTHESIS BI: CPT | Mod: 26,,, | Performed by: RADIOLOGY

## 2024-10-10 PROCEDURE — 99499 UNLISTED E&M SERVICE: CPT | Mod: S$GLB,,, | Performed by: FAMILY MEDICINE

## 2024-10-10 PROCEDURE — 99999 PR PBB SHADOW E&M-EST. PATIENT-LVL V: CPT | Mod: PBBFAC,,, | Performed by: FAMILY MEDICINE

## 2024-10-10 PROCEDURE — 77067 SCR MAMMO BI INCL CAD: CPT | Mod: 26,,, | Performed by: RADIOLOGY

## 2024-10-10 PROCEDURE — 77067 SCR MAMMO BI INCL CAD: CPT | Mod: TC,PN

## 2024-10-10 RX ORDER — MUPIROCIN 20 MG/G
OINTMENT TOPICAL 2 TIMES DAILY
Qty: 30 G | Refills: 0 | Status: SHIPPED | OUTPATIENT
Start: 2024-10-10

## 2024-10-10 RX ORDER — SULFAMETHOXAZOLE AND TRIMETHOPRIM 800; 160 MG/1; MG/1
1 TABLET ORAL 2 TIMES DAILY
Qty: 20 TABLET | Refills: 0 | Status: SHIPPED | OUTPATIENT
Start: 2024-10-10 | End: 2024-10-20

## 2024-10-10 RX ORDER — TRAMADOL HYDROCHLORIDE 50 MG/1
50 TABLET ORAL EVERY 12 HOURS PRN
Qty: 10 TABLET | Refills: 0 | Status: SHIPPED | OUTPATIENT
Start: 2024-10-10

## 2024-10-10 NOTE — PATIENT INSTRUCTIONS
Let us see if we can get you some relief from the finger...    I am sending some Bactrim (trimethoprim sulfamethoxazole) to the pharmacy.  This is an antibiotic that should help get rid of the infection.  Take it twice daily, as directed.      Soak the finger in some warm water with Epsom salts for about 10-15 minutes, twice a day.  After soaking, clean gently with Dial soap and water, then rinse thoroughly and pat dry.  Apply some Bactroban (mupirocin) ointment to the area, then go ahead and keep it covered.      For pain, start with OTC Tylenol and/or ibuprofen.  I am sending some tramadol to the pharmacy to use, as needed, for severe pain.      I would like you to see my hand specialist either tomorrow, or one day next week, to get it looked at.  They may want to open it up and drain it.  Referral placed.    I will send refills of the rest of your medicines to the pharmacy this afternoon.  Please continue taking them, as directed.      We are due for screening mammogram.  Order placed.  You can probably get that done on October 29 when you come in for the blood work.    Continue to eat a healthy diet.  Be careful with portion sizes.  Includes lots of fresh fruits, vegetables, whole grains, lean proteins.  See info below.    Keep hydrated.  Be sure to drink at least 8-10, 8 oz, glasses of water every day.    Stay active.  Try to do some sort of physical activity every day.  Nothing outrageous, just try walking for 10-15 minutes each day.

## 2024-10-11 ENCOUNTER — HOSPITAL ENCOUNTER (EMERGENCY)
Facility: HOSPITAL | Age: 69
Discharge: HOME | End: 2024-10-11
Attending: EMERGENCY MEDICINE
Payer: MEDICARE

## 2024-10-11 VITALS
DIASTOLIC BLOOD PRESSURE: 74 MMHG | TEMPERATURE: 98 F | WEIGHT: 190.06 LBS | RESPIRATION RATE: 18 BRPM | HEIGHT: 64 IN | BODY MASS INDEX: 32.45 KG/M2 | HEART RATE: 81 BPM | OXYGEN SATURATION: 99 % | SYSTOLIC BLOOD PRESSURE: 151 MMHG

## 2024-10-11 DIAGNOSIS — L03.011 PARONYCHIA OF FINGER OF RIGHT HAND: Primary | ICD-10-CM

## 2024-10-11 PROCEDURE — 63600175 PHARM REV CODE 636 W HCPCS: Performed by: REGISTERED NURSE

## 2024-10-11 PROCEDURE — 99282 EMERGENCY DEPT VISIT SF MDM: CPT | Mod: 25

## 2024-10-11 PROCEDURE — 10060 I&D ABSCESS SIMPLE/SINGLE: CPT

## 2024-10-11 RX ORDER — LIDOCAINE HYDROCHLORIDE 10 MG/ML
10 INJECTION, SOLUTION EPIDURAL; INFILTRATION; INTRACAUDAL; PERINEURAL
Status: COMPLETED | OUTPATIENT
Start: 2024-10-11 | End: 2024-10-11

## 2024-10-11 RX ADMIN — LIDOCAINE HYDROCHLORIDE 100 MG: 10 INJECTION, SOLUTION EPIDURAL; INFILTRATION; INTRACAUDAL at 05:10

## 2024-10-11 NOTE — ED PROVIDER NOTES
Encounter Date: 10/11/2024       History     Chief Complaint   Patient presents with    Hand Pain     Pain and swelling to right 3 finger x 2 days.      69-year-old female presents emergency department with complaints of swelling to the right middle finger.  Patient states swelling began 2 days ago.  Patient was seen by her primary care physician and placed on Bactrim and Bactroban ointment.  Patient denies any fever, chills, decreased range of motion, injury, nausea/vomiting or any other symptoms.    The history is provided by the patient.     Review of patient's allergies indicates:   Allergen Reactions    Penicillins Rash    Venofer [iron sucrose] Hives, Itching and Other (See Comments)     welts     Past Medical History:   Diagnosis Date    Coronary artery disease     Encounter for blood transfusion     Hyperlipidemia     Hypertension     Obesity     Ovarian cancer     Pulmonary edema      Past Surgical History:   Procedure Laterality Date     SECTION      x 2    EYE SURGERY          HYSTERECTOMY      OOPHORECTOMY Bilateral     ROBOT-ASSISTED LAPAROSCOPIC OMENTECTOMY USING DA [place] XI N/A 2024    Procedure: XI ROBOTIC OMENTECTOMY;  Surgeon: Mckenna Eng MD;  Location: [facility];  Service: OB/GYN;  Laterality: N/A;    ROBOT-ASSISTED LAPAROSCOPIC SALPINGO-OOPHORECTOMY USING [place] XI Left 2024    Procedure: XI ROBOTIC SALPINGO-OOPHORECTOMY;  Surgeon: Mckenna Eng MD;  Location: [facility];  Service: OB/GYN;  Laterality: Left;  USO, side will be determined by md    TUBAL LIGATION          XI ROBOTIC HYSTERECTOMY, WITH SALPINGO-OOPHORECTOMY Right 2024    Procedure: XI ROBOTIC HYSTERECTOMY,WITH SALPINGO-OOPHORECTOMY;  Surgeon: Ema Aquino MD;  Location[facility];  Service: OB/GYN;  Laterality: Right;     Family History   Problem Relation Name Age of Onset    Hypertension Mother Yoko Felder      Social History     Tobacco Use    Smoking status: Former      Current packs/day: 0.25     Average packs/day: 0.3 packs/day for 41.0 years (10.3 ttl pk-yrs)     Types: Cigarettes    Smokeless tobacco: Never   Substance Use Topics    Alcohol use: Never    Drug use: Never     Review of Systems   Constitutional:  Negative for fever.   HENT:  Negative for sore throat.    Respiratory:  Negative for shortness of breath.    Cardiovascular:  Negative for chest pain.   Gastrointestinal:  Negative for nausea.   Genitourinary:  Negative for dysuria.   Musculoskeletal:  Negative for back pain.        +swelling to the right middle finger   Skin:  Negative for rash.   Neurological:  Negative for weakness.   Hematological:  Does not bruise/bleed easily.   All other systems reviewed and are negative.      Physical Exam     Initial Vitals [10/11/24 1732]   BP Pulse Resp Temp SpO2   (!) 151/74 81 18 98 °F (36.7 °C) 99 %      MAP       --         Physical Exam    Constitutional: She appears well-developed and well-nourished. She is not diaphoretic. No distress.   HENT:   Head: Normocephalic and atraumatic.   Eyes: Conjunctivae and EOM are normal. Pupils are equal, round, and reactive to light.   Neck: Neck supple.   Normal range of motion.  Cardiovascular:  Normal rate, regular rhythm and normal heart sounds.           No murmur heard.  Pulmonary/Chest: Breath sounds normal. No respiratory distress. She has no wheezes. She has no rales.   Abdominal: Abdomen is soft. Bowel sounds are normal. There is no abdominal tenderness. There is no rebound and no guarding.   Musculoskeletal:         General: No edema. Normal range of motion.      Right hand: Swelling and tenderness present.      Cervical back: Normal range of motion and neck supple.      Comments: Moderate swelling and fluctuance to the right distal middle finger, paronychia noted, full range of motion, capillary refill less than 3 seconds     Neurological: She is alert and oriented to person, place, and time. No cranial nerve deficit. GCS  score is 15. GCS eye subscore is 4. GCS verbal subscore is 5. GCS motor subscore is 6.   Skin: Skin is warm and dry. Capillary refill takes less than 2 seconds.   Psychiatric: She has a normal mood and affect. Thought content normal.         ED Course   I & D - Incision and Drainage    Date/Time: 10/11/2024 6:05 PM  Location procedure was performed: [facility] EMERGENCY DEPARTMENT    Performed by: Jonhie Kenny Jr., FNP  Authorized by: Daniel Bauman DO  Consent Done: Yes  Consent: Verbal consent obtained.  Consent given by: patient  Type: abscess  Body area: upper extremity  Location details: right long finger  Anesthesia: digital block    Anesthesia:  Local Anesthetic: lidocaine 1% without epinephrine  Anesthetic total: 5 mL  Scalpel size: 11  Incision type: single straight  Incision depth: dermal  Complexity: simple  Drainage: pus and bloody  Drainage amount: moderate  Wound treatment: incision and expression of material  Packing material: none  Patient tolerance: Patient tolerated the procedure well with no immediate complications    Incision depth: dermal        Labs Reviewed - No data to display       Imaging Results    None          Medications   LIDOcaine (PF) 10 mg/ml (1%) injection 100 mg (100 mg Infiltration Given by Provider 10/11/24 2033)     Medical Decision Making  Amount and/or Complexity of Data Reviewed  Discussion of management or test interpretation with external provider(s): Paronychia drained with success.  Patient tolerated well.  Advised patient to continue with antibiotics as prescribed.    Risk  Prescription drug management.  Risk Details: I discussed with patient and/or family/caretaker that evaluation in the ED does not suggest any emergent or life threatening medical conditions requiring immediate intervention beyond what was provided in the ED, and I believe patient is safe for discharge.  Regardless, an unremarkable evaluation in the ED does not preclude the development or  presence of a serious of life threatening condition. As such, patient was instructed to return immediately for any worsening or change in current symptoms.                                        Clinical Impression:  Final diagnoses:  [L03.011] Paronychia of finger of right hand (Primary)          ED Disposition Condition    Discharge Stable          ED Prescriptions    None       Follow-up Information       Follow up With Specialties Details Why Contact Info    Kishan Kelley MD Family Medicine In 1 week  [address]  [place] [address]  [phone]               Johnie Kenny Jr., [facility]  10/11/24 0397

## 2024-10-16 ENCOUNTER — TELEPHONE (OUTPATIENT)
Dept: SPORTS MEDICINE | Facility: CLINIC | Age: 69
End: 2024-10-16
Payer: MEDICARE

## 2024-10-16 NOTE — TELEPHONE ENCOUNTER
Called pt to triage upcoming visit.  Pt reports same complaint seen in ED for. Advise pt that paronychia is not something that Dr. Mckeon treats and that it would be most appropriate to reach out to her PCP.  If Cesar Kelley is worries about her infection related to her joint and wishes for her to see hand specialist, I provided names of hand surgeons who treat such issues (Dr. Carty, Dr. Irwin, Dr. Mcfarlane). Pt verbalized understanding and will reach out to PCP

## 2024-10-17 ENCOUNTER — OFFICE VISIT (OUTPATIENT)
Dept: ORTHOPEDICS | Facility: CLINIC | Age: 69
End: 2024-10-17
Payer: MEDICARE

## 2024-10-17 ENCOUNTER — HOSPITAL ENCOUNTER (OUTPATIENT)
Dept: RADIOLOGY | Facility: HOSPITAL | Age: 69
Discharge: HOME | End: 2024-10-17
Attending: ORTHOPAEDIC SURGERY
Payer: MEDICARE

## 2024-10-17 DIAGNOSIS — M79.644 FINGER PAIN, RIGHT: ICD-10-CM

## 2024-10-17 DIAGNOSIS — L03.011 PARONYCHIA OF FINGER, RIGHT: ICD-10-CM

## 2024-10-17 DIAGNOSIS — M79.644 FINGER PAIN, RIGHT: Primary | ICD-10-CM

## 2024-10-17 DIAGNOSIS — L03.011 PARONYCHIA OF RIGHT MIDDLE FINGER: Primary | ICD-10-CM

## 2024-10-17 PROCEDURE — 73140 X-RAY EXAM OF FINGER(S): CPT | Mod: TC,RT

## 2024-10-17 PROCEDURE — 99999 PR PBB SHADOW E&M-EST. PATIENT-LVL III: CPT | Mod: PBBFAC,,, | Performed by: ORTHOPAEDIC SURGERY

## 2024-10-17 PROCEDURE — 73140 X-RAY EXAM OF FINGER(S): CPT | Mod: 26,RT,, | Performed by: RADIOLOGY

## 2024-10-17 PROCEDURE — 99213 OFFICE O/P EST LOW 20 MIN: CPT | Mod: PBBFAC,25 | Performed by: ORTHOPAEDIC SURGERY

## 2024-10-17 NOTE — PROGRESS NOTES
TESSIE Carty M.D.  Orthopaedic Hand and Wrist Surgery  [address]    Patient ID: Liliane Ford  YOB: 1955  MRN: [id number]    Provider Note/Medical Decision Makin. Paronychia of right middle finger  Assessment & Plan:  The patient and I talked at length about the natural history and pathophysiology of her injury which is right middle finger paronychia improving , she understands that this may lead to chronic problems which may have acute episodic exacerbations.   Symptoms may resolve, worsen and even become permanent.  The patient understands the treatment options including observation, activity modification, therapy, NSAIDs, splints and the surgical options including debridement.  We discussed the risks of the diagnosis and the treatment options including pain, infection, bleeding, damage to nerves and vessels, stiffness, scarring, incomplete relief or recurrence of symptoms, poor pain and functional outcomes.  Unique risks of this diagnosis and the treatment include risk of amputation.  The patients treatment is further complicated by an increased risk of anesthetic complication secondary to cardiopulmonary problems.  The patient has elected to proceed with daily dressing changes and continued p.o. antibiotics and we will follow up next week.    She understands her symptoms are currently improved significantly there are no signs of osteomyelitis today were deep finger infection she understands the risks of recurrence of infection which may necessitate surgery if it gets worse over the weekend she will present to the ER        2. Paronychia of finger, right  -     Ambulatory referral/consult to Orthopedics         Chief Complaint: Pain, Swelling, and Injury of the Right Hand      Referred By: Kishan Kelley    History of Present Illness: Liliane Ford is a 69 y.o. female here today for evaluation of the right middle finger she had an abrasion of the right middle  "finger on Tuesday of last week on Wednesday became infected she [place] and had an I and D of her right middle finger paronychia and was placed on p.o. antibiotics.  She reports her finger is much improved from then and it has improved significantly over the week she is here today for evaluation after being referred by Dr. Kelley      Patient was queried and this is the extent of the patients current complaints today.    Past Medical History:     Estimated body mass index is 32.62 kg/m² as calculated from the following:    Height as of 10/11/24: 5' 4" (1.626 m).    Weight as of 10/11/24: 86.2 kg (190 lb 0.6 oz).  Past Medical History:   Diagnosis Date    Coronary artery disease     Encounter for blood transfusion     Hyperlipidemia     Hypertension     Obesity     Ovarian cancer     Pulmonary edema      Past Surgical History:   Procedure Laterality Date     SECTION      x 2    EYE SURGERY          HYSTERECTOMY      OOPHORECTOMY Bilateral     ROBOT-ASSISTED LAPAROSCOPIC OMENTECTOMY USING [place] XI N/A 2024    Procedure: XI ROBOTIC OMENTECTOMY;  Surgeon: Mckenna Eng MD;  Location: [facility];  Service: OB/GYN;  Laterality: N/A;    ROBOT-ASSISTED LAPAROSCOPIC SALPINGO-OOPHORECTOMY USING [place] XI Left 2024    Procedure: XI ROBOTIC SALPINGO-OOPHORECTOMY;  Surgeon: Mckenna Eng MD;  Location: [facility] OR;  Service: OB/GYN;  Laterality: Left;  USO, side will be determined by md    TUBAL LIGATION      1986 ROBOTIC HYSTERECTOMY, WITH SALPINGO-OOPHORECTOMY Right 2024    Procedure: XI ROBOTIC HYSTERECTOMY,WITH SALPINGO-OOPHORECTOMY;  Surgeon: Ema Aquino MD;  Location: [facility] OR;  Service: OB/GYN;  Laterality: Right;     Family History   Problem Relation Name Age of Onset    Hypertension Mother Yoko Felder      Social History     Socioeconomic History    Marital status:     Number of children: 3   Tobacco Use    Smoking status: Former     Current " packs/day: 0.25     Average packs/day: 0.3 packs/day for 41.0 years (10.3 ttl pk-yrs)     Types: Cigarettes    Smokeless tobacco: Never   Substance and Sexual Activity    Alcohol use: Never    Drug use: Never    Sexual activity: Not Currently     Partners: Male     Birth control/protection: Post-menopausal     Social Drivers of Health     Financial Resource Strain: Low Risk  (7/1/2024)    Overall Financial Resource Strain (CARDIA)     Difficulty of Paying Living Expenses: Not hard at all   Food Insecurity: No Food Insecurity (7/1/2024)    Hunger Vital Sign     Worried About Running Out of Food in the Last Year: Never true     Ran Out of Food in the Last Year: Never true   Transportation Needs: No Transportation Needs (7/1/2024)    PRAPARE - Transportation     Lack of Transportation (Medical): No     Lack of Transportation (Non-Medical): No   Physical Activity: Sufficiently Active (7/1/2024)    Exercise Vital Sign     Days of Exercise per Week: 7 days     Minutes of Exercise per Session: 40 min   Stress: No Stress Concern Present (7/1/2024)    [employer] - Occupational Stress Questionnaire     Feeling of Stress : Not at all   Housing Stability: Low Risk  (7/1/2024)    Housing Stability Vital Sign     Unable to Pay for Housing in the Last Year: No     Homeless in the Last Year: No     Medication List with Changes/Refills   Current Medications    AMLODIPINE (NORVASC) 10 MG TABLET    Take 1 tablet (10 mg total) by mouth once daily.    ASCORBIC ACID, VITAMIN C, (VITAMIN C) 500 MG TABLET    Take 1 tablet (500 mg total) by mouth every evening.    ASPIRIN (ECOTRIN) 81 MG EC TABLET    Take 1 tablet (81 mg total) by mouth once daily.    B COMPLEX VITAMINS TABLET    Take 1 tablet by mouth once daily. B COMPLETE    METOPROLOL SUCCINATE (TOPROL-XL) 50 MG 24 HR TABLET    Take 1 tablet (50 mg total) by mouth 2 (two) times daily.    MUPIROCIN (BACTROBAN) 2 % OINTMENT    Apply topically 2 (two) times  daily.    NITROGLYCERIN (NITROSTAT) 0.4 MG SL TABLET    Place 1 tablet (0.4 mg total) under the tongue every 5 (five) minutes as needed.    OLMESARTAN-HYDROCHLOROTHIAZIDE (BENICAR HCT) 40-25 MG PER TABLET    Take 1 tablet by mouth once daily.    ROSUVASTATIN (CRESTOR) 20 MG TABLET    Take 1 tablet (20 mg total) by mouth every evening.    SULFAMETHOXAZOLE-TRIMETHOPRIM 800-160MG (BACTRIM DS) 800-160 MG TAB    Take 1 tablet by mouth 2 (two) times daily. for 10 days    TRAMADOL (ULTRAM) 50 MG TABLET    Take 1 tablet (50 mg total) by mouth every 12 (twelve) hours as needed for Pain.     Review of patient's allergies indicates:   Allergen Reactions    Penicillins Rash    Venofer [iron sucrose] Hives, Itching and Other (See Comments)     welts     ROS    Physical Exam:   GENERAL: Well appearing, appropriate for stated age, no acute distress.  CARDIOVASCULAR:  Fingers have good brisk refill and good turgor.   PULMONARY: Respirations are even and non-labored.  NEURO: Awake, alert, and oriented x 3.  PSYCH: Mood & affect are appropriate.  HEENT: Head is normocephalic and atraumatic.  Ortho/SPM Exam  Hand/Wrist Musculoskeletal Exam  Superficial epidermolysis  No signs of flexor tenosynovitis  No signs of septic arthritis  Full range of motion of the hand with the exception of the PIP joint which goes from 0-45 degrees  No signs of felon  There is a large area of epidermolysis over the dorsum of the right middle finger there was no exposed tendon there was fibrinous tissue no signs of purulence and no fluctuance  Nail plate is stable    Imaging:    Relevant imaging results reviewed and interpreted by me, discussed with the patient and / or family today.   X-rays of the right middle finger show no evidence osteomyelitis      Provider Note/Medical Decision Makin. Paronychia of right middle finger  Assessment & Plan:  The patient and I talked at length about the natural history and pathophysiology of her injury which is right  middle finger paronychia improving , she understands that this may lead to chronic problems which may have acute episodic exacerbations.   Symptoms may resolve, worsen and even become permanent.  The patient understands the treatment options including observation, activity modification, therapy, NSAIDs, splints and the surgical options including debridement.  We discussed the risks of the diagnosis and the treatment options including pain, infection, bleeding, damage to nerves and vessels, stiffness, scarring, incomplete relief or recurrence of symptoms, poor pain and functional outcomes.  Unique risks of this diagnosis and the treatment include risk of amputation.  The patients treatment is further complicated by an increased risk of anesthetic complication secondary to cardiopulmonary problems.  The patient has elected to proceed with daily dressing changes and continued p.o. antibiotics and we will follow up next week.    She understands her symptoms are currently improved significantly there are no signs of osteomyelitis today were deep finger infection she understands the risks of recurrence of infection which may necessitate surgery if it gets worse over the weekend she will present to the ER        2. Paronychia of finger, right  -     Ambulatory referral/consult to Orthopedics         I discussed worrisome and red flag signs and symptoms with the patient. The patient expressed understanding and agreed to alert me immediately or to go to the emergency room if they experience any of these.   Treatment plan was developed with input from the patient/family, and they expressed understanding and agreement with the plan. All questions were answered today.    There are no Patient Instructions on file for this visit.    TESSIE Carty M.D.  [facility] of Orthopedic Surgery  Orthopedic Hand and Wrist Surgeon    [facility]  Dr. Sherwin Carty   [employer]   [employer] [facility]      Disclaimer: This note was prepared using a voice recognition system and is likely to have sound alike errors within the text.

## 2024-10-17 NOTE — ASSESSMENT & PLAN NOTE
The patient and I talked at length about the natural history and pathophysiology of her injury which is right middle finger paronychia improving , she understands that this may lead to chronic problems which may have acute episodic exacerbations.   Symptoms may resolve, worsen and even become permanent.  The patient understands the treatment options including observation, activity modification, therapy, NSAIDs, splints and the surgical options including debridement.  We discussed the risks of the diagnosis and the treatment options including pain, infection, bleeding, damage to nerves and vessels, stiffness, scarring, incomplete relief or recurrence of symptoms, poor pain and functional outcomes.  Unique risks of this diagnosis and the treatment include risk of amputation.  The patients treatment is further complicated by an increased risk of anesthetic complication secondary to cardiopulmonary problems.  The patient has elected to proceed with daily dressing changes and continued p.o. antibiotics and we will follow up next week.    She understands her symptoms are currently improved significantly there are no signs of osteomyelitis today were deep finger infection she understands the risks of recurrence of infection which may necessitate surgery if it gets worse over the weekend she will present to the ER

## 2024-10-24 ENCOUNTER — OFFICE VISIT (OUTPATIENT)
Dept: ORTHOPEDICS | Facility: CLINIC | Age: 69
End: 2024-10-24
Payer: MEDICARE

## 2024-10-24 VITALS — BODY MASS INDEX: 32.45 KG/M2 | HEIGHT: 64 IN | WEIGHT: 190.06 LBS

## 2024-10-24 DIAGNOSIS — L03.011 PARONYCHIA OF RIGHT MIDDLE FINGER: Primary | ICD-10-CM

## 2024-10-24 PROCEDURE — 99213 OFFICE O/P EST LOW 20 MIN: CPT | Mod: PBBFAC | Performed by: ORTHOPAEDIC SURGERY

## 2024-10-24 PROCEDURE — 99999 PR PBB SHADOW E&M-EST. PATIENT-LVL III: CPT | Mod: PBBFAC,,, | Performed by: ORTHOPAEDIC SURGERY

## 2024-10-24 NOTE — PROGRESS NOTES
"TESSIE Carty M.D.  Orthopaedic Hand and Wrist Surgery  [address]    Patient ID: Liliane Ford  YOB: 1955  MRN: [id number]    Provider Note/Medical Decision Makin. Paronychia of right middle finger  Assessment & Plan:  Finger is doing much better today she has no pain sure range of motion has returned back to normal she did not want to get any x-rays today she has completed her antibiotics 2 days ago I have offered her follow up in 2 months she has agreed to this but if she is asymptomatic she will cancel.  She understands the risks of recurrence of infection and osteomyelitis           Chief Complaint: Post-op Evaluation and Pain of the Right Wrist      Referred By: * No referring provider recorded for this case *    History of Present Illness: Liliane Ford is a 69 y.o. female status post I and D of her right middle finger paronychia on 2024 she has completed a 10 day course of antibiotics she reports her finger is doing well does not hurt her in his swelling is improved as well as her motion     Patient was queried and this is the extent of the patients current complaints today.    Past Medical History:     Estimated body mass index is 32.62 kg/m² as calculated from the following:    Height as of this encounter: 5' 4" (1.626 m).    Weight as of this encounter: 86.2 kg (190 lb 0.6 oz).  Past Medical History:   Diagnosis Date    Coronary artery disease     Encounter for blood transfusion     Hyperlipidemia     Hypertension     Obesity     Ovarian cancer     Pulmonary edema      Past Surgical History:   Procedure Laterality Date     SECTION      x 2    EYE SURGERY          HYSTERECTOMY      OOPHORECTOMY Bilateral     ROBOT-ASSISTED LAPAROSCOPIC OMENTECTOMY USING DA [place] XI N/A 2024    Procedure: XI ROBOTIC OMENTECTOMY;  Surgeon: Mckenna Eng MD;  Location: [facility];  Service: OB/GYN;  Laterality: N/A;    ROBOT-ASSISTED " LAPAROSCOPIC SALPINGO-OOPHORECTOMY USING [place] XI Left 04/19/2024    Procedure: XI ROBOTIC SALPINGO-OOPHORECTOMY;  Surgeon: Mckenna Eng MD;  Location: [facility] OR;  Service: OB/GYN;  Laterality: Left;  USO, side will be determined by md    TUBAL LIGATION      1986    XI ROBOTIC HYSTERECTOMY, WITH SALPINGO-OOPHORECTOMY Right 02/28/2024    Procedure: XI ROBOTIC HYSTERECTOMY,WITH SALPINGO-OOPHORECTOMY;  Surgeon: Ema Aquino MD;  Location: [facility] OR;  Service: OB/GYN;  Laterality: Right;     Family History   Problem Relation Name Age of Onset    Hypertension Mother Yoko Felder      Social History     Socioeconomic History    Marital status:     Number of children: 3   Tobacco Use    Smoking status: Former     Current packs/day: 0.25     Average packs/day: 0.3 packs/day for 41.0 years (10.3 ttl pk-yrs)     Types: Cigarettes    Smokeless tobacco: Never   Substance and Sexual Activity    Alcohol use: Never    Drug use: Never    Sexual activity: Not Currently     Partners: Male     Birth control/protection: Post-menopausal     Social Drivers of Health     Financial Resource Strain: Low Risk  (7/1/2024)    Overall Financial Resource Strain (CARDIA)     Difficulty of Paying Living Expenses: Not hard at all   Food Insecurity: No Food Insecurity (7/1/2024)    Hunger Vital Sign     Worried About Running Out of Food in the Last Year: Never true     Ran Out of Food in the Last Year: Never true   Transportation Needs: No Transportation Needs (7/1/2024)    PRAPARE - Transportation     Lack of Transportation (Medical): No     Lack of Transportation (Non-Medical): No   Physical Activity: Sufficiently Active (7/1/2024)    Exercise Vital Sign     Days of Exercise per Week: 7 days     Minutes of Exercise per Session: 40 min   Stress: No Stress Concern Present (7/1/2024)    [employer] - Occupational Stress Questionnaire     Feeling of Stress : Not at all   Housing Stability: Low Risk   (7/1/2024)    Housing Stability Vital Sign     Unable to Pay for Housing in the Last Year: No     Homeless in the Last Year: No     Medication List with Changes/Refills   Current Medications    AMLODIPINE (NORVASC) 10 MG TABLET    Take 1 tablet (10 mg total) by mouth once daily.    ASCORBIC ACID, VITAMIN C, (VITAMIN C) 500 MG TABLET    Take 1 tablet (500 mg total) by mouth every evening.    ASPIRIN (ECOTRIN) 81 MG EC TABLET    Take 1 tablet (81 mg total) by mouth once daily.    B COMPLEX VITAMINS TABLET    Take 1 tablet by mouth once daily. B COMPLETE    METOPROLOL SUCCINATE (TOPROL-XL) 50 MG 24 HR TABLET    Take 1 tablet (50 mg total) by mouth 2 (two) times daily.    MUPIROCIN (BACTROBAN) 2 % OINTMENT    Apply topically 2 (two) times daily.    NITROGLYCERIN (NITROSTAT) 0.4 MG SL TABLET    Place 1 tablet (0.4 mg total) under the tongue every 5 (five) minutes as needed.    OLMESARTAN-HYDROCHLOROTHIAZIDE (BENICAR HCT) 40-25 MG PER TABLET    Take 1 tablet by mouth once daily.    ROSUVASTATIN (CRESTOR) 20 MG TABLET    Take 1 tablet (20 mg total) by mouth every evening.    TRAMADOL (ULTRAM) 50 MG TABLET    Take 1 tablet (50 mg total) by mouth every 12 (twelve) hours as needed for Pain.     Review of patient's allergies indicates:   Allergen Reactions    Penicillins Rash    Venofer [iron sucrose] Hives, Itching and Other (See Comments)     welts     ROS    Physical Exam:   GENERAL: Well appearing, appropriate for stated age, no acute distress.  CARDIOVASCULAR:  Fingers have good brisk refill and good turgor.   PULMONARY: Respirations are even and non-labored.  NEURO: Awake, alert, and oriented x 3.  PSYCH: Mood & affect are appropriate.  HEENT: Head is normocephalic and atraumatic.  Ortho/SPM Exam  Hand/Wrist Musculoskeletal Exam  Wound is clean no signs of active infection no drainage  She is able make a composite fist fully extend all fingers  Full range of motion of the MP and proximal interphalangeal joints  Distal  interphalangeal goes from 0-70 degrees  Good brisk cap refill tip of the finger  No signs of septic arthritis felon or flexor tenosynovitis    Imaging:    Patient did not want to get any x-rays today        Provider Note/Medical Decision Makin. Paronychia of right middle finger  Assessment & Plan:  Finger is doing much better today she has no pain sure range of motion has returned back to normal she did not want to get any x-rays today she has completed her antibiotics 2 days ago I have offered her follow up in 2 months she has agreed to this but if she is asymptomatic she will cancel.  She understands the risks of recurrence of infection and osteomyelitis           I discussed worrisome and red flag signs and symptoms with the patient. The patient expressed understanding and agreed to alert me immediately or to go to the emergency room if they experience any of these.   Treatment plan was developed with input from the patient/family, and they expressed understanding and agreement with the plan. All questions were answered today.    There are no Patient Instructions on file for this visit.    TESSIE Carty M.D.  [facility] Department of Orthopedic Surgery  Orthopedic Hand and Wrist Surgeon    [facility]  Dr. Sherwin Carty   [facility]   [employer]     Disclaimer: This note was prepared using a voice recognition system and is likely to have sound alike errors within the text.

## 2024-10-24 NOTE — ASSESSMENT & PLAN NOTE
Finger is doing much better today she has no pain sure range of motion has returned back to normal she did not want to get any x-rays today she has completed her antibiotics 2 days ago I have offered her follow up in 2 months she has agreed to this but if she is asymptomatic she will cancel.  She understands the risks of recurrence of infection and osteomyelitis

## 2024-10-29 ENCOUNTER — LAB VISIT (OUTPATIENT)
Dept: LAB | Facility: HOSPITAL | Age: 69
End: 2024-10-29
Attending: NURSE PRACTITIONER
Payer: MEDICARE

## 2024-10-29 DIAGNOSIS — C56.1: ICD-10-CM

## 2024-10-29 LAB — CANCER AG125 SERPL-ACNC: 10 U/ML (ref 0–30)

## 2024-10-29 PROCEDURE — 86304 IMMUNOASSAY TUMOR CA 125: CPT | Performed by: OBSTETRICS & GYNECOLOGY

## 2024-10-29 PROCEDURE — 36415 COLL VENOUS BLD VENIPUNCTURE: CPT | Mod: PN | Performed by: OBSTETRICS & GYNECOLOGY

## 2024-10-31 LAB — INHIBIN A SERPL-MCNC: <5 PG/ML

## 2024-11-01 LAB — INHIBIN B SERPL IA-MCNC: <10 PG/ML

## 2024-11-04 NOTE — PROGRESS NOTES
Subjective     Patient ID: Liliane Ford is a 69 y.o. female.    Chief Complaint: Follow-up    S/p robotic LSO/staging 2024  Reviewed pathology from staging and removal of remaining ovary which was negative.   Stage IA. Tumor size 2.5cm, 1 mitotic figure/10hpf. Capsule intact prior to surgery and no intra-abdominal spillage according to outside operative report.   No adjuvant therapy is recommended.     Today's visit:  Presents today for routine surveillance.     is 10  Inhibin A < 5  Inhibin B < 10    _______________________  68 year old female  referred by Dr. Aquino. 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 Patient underwent RALH BS RO on 2024 for PMB. Patient reports she had been bleeding intermittently for 4 years. Multiple EMBx were negative. She is recovering appropriately. Denies any vaginal bleeding.     LMP- age 55 denies any HRT  Denies any abnormal pap smears.    2024  Robotic assisted laparoscopic hysterectomy, bilateral salpingectomy and right oophorectomy.   Uterus, cervix, right ovary and bilateral fallopian tubes weighing 381 g show a granulosa cell tumor, adult type with areas of hemorrhage and necrosis present in the right ovary.    US 2023  FINDINGS:  The uterus measures 15.6 cm in length. Multiple uterine fibroids are noted the 3 largest were measured including a 1.7 cm fibroid within the fundus a 2.9 cm fibroid within the anterior body and a 3.1 cm fibroid within the posterior body.  The endometrial stripe in this premenopausal patient measures 5.7 mm which is minimally thickened.  The ovaries are not visualized.  There appears to be a large fibroid seen the right adnexal region that measures up to 6 cm in size best seen on the transabdominal imaging.     Co-morbidites include HTN, HLD, and CAD    Surgical history includes c/s x 2 (mid-line vertical) and recent RALH BS RO.         Review of Systems   Constitutional:  Negative for  "chills and fever.   HENT:  Negative for mouth sores.    Respiratory:  Negative for chest tightness and shortness of breath.    Cardiovascular:  Negative for chest pain.   Gastrointestinal:  Negative for abdominal distention, nausea and vomiting.   Genitourinary:  Negative for dysuria, hematuria, pelvic pain, vaginal bleeding and vaginal discharge.   Neurological:  Negative for syncope and weakness.          Objective   BP (!) 149/83 (Patient Position: Sitting)   Pulse 81   Temp 98.4 °F (36.9 °C)   Ht 5' 3" (1.6 m)   Wt 85.9 kg (189 lb 6 oz)   SpO2 98%   BMI 33.55 kg/m²     Physical Exam  Vitals reviewed. Exam conducted with a chaperone present.   Constitutional:       Appearance: Normal appearance. She is not diaphoretic.   HENT:      Head: Normocephalic and atraumatic.   Eyes:      General: No scleral icterus.  Pulmonary:      Effort: Pulmonary effort is normal. No respiratory distress.   Abdominal:      General: There is no distension.      Palpations: Abdomen is soft.      Tenderness: There is no abdominal tenderness.   Genitourinary:     General: Normal vulva.      Vagina: Normal. No bleeding.      Uterus: Absent.       Adnexa:         Right: No mass or fullness.          Left: No mass or fullness.     Musculoskeletal:         General: Normal range of motion.      Cervical back: Normal range of motion.      Right lower leg: No edema.      Left lower leg: No edema.   Skin:     General: Skin is warm and dry.      Coloration: Skin is not pale.   Neurological:      General: No focal deficit present.      Mental Status: She is alert and oriented to person, place, and time. Mental status is at baseline.   Psychiatric:         Mood and Affect: Mood normal.         Behavior: Behavior normal.         Thought Content: Thought content normal.         Judgment: Judgment normal.          Assessment and Plan     1. Malignant granulosa cell tumor of ovary, right  -     ; Future; Expected date: 11/05/2024  -     " Inhibin; Future; Expected date: 11/05/2024  -     Inhibin B; Future; Expected date: 11/05/2024        WILLI on today's exam  RTC 6 months with tumor markers or sooner if needed.     I spent approximately 30 minutes reviewing the available records and evaluating the patient, out of which over 50% of the time was spent face to face with the patient in counseling and coordinating this patient's care.           No follow-ups on file.

## 2024-11-05 ENCOUNTER — OFFICE VISIT (OUTPATIENT)
Dept: GYNECOLOGIC ONCOLOGY | Facility: CLINIC | Age: 69
End: 2024-11-05
Payer: COMMERCIAL

## 2024-11-05 VITALS
DIASTOLIC BLOOD PRESSURE: 83 MMHG | SYSTOLIC BLOOD PRESSURE: 149 MMHG | TEMPERATURE: 98 F | HEART RATE: 81 BPM | BODY MASS INDEX: 33.55 KG/M2 | OXYGEN SATURATION: 98 % | HEIGHT: 63 IN | WEIGHT: 189.38 LBS

## 2024-11-05 DIAGNOSIS — C56.1: Primary | ICD-10-CM

## 2024-11-05 PROCEDURE — 3008F BODY MASS INDEX DOCD: CPT | Mod: CPTII,S$GLB,, | Performed by: OBSTETRICS & GYNECOLOGY

## 2024-11-05 PROCEDURE — 3077F SYST BP >= 140 MM HG: CPT | Mod: CPTII,S$GLB,, | Performed by: OBSTETRICS & GYNECOLOGY

## 2024-11-05 PROCEDURE — 99214 OFFICE O/P EST MOD 30 MIN: CPT | Mod: S$GLB,,, | Performed by: OBSTETRICS & GYNECOLOGY

## 2024-11-05 PROCEDURE — 99999 PR PBB SHADOW E&M-EST. PATIENT-LVL IV: CPT | Mod: PBBFAC,,, | Performed by: OBSTETRICS & GYNECOLOGY

## 2024-11-05 PROCEDURE — 1159F MED LIST DOCD IN RCRD: CPT | Mod: CPTII,S$GLB,, | Performed by: OBSTETRICS & GYNECOLOGY

## 2024-11-05 PROCEDURE — 3079F DIAST BP 80-89 MM HG: CPT | Mod: CPTII,S$GLB,, | Performed by: OBSTETRICS & GYNECOLOGY

## 2024-11-05 PROCEDURE — 1160F RVW MEDS BY RX/DR IN RCRD: CPT | Mod: CPTII,S$GLB,, | Performed by: OBSTETRICS & GYNECOLOGY

## 2024-11-05 PROCEDURE — 4010F ACE/ARB THERAPY RXD/TAKEN: CPT | Mod: CPTII,S$GLB,, | Performed by: OBSTETRICS & GYNECOLOGY

## 2024-12-06 DIAGNOSIS — I25.10 CORONARY ARTERY DISEASE INVOLVING NATIVE HEART, UNSPECIFIED VESSEL OR LESION TYPE, UNSPECIFIED WHETHER ANGINA PRESENT: ICD-10-CM

## 2024-12-06 DIAGNOSIS — I10 PRIMARY HYPERTENSION: ICD-10-CM

## 2024-12-06 NOTE — TELEPHONE ENCOUNTER
Refill Routing Note   Medication(s) are not appropriate for processing by [facility] for the following reason(s):        Required vitals abnormal  ED/Hospital Visit since last OV with provider    [facility] action(s):  Defer     Requires labs : Yes             Appointments  past 12m or future 3m with PCP    Date Provider   Last Visit   10/10/2024 Kishan Kelley MD   Next Visit   4/14/2025 Kishan Kelley MD   ED visits in past 90 days: 1        Note composed:5:34 PM 12/06/2024

## 2024-12-06 NOTE — TELEPHONE ENCOUNTER
Care Due:                  Date            Visit Type   Department     Provider  --------------------------------------------------------------------------------                                EP -                              PRIMARY      [facility] PRIMARY  Last Visit: 10-      CARE ([facility])   ROBINSON Kelley                              EP -                              PRIMARY      [facility] PRIMARY  Next Visit: 04-      CARE ([facility])   ROBINSON Kelley                                                            Last  Test          Frequency    Reason                     Performed    Due Date  --------------------------------------------------------------------------------    CMP.........  12 months..  rosuvastatin.............  06- 05-    Lipid Panel.  12 months..  rosuvastatin.............  06- 05-    [employer] Embedded Care Due Messages. Reference number: [id number].   12/06/2024 4:12:14 AM CST

## 2024-12-09 ENCOUNTER — PATIENT OUTREACH (OUTPATIENT)
Dept: ADMINISTRATIVE | Facility: HOSPITAL | Age: 69
End: 2024-12-09
Payer: MEDICARE

## 2024-12-09 RX ORDER — METOPROLOL SUCCINATE 50 MG/1
50 TABLET, EXTENDED RELEASE ORAL 2 TIMES DAILY
Qty: 180 TABLET | Refills: 3 | Status: SHIPPED | OUTPATIENT
Start: 2024-12-09

## 2024-12-18 DIAGNOSIS — I25.10 CORONARY ARTERY DISEASE INVOLVING NATIVE CORONARY ARTERY OF NATIVE HEART WITHOUT ANGINA PECTORIS: ICD-10-CM

## 2024-12-18 DIAGNOSIS — I51.7 CARDIOMEGALY: Primary | ICD-10-CM

## 2024-12-18 DIAGNOSIS — I10 ESSENTIAL HYPERTENSION: ICD-10-CM

## 2024-12-18 DIAGNOSIS — R94.31 ABNORMAL ECG: ICD-10-CM

## 2025-02-04 NOTE — ASSESSMENT & PLAN NOTE
Patient with prior post menopausal bleeding. Reports no bleeding since 2 days prior to thanksgiving 2023. Hysterectomy scheduled later this month but she is unsure if she will proceed. She is taking daily multivitamin with iron and separate iron supplement.     Iron levels essentially unchanged from prior. Normal except low ferritin. Interval resolution of anemia    She notes cologuard testing recently negative. But no recent colonoscopy or EGD    Continue multivitamin. Hold iron supplement. F.u 3 months with cbc iron ferritin. Discussed S&S to report sooner  
No

## 2025-02-10 ENCOUNTER — HOSPITAL ENCOUNTER (OUTPATIENT)
Dept: CARDIOLOGY | Facility: HOSPITAL | Age: 70
Discharge: HOME | End: 2025-02-10
Attending: INTERNAL MEDICINE
Payer: COMMERCIAL

## 2025-02-10 VITALS
HEIGHT: 63 IN | WEIGHT: 189 LBS | SYSTOLIC BLOOD PRESSURE: 149 MMHG | DIASTOLIC BLOOD PRESSURE: 83 MMHG | BODY MASS INDEX: 33.49 KG/M2

## 2025-02-10 DIAGNOSIS — I70.0 AORTIC ATHEROSCLEROSIS: ICD-10-CM

## 2025-02-10 DIAGNOSIS — E66.09 CLASS 1 OBESITY DUE TO EXCESS CALORIES WITH SERIOUS COMORBIDITY AND BODY MASS INDEX (BMI) OF 31.0 TO 31.9 IN ADULT: ICD-10-CM

## 2025-02-10 DIAGNOSIS — I10 ESSENTIAL HYPERTENSION: ICD-10-CM

## 2025-02-10 DIAGNOSIS — R93.89 ABNORMAL COMPUTED TOMOGRAPHY ANGIOGRAPHY (CTA): ICD-10-CM

## 2025-02-10 DIAGNOSIS — I51.7 LAE (LEFT ATRIAL ENLARGEMENT): ICD-10-CM

## 2025-02-10 DIAGNOSIS — D50.9 IRON DEFICIENCY ANEMIA, UNSPECIFIED IRON DEFICIENCY ANEMIA TYPE: ICD-10-CM

## 2025-02-10 DIAGNOSIS — I51.89 DIASTOLIC DYSFUNCTION: ICD-10-CM

## 2025-02-10 DIAGNOSIS — R00.2 PALPITATIONS: ICD-10-CM

## 2025-02-10 DIAGNOSIS — R94.31 ABNORMAL ECG: ICD-10-CM

## 2025-02-10 DIAGNOSIS — R94.39 ABNORMAL NUCLEAR STRESS TEST: ICD-10-CM

## 2025-02-10 DIAGNOSIS — I25.10 CORONARY ARTERY DISEASE INVOLVING NATIVE CORONARY ARTERY OF NATIVE HEART WITHOUT ANGINA PECTORIS: ICD-10-CM

## 2025-02-10 DIAGNOSIS — E66.811 CLASS 1 OBESITY DUE TO EXCESS CALORIES WITH SERIOUS COMORBIDITY AND BODY MASS INDEX (BMI) OF 31.0 TO 31.9 IN ADULT: ICD-10-CM

## 2025-02-10 DIAGNOSIS — E78.00 HYPERCHOLESTEROLEMIA: ICD-10-CM

## 2025-02-10 DIAGNOSIS — I51.7 CARDIOMEGALY: ICD-10-CM

## 2025-02-10 LAB
AORTIC ROOT ANNULUS: 3.28 CM
AV INDEX (PROSTH): 0.77
AV MEAN GRADIENT: 5 MMHG
AV PEAK GRADIENT: 9 MMHG
AV VALVE AREA BY VELOCITY RATIO: 2.3 CM²
AV VALVE AREA: 2.4 CM²
AV VELOCITY RATIO: 0.73
BSA FOR ECHO PROCEDURE: 1.95 M2
CV ECHO LV RWT: 0.55 CM
DOP CALC AO PEAK VEL: 1.5 M/S
DOP CALC AO VTI: 38.5 CM
DOP CALC LVOT AREA: 3.1 CM2
DOP CALC LVOT DIAMETER: 2 CM
DOP CALC LVOT PEAK VEL: 1.1 M/S
DOP CALC LVOT STROKE VOLUME: 93.3 CM3
DOP CALC RVOT PEAK VEL: 0.85 M/S
DOP CALC RVOT VTI: 20 CM
DOP CALCLVOT PEAK VEL VTI: 29.7 CM
E WAVE DECELERATION TIME: 232 MSEC
E/A RATIO: 1
E/E' RATIO: 12 M/S
ECHO LV POSTERIOR WALL: 1.2 CM (ref 0.6–1.1)
FRACTIONAL SHORTENING: 34.1 % (ref 28–44)
INTERVENTRICULAR SEPTUM: 1.1 CM (ref 0.6–1.1)
IVRT: 100 MSEC
LA MAJOR: 4.5 CM
LA MINOR: 4.8 CM
LA WIDTH: 4.1 CM
LEFT ATRIUM AREA SYSTOLIC (APICAL 2 CHAMBER): 12.81 CM2
LEFT ATRIUM AREA SYSTOLIC (APICAL 4 CHAMBER): 19.55 CM2
LEFT ATRIUM SIZE: 3.8 CM
LEFT ATRIUM VOLUME INDEX MOD: 22 ML/M2
LEFT ATRIUM VOLUME INDEX: 33 ML/M2
LEFT ATRIUM VOLUME MOD: 42 ML
LEFT ATRIUM VOLUME: 62 CM3
LEFT INTERNAL DIMENSION IN SYSTOLE: 2.9 CM (ref 2.1–4)
LEFT VENTRICLE DIASTOLIC VOLUME INDEX: 47.41 ML/M2
LEFT VENTRICLE DIASTOLIC VOLUME: 89.61 ML
LEFT VENTRICLE END SYSTOLIC VOLUME APICAL 2 CHAMBER: 28.22 ML
LEFT VENTRICLE END SYSTOLIC VOLUME APICAL 4 CHAMBER: 61.19 ML
LEFT VENTRICLE MASS INDEX: 95.2 G/M2
LEFT VENTRICLE SYSTOLIC VOLUME INDEX: 17.4 ML/M2
LEFT VENTRICLE SYSTOLIC VOLUME: 32.9 ML
LEFT VENTRICULAR INTERNAL DIMENSION IN DIASTOLE: 4.4 CM (ref 3.5–6)
LEFT VENTRICULAR MASS: 180 G
LV LATERAL E/E' RATIO: 11.6 M/S
LV SEPTAL E/E' RATIO: 11.6 M/S
LVED V (TEICH): 89.61 ML
LVES V (TEICH): 32.9 ML
LVOT MG: 2.83 MMHG
LVOT MV: 0.81 CM/S
MV PEAK A VEL: 0.81 M/S
MV PEAK E VEL: 0.81 M/S
MV STENOSIS PRESSURE HALF TIME: 67.41 MS
MV VALVE AREA P 1/2 METHOD: 3.26 CM2
OHS CV RV/LV RATIO: 0.98 CM
PISA TR MAX VEL: 2.7 M/S
PULM VEIN S/D RATIO: 1.24
PV MEAN GRADIENT: 2 MMHG
PV PEAK D VEL: 0.54 M/S
PV PEAK GRADIENT: 4 MMHG
PV PEAK S VEL: 0.67 M/S
PV PEAK VELOCITY: 1 M/S
RA MAJOR: 4.18 CM
RA PRESSURE ESTIMATED: 3 MMHG
RA WIDTH: 3.77 CM
RIGHT VENTRICLE DIASTOLIC BASEL DIMENSION: 4.3 CM
RIGHT VENTRICULAR END-DIASTOLIC DIMENSION: 4.25 CM
RV TB RVSP: 6 MMHG
SINUS: 3.52 CM
STJ: 3.42 CM
TDI LATERAL: 0.07 M/S
TDI SEPTAL: 0.07 M/S
TDI: 0.07 M/S
TR MAX PG: 29 MMHG
TRICUSPID ANNULAR PLANE SYSTOLIC EXCURSION: 2.88 CM
TV REST PULMONARY ARTERY PRESSURE: 32 MMHG
Z-SCORE OF LEFT VENTRICULAR DIMENSION IN END DIASTOLE: -1.84
Z-SCORE OF LEFT VENTRICULAR DIMENSION IN END SYSTOLE: -0.93

## 2025-02-10 PROCEDURE — 93306 TTE W/DOPPLER COMPLETE: CPT | Mod: 26,,, | Performed by: INTERNAL MEDICINE

## 2025-02-10 PROCEDURE — 93306 TTE W/DOPPLER COMPLETE: CPT

## 2025-02-11 ENCOUNTER — HOSPITAL ENCOUNTER (OUTPATIENT)
Dept: RADIOLOGY | Facility: HOSPITAL | Age: 70
Discharge: HOME | End: 2025-02-11
Attending: INTERNAL MEDICINE
Payer: COMMERCIAL

## 2025-02-11 ENCOUNTER — HOSPITAL ENCOUNTER (OUTPATIENT)
Dept: CARDIOLOGY | Facility: HOSPITAL | Age: 70
Discharge: HOME | End: 2025-02-11
Attending: INTERNAL MEDICINE
Payer: COMMERCIAL

## 2025-02-11 DIAGNOSIS — R00.2 PALPITATIONS: ICD-10-CM

## 2025-02-11 DIAGNOSIS — I70.0 AORTIC ATHEROSCLEROSIS: ICD-10-CM

## 2025-02-11 DIAGNOSIS — I51.89 DIASTOLIC DYSFUNCTION: ICD-10-CM

## 2025-02-11 DIAGNOSIS — I51.7 LAE (LEFT ATRIAL ENLARGEMENT): ICD-10-CM

## 2025-02-11 DIAGNOSIS — E78.00 HYPERCHOLESTEROLEMIA: ICD-10-CM

## 2025-02-11 DIAGNOSIS — E66.811 CLASS 1 OBESITY DUE TO EXCESS CALORIES WITH SERIOUS COMORBIDITY AND BODY MASS INDEX (BMI) OF 31.0 TO 31.9 IN ADULT: ICD-10-CM

## 2025-02-11 DIAGNOSIS — I51.7 CARDIOMEGALY: ICD-10-CM

## 2025-02-11 DIAGNOSIS — R94.31 ABNORMAL ECG: ICD-10-CM

## 2025-02-11 DIAGNOSIS — R94.39 ABNORMAL NUCLEAR STRESS TEST: ICD-10-CM

## 2025-02-11 DIAGNOSIS — I10 ESSENTIAL HYPERTENSION: ICD-10-CM

## 2025-02-11 DIAGNOSIS — D50.9 IRON DEFICIENCY ANEMIA, UNSPECIFIED IRON DEFICIENCY ANEMIA TYPE: ICD-10-CM

## 2025-02-11 DIAGNOSIS — E66.09 CLASS 1 OBESITY DUE TO EXCESS CALORIES WITH SERIOUS COMORBIDITY AND BODY MASS INDEX (BMI) OF 31.0 TO 31.9 IN ADULT: ICD-10-CM

## 2025-02-11 DIAGNOSIS — R93.89 ABNORMAL COMPUTED TOMOGRAPHY ANGIOGRAPHY (CTA): ICD-10-CM

## 2025-02-11 DIAGNOSIS — I25.10 CORONARY ARTERY DISEASE INVOLVING NATIVE CORONARY ARTERY OF NATIVE HEART WITHOUT ANGINA PECTORIS: ICD-10-CM

## 2025-02-11 LAB
CV STRESS BASE HR: 60 BPM
DIASTOLIC BLOOD PRESSURE: 83 MMHG
NUC REST EJECTION FRACTION: 68
NUC STRESS EJECTION FRACTION: 78 %
OHS CV CPX 85 PERCENT MAX PREDICTED HEART RATE MALE: 128
OHS CV CPX ESTIMATED METS: 1
OHS CV CPX MAX PREDICTED HEART RATE: 150
OHS CV CPX PATIENT IS FEMALE: 1
OHS CV CPX PATIENT IS MALE: 0
OHS CV CPX PEAK DIASTOLIC BLOOD PRESSURE: 79 MMHG
OHS CV CPX PEAK HEAR RATE: 100 BPM
OHS CV CPX PEAK RATE PRESSURE PRODUCT: NORMAL
OHS CV CPX PEAK SYSTOLIC BLOOD PRESSURE: 147 MMHG
OHS CV CPX PERCENT MAX PREDICTED HEART RATE ACHIEVED: 69
OHS CV CPX RATE PRESSURE PRODUCT PRESENTING: 8460
OHS CV INITIAL DOSE: 9.1 MCG/KG/MIN
OHS CV PEAK DOSE: 29 MCG/KG/MIN
SYSTOLIC BLOOD PRESSURE: 141 MMHG

## 2025-02-11 PROCEDURE — A9502 TC99M TETROFOSMIN: HCPCS | Performed by: INTERNAL MEDICINE

## 2025-02-11 PROCEDURE — 78452 HT MUSCLE IMAGE SPECT MULT: CPT | Mod: 26,,, | Performed by: INTERNAL MEDICINE

## 2025-02-11 PROCEDURE — 63600175 PHARM REV CODE 636 W HCPCS: Performed by: INTERNAL MEDICINE

## 2025-02-11 PROCEDURE — 78452 HT MUSCLE IMAGE SPECT MULT: CPT

## 2025-02-11 PROCEDURE — 93016 CV STRESS TEST SUPVJ ONLY: CPT | Mod: ,,, | Performed by: INTERNAL MEDICINE

## 2025-02-11 PROCEDURE — 93017 CV STRESS TEST TRACING ONLY: CPT

## 2025-02-11 PROCEDURE — 93018 CV STRESS TEST I&R ONLY: CPT | Mod: ,,, | Performed by: INTERNAL MEDICINE

## 2025-02-11 RX ORDER — REGADENOSON 0.08 MG/ML
0.4 INJECTION, SOLUTION INTRAVENOUS ONCE
Status: COMPLETED | OUTPATIENT
Start: 2025-02-11 | End: 2025-02-11

## 2025-02-11 RX ADMIN — TETROFOSMIN 29 MILLICURIE: 1.38 INJECTION, POWDER, LYOPHILIZED, FOR SOLUTION INTRAVENOUS at 11:02

## 2025-02-11 RX ADMIN — REGADENOSON 0.4 MG: 0.08 INJECTION, SOLUTION INTRAVENOUS at 10:02

## 2025-02-11 RX ADMIN — TETROFOSMIN 9.1 MILLICURIE: 1.38 INJECTION, POWDER, LYOPHILIZED, FOR SOLUTION INTRAVENOUS at 09:02

## 2025-03-31 ENCOUNTER — PATIENT MESSAGE (OUTPATIENT)
Dept: CARDIOLOGY | Facility: CLINIC | Age: 70
End: 2025-03-31
Payer: COMMERCIAL

## 2025-04-02 ENCOUNTER — TELEPHONE (OUTPATIENT)
Dept: GYNECOLOGIC ONCOLOGY | Facility: CLINIC | Age: 70
End: 2025-04-02
Payer: COMMERCIAL

## 2025-04-14 ENCOUNTER — LAB VISIT (OUTPATIENT)
Dept: LAB | Facility: HOSPITAL | Age: 70
End: 2025-04-14
Attending: FAMILY MEDICINE
Payer: COMMERCIAL

## 2025-04-14 ENCOUNTER — OFFICE VISIT (OUTPATIENT)
Dept: PRIMARY CARE CLINIC | Facility: CLINIC | Age: 70
End: 2025-04-14
Payer: COMMERCIAL

## 2025-04-14 VITALS
DIASTOLIC BLOOD PRESSURE: 82 MMHG | OXYGEN SATURATION: 96 % | SYSTOLIC BLOOD PRESSURE: 130 MMHG | TEMPERATURE: 98 F | HEART RATE: 72 BPM | HEIGHT: 63 IN | BODY MASS INDEX: 34.5 KG/M2 | WEIGHT: 194.69 LBS

## 2025-04-14 DIAGNOSIS — D50.9 IRON DEFICIENCY ANEMIA, UNSPECIFIED IRON DEFICIENCY ANEMIA TYPE: ICD-10-CM

## 2025-04-14 DIAGNOSIS — I25.10 CORONARY ARTERY DISEASE INVOLVING NATIVE CORONARY ARTERY OF NATIVE HEART WITHOUT ANGINA PECTORIS: ICD-10-CM

## 2025-04-14 DIAGNOSIS — E78.00 HYPERCHOLESTEROLEMIA: ICD-10-CM

## 2025-04-14 DIAGNOSIS — I10 PRIMARY HYPERTENSION: Primary | ICD-10-CM

## 2025-04-14 DIAGNOSIS — Z12.11 COLON CANCER SCREENING: ICD-10-CM

## 2025-04-14 DIAGNOSIS — R73.09 ELEVATED GLUCOSE: ICD-10-CM

## 2025-04-14 DIAGNOSIS — C56.1: ICD-10-CM

## 2025-04-14 DIAGNOSIS — I70.0 AORTIC ATHEROSCLEROSIS: ICD-10-CM

## 2025-04-14 DIAGNOSIS — C56.1 GRANULOSA CELL CARCINOMA OF RIGHT OVARY: ICD-10-CM

## 2025-04-14 DIAGNOSIS — I10 PRIMARY HYPERTENSION: ICD-10-CM

## 2025-04-14 PROBLEM — R94.39 ABNORMAL NUCLEAR STRESS TEST: Status: RESOLVED | Noted: 2023-05-30 | Resolved: 2025-04-14

## 2025-04-14 PROBLEM — E66.09 CLASS 1 OBESITY DUE TO EXCESS CALORIES WITH SERIOUS COMORBIDITY AND BODY MASS INDEX (BMI) OF 31.0 TO 31.9 IN ADULT: Status: RESOLVED | Noted: 2023-05-30 | Resolved: 2025-04-14

## 2025-04-14 PROBLEM — R74.01 ELEVATED AST (SGOT): Status: RESOLVED | Noted: 2020-02-04 | Resolved: 2025-04-14

## 2025-04-14 PROBLEM — L03.011 PARONYCHIA OF RIGHT MIDDLE FINGER: Status: RESOLVED | Noted: 2024-10-17 | Resolved: 2025-04-14

## 2025-04-14 PROBLEM — I51.7 CARDIOMEGALY: Status: RESOLVED | Noted: 2020-02-04 | Resolved: 2025-04-14

## 2025-04-14 PROBLEM — N95.0 POSTMENOPAUSAL BLEEDING: Status: RESOLVED | Noted: 2020-02-04 | Resolved: 2025-04-14

## 2025-04-14 PROBLEM — Z87.42 HISTORY OF ENDOMETRIAL HYPERPLASIA: Status: RESOLVED | Noted: 2022-12-14 | Resolved: 2025-04-14

## 2025-04-14 PROBLEM — R93.89 ABNORMAL COMPUTED TOMOGRAPHY ANGIOGRAPHY (CTA): Status: RESOLVED | Noted: 2022-12-14 | Resolved: 2025-04-14

## 2025-04-14 PROBLEM — Z28.9 DELAYED IMMUNIZATIONS: Status: RESOLVED | Noted: 2022-09-12 | Resolved: 2025-04-14

## 2025-04-14 PROBLEM — Z01.810 PREOP CARDIOVASCULAR EXAM: Status: RESOLVED | Noted: 2022-09-12 | Resolved: 2025-04-14

## 2025-04-14 PROBLEM — I51.89 DIASTOLIC DYSFUNCTION: Status: RESOLVED | Noted: 2023-05-30 | Resolved: 2025-04-14

## 2025-04-14 PROBLEM — E66.811 CLASS 1 OBESITY DUE TO EXCESS CALORIES WITH SERIOUS COMORBIDITY AND BODY MASS INDEX (BMI) OF 31.0 TO 31.9 IN ADULT: Status: RESOLVED | Noted: 2023-05-30 | Resolved: 2025-04-14

## 2025-04-14 PROBLEM — R00.2 PALPITATIONS: Status: RESOLVED | Noted: 2022-11-01 | Resolved: 2025-04-14

## 2025-04-14 PROBLEM — R94.31 ABNORMAL ECG: Status: RESOLVED | Noted: 2022-11-01 | Resolved: 2025-04-14

## 2025-04-14 PROBLEM — D21.9 FIBROIDS: Status: RESOLVED | Noted: 2022-12-14 | Resolved: 2025-04-14

## 2025-04-14 PROBLEM — M21.619 BUNION: Status: RESOLVED | Noted: 2022-09-12 | Resolved: 2025-04-14

## 2025-04-14 PROBLEM — I51.7 LAE (LEFT ATRIAL ENLARGEMENT): Status: RESOLVED | Noted: 2023-05-30 | Resolved: 2025-04-14

## 2025-04-14 PROBLEM — E61.1 LOW IRON: Status: RESOLVED | Noted: 2020-02-04 | Resolved: 2025-04-14

## 2025-04-14 LAB
ABSOLUTE EOSINOPHIL (OHS): 0.15 K/UL
ABSOLUTE MONOCYTE (OHS): 0.33 K/UL (ref 0.3–1)
ABSOLUTE NEUTROPHIL COUNT (OHS): 2.59 K/UL (ref 1.8–7.7)
ALBUMIN SERPL BCP-MCNC: 3.6 G/DL (ref 3.5–5.2)
ALP SERPL-CCNC: 83 UNIT/L (ref 40–150)
ALT SERPL W/O P-5'-P-CCNC: 36 UNIT/L (ref 10–44)
ANION GAP (OHS): 9 MMOL/L (ref 8–16)
AST SERPL-CCNC: 33 UNIT/L (ref 11–45)
BASOPHILS # BLD AUTO: 0.04 K/UL
BASOPHILS NFR BLD AUTO: 0.9 %
BILIRUB SERPL-MCNC: 0.6 MG/DL (ref 0.1–1)
BUN SERPL-MCNC: 17 MG/DL (ref 8–23)
CALCIUM SERPL-MCNC: 9.6 MG/DL (ref 8.7–10.5)
CANCER AG125 SERPL-ACNC: 10 UNIT/ML
CHLORIDE SERPL-SCNC: 102 MMOL/L (ref 95–110)
CHOLEST SERPL-MCNC: 237 MG/DL (ref 120–199)
CHOLEST/HDLC SERPL: 3.8 {RATIO} (ref 2–5)
CO2 SERPL-SCNC: 27 MMOL/L (ref 23–29)
CREAT SERPL-MCNC: 0.9 MG/DL (ref 0.5–1.4)
EAG (OHS): 123 MG/DL (ref 68–131)
ERYTHROCYTE [DISTWIDTH] IN BLOOD BY AUTOMATED COUNT: 14.5 % (ref 11.5–14.5)
FERRITIN SERPL-MCNC: 38 NG/ML (ref 20–300)
GFR SERPLBLD CREATININE-BSD FMLA CKD-EPI: >60 ML/MIN/1.73/M2
GLUCOSE SERPL-MCNC: 101 MG/DL (ref 70–110)
HBA1C MFR BLD: 5.9 % (ref 4–5.6)
HCT VFR BLD AUTO: 43.1 % (ref 37–48.5)
HDLC SERPL-MCNC: 62 MG/DL (ref 40–75)
HDLC SERPL: 26.2 % (ref 20–50)
HGB BLD-MCNC: 13.8 GM/DL (ref 12–16)
IMM GRANULOCYTES # BLD AUTO: 0.01 K/UL (ref 0–0.04)
IMM GRANULOCYTES NFR BLD AUTO: 0.2 % (ref 0–0.5)
IRON SATN MFR SERPL: 23 % (ref 20–50)
IRON SERPL-MCNC: 92 UG/DL (ref 30–160)
LDLC SERPL CALC-MCNC: 162.2 MG/DL (ref 63–159)
LYMPHOCYTES # BLD AUTO: 1.38 K/UL (ref 1–4.8)
MCH RBC QN AUTO: 26.5 PG (ref 27–31)
MCHC RBC AUTO-ENTMCNC: 32 G/DL (ref 32–36)
MCV RBC AUTO: 83 FL (ref 82–98)
NONHDLC SERPL-MCNC: 175 MG/DL
NUCLEATED RBC (/100WBC) (OHS): 0 /100 WBC
PLATELET # BLD AUTO: 258 K/UL (ref 150–450)
PMV BLD AUTO: 12.8 FL (ref 9.2–12.9)
POTASSIUM SERPL-SCNC: 3.6 MMOL/L (ref 3.5–5.1)
PROT SERPL-MCNC: 7.4 GM/DL (ref 6–8.4)
RBC # BLD AUTO: 5.21 M/UL (ref 4–5.4)
RELATIVE EOSINOPHIL (OHS): 3.3 %
RELATIVE LYMPHOCYTE (OHS): 30.7 % (ref 18–48)
RELATIVE MONOCYTE (OHS): 7.3 % (ref 4–15)
RELATIVE NEUTROPHIL (OHS): 57.6 % (ref 38–73)
SODIUM SERPL-SCNC: 138 MMOL/L (ref 136–145)
TIBC SERPL-MCNC: 398 UG/DL (ref 250–450)
TRANSFERRIN SERPL-MCNC: 269 MG/DL (ref 200–375)
TRIGL SERPL-MCNC: 64 MG/DL (ref 30–150)
TSH SERPL-ACNC: 2.3 UIU/ML (ref 0.4–4)
WBC # BLD AUTO: 4.5 K/UL (ref 3.9–12.7)

## 2025-04-14 PROCEDURE — 82040 ASSAY OF SERUM ALBUMIN: CPT

## 2025-04-14 PROCEDURE — 83718 ASSAY OF LIPOPROTEIN: CPT

## 2025-04-14 PROCEDURE — 83036 HEMOGLOBIN GLYCOSYLATED A1C: CPT

## 2025-04-14 PROCEDURE — 36415 COLL VENOUS BLD VENIPUNCTURE: CPT | Mod: PN

## 2025-04-14 PROCEDURE — 86304 IMMUNOASSAY TUMOR CA 125: CPT

## 2025-04-14 PROCEDURE — 85025 COMPLETE CBC W/AUTO DIFF WBC: CPT

## 2025-04-14 PROCEDURE — 84443 ASSAY THYROID STIM HORMONE: CPT

## 2025-04-14 PROCEDURE — 83540 ASSAY OF IRON: CPT

## 2025-04-14 PROCEDURE — 99999 PR PBB SHADOW E&M-EST. PATIENT-LVL IV: CPT | Mod: PBBFAC,,, | Performed by: FAMILY MEDICINE

## 2025-04-14 PROCEDURE — 82728 ASSAY OF FERRITIN: CPT

## 2025-04-14 RX ORDER — MELOXICAM 15 MG/1
15 TABLET ORAL EVERY MORNING
COMMUNITY
Start: 2025-03-11

## 2025-04-14 RX ORDER — OLMESARTAN MEDOXOMIL AND HYDROCHLOROTHIAZIDE 40/25 40; 25 MG/1; MG/1
1 TABLET ORAL DAILY
Qty: 90 TABLET | Refills: 3 | Status: SHIPPED | OUTPATIENT
Start: 2025-04-14 | End: 2026-04-14

## 2025-04-14 RX ORDER — ROSUVASTATIN CALCIUM 20 MG/1
20 TABLET, COATED ORAL NIGHTLY
Qty: 90 TABLET | Refills: 3 | Status: SHIPPED | OUTPATIENT
Start: 2025-04-14 | End: 2026-04-14

## 2025-04-14 RX ORDER — AMLODIPINE BESYLATE 10 MG/1
10 TABLET ORAL DAILY
Qty: 90 TABLET | Refills: 3 | Status: SHIPPED | OUTPATIENT
Start: 2025-04-14

## 2025-04-14 RX ORDER — METOPROLOL SUCCINATE 50 MG/1
50 TABLET, EXTENDED RELEASE ORAL 2 TIMES DAILY
Qty: 180 TABLET | Refills: 3 | Status: SHIPPED | OUTPATIENT
Start: 2025-04-14

## 2025-04-14 NOTE — PROGRESS NOTES
"    [facility]       [address] [place] [address]      Phone: [phone]      Fax: [phone]    Kishan Kelley MD                Office Visit  2025        Subjective      HPI:  Liliane Ford is a 69 y.o. female presents today in clinic for "Follow-up (6 month)  ."     69-year-old female presents today to follow-up on multiple issues.     Patient states that she feels pretty good today.  No chest pains, shortness of breath.  No fever, chills, body aches.  No coughing, sneezing, URI type symptoms.  Appetite normal.  Bowel movements normal.  No urinary issues.      Has been trying to watch diet, but struggles to lose weight.  Walks regularly.  Just started lifting some weights.  Notices that she does better when exercising.    Has HTN.  CAD.  Follows with Cardiology.  Stress test recently that was slightly abnormal.  Was supposed to see the cardiologist, but had to go out of town.  They have an appointment rescheduled in .  They have her on Toprol-XL 50 mg twice a day, along with our olmesartan-HCTZ 40-25 mg daily, amlodipine 10 mg daily.  Blood pressure remains labile.  Initially, was a bit elevated, but after we rechecked it it was in the normal range.    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  Follows with gyn/onc.  They checked some tumor markers about six months ago and everything was normal.  She has an appointment with them next month.    PMH: HTN.  CAD.  Granulosa cell tumor ovary.  PSH:   F MH: None significant reported  allergies:  Penicillin caused a rash as a child.    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  .    T: No current use.  Quit 40+ years ago.    a: Denies  D: Denies    exercise: Daily    Colon:  2022.  Cologuard negative.  Repeat three years ().    MMG:  10/10/2024    gyn:  Dr. Aquino ([facility])    Has not had pneumonia vaccine.  She declines vaccines.        The following were " updated and reviewed by myself in the chart: medications, past medical history, past surgical history, family history, social history, and allergies.     Medications:  Medications Ordered Prior to Encounter[1]     PMHx:  Past Medical History:   Diagnosis Date    Coronary artery disease     Encounter for blood transfusion     Hyperlipidemia     Hypertension     Obesity     Ovarian cancer     Pulmonary edema       Patient Active Problem List    Diagnosis Date Noted    Primary hypertension 2025    Granulosa cell carcinoma of right ovary 2024    Osteopenia of multiple sites 2024    Aortic atherosclerosis 04/10/2024    S/P laparoscopic robotic assisted hysterectomy with left salpmingectomy and right salpingo ophorectomysalpingo opp 2024    Iron deficiency anemia 2023    Coronary artery disease involving native coronary artery of native heart without angina pectoris 2023    Hypercholesterolemia 2022    Thyroid nodule 2020        PSHx:  Past Surgical History:   Procedure Laterality Date     SECTION      x 2    EYE SURGERY          HYSTERECTOMY      OOPHORECTOMY Bilateral     ROBOT-ASSISTED LAPAROSCOPIC OMENTECTOMY USING DA [place] XI N/A 2024    Procedure: XI ROBOTIC OMENTECTOMY;  Surgeon: Mckenna Eng MD;  Location: [facility];  Service: OB/GYN;  Laterality: N/A;    ROBOT-ASSISTED LAPAROSCOPIC SALPINGO-OOPHORECTOMY USING DA [place] XI Left 2024    Procedure: XI ROBOTIC SALPINGO-OOPHORECTOMY;  Surgeon: Mckenna Eng MD;  Location: [facility] OR;  Service: OB/GYN;  Laterality: Left;  USO, side will be determined by md    TUBAL LIGATION      1986    XI ROBOTIC HYSTERECTOMY, WITH SALPINGO-OOPHORECTOMY Right 2024    Procedure: XI ROBOTIC HYSTERECTOMY,WITH SALPINGO-OOPHORECTOMY;  Surgeon: Ema Aquino MD;  Location: [facility] OR;  Service: OB/GYN;  Laterality: Right;        FHx:  Family History   Problem Relation Name Age of Onset    Hypertension  Mother Yoko Felder         Social:  Social History     Socioeconomic History    Marital status:     Number of children: 3   Tobacco Use    Smoking status: Former     Current packs/day: 0.25     Average packs/day: 0.3 packs/day for 41.0 years (10.3 ttl pk-yrs)     Types: Cigarettes    Smokeless tobacco: Never   Substance and Sexual Activity    Alcohol use: Never    Drug use: Never    Sexual activity: Not Currently     Partners: Male     Birth control/protection: Post-menopausal     Social Drivers of Health     Financial Resource Strain: Low Risk  (2/10/2025)    [facility]    Overall Financial Resource Strain (CARDIA)     Difficulty of Paying Living Expenses: Not hard at all   Food Insecurity: No Food Insecurity (2/10/2025)    [facility]    Hunger Vital Sign     Worried About Running Out of Food in the Last Year: Never true     Ran Out of Food in the Last Year: Never true   Transportation Needs: No Transportation Needs (2/10/2025)    [facility]    PRAPARE - Transportation     Lack of Transportation (Medical): No     Lack of Transportation (Non-Medical): No   Physical Activity: Sufficiently Active (2/10/2025)    [facility]    Exercise Vital Sign     Days of Exercise per Week: 5 days     Minutes of Exercise per Session: 50 min   Stress: No Stress Concern Present (2/10/2025)    Received from [facility]    [place] - Occupational Stress Questionnaire     Feeling of Stress : Not at all   Housing Stability: Unknown (2/10/2025)    Received from [facility]    Housing Stability Vital Sign     Unable to Pay for Housing in the Last Year: No     Homeless in the Last Year: No        Allergies:  Review of patient's allergies indicates:   Allergen Reactions    Penicillins Rash    Venofer [iron sucrose] Hives, Itching and Other (See Comments)     welts        ROS:  Review of Systems   Constitutional:  Negative for activity change, appetite change, chills and fever.   HENT:  Negative for congestion, postnasal drip, rhinorrhea, sore throat and trouble swallowing.    Respiratory:  Negative for cough, shortness of breath and wheezing.    Cardiovascular:  Negative for chest pain and palpitations.   Gastrointestinal:  Negative for abdominal pain, constipation, diarrhea, nausea and vomiting.   Genitourinary:  Negative for difficulty urinating.   Musculoskeletal:  Negative for arthralgias and myalgias.   Skin:  Negative for color change and rash.   Neurological:  Negative for speech difficulty and headaches.   All other systems reviewed and are negative.         Objective      /82   Pulse 72   Temp 97.8 °F (36.6 °C) (Tympanic)   Ht 5' 3" (1.6 m)   Wt 88.3 kg (194 lb 10.7 oz)   SpO2 96%   BMI 34.48 kg/m²   Ht Readings from Last 3 Encounters:   04/14/25 5' 3" (1.6 m)   02/10/25 5' 3" (1.6 m)   11/05/24 5' 3" (1.6 m)     Wt Readings from Last 3 Encounters:   04/14/25 88.3 kg (194 lb 10.7 oz)   02/10/25 85.7 kg (189 lb)   11/05/24 85.9 kg (189 lb 6 oz)       PHYSICAL EXAM:  Physical Exam  Vitals and nursing note reviewed.   Constitutional:       General: She is not in acute distress.     Appearance: Normal appearance.   HENT:      Head: Normocephalic and atraumatic.      Right Ear: Tympanic membrane, ear canal and external ear normal.      Left Ear: Tympanic membrane, ear canal and external ear normal.      Nose: Nose normal. No congestion or rhinorrhea.      Mouth/Throat:      Mouth: Mucous membranes " are moist.      Pharynx: Oropharynx is clear. No oropharyngeal exudate or posterior oropharyngeal erythema.   Eyes:      Extraocular Movements: Extraocular movements intact.      Conjunctiva/sclera: Conjunctivae normal.      Pupils: Pupils are equal, round, and reactive to light.   Cardiovascular:      Rate and Rhythm: Normal rate and regular rhythm.   Pulmonary:      Effort: Pulmonary effort is normal. No respiratory distress.      Breath sounds: No wheezing, rhonchi or rales.   Musculoskeletal:         General: Normal range of motion.      Cervical back: Normal range of motion.   Lymphadenopathy:      Cervical: No cervical adenopathy.   Skin:     General: Skin is warm and dry.      Findings: No rash.   Neurological:      Mental Status: She is alert.              LABS / IMAGING:  Recent Results (from the past 26 weeks)   Inhibin    Collection Time: 10/29/24  8:59 AM   Result Value Ref Range    Inhibin A <5.0 pg/mL   Inhibin B    Collection Time: 10/29/24  8:59 AM   Result Value Ref Range    Inhibin B, Serum <10 pg/mL       Collection Time: 10/29/24  8:59 AM   Result Value Ref Range     10 0 - 30 U/mL   Echo    Collection Time: 02/10/25  9:48 AM   Result Value Ref Range    RA Width 3.77 cm    LA Vol (MOD) 42 mL    LV ESV A2C 28.22 mL    LA area A4C 19.55 cm2    LA area A2C 12.81 cm2    LV ESV A4C 61.19 mL    Left Atrium Major Axis 4.5 cm    Left Atrium Minor Axis 4.8 cm    RA Major Axis 4.18 cm    LV Diastolic Volume 89.61 mL    LV Systolic Volume 32.90 mL    PV Peak D Jovanni 0.54 m/s    PV Peak S Jovanni 0.67 m/s    MV Peak A Jovanni 0.81 m/s    MV stenosis pressure 1/2 time 67.41 ms    TR Max Jovanni 2.7 m/s    MV Peak E Jovanni 0.81 m/s    PV mean gradient 2 mmHg    RVOT peak VTI 20.0 cm    RVOT peak jovanni 0.85 m/s    Ao VTI 38.5 cm    Ao peak jovanni 1.5 m/s    LVOT peak VTI 29.7 cm    LVOT peak jovanni 1.1 m/s    LVOT diameter 2.0 cm    IVRT 100 msec    E wave deceleration time 232 msec    PV peak gradient 4 mmHg    AV mean  gradient 5 mmHg    RV- farley basal diam 4.3 cm    TAPSE 2.88 cm    RVDD 4.25 cm    LA size 3.8 cm    STJ 3.42 cm    Sinus 3.52 cm    LVIDs 2.9 2.1 - 4.0 cm    Ao root annulus 3.28 cm    PW 1.2 (A) 0.6 - 1.1 cm    IVS 1.1 0.6 - 1.1 cm    LVIDd 4.4 3.5 - 6.0 cm    PV PEAK VELOCITY 1.00 m/s    TDI LATERAL 0.07 m/s    Left Ventricular Outflow Tract Mean Gradient 2.83 mmHg    Left Ventricular Outflow Tract Mean Velocity 0.81 cm/s    Left Ventricular End Systolic Volume by Teichholz Method 32.90 mL    Left Ventricular End Diastolic Volume by Teichholz Method 89.61 mL    TDI SEPTAL 0.07 m/s    LV LATERAL E/E' RATIO 11.6 m/s    LV SEPTAL E/E' RATIO 11.6 m/s    RV/LV Ratio 0.98 cm    FS 34.1 28 - 44 %    LV mass 180.0 g    Left Ventricle Relative Wall Thickness 0.55 cm    AV valve area 2.4 cm²    AV Velocity Ratio 0.73     AV index (prosthetic) 0.77     MV valve area p 1/2 method 3.26 cm2    E/A ratio 1.00     Mean e' 0.07 m/s    Pulm vein S/D ratio 1.24     LVOT area 3.1 cm2    LVOT stroke volume 93.3 cm3    AV peak gradient 9 mmHg    E/E' ratio 12 m/s    Triscuspid Valve Regurgitation Peak Gradient 29 mmHg    AGUILAR by Velocity Ratio 2.3 cm²    BSA 1.95 m2    LV Systolic Volume Index 17.4 mL/m2    LV Diastolic Volume Index 47.41 mL/m2    LV Mass Index 95.2 g/m2    LESIA (MOD) 22 mL/m2    ZLVIDS -0.93     ZLVIDD -1.84     LESIA 33 mL/m2    LA Vol 62 cm3    LA WIDTH 4.1 cm    TV resting pulmonary artery pressure 32 mmHg    RV TB RVSP 6 mmHg    Est. RA pres 3 mmHg   Nuclear Stress - Cardiology Interpreted    Collection Time: 02/11/25 11:59 AM   Result Value Ref Range    85% Max Predicted      Max Predicted      OHS CV CPX PATIENT IS MALE 0.0     OHS CV CPX PATIENT IS FEMALE 1.0     dose 9.1 mcg/kg/min    HR at rest 60 bpm    Systolic blood pressure 141 mmHg    Diastolic blood pressure 83 mmHg    RPP 8,460     Peak  bpm    Peak Systolic  mmHg    Peak Diatolic BP 79 mmHg    Peak RPP 14,700     Estimated METs 1      % Max HR Achieved 69     dose 29.0 mcg/kg/min    Nuc Rest EF 68     Nuc Stress EF 78 %   Cortisol Level, AM    Collection Time: 02/12/25  9:49 AM   Result Value Ref Range    Cortisol 11.2 6.2 - 19.4 ug/dL   LIPID PANEL    Collection Time: 02/12/25  9:49 AM   Result Value Ref Range    Cholesterol 244 (H) 100 - 199 mg/dL    Triglycerides 80 0 - 149 mg/dL    HDL 63 >39 mg/dL    VLDL Cholesterol Tonio 14 5 - 40 mg/dL    LDL Calculated 167 (H) 0 - 99 mg/dL   HEMOGLOBIN A1C    Collection Time: 02/12/25  9:49 AM   Result Value Ref Range    Hemoglobin A1C 6.3 (H) 4.8 - 5.6 %   VITAMIN D, 25-HYDROXY    Collection Time: 02/12/25  9:49 AM   Result Value Ref Range    Vitamin D, 25-OH, Total 31.4 30.0 - 100.0 ng/mL   VITAMIN B12    Collection Time: 02/12/25  9:49 AM   Result Value Ref Range    Vitamin B12 Level 1,167 232 - 1,245 pg/mL   CBC WITH DIFFERENTIAL    Collection Time: 02/12/25  9:49 AM   Result Value Ref Range    White Blood Cell Count 6.1 3.4 - 10.8 x10E3/uL    Red Blood Cell Count 5.37 (H) 3.77 - 5.28 x10E6/uL    Hemoglobin 14.5 11.1 - 15.9 g/dL    Hematocrit 45.6 34.0 - 46.6 %    Mean Corpuscular Volume 85 79 - 97 fL    MCH 27 26.6 - 33.0 pg    Mean Corpuscular Hemoglobin Conc 31.8 31.5 - 35.7 g/dL    RDW 13 11.7 - 15.4 %    Platelets 284 150 - 450 x10E3/uL    Neutrophils Relative 65 Not Estab. %    Lymphocytes % 27 Not Estab. %    Monocytes % 6 Not Estab. %    Eosinophils % 2 Not Estab. %    Baso, CSF 0 Not Estab. %    Neutrophils, Abs 3.9 1.4 - 7.0 x10E3/uL    Lymphocytes Absolute 1.7 0.7 - 3.1 x10E3/uL    Monocytes Absolute Count 0.4 0.1 - 0.9 x10E3/uL    Eosinophils, Absolute 0.1 0.0 - 0.4 x10E3/uL    Basophils, Absolute 0 0.0 - 0.2 x10E3/uL    Immature Granulocytes % 0 Not Estab. %    Immature Grans (Abs) 0 0.0 - 0.1 x10E3/uL         Assessment    1. Primary hypertension    2. Coronary artery disease involving native coronary artery of native heart without angina pectoris    3. Hypercholesterolemia    4.  Aortic atherosclerosis    5. Granulosa cell carcinoma of right ovary    6. Iron deficiency anemia, unspecified iron deficiency anemia type    7. Elevated glucose    8. Colon cancer screening          Plan    Liliane was seen today for follow-up.    Diagnoses and all orders for this visit:    Primary hypertension  -     amLODIPine (NORVASC) 10 MG tablet; Take 1 tablet (10 mg total) by mouth once daily.  -     metoprolol succinate (TOPROL-XL) 50 MG 24 hr tablet; Take 1 tablet (50 mg total) by mouth 2 (two) times daily.  -     olmesartan-hydrochlorothiazide (BENICAR HCT) 40-25 mg per tablet; Take 1 tablet by mouth once daily.  -     CBC Auto Differential; Future  -     Comprehensive Metabolic Panel; Future  -     TSH; Future  -     Lipid Panel; Future  -     Hemoglobin A1C; Future    Coronary artery disease involving native coronary artery of native heart without angina pectoris  -     metoprolol succinate (TOPROL-XL) 50 MG 24 hr tablet; Take 1 tablet (50 mg total) by mouth 2 (two) times daily.  -     rosuvastatin (CRESTOR) 20 MG tablet; Take 1 tablet (20 mg total) by mouth every evening.    Hypercholesterolemia  -     rosuvastatin (CRESTOR) 20 MG tablet; Take 1 tablet (20 mg total) by mouth every evening.    Aortic atherosclerosis  -     rosuvastatin (CRESTOR) 20 MG tablet; Take 1 tablet (20 mg total) by mouth every evening.    Granulosa cell carcinoma of right ovary    Iron deficiency anemia, unspecified iron deficiency anemia type  -     CBC Auto Differential; Future  -     Iron and TIBC; Future  -     Ferritin; Future    Elevated glucose  -     Hemoglobin A1C; Future    Colon cancer screening  -     Cologuard Screening (Multitarget Stool DNA); Future  -     Cologuard Screening (Multitarget Stool DNA)    Physically, everything looks good.      She has a lab appointment on April 29 for Urology/Oncology.  We will save her a trip and just get those labs done today, along with our screening labs.    Blood pressure much  better after rechecked.  Continue current meds.      Med refills, as above.      She was due for colon cancer screening later this year.  Discussed colonoscopy versus Cologuard.  She would like to do the Cologuard again.  Order placed.      Recommended she keep her appointment with Cardiology in June.  Sounds like she may need a heart catheterization.    FOLLOW-UP:  Follow up in about 6 months (around 10/14/2025) for check up, with Sade.    I spent a total of 40 minutes face to face and non-face to face on the date of this visit.This includes time preparing to see the patient (eg, review of tests, notes), obtaining and/or reviewing additional history from an independent historian and/or outside medical records, documenting clinical information in the electronic health record, independently interpreting results and/or communicating results to the patient/family/caregiver, or care coordinator.  Visit today included increased complexity associated with the care of the episodic problem addressed and managing the longitudinal care of the patient due to the serious and/or complex managed problem(s).    Signed by:  Kishan Kelley MD       [1]   Current Outpatient Medications on File Prior to Visit   Medication Sig Dispense Refill    ascorbic acid, vitamin C, (VITAMIN C) 500 MG tablet Take 1 tablet (500 mg total) by mouth every evening.      b complex vitamins tablet Take 1 tablet by mouth once daily. B COMPLETE      meloxicam (MOBIC) 15 MG tablet Take 15 mg by mouth every morning.      [DISCONTINUED] amLODIPine (NORVASC) 10 MG tablet Take 1 tablet (10 mg total) by mouth once daily. 90 tablet 3    [DISCONTINUED] metoprolol succinate (TOPROL-XL) 50 MG 24 hr tablet TAKE 1 TABLET(50 MG) BY MOUTH TWICE DAILY 180 tablet 3    aspirin (ECOTRIN) 81 MG EC tablet Take 1 tablet (81 mg total) by mouth once daily. 90 tablet 3    nitroGLYCERIN (NITROSTAT) 0.4 MG SL tablet Place 1 tablet (0.4 mg total) under the tongue every 5  (five) minutes as needed. 20 tablet 12    [DISCONTINUED] mupirocin (BACTROBAN) 2 % ointment Apply topically 2 (two) times daily. 30 g 0    [DISCONTINUED] olmesartan-hydrochlorothiazide (BENICAR HCT) 40-25 mg per tablet Take 1 tablet by mouth once daily. 90 tablet 3    [DISCONTINUED] rosuvastatin (CRESTOR) 20 MG tablet Take 1 tablet (20 mg total) by mouth every evening. 90 tablet 3    [DISCONTINUED] traMADoL (ULTRAM) 50 mg tablet Take 1 tablet (50 mg total) by mouth every 12 (twelve) hours as needed for Pain. 10 tablet 0     No current facility-administered medications on file prior to visit.

## 2025-04-14 NOTE — PATIENT INSTRUCTIONS
Physically, everything looks really good.      You have a lab appointment scheduled on April 29, but since you are here today let us see if we can save you a trip.  We will do those tumor markers today, plus my screening labs.  Results will be posted to [facility] as soon as they are available.      Your blood pressure is a bit elevated today.  We will recheck it before you go.  Please check your pressure at home three or 4 times per week over the next several days.  We will reach out to you next week to get a list of your home blood pressure readings.  If it remains elevated at home, we may need to tweak your blood pressure medicines a bit.    Order placed today for Cologuard colon cancer screening test.  We are due to repeat it later this year.  [facility] may contact you to verify address and insurance info.  When you receive the kit, please try to complete it asap and send it back to them.  If you have any questions regarding completing the test, feel free to call them directly at [phone].  They have 24/7 telephone support available.     Continue to eat a healthy diet.  Be careful with portion sizes.  Includes lots of fresh fruits, vegetables, whole grains, lean proteins.  See info below.    Keep hydrated.  Be sure to drink at least 8-10, 8 oz, glasses of water every day.    Stay active.  Try to do some sort of physical activity every day.  Nothing outrageous, just try walking for 10-15 minutes each day.    Problem: Skin Integrity:  Goal: Will show no infection signs and symptoms  Description: Will show no infection signs and symptoms  1/7/2022 1409 by Jesus Umanzor RN  Outcome: Ongoing  1/7/2022 0237 by Rosalia Albarran RN  Outcome: Met This Shift  Goal: Absence of new skin breakdown  Description: Absence of new skin breakdown  1/7/2022 1409 by Jesus Umanzor RN  Outcome: Ongoing  1/7/2022 0237 by Rosalia Albarran RN  Outcome: Met This Shift     Problem: Falls - Risk of:  Goal: Will remain free from falls  Description: Will remain free from falls  1/7/2022 1409 by Jesus Umanzor RN  Outcome: Ongoing  1/7/2022 0237 by Rosalia Albarran RN  Outcome: Met This Shift  Goal: Absence of physical injury  Description: Absence of physical injury  Outcome: Ongoing     Problem: SAFETY  Goal: Free from accidental physical injury  1/7/2022 1409 by Jesus Umanzor RN  Outcome: Ongoing  1/7/2022 0237 by Rosalia Albarran RN  Outcome: Met This Shift  Goal: Free from intentional harm  Outcome: Ongoing     Problem: DAILY CARE  Goal: Daily care needs are met  1/7/2022 1409 by Jesus Umanzor RN  Outcome: Ongoing  1/7/2022 0237 by Rosalia Albarran RN  Outcome: Met This Shift     Problem: PAIN  Goal: Patient's pain/discomfort is manageable  1/7/2022 1409 by Jesus Umanzor RN  Outcome: Ongoing  1/7/2022 0237 by Rosalia Albarran RN  Outcome: Met This Shift     Problem: SKIN INTEGRITY  Goal: Skin integrity is maintained or improved  1/7/2022 1409 by Jesus Umanzor RN  Outcome: Ongoing  1/7/2022 0237 by Rosalia Albarran RN  Outcome: Met This Shift     Problem: KNOWLEDGE DEFICIT  Goal: Patient/S.O. demonstrates understanding of disease process, treatment plan, medications, and discharge instructions.   Outcome: Ongoing     Problem: DISCHARGE BARRIERS  Goal: Patient's continuum of care needs are met  Outcome: Ongoing     Problem: Mobility - Impaired:  Goal: Mobility will improve  Description: Mobility will improve  1/7/2022 1409 by Jesus Umanzor RN  Outcome: Ongoing  1/7/2022 0237 by Rosalia Albarran RN  Outcome: Met This Shift

## 2025-04-15 ENCOUNTER — RESULTS FOLLOW-UP (OUTPATIENT)
Dept: GYNECOLOGIC ONCOLOGY | Facility: CLINIC | Age: 70
End: 2025-04-15

## 2025-04-16 LAB
INHIBIN A SERPL-MCNC: <5 PG/ML
INHIBIN B SERPL IA-MCNC: <10 PG/ML

## 2025-04-17 ENCOUNTER — PATIENT MESSAGE (OUTPATIENT)
Dept: PRIMARY CARE CLINIC | Facility: CLINIC | Age: 70
End: 2025-04-17
Payer: COMMERCIAL

## 2025-04-23 ENCOUNTER — TELEPHONE (OUTPATIENT)
Dept: PRIMARY CARE CLINIC | Facility: CLINIC | Age: 70
End: 2025-04-23
Payer: COMMERCIAL

## 2025-04-23 VITALS — DIASTOLIC BLOOD PRESSURE: 72 MMHG | SYSTOLIC BLOOD PRESSURE: 132 MMHG

## 2025-04-23 NOTE — TELEPHONE ENCOUNTER
----- Message from Kishan Kelley MD sent at 4/14/2025  8:54 AM CDT -----  Regarding: Home blood pressure readings  Please call her and get her home blood pressure readings.  If they remain elevated, please let me know so I can adjust her medications.If in the normal range, please update her vital signs.

## 2025-05-09 ENCOUNTER — OFFICE VISIT (OUTPATIENT)
Dept: OPHTHALMOLOGY | Facility: CLINIC | Age: 70
End: 2025-05-09
Payer: COMMERCIAL

## 2025-05-09 DIAGNOSIS — H02.823 CYST, EYELID, RIGHT: Primary | ICD-10-CM

## 2025-05-09 PROCEDURE — 99999 PR PBB SHADOW E&M-EST. PATIENT-LVL III: CPT | Mod: PBBFAC,,, | Performed by: STUDENT IN AN ORGANIZED HEALTH CARE EDUCATION/TRAINING PROGRAM

## 2025-05-09 RX ORDER — NEOMYCIN SULFATE, POLYMYXIN B SULFATE, AND DEXAMETHASONE 3.5; 10000; 1 MG/G; [USP'U]/G; MG/G
OINTMENT OPHTHALMIC 3 TIMES DAILY
Qty: 1 EACH | Refills: 0 | Status: SHIPPED | OUTPATIENT
Start: 2025-05-09 | End: 2025-05-23

## 2025-05-09 NOTE — PROGRESS NOTES
HPI     Stye     Additional comments: Pt reports for the removal of a stye on her right   upper lid. No pain or irritation. Pt says she's had the stye for years. Va   stable. No flashes of light or floaters.           Last edited by Brittany Avelar on 5/9/2025  1:30 PM.            Assessment /Plan     For exam results, see Encounter Report.    Cyst, eyelid, right- Preop Diagnosis: Eyelid lesion of the right upper lid     Post Op diagnosis : the same    Procedure : Excision of lesion(s)    Complications: none    Surgeon : Denia Cuenca    Anesthesia : Local infiltration    After obtaining informed consent, the patient was brought to the minor OR suite. Lesion(s) were outlined using a surgical marker. Local infiltration with 2% Xylocaine with epi was delivered.     A sterile prep and drape was performed.  The lesion was excised using 0.12 forceps and Carl scissors. Bleeding was controlled with cautery.     The patient tolerated the procedure well and a pressure patch was applied as needed with steroid antibiotic ophthalmic ointment. The patient was discharged in satisfactory condition and will return as needed.     Start:  Maxitrol TID RUL for 14 days     RTC PRN

## 2025-06-02 ENCOUNTER — RESULTS FOLLOW-UP (OUTPATIENT)
Dept: PRIMARY CARE CLINIC | Facility: CLINIC | Age: 70
End: 2025-06-02

## 2025-06-09 ENCOUNTER — OFFICE VISIT (OUTPATIENT)
Dept: CARDIOLOGY | Facility: CLINIC | Age: 70
End: 2025-06-09
Payer: COMMERCIAL

## 2025-06-09 VITALS
DIASTOLIC BLOOD PRESSURE: 74 MMHG | OXYGEN SATURATION: 96 % | SYSTOLIC BLOOD PRESSURE: 144 MMHG | HEART RATE: 67 BPM | HEIGHT: 63 IN | BODY MASS INDEX: 34.22 KG/M2 | WEIGHT: 193.13 LBS

## 2025-06-09 DIAGNOSIS — R94.31 ABNORMAL ECG: ICD-10-CM

## 2025-06-09 DIAGNOSIS — E66.811 CLASS 1 OBESITY DUE TO EXCESS CALORIES WITH SERIOUS COMORBIDITY AND BODY MASS INDEX (BMI) OF 31.0 TO 31.9 IN ADULT: ICD-10-CM

## 2025-06-09 DIAGNOSIS — I10 PRIMARY HYPERTENSION: ICD-10-CM

## 2025-06-09 DIAGNOSIS — E66.09 CLASS 1 OBESITY DUE TO EXCESS CALORIES WITH SERIOUS COMORBIDITY AND BODY MASS INDEX (BMI) OF 31.0 TO 31.9 IN ADULT: ICD-10-CM

## 2025-06-09 DIAGNOSIS — I51.7 LAE (LEFT ATRIAL ENLARGEMENT): ICD-10-CM

## 2025-06-09 DIAGNOSIS — D50.9 IRON DEFICIENCY ANEMIA, UNSPECIFIED IRON DEFICIENCY ANEMIA TYPE: ICD-10-CM

## 2025-06-09 DIAGNOSIS — I25.10 CORONARY ARTERY DISEASE INVOLVING NATIVE CORONARY ARTERY OF NATIVE HEART WITHOUT ANGINA PECTORIS: Primary | ICD-10-CM

## 2025-06-09 DIAGNOSIS — E78.00 HYPERCHOLESTEROLEMIA: ICD-10-CM

## 2025-06-09 DIAGNOSIS — R93.89 ABNORMAL COMPUTED TOMOGRAPHY ANGIOGRAPHY (CTA): ICD-10-CM

## 2025-06-09 DIAGNOSIS — R00.2 PALPITATIONS: ICD-10-CM

## 2025-06-09 DIAGNOSIS — I70.0 AORTIC ATHEROSCLEROSIS: ICD-10-CM

## 2025-06-09 DIAGNOSIS — R73.03 PRE-DIABETES: ICD-10-CM

## 2025-06-09 DIAGNOSIS — R94.39 ABNORMAL NUCLEAR STRESS TEST: ICD-10-CM

## 2025-06-09 PROCEDURE — 99999 PR PBB SHADOW E&M-EST. PATIENT-LVL III: CPT | Mod: PBBFAC,,, | Performed by: INTERNAL MEDICINE

## 2025-06-09 NOTE — PROGRESS NOTES
Subjective:    Patient ID:  Liliane Ford is a 69 y.o. female who presents for evaluation of Coronary Artery Disease          HPI:  Pt presents for eval.  Her current med conditions include CAD, HTN, ovarian cancer (dx Feb 2024), obesity, LAE, DD, abnl ecg, pre-DM, hypercholesterolemia.  Nonsmoker.  Past hx pertinent for following:  Pt seen as new pt in Nov 2022; per Nov 2022 visit:  She thinks she may have developed a heart condition after having heavy fibroid bleeding issues 2+ years ago, required 4 units PRBC.  PCP added Toprol for palpitations, HTN control in 2022.  Ecg 10/25/22 NSR, old septal infarct.  Old ecgs reviewed: Feb 2020 anterolateral infarct, and 8/30/22 NSR, old septal infarct.   Pt had nuclear stress test Nov 2022.  Personally reviewed: 1.5 mm ST depression, apical defect on my view.  Report indicated normal study.  Due to concerns, I advised Coronary CTA which was done in Nov 2022.  Coronary CTA suggested 70% mid LAD stenosis.  Echo Nov 2022 normal EF, DD, LAE, LVH.  LHC scheduled in Dec 2022 but she had admission Dec 2022 for severe anemia, Hg 4.9  Required multiple PRBC.  LHC was cancelled.  Has been working w hematology on anemia.  S/p hysterectomy + bilateral salpingectomy and right oophorectomy due to fibroid bleeding; dx w ovarian cancer.  S/p left oophorectomy.  ecg 11/16/23 sinus erick 59 bpm, 1 av block, anteroseptal infarct (old).  Now here.  No acute issues noted.  Echo Feb 2025 normal EF, mild TR, PAP 32 mmhg.  Nuclear stress test Feb 2025 reviewed: small area of apical lateral ischemia, normal EF.  No appreciable change from 2022 stress images.  CAD is stable.  Denies chest pain/angina.  Walks for exercise.  No CHF sxs.  No dizziness.  No syncope.  BP controlled.  Compliant w meds.  Chart/labs reviewed.  Lipids went up.  No anemia on recent labs.      Past Medical History:   Diagnosis Date    Coronary artery disease     Encounter for blood transfusion     Hyperlipidemia      "Hypertension     Obesity     Ovarian cancer     Pulmonary edema      Current Outpatient Medications   Medication Instructions    amLODIPine (NORVASC) 10 mg, Oral, Daily    ascorbic acid (vitamin C) (VITAMIN C) 500 mg, Oral, Nightly    aspirin (ECOTRIN) 81 mg, Oral, Daily    b complex vitamins tablet 1 tablet, Daily    meloxicam (MOBIC) 15 mg, Every morning    metoprolol succinate (TOPROL-XL) 50 mg, Oral, 2 times daily    nitroGLYCERIN (NITROSTAT) 0.4 mg, Sublingual, Every 5 min PRN    olmesartan-hydrochlorothiazide (BENICAR HCT) 40-25 mg per tablet 1 tablet, Oral, Daily    rosuvastatin (CRESTOR) 20 mg, Oral, Nightly         Review of Systems   Constitutional: Negative.   HENT: Negative.     Eyes: Negative.    Cardiovascular: Negative.    Respiratory: Negative.     Endocrine: Negative.    Hematologic/Lymphatic: Negative.    Skin: Negative.    Musculoskeletal: Negative.    Gastrointestinal: Negative.    Genitourinary: Negative.    Neurological: Negative.    Psychiatric/Behavioral: Negative.     Allergic/Immunologic: Negative.         BP (!) 144/74 (Patient Position: Sitting)   Pulse 67   Ht 5' 3" (1.6 m)   Wt 87.6 kg (193 lb 2 oz)   SpO2 96%   BMI 34.21 kg/m²     Wt Readings from Last 3 Encounters:   06/09/25 87.6 kg (193 lb 2 oz)   04/14/25 88.3 kg (194 lb 10.7 oz)   02/10/25 85.7 kg (189 lb)     Temp Readings from Last 3 Encounters:   04/14/25 97.8 °F (36.6 °C) (Tympanic)   11/05/24 98.4 °F (36.9 °C)   10/11/24 98 °F (36.7 °C) (Oral)     BP Readings from Last 3 Encounters:   06/09/25 (!) 144/74   04/23/25 132/72   04/14/25 130/82     Pulse Readings from Last 3 Encounters:   06/09/25 67   04/14/25 72   11/05/24 81       Objective:    Physical Exam  Vitals and nursing note reviewed.   Constitutional:       General: She is not in acute distress.     Appearance: Normal appearance. She is well-developed. She is not ill-appearing or diaphoretic.   HENT:      Head: Normocephalic.   Neck:      Thyroid: No thyromegaly. "      Vascular: No carotid bruit or JVD.   Cardiovascular:      Rate and Rhythm: Normal rate and regular rhythm.      Pulses: Normal pulses.           Radial pulses are 2+ on the right side and 2+ on the left side.      Heart sounds: Normal heart sounds, S1 normal and S2 normal. No murmur heard.     No friction rub. No gallop.   Pulmonary:      Effort: Pulmonary effort is normal.      Breath sounds: Normal breath sounds. No wheezing or rales.   Abdominal:      General: Bowel sounds are normal. There is no abdominal bruit.      Palpations: Abdomen is soft.      Tenderness: There is no abdominal tenderness.   Musculoskeletal:      Cervical back: Neck supple.   Lymphadenopathy:      Cervical: No cervical adenopathy.   Skin:     General: Skin is warm.   Neurological:      Mental Status: She is alert and oriented to person, place, and time.   Psychiatric:         Behavior: Behavior normal. Behavior is cooperative.         I have reviewed all pertinent labs and cardiac studies.      Chemistry        Component Value Date/Time     04/14/2025 0914     04/12/2024 1223    K 3.6 04/14/2025 0914    K 4.0 04/12/2024 1223     04/14/2025 0914     04/12/2024 1223    CO2 27 04/14/2025 0914    CO2 27 04/12/2024 1223    BUN 17 04/14/2025 0914    CREATININE 0.9 04/14/2025 0914     04/14/2025 0914     (H) 04/12/2024 1223        Component Value Date/Time    CALCIUM 9.6 04/14/2025 0914    CALCIUM 10.4 04/12/2024 1223    ALKPHOS 83 04/14/2025 0914    ALKPHOS 51 (L) 06/02/2023 0709    AST 33 04/14/2025 0914    AST 20 06/02/2023 0709    ALT 36 04/14/2025 0914    ALT 17 06/02/2023 0709    BILITOT 0.6 04/14/2025 0914    BILITOT 0.7 06/02/2023 0709    ESTGFRAFRICA >60 02/06/2020 0458    EGFRNONAA >60 02/06/2020 0458          Lab Results   Component Value Date    WBC 4.50 04/14/2025    HGB 13.8 04/14/2025    HCT 43.1 04/14/2025    MCV 83 04/14/2025     04/14/2025       Lab Results   Component Value Date     HGBA1C 5.9 (H) 04/14/2025     Lab Results   Component Value Date    CHOL 237 (H) 04/14/2025    CHOL 244 (H) 02/12/2025    CHOL 121 06/02/2023     Lab Results   Component Value Date    HDL 62 04/14/2025    HDL 63 02/12/2025    HDL 43 06/02/2023     Lab Results   Component Value Date    LDLCALC 162.2 (H) 04/14/2025    LDLCALC 167 (H) 02/12/2025    LDLCALC 67.4 06/02/2023     Lab Results   Component Value Date    TRIG 64 04/14/2025    TRIG 80 02/12/2025    TRIG 53 06/02/2023     Lab Results   Component Value Date    CHOLHDL 26.2 04/14/2025    CHOLHDL 35.5 06/02/2023    CHOLHDL 31.6 08/30/2022       Results for orders placed during the hospital encounter of 11/22/22    Echo    Interpretation Summary  · Concentric hypertrophy and normal systolic function.  · The estimated ejection fraction is 60%.  · Grade II left ventricular diastolic dysfunction.  · Normal right ventricular size with normal r`ight ventricular systolic function.  · Mild left atrial enlargement.        CONCLUSION:     1.  CAD-RADS 4/P1.     2.  Single-vessel CAD involving the mid LAD.  This is a 2.5 cm long mild (25-49%) lesion with a possible focal 70% stenosis.  FFR-CT is not available.     3. Coronary calcium score 13 (36 percentile).     4.  Post processed images are available for review in PACS under the heading Recons: HKD and HKD LAD CPR.        Results for orders placed during the hospital encounter of 02/10/25    Echo    Interpretation Summary    Left Ventricle: The left ventricle is normal in size. Ventricular mass is normal. Mildly increased wall thickness. There is concentric hypertrophy. There is normal systolic function with a visually estimated ejection fraction of 55 - 60%.    Right Ventricle: Normal right ventricular cavity size. Wall thickness is normal. Systolic function is normal.    Tricuspid Valve: There is mild regurgitation.    Pulmonary Artery: The estimated pulmonary artery systolic pressure is 32 mmHg.    IVC/SVC: Normal  venous pressure at 3 mmHg.      Results for orders placed during the hospital encounter of 02/11/25    Nuclear Stress - Cardiology Interpreted    Interpretation Summary    Abnormal myocardial perfusion scan.    There is a mild to moderate intensity, reversible perfusion abnormality that is consistent with ischemia in the apical lateral wall(s).    The gated perfusion images showed an ejection fraction of 68% at rest. The gated perfusion images showed an ejection fraction of 78% post stress.    The ECG portion of the study is negative for ischemia.    The patient reported no chest pain during the stress test.        Results for orders placed during the hospital encounter of 02/11/25    Nuclear Stress - Cardiology Interpreted    Interpretation Summary    Abnormal myocardial perfusion scan.    There is a mild to moderate intensity, reversible perfusion abnormality that is consistent with ischemia in the apical lateral wall(s).    The gated perfusion images showed an ejection fraction of 68% at rest. The gated perfusion images showed an ejection fraction of 78% post stress.    The ECG portion of the study is negative for ischemia.    The patient reported no chest pain during the stress test.        Assessment:       1. Coronary artery disease involving native coronary artery of native heart without angina pectoris    2. Abnormal nuclear stress test    3. Aortic atherosclerosis    4. Abnormal ECG    5. LAE (left atrial enlargement)    6. Hypercholesterolemia    7. Iron deficiency anemia, unspecified iron deficiency anemia type    8. Primary hypertension    9. Palpitations    10. Class 1 obesity due to excess calories with serious comorbidity and body mass index (BMI) of 31.0 to 31.9 in adult    11. Abnormal computed tomography angiography (CTA)    12. Pre-diabetes         Plan:               Stable CV status on current med tx.  CAD: Nuclear stress test Nov 2022  correlates with her coronary CTA suggestive of 70% mid LAD  stenosis.  Continue medical tx for CAD.  LHC was cancelled Dec 2022 due to critical anemia.  On prior clinic visits around 2022 - 2023, I highly doubted she could tolerate DAPT should any PCI be performed due to bleeding, h/o severe anemia and recommended continue medical tx and risk factor modification.  Anemia now corrected with Urological surgery.  Nuclear stress test Feb 2025 -- no change in ischemia from 2022 study.  Will continue on with medical tx and CV risk factor modification and reserve LHC option should any concerning anginal sxs develop.  Sublingual nitroglycerin 0.4 mg for concerning chest pain episodes or breakthrough angina.  Patient advised of indications, side effects of nitroglycerin and when to report to ER.   Reviewed all tests and above medical conditions with patient in detail and formulated treatment plan.  Continue optimal medical treatment for cardiovascular conditions.  Cardiac low salt diet advised.  Daily exercise encouraged, with the goal 30 +  minutes aerobic exercise as tolerated.  Obesity: Maintaining healthy weight and weight loss goals (if needed) were discussed in clinic.  HTN: Need for BP control and HTN goals (if needed) were discussed and tx plan formulated.  Goal < 130/80.  Continue current HTN meds.  Home BP monitoring advised.  Abnl ecg: chronically abnormal with anteroseptal infarct for years. Continue to monitor.  Lipids:  Statin tx. LDL goal 60.  Palpitations: Continue beta-blocker tx.  Anemia: f/u w heme/onc as advised.  Pre-DM: optimal HGAIC control needed.      F/u in 6 months.    I have reviewed all pertinent labs and cardiac studies independently. Plans and recommendations have been formulated under my direct supervision. All questions answered and patient voiced understanding.

## 2025-06-21 DIAGNOSIS — I10 PRIMARY HYPERTENSION: ICD-10-CM

## 2025-06-21 NOTE — TELEPHONE ENCOUNTER
No care due was identified.  [facility] Embedded Care Due Messages. Reference number: [id number].   6/21/2025 4:10:38 AM CDT

## 2025-06-21 NOTE — TELEPHONE ENCOUNTER
Refill Routing Note   Medication(s) are not appropriate for processing by [facility] for the following reason(s):        Required vitals abnormal    [facility] action(s):  Defer             Appointments  past 12m or future 3m with PCP    Date Provider   Last Visit   4/14/2025 Kishan Kelley MD   Next Visit   Visit date not found Kishan Kelley MD   ED visits in past 90 days: 0        Note composed:11:27 AM 06/21/2025

## 2025-06-23 DIAGNOSIS — Z00.00 ENCOUNTER FOR MEDICARE ANNUAL WELLNESS EXAM: ICD-10-CM

## 2025-06-23 RX ORDER — AMLODIPINE BESYLATE 10 MG/1
10 TABLET ORAL
Qty: 90 TABLET | Refills: 3 | Status: SHIPPED | OUTPATIENT
Start: 2025-06-23

## (undated) DEVICE — COVER LIGHT HANDLE 80/CA

## (undated) DEVICE — SOL IRRI STRL WATER 1000ML

## (undated) DEVICE — ADHESIVE DERMABOND ADVANCED

## (undated) DEVICE — TRAY CATH FOL SIL URIMTR 16FR

## (undated) DEVICE — SUT MONOCRYL 4-0 PS-1 UND

## (undated) DEVICE — SOL ELECTROLUBE ANTI-STIC

## (undated) DEVICE — SUT ABS CLIP LAPRA-TY CTD

## (undated) DEVICE — SUT VICRYL PLUS 0 CT1 36IN

## (undated) DEVICE — NDL INSUFFLATION VERRES 120MM

## (undated) DEVICE — KIT WING PAD POSITIONING

## (undated) DEVICE — PACK BASIC SETUP SC BR

## (undated) DEVICE — GLOVE SENSICARE PI GRN 7

## (undated) DEVICE — JELLY SURGILUBE 5GR

## (undated) DEVICE — SYR 50CC LL

## (undated) DEVICE — GLOVE SENSICARE PI SURG 6.5

## (undated) DEVICE — OBTURATOR BLADELESS 8MM XI CLR

## (undated) DEVICE — DRAPE LAVH SURG 109X109X100IN

## (undated) DEVICE — IRRIGATOR ENDOSCOPY DISP.

## (undated) DEVICE — DRAPE UINDERBUT GRAD PCH

## (undated) DEVICE — NDL PNEUMO INSUFFLATI 120MM

## (undated) DEVICE — SYR LUER LOCK STERILE 10ML

## (undated) DEVICE — SEAL UNIVERSAL 5MM-8MM XI

## (undated) DEVICE — SOL POVIDONE PREP IODINE 4 OZ

## (undated) DEVICE — DRAPE COLUMN DAVINCI XI

## (undated) DEVICE — DRAPE ARM DAVINCI XI

## (undated) DEVICE — BLADE SURG CARBON STEEL #10

## (undated) DEVICE — TRAY DO THE ROBOT

## (undated) DEVICE — DEVICE SNAPSECURE FOL CATH

## (undated) DEVICE — SYS SEE SHARP SCP ANTIFG LNG

## (undated) DEVICE — COVER TIP CURVED SCISSORS XI

## (undated) DEVICE — KIT ANTIFOG W/SPONG & FLUID

## (undated) DEVICE — GLOVE SIGNATURE ESSNTL LTX 7

## (undated) DEVICE — PORT ACCESS 5MM W/120MM

## (undated) DEVICE — ELECTRODE REM PLYHSV RETURN 9

## (undated) DEVICE — SOL NORMAL USPCA 0.9%

## (undated) DEVICE — SET PNEUMOCLEAR HEAT HUM SE HF

## (undated) DEVICE — INSERT CUSHIONPRONE VIEW LARGE

## (undated) DEVICE — BAG TISSUE RETRIEVAL 5MM

## (undated) DEVICE — SYR 10CC LUER LOCK

## (undated) DEVICE — APPLICATOR CHLORAPREP ORN 26ML

## (undated) DEVICE — DRAPE STERI LONG

## (undated) DEVICE — SYR 3CC LUER LOC

## (undated) DEVICE — OCCLUDER COLPO-PNEUMO STERILE

## (undated) DEVICE — COVER PROXIMA MAYO STAND

## (undated) DEVICE — SUT MCRYL PLUS 4-0 PS2 27IN

## (undated) DEVICE — TRAY SKIN SCRUB WET 4 COMPART

## (undated) DEVICE — BAG TISS RETRV MONARCH 10MM

## (undated) DEVICE — TIP RUMI GREEN DISPOSABLE6.7MM

## (undated) DEVICE — SET TRI-LUMEN FILTERED TUBE

## (undated) DEVICE — SOL NACL IRR 1000ML BTL

## (undated) DEVICE — SOL POVIDONE SCRUB IODINE 4 OZ

## (undated) DEVICE — GOWN POLY REINF BRTH SLV XL

## (undated) DEVICE — TOWEL OR DISP STRL BLUE 4/PK

## (undated) DEVICE — SEALER VESSEL EXTEND